# Patient Record
Sex: FEMALE | Race: WHITE | NOT HISPANIC OR LATINO | Employment: OTHER | URBAN - METROPOLITAN AREA
[De-identification: names, ages, dates, MRNs, and addresses within clinical notes are randomized per-mention and may not be internally consistent; named-entity substitution may affect disease eponyms.]

---

## 2021-01-18 ENCOUNTER — CLINICAL SUPPORT (OUTPATIENT)
Dept: URGENT CARE | Facility: CLINIC | Age: 66
End: 2021-01-18
Payer: MEDICARE

## 2021-01-18 DIAGNOSIS — J06.9 UPPER RESPIRATORY TRACT INFECTION, UNSPECIFIED TYPE: Primary | ICD-10-CM

## 2021-01-18 DIAGNOSIS — U07.1 COVID-19 VIRUS DETECTED: ICD-10-CM

## 2021-01-18 LAB
CTP QC/QA: YES
SARS-COV-2 RDRP RESP QL NAA+PROBE: POSITIVE

## 2021-01-18 PROCEDURE — 99211 PR OFFICE/OUTPT VISIT, EST, LEVL I: ICD-10-PCS | Mod: S$GLB,,, | Performed by: NURSE PRACTITIONER

## 2021-01-18 PROCEDURE — 99211 OFF/OP EST MAY X REQ PHY/QHP: CPT | Mod: S$GLB,,, | Performed by: NURSE PRACTITIONER

## 2021-01-18 PROCEDURE — U0002: ICD-10-PCS | Mod: QW,S$GLB,, | Performed by: NURSE PRACTITIONER

## 2021-01-18 PROCEDURE — U0002 COVID-19 LAB TEST NON-CDC: HCPCS | Mod: QW,S$GLB,, | Performed by: NURSE PRACTITIONER

## 2021-01-22 ENCOUNTER — NURSE TRIAGE (OUTPATIENT)
Dept: ADMINISTRATIVE | Facility: CLINIC | Age: 66
End: 2021-01-22

## 2021-02-01 ENCOUNTER — PATIENT OUTREACH (OUTPATIENT)
Dept: ADMINISTRATIVE | Facility: CLINIC | Age: 66
End: 2021-02-01

## 2021-02-01 ENCOUNTER — EXTERNAL HOSPITAL ADMISSION (OUTPATIENT)
Dept: ADMINISTRATIVE | Facility: CLINIC | Age: 66
End: 2021-02-01

## 2021-02-11 ENCOUNTER — PATIENT OUTREACH (OUTPATIENT)
Dept: ADMINISTRATIVE | Facility: CLINIC | Age: 66
End: 2021-02-11

## 2021-02-11 ENCOUNTER — EXTERNAL HOSPITAL ADMISSION (OUTPATIENT)
Dept: ADMINISTRATIVE | Facility: CLINIC | Age: 66
End: 2021-02-11

## 2021-02-13 ENCOUNTER — HOSPITAL ENCOUNTER (INPATIENT)
Facility: HOSPITAL | Age: 66
LOS: 7 days | Discharge: HOME | DRG: 389 | End: 2021-02-22
Attending: EMERGENCY MEDICINE | Admitting: SPECIALIST
Payer: MEDICARE

## 2021-02-13 DIAGNOSIS — K55.069 OCCLUSION OF SUPERIOR MESENTERIC ARTERY: ICD-10-CM

## 2021-02-13 DIAGNOSIS — K55.1 CHRONIC INTESTINAL ISCHEMIA: ICD-10-CM

## 2021-02-13 DIAGNOSIS — R07.89 CHEST DISCOMFORT: ICD-10-CM

## 2021-02-13 DIAGNOSIS — N17.9 AKI (ACUTE KIDNEY INJURY): Primary | ICD-10-CM

## 2021-02-13 DIAGNOSIS — E86.0 DEHYDRATION: ICD-10-CM

## 2021-02-13 DIAGNOSIS — K56.609 SBO (SMALL BOWEL OBSTRUCTION): ICD-10-CM

## 2021-02-13 DIAGNOSIS — F32.A ANXIETY AND DEPRESSION: ICD-10-CM

## 2021-02-13 DIAGNOSIS — R06.02 SOB (SHORTNESS OF BREATH): ICD-10-CM

## 2021-02-13 DIAGNOSIS — D64.9 CHRONIC ANEMIA: ICD-10-CM

## 2021-02-13 DIAGNOSIS — R19.7 DIARRHEA, UNSPECIFIED TYPE: ICD-10-CM

## 2021-02-13 DIAGNOSIS — F41.9 ANXIETY AND DEPRESSION: ICD-10-CM

## 2021-02-13 LAB
ALBUMIN SERPL BCP-MCNC: 2.8 G/DL (ref 3.5–5.2)
ALLENS TEST: ABNORMAL
ALP SERPL-CCNC: 109 U/L (ref 55–135)
ALT SERPL W/O P-5'-P-CCNC: 23 U/L (ref 10–44)
ANION GAP SERPL CALC-SCNC: 13 MMOL/L (ref 8–16)
AST SERPL-CCNC: 20 U/L (ref 10–40)
BACTERIA #/AREA URNS HPF: ABNORMAL /HPF
BASOPHILS # BLD AUTO: 0.02 K/UL (ref 0–0.2)
BASOPHILS NFR BLD: 0.1 % (ref 0–1.9)
BILIRUB SERPL-MCNC: 0.9 MG/DL (ref 0.1–1)
BILIRUB UR QL STRIP: ABNORMAL
BUN SERPL-MCNC: 38 MG/DL (ref 8–23)
C DIFF GDH STL QL: NEGATIVE
C DIFF TOX A+B STL QL IA: NEGATIVE
CALCIUM SERPL-MCNC: 8.2 MG/DL (ref 8.7–10.5)
CHLORIDE SERPL-SCNC: 102 MMOL/L (ref 95–110)
CK SERPL-CCNC: 99 U/L (ref 20–180)
CLARITY UR: ABNORMAL
CO2 SERPL-SCNC: 21 MMOL/L (ref 23–29)
COLOR UR: ABNORMAL
CREAT SERPL-MCNC: 2 MG/DL (ref 0.5–1.4)
DELSYS: ABNORMAL
DIFFERENTIAL METHOD: ABNORMAL
EOSINOPHIL # BLD AUTO: 0 K/UL (ref 0–0.5)
EOSINOPHIL NFR BLD: 0.1 % (ref 0–8)
ERYTHROCYTE [DISTWIDTH] IN BLOOD BY AUTOMATED COUNT: 15.5 % (ref 11.5–14.5)
EST. GFR  (AFRICAN AMERICAN): 30 ML/MIN/1.73 M^2
EST. GFR  (NON AFRICAN AMERICAN): 26 ML/MIN/1.73 M^2
ESTIMATED AVG GLUCOSE: 103 MG/DL (ref 68–131)
GIANT PLATELETS BLD QL SMEAR: PRESENT
GLUCOSE SERPL-MCNC: 159 MG/DL (ref 70–110)
GLUCOSE UR QL STRIP: NEGATIVE
HBA1C MFR BLD: 5.2 % (ref 4–5.6)
HCO3 UR-SCNC: 21.3 MMOL/L (ref 24–28)
HCT VFR BLD AUTO: 31.5 % (ref 37–48.5)
HGB BLD-MCNC: 9.7 G/DL (ref 12–16)
HGB UR QL STRIP: NEGATIVE
HYALINE CASTS #/AREA URNS LPF: 2 /LPF
IMM GRANULOCYTES # BLD AUTO: 0.27 K/UL (ref 0–0.04)
IMM GRANULOCYTES NFR BLD AUTO: 1.4 % (ref 0–0.5)
KETONES UR QL STRIP: NEGATIVE
LACTATE SERPL-SCNC: 1.7 MMOL/L (ref 0.5–2.2)
LEUKOCYTE ESTERASE UR QL STRIP: NEGATIVE
LYMPHOCYTES # BLD AUTO: 1.5 K/UL (ref 1–4.8)
LYMPHOCYTES NFR BLD: 7.6 % (ref 18–48)
MAGNESIUM SERPL-MCNC: 1.9 MG/DL (ref 1.6–2.6)
MCH RBC QN AUTO: 30.5 PG (ref 27–31)
MCHC RBC AUTO-ENTMCNC: 30.8 G/DL (ref 32–36)
MCV RBC AUTO: 99 FL (ref 82–98)
MICROSCOPIC COMMENT: ABNORMAL
MODE: ABNORMAL
MONOCYTES # BLD AUTO: 2.4 K/UL (ref 0.3–1)
MONOCYTES NFR BLD: 12.5 % (ref 4–15)
NEUTROPHILS # BLD AUTO: 15 K/UL (ref 1.8–7.7)
NEUTROPHILS NFR BLD: 78.3 % (ref 38–73)
NITRITE UR QL STRIP: NEGATIVE
NRBC BLD-RTO: 0 /100 WBC
OB PNL STL: POSITIVE
PCO2 BLDA: 42.5 MMHG (ref 35–45)
PH SMN: 7.31 [PH] (ref 7.35–7.45)
PH UR STRIP: 5 [PH] (ref 5–8)
PHOSPHATE SERPL-MCNC: 2.9 MG/DL (ref 2.7–4.5)
PLATELET # BLD AUTO: 269 K/UL (ref 150–350)
PLATELET BLD QL SMEAR: ABNORMAL
PMV BLD AUTO: 9.9 FL (ref 9.2–12.9)
PO2 BLDA: 35 MMHG (ref 40–60)
POC BE: -5 MMOL/L
POC SATURATED O2: 61 % (ref 95–100)
POC TCO2: 23 MMOL/L (ref 24–29)
POCT GLUCOSE: 113 MG/DL (ref 70–110)
POTASSIUM SERPL-SCNC: 3.7 MMOL/L (ref 3.5–5.1)
PROT SERPL-MCNC: 6.9 G/DL (ref 6–8.4)
PROT UR QL STRIP: ABNORMAL
RBC # BLD AUTO: 3.18 M/UL (ref 4–5.4)
RBC #/AREA URNS HPF: 4 /HPF (ref 0–4)
SAMPLE: ABNORMAL
SITE: ABNORMAL
SODIUM SERPL-SCNC: 136 MMOL/L (ref 136–145)
SP GR UR STRIP: 1.03 (ref 1–1.03)
TSH SERPL DL<=0.005 MIU/L-ACNC: 1.55 UIU/ML (ref 0.4–4)
URN SPEC COLLECT METH UR: ABNORMAL
UROBILINOGEN UR STRIP-ACNC: NEGATIVE EU/DL
WBC # BLD AUTO: 19.14 K/UL (ref 3.9–12.7)
WBC #/AREA STL HPF: NORMAL /[HPF]
WBC #/AREA URNS HPF: 2 /HPF (ref 0–5)
WBC CLUMPS URNS QL MICRO: ABNORMAL

## 2021-02-13 PROCEDURE — 84100 ASSAY OF PHOSPHORUS: CPT

## 2021-02-13 PROCEDURE — 87046 STOOL CULTR AEROBIC BACT EA: CPT

## 2021-02-13 PROCEDURE — 83036 HEMOGLOBIN GLYCOSYLATED A1C: CPT

## 2021-02-13 PROCEDURE — 63600175 PHARM REV CODE 636 W HCPCS: Performed by: STUDENT IN AN ORGANIZED HEALTH CARE EDUCATION/TRAINING PROGRAM

## 2021-02-13 PROCEDURE — 99285 EMERGENCY DEPT VISIT HI MDM: CPT | Mod: 25

## 2021-02-13 PROCEDURE — 36569 INSJ PICC 5 YR+ W/O IMAGING: CPT

## 2021-02-13 PROCEDURE — 87449 NOS EACH ORGANISM AG IA: CPT

## 2021-02-13 PROCEDURE — 82803 BLOOD GASES ANY COMBINATION: CPT

## 2021-02-13 PROCEDURE — 96361 HYDRATE IV INFUSION ADD-ON: CPT | Performed by: EMERGENCY MEDICINE

## 2021-02-13 PROCEDURE — 25000003 PHARM REV CODE 250: Performed by: STUDENT IN AN ORGANIZED HEALTH CARE EDUCATION/TRAINING PROGRAM

## 2021-02-13 PROCEDURE — G0378 HOSPITAL OBSERVATION PER HR: HCPCS

## 2021-02-13 PROCEDURE — 82550 ASSAY OF CK (CPK): CPT

## 2021-02-13 PROCEDURE — 83735 ASSAY OF MAGNESIUM: CPT

## 2021-02-13 PROCEDURE — 96361 HYDRATE IV INFUSION ADD-ON: CPT

## 2021-02-13 PROCEDURE — 89055 LEUKOCYTE ASSESSMENT FECAL: CPT

## 2021-02-13 PROCEDURE — 83630 LACTOFERRIN FECAL (QUAL): CPT

## 2021-02-13 PROCEDURE — 25000003 PHARM REV CODE 250: Performed by: EMERGENCY MEDICINE

## 2021-02-13 PROCEDURE — 85025 COMPLETE CBC W/AUTO DIFF WBC: CPT

## 2021-02-13 PROCEDURE — C1751 CATH, INF, PER/CENT/MIDLINE: HCPCS

## 2021-02-13 PROCEDURE — 81000 URINALYSIS NONAUTO W/SCOPE: CPT

## 2021-02-13 PROCEDURE — 96360 HYDRATION IV INFUSION INIT: CPT | Mod: 59

## 2021-02-13 PROCEDURE — 84443 ASSAY THYROID STIM HORMONE: CPT

## 2021-02-13 PROCEDURE — A4216 STERILE WATER/SALINE, 10 ML: HCPCS | Performed by: STUDENT IN AN ORGANIZED HEALTH CARE EDUCATION/TRAINING PROGRAM

## 2021-02-13 PROCEDURE — 87209 SMEAR COMPLEX STAIN: CPT

## 2021-02-13 PROCEDURE — 87324 CLOSTRIDIUM AG IA: CPT

## 2021-02-13 PROCEDURE — 96372 THER/PROPH/DIAG INJ SC/IM: CPT | Mod: 59 | Performed by: EMERGENCY MEDICINE

## 2021-02-13 PROCEDURE — 83605 ASSAY OF LACTIC ACID: CPT

## 2021-02-13 PROCEDURE — 82272 OCCULT BLD FECES 1-3 TESTS: CPT

## 2021-02-13 PROCEDURE — 80053 COMPREHEN METABOLIC PANEL: CPT

## 2021-02-13 PROCEDURE — 96374 THER/PROPH/DIAG INJ IV PUSH: CPT | Performed by: EMERGENCY MEDICINE

## 2021-02-13 PROCEDURE — 87045 FECES CULTURE AEROBIC BACT: CPT

## 2021-02-13 PROCEDURE — 87427 SHIGA-LIKE TOXIN AG IA: CPT | Mod: 59

## 2021-02-13 RX ORDER — ALBUTEROL SULFATE 90 UG/1
2 AEROSOL, METERED RESPIRATORY (INHALATION) EVERY 6 HOURS PRN
Status: DISCONTINUED | OUTPATIENT
Start: 2021-02-13 | End: 2021-02-18

## 2021-02-13 RX ORDER — IBUPROFEN 200 MG
24 TABLET ORAL
Status: DISCONTINUED | OUTPATIENT
Start: 2021-02-13 | End: 2021-02-22 | Stop reason: HOSPADM

## 2021-02-13 RX ORDER — GLUCAGON 1 MG
1 KIT INJECTION
Status: DISCONTINUED | OUTPATIENT
Start: 2021-02-13 | End: 2021-02-22 | Stop reason: HOSPADM

## 2021-02-13 RX ORDER — SODIUM CHLORIDE 0.9 % (FLUSH) 0.9 %
10 SYRINGE (ML) INJECTION
Status: DISCONTINUED | OUTPATIENT
Start: 2021-02-13 | End: 2021-02-22 | Stop reason: HOSPADM

## 2021-02-13 RX ORDER — QUETIAPINE FUMARATE 200 MG/1
200 TABLET, FILM COATED ORAL NIGHTLY
COMMUNITY

## 2021-02-13 RX ORDER — ENOXAPARIN SODIUM 100 MG/ML
40 INJECTION SUBCUTANEOUS EVERY 24 HOURS
Status: DISCONTINUED | OUTPATIENT
Start: 2021-02-13 | End: 2021-02-18

## 2021-02-13 RX ORDER — BUDESONIDE AND FORMOTEROL FUMARATE DIHYDRATE 160; 4.5 UG/1; UG/1
2 AEROSOL RESPIRATORY (INHALATION) EVERY 12 HOURS
COMMUNITY
End: 2021-03-03 | Stop reason: SDUPTHER

## 2021-02-13 RX ORDER — ENOXAPARIN SODIUM 100 MG/ML
40 INJECTION SUBCUTANEOUS EVERY 24 HOURS
Status: DISCONTINUED | OUTPATIENT
Start: 2021-02-13 | End: 2021-02-13 | Stop reason: SDUPTHER

## 2021-02-13 RX ORDER — BENZONATATE 100 MG/1
200 CAPSULE ORAL 3 TIMES DAILY PRN
COMMUNITY
End: 2021-03-03 | Stop reason: SDUPTHER

## 2021-02-13 RX ORDER — ACETAMINOPHEN 325 MG/1
650 TABLET ORAL EVERY 8 HOURS PRN
Status: DISCONTINUED | OUTPATIENT
Start: 2021-02-13 | End: 2021-02-22 | Stop reason: HOSPADM

## 2021-02-13 RX ORDER — RIVAROXABAN 20 MG/1
20 TABLET, FILM COATED ORAL
COMMUNITY
End: 2021-03-03 | Stop reason: SDUPTHER

## 2021-02-13 RX ORDER — ONDANSETRON 2 MG/ML
4 INJECTION INTRAMUSCULAR; INTRAVENOUS ONCE
Status: COMPLETED | OUTPATIENT
Start: 2021-02-13 | End: 2021-02-13

## 2021-02-13 RX ORDER — ACETAMINOPHEN 325 MG/1
650 TABLET ORAL EVERY 4 HOURS PRN
Status: DISCONTINUED | OUTPATIENT
Start: 2021-02-13 | End: 2021-02-22 | Stop reason: HOSPADM

## 2021-02-13 RX ORDER — SODIUM CHLORIDE 0.9 % (FLUSH) 0.9 %
10 SYRINGE (ML) INJECTION EVERY 6 HOURS
Status: DISCONTINUED | OUTPATIENT
Start: 2021-02-13 | End: 2021-02-22 | Stop reason: HOSPADM

## 2021-02-13 RX ORDER — SODIUM CHLORIDE, SODIUM LACTATE, POTASSIUM CHLORIDE, CALCIUM CHLORIDE 600; 310; 30; 20 MG/100ML; MG/100ML; MG/100ML; MG/100ML
INJECTION, SOLUTION INTRAVENOUS CONTINUOUS
Status: ACTIVE | OUTPATIENT
Start: 2021-02-13 | End: 2021-02-14

## 2021-02-13 RX ORDER — INSULIN ASPART 100 [IU]/ML
1-10 INJECTION, SOLUTION INTRAVENOUS; SUBCUTANEOUS
Status: DISCONTINUED | OUTPATIENT
Start: 2021-02-13 | End: 2021-02-22 | Stop reason: HOSPADM

## 2021-02-13 RX ORDER — ALBUTEROL SULFATE 90 UG/1
2 AEROSOL, METERED RESPIRATORY (INHALATION) EVERY 6 HOURS PRN
COMMUNITY
End: 2021-05-24 | Stop reason: SDUPTHER

## 2021-02-13 RX ORDER — IBUPROFEN 200 MG
16 TABLET ORAL
Status: DISCONTINUED | OUTPATIENT
Start: 2021-02-13 | End: 2021-02-22 | Stop reason: HOSPADM

## 2021-02-13 RX ORDER — FLUTICASONE FUROATE AND VILANTEROL TRIFENATATE 200; 25 UG/1; UG/1
1 POWDER RESPIRATORY (INHALATION) DAILY
Status: ON HOLD | COMMUNITY
End: 2021-02-22 | Stop reason: HOSPADM

## 2021-02-13 RX ORDER — SODIUM CHLORIDE 0.9 % (FLUSH) 0.9 %
5 SYRINGE (ML) INJECTION
Status: DISCONTINUED | OUTPATIENT
Start: 2021-02-13 | End: 2021-02-22 | Stop reason: HOSPADM

## 2021-02-13 RX ADMIN — ONDANSETRON 4 MG: 2 INJECTION INTRAMUSCULAR; INTRAVENOUS at 07:02

## 2021-02-13 RX ADMIN — Medication 10 ML: at 06:02

## 2021-02-13 RX ADMIN — ENOXAPARIN SODIUM 40 MG: 40 INJECTION SUBCUTANEOUS at 05:02

## 2021-02-13 RX ADMIN — SODIUM CHLORIDE, SODIUM LACTATE, POTASSIUM CHLORIDE, AND CALCIUM CHLORIDE 1000 ML: .6; .31; .03; .02 INJECTION, SOLUTION INTRAVENOUS at 01:02

## 2021-02-13 RX ADMIN — SODIUM CHLORIDE, SODIUM LACTATE, POTASSIUM CHLORIDE, AND CALCIUM CHLORIDE: .6; .31; .03; .02 INJECTION, SOLUTION INTRAVENOUS at 07:02

## 2021-02-13 RX ADMIN — SODIUM CHLORIDE 1000 ML: 0.9 INJECTION, SOLUTION INTRAVENOUS at 08:02

## 2021-02-13 RX ADMIN — SODIUM CHLORIDE, SODIUM LACTATE, POTASSIUM CHLORIDE, AND CALCIUM CHLORIDE 1000 ML: .6; .31; .03; .02 INJECTION, SOLUTION INTRAVENOUS at 04:02

## 2021-02-14 LAB
ABO + RH BLD: NORMAL
ALBUMIN SERPL BCP-MCNC: 2 G/DL (ref 3.5–5.2)
ALP SERPL-CCNC: 84 U/L (ref 55–135)
ALT SERPL W/O P-5'-P-CCNC: 16 U/L (ref 10–44)
ANION GAP SERPL CALC-SCNC: 6 MMOL/L (ref 8–16)
AST SERPL-CCNC: 13 U/L (ref 10–40)
BASOPHILS # BLD AUTO: 0.01 K/UL (ref 0–0.2)
BASOPHILS # BLD AUTO: 0.02 K/UL (ref 0–0.2)
BASOPHILS NFR BLD: 0.1 % (ref 0–1.9)
BASOPHILS NFR BLD: 0.1 % (ref 0–1.9)
BILIRUB SERPL-MCNC: 0.8 MG/DL (ref 0.1–1)
BLD GP AB SCN CELLS X3 SERPL QL: NORMAL
BLD PROD TYP BPU: NORMAL
BLOOD UNIT EXPIRATION DATE: NORMAL
BLOOD UNIT TYPE CODE: 6200
BLOOD UNIT TYPE: NORMAL
BUN SERPL-MCNC: 24 MG/DL (ref 8–23)
CALCIUM SERPL-MCNC: 7.7 MG/DL (ref 8.7–10.5)
CHLORIDE SERPL-SCNC: 108 MMOL/L (ref 95–110)
CO2 SERPL-SCNC: 24 MMOL/L (ref 23–29)
CODING SYSTEM: NORMAL
CREAT SERPL-MCNC: 0.9 MG/DL (ref 0.5–1.4)
DIFFERENTIAL METHOD: ABNORMAL
DIFFERENTIAL METHOD: ABNORMAL
DISPENSE STATUS: NORMAL
EOSINOPHIL # BLD AUTO: 0.1 K/UL (ref 0–0.5)
EOSINOPHIL # BLD AUTO: 0.1 K/UL (ref 0–0.5)
EOSINOPHIL NFR BLD: 0.6 % (ref 0–8)
EOSINOPHIL NFR BLD: 0.6 % (ref 0–8)
ERYTHROCYTE [DISTWIDTH] IN BLOOD BY AUTOMATED COUNT: 15.6 % (ref 11.5–14.5)
ERYTHROCYTE [DISTWIDTH] IN BLOOD BY AUTOMATED COUNT: 15.7 % (ref 11.5–14.5)
EST. GFR  (AFRICAN AMERICAN): >60 ML/MIN/1.73 M^2
EST. GFR  (NON AFRICAN AMERICAN): >60 ML/MIN/1.73 M^2
GLUCOSE SERPL-MCNC: 91 MG/DL (ref 70–110)
HCT VFR BLD AUTO: 22.6 % (ref 37–48.5)
HCT VFR BLD AUTO: 23.4 % (ref 37–48.5)
HGB BLD-MCNC: 7.1 G/DL (ref 12–16)
HGB BLD-MCNC: 7.3 G/DL (ref 12–16)
IMM GRANULOCYTES # BLD AUTO: 0.11 K/UL (ref 0–0.04)
IMM GRANULOCYTES # BLD AUTO: 0.12 K/UL (ref 0–0.04)
IMM GRANULOCYTES NFR BLD AUTO: 0.8 % (ref 0–0.5)
IMM GRANULOCYTES NFR BLD AUTO: 0.9 % (ref 0–0.5)
LACTATE SERPL-SCNC: 0.7 MMOL/L (ref 0.5–2.2)
LYMPHOCYTES # BLD AUTO: 1.3 K/UL (ref 1–4.8)
LYMPHOCYTES # BLD AUTO: 1.4 K/UL (ref 1–4.8)
LYMPHOCYTES NFR BLD: 9.5 % (ref 18–48)
LYMPHOCYTES NFR BLD: 9.8 % (ref 18–48)
MAGNESIUM SERPL-MCNC: 1.9 MG/DL (ref 1.6–2.6)
MCH RBC QN AUTO: 30.9 PG (ref 27–31)
MCH RBC QN AUTO: 31.4 PG (ref 27–31)
MCHC RBC AUTO-ENTMCNC: 31.2 G/DL (ref 32–36)
MCHC RBC AUTO-ENTMCNC: 31.4 G/DL (ref 32–36)
MCV RBC AUTO: 100 FL (ref 82–98)
MCV RBC AUTO: 99 FL (ref 82–98)
MONOCYTES # BLD AUTO: 1.7 K/UL (ref 0.3–1)
MONOCYTES # BLD AUTO: 1.7 K/UL (ref 0.3–1)
MONOCYTES NFR BLD: 12.1 % (ref 4–15)
MONOCYTES NFR BLD: 12.5 % (ref 4–15)
NEUTROPHILS # BLD AUTO: 10.4 K/UL (ref 1.8–7.7)
NEUTROPHILS # BLD AUTO: 10.5 K/UL (ref 1.8–7.7)
NEUTROPHILS NFR BLD: 76.2 % (ref 38–73)
NEUTROPHILS NFR BLD: 76.8 % (ref 38–73)
NRBC BLD-RTO: 0 /100 WBC
NRBC BLD-RTO: 0 /100 WBC
PHOSPHATE SERPL-MCNC: 2.3 MG/DL (ref 2.7–4.5)
PLATELET # BLD AUTO: 189 K/UL (ref 150–350)
PLATELET # BLD AUTO: 203 K/UL (ref 150–350)
PLATELET BLD QL SMEAR: ABNORMAL
PLATELET BLD QL SMEAR: ABNORMAL
PMV BLD AUTO: 10.1 FL (ref 9.2–12.9)
PMV BLD AUTO: 10.2 FL (ref 9.2–12.9)
POCT GLUCOSE: 73 MG/DL (ref 70–110)
POCT GLUCOSE: 78 MG/DL (ref 70–110)
POCT GLUCOSE: 91 MG/DL (ref 70–110)
POCT GLUCOSE: 94 MG/DL (ref 70–110)
POTASSIUM SERPL-SCNC: 4.1 MMOL/L (ref 3.5–5.1)
PROT SERPL-MCNC: 5.4 G/DL (ref 6–8.4)
RBC # BLD AUTO: 2.26 M/UL (ref 4–5.4)
RBC # BLD AUTO: 2.36 M/UL (ref 4–5.4)
RPR SER QL: NORMAL
SODIUM SERPL-SCNC: 138 MMOL/L (ref 136–145)
TRANS ERYTHROCYTES VOL PATIENT: NORMAL ML
WBC # BLD AUTO: 13.62 K/UL (ref 3.9–12.7)
WBC # BLD AUTO: 13.71 K/UL (ref 3.9–12.7)

## 2021-02-14 PROCEDURE — P9021 RED BLOOD CELLS UNIT: HCPCS

## 2021-02-14 PROCEDURE — 63600175 PHARM REV CODE 636 W HCPCS: Performed by: STUDENT IN AN ORGANIZED HEALTH CARE EDUCATION/TRAINING PROGRAM

## 2021-02-14 PROCEDURE — 85025 COMPLETE CBC W/AUTO DIFF WBC: CPT | Mod: 91

## 2021-02-14 PROCEDURE — 86920 COMPATIBILITY TEST SPIN: CPT

## 2021-02-14 PROCEDURE — 25500020 PHARM REV CODE 255: Performed by: SPECIALIST

## 2021-02-14 PROCEDURE — 83735 ASSAY OF MAGNESIUM: CPT

## 2021-02-14 PROCEDURE — 80053 COMPREHEN METABOLIC PANEL: CPT

## 2021-02-14 PROCEDURE — 97165 OT EVAL LOW COMPLEX 30 MIN: CPT

## 2021-02-14 PROCEDURE — 96372 THER/PROPH/DIAG INJ SC/IM: CPT | Mod: 59 | Performed by: EMERGENCY MEDICINE

## 2021-02-14 PROCEDURE — 96361 HYDRATE IV INFUSION ADD-ON: CPT | Performed by: EMERGENCY MEDICINE

## 2021-02-14 PROCEDURE — 96375 TX/PRO/DX INJ NEW DRUG ADDON: CPT | Performed by: EMERGENCY MEDICINE

## 2021-02-14 PROCEDURE — 36430 TRANSFUSION BLD/BLD COMPNT: CPT

## 2021-02-14 PROCEDURE — 86703 HIV-1/HIV-2 1 RESULT ANTBDY: CPT

## 2021-02-14 PROCEDURE — 83605 ASSAY OF LACTIC ACID: CPT

## 2021-02-14 PROCEDURE — G0378 HOSPITAL OBSERVATION PER HR: HCPCS

## 2021-02-14 PROCEDURE — 25000003 PHARM REV CODE 250: Performed by: STUDENT IN AN ORGANIZED HEALTH CARE EDUCATION/TRAINING PROGRAM

## 2021-02-14 PROCEDURE — 86592 SYPHILIS TEST NON-TREP QUAL: CPT

## 2021-02-14 PROCEDURE — A4216 STERILE WATER/SALINE, 10 ML: HCPCS | Performed by: STUDENT IN AN ORGANIZED HEALTH CARE EDUCATION/TRAINING PROGRAM

## 2021-02-14 PROCEDURE — S0030 INJECTION, METRONIDAZOLE: HCPCS | Performed by: STUDENT IN AN ORGANIZED HEALTH CARE EDUCATION/TRAINING PROGRAM

## 2021-02-14 PROCEDURE — 96376 TX/PRO/DX INJ SAME DRUG ADON: CPT | Performed by: EMERGENCY MEDICINE

## 2021-02-14 PROCEDURE — 25000003 PHARM REV CODE 250: Performed by: NURSE PRACTITIONER

## 2021-02-14 PROCEDURE — 86900 BLOOD TYPING SEROLOGIC ABO: CPT

## 2021-02-14 PROCEDURE — 63600175 PHARM REV CODE 636 W HCPCS: Performed by: NURSE PRACTITIONER

## 2021-02-14 PROCEDURE — 84100 ASSAY OF PHOSPHORUS: CPT

## 2021-02-14 PROCEDURE — 36415 COLL VENOUS BLD VENIPUNCTURE: CPT

## 2021-02-14 PROCEDURE — 97161 PT EVAL LOW COMPLEX 20 MIN: CPT

## 2021-02-14 PROCEDURE — 87040 BLOOD CULTURE FOR BACTERIA: CPT

## 2021-02-14 PROCEDURE — 97116 GAIT TRAINING THERAPY: CPT

## 2021-02-14 RX ORDER — PANTOPRAZOLE SODIUM 40 MG/1
40 TABLET, DELAYED RELEASE ORAL DAILY
Status: COMPLETED | OUTPATIENT
Start: 2021-02-14 | End: 2021-02-14

## 2021-02-14 RX ORDER — METRONIDAZOLE 500 MG/100ML
500 INJECTION, SOLUTION INTRAVENOUS
Status: DISPENSED | OUTPATIENT
Start: 2021-02-14 | End: 2021-02-21

## 2021-02-14 RX ORDER — METOCLOPRAMIDE HYDROCHLORIDE 5 MG/ML
10 INJECTION INTRAMUSCULAR; INTRAVENOUS EVERY 6 HOURS PRN
Status: DISCONTINUED | OUTPATIENT
Start: 2021-02-14 | End: 2021-02-15

## 2021-02-14 RX ORDER — ACETAMINOPHEN 650 MG/1
650 SUPPOSITORY RECTAL EVERY 6 HOURS PRN
Status: DISCONTINUED | OUTPATIENT
Start: 2021-02-14 | End: 2021-02-20

## 2021-02-14 RX ORDER — FAMOTIDINE 10 MG/ML
40 INJECTION INTRAVENOUS ONCE
Status: DISCONTINUED | OUTPATIENT
Start: 2021-02-14 | End: 2021-02-15

## 2021-02-14 RX ORDER — ONDANSETRON 8 MG/1
8 TABLET, ORALLY DISINTEGRATING ORAL EVERY 6 HOURS PRN
Status: DISCONTINUED | OUTPATIENT
Start: 2021-02-14 | End: 2021-02-14

## 2021-02-14 RX ORDER — FAMOTIDINE 20 MG/1
20 TABLET, FILM COATED ORAL NIGHTLY
Status: DISCONTINUED | OUTPATIENT
Start: 2021-02-14 | End: 2021-02-16

## 2021-02-14 RX ORDER — HYDROCODONE BITARTRATE AND ACETAMINOPHEN 500; 5 MG/1; MG/1
TABLET ORAL
Status: DISCONTINUED | OUTPATIENT
Start: 2021-02-14 | End: 2021-02-22 | Stop reason: HOSPADM

## 2021-02-14 RX ORDER — CEFEPIME HYDROCHLORIDE 1 G/50ML
2 INJECTION, SOLUTION INTRAVENOUS
Status: DISCONTINUED | OUTPATIENT
Start: 2021-02-14 | End: 2021-02-15

## 2021-02-14 RX ORDER — LORAZEPAM 1 MG/1
1 TABLET ORAL DAILY PRN
Status: DISCONTINUED | OUTPATIENT
Start: 2021-02-14 | End: 2021-02-14

## 2021-02-14 RX ORDER — SODIUM CHLORIDE, SODIUM LACTATE, POTASSIUM CHLORIDE, CALCIUM CHLORIDE 600; 310; 30; 20 MG/100ML; MG/100ML; MG/100ML; MG/100ML
INJECTION, SOLUTION INTRAVENOUS CONTINUOUS
Status: DISCONTINUED | OUTPATIENT
Start: 2021-02-14 | End: 2021-02-14

## 2021-02-14 RX ORDER — QUETIAPINE FUMARATE 100 MG/1
200 TABLET, FILM COATED ORAL NIGHTLY
Status: DISCONTINUED | OUTPATIENT
Start: 2021-02-14 | End: 2021-02-18

## 2021-02-14 RX ORDER — LORAZEPAM 1 MG/1
1 TABLET ORAL 2 TIMES DAILY PRN
Status: DISCONTINUED | OUTPATIENT
Start: 2021-02-14 | End: 2021-02-18

## 2021-02-14 RX ORDER — ONDANSETRON 2 MG/ML
8 INJECTION INTRAMUSCULAR; INTRAVENOUS ONCE
Status: COMPLETED | OUTPATIENT
Start: 2021-02-14 | End: 2021-02-14

## 2021-02-14 RX ADMIN — ONDANSETRON 8 MG: 2 INJECTION INTRAMUSCULAR; INTRAVENOUS at 10:02

## 2021-02-14 RX ADMIN — ENOXAPARIN SODIUM 40 MG: 40 INJECTION SUBCUTANEOUS at 04:02

## 2021-02-14 RX ADMIN — METOCLOPRAMIDE 10 MG: 5 INJECTION, SOLUTION INTRAMUSCULAR; INTRAVENOUS at 03:02

## 2021-02-14 RX ADMIN — ONDANSETRON 8 MG: 8 TABLET, ORALLY DISINTEGRATING ORAL at 04:02

## 2021-02-14 RX ADMIN — FAMOTIDINE 20 MG: 20 TABLET, FILM COATED ORAL at 06:02

## 2021-02-14 RX ADMIN — QUETIAPINE FUMARATE 200 MG: 100 TABLET ORAL at 06:02

## 2021-02-14 RX ADMIN — CEFEPIME 2 G: 2 INJECTION, POWDER, FOR SOLUTION INTRAVENOUS at 10:02

## 2021-02-14 RX ADMIN — SODIUM CHLORIDE, SODIUM LACTATE, POTASSIUM CHLORIDE, AND CALCIUM CHLORIDE 1000 ML: .6; .31; .03; .02 INJECTION, SOLUTION INTRAVENOUS at 02:02

## 2021-02-14 RX ADMIN — Medication 10 ML: at 05:02

## 2021-02-14 RX ADMIN — Medication 10 ML: at 12:02

## 2021-02-14 RX ADMIN — Medication 10 ML: at 07:02

## 2021-02-14 RX ADMIN — METOCLOPRAMIDE 10 MG: 5 INJECTION, SOLUTION INTRAMUSCULAR; INTRAVENOUS at 05:02

## 2021-02-14 RX ADMIN — Medication 10 ML: at 01:02

## 2021-02-14 RX ADMIN — LORAZEPAM 1 MG: 2 INJECTION INTRAMUSCULAR; INTRAVENOUS at 10:02

## 2021-02-14 RX ADMIN — Medication 10 ML: at 11:02

## 2021-02-14 RX ADMIN — IOHEXOL 100 ML: 350 INJECTION, SOLUTION INTRAVENOUS at 12:02

## 2021-02-14 RX ADMIN — Medication 5 ML: at 11:02

## 2021-02-14 RX ADMIN — ACETAMINOPHEN 650 MG: 650 SUPPOSITORY RECTAL at 10:02

## 2021-02-14 RX ADMIN — SODIUM CHLORIDE, SODIUM LACTATE, POTASSIUM CHLORIDE, AND CALCIUM CHLORIDE: .6; .31; .03; .02 INJECTION, SOLUTION INTRAVENOUS at 03:02

## 2021-02-14 RX ADMIN — LORAZEPAM 1 MG: 1 TABLET ORAL at 04:02

## 2021-02-14 RX ADMIN — LORAZEPAM 1 MG: 1 TABLET ORAL at 10:02

## 2021-02-14 RX ADMIN — PANTOPRAZOLE SODIUM 40 MG: 40 TABLET, DELAYED RELEASE ORAL at 03:02

## 2021-02-15 PROBLEM — N17.9 AKI (ACUTE KIDNEY INJURY): Status: RESOLVED | Noted: 2021-02-13 | Resolved: 2021-02-15

## 2021-02-15 PROBLEM — K56.609 SBO (SMALL BOWEL OBSTRUCTION): Status: ACTIVE | Noted: 2021-02-15

## 2021-02-15 LAB
ALBUMIN SERPL BCP-MCNC: 2.5 G/DL (ref 3.5–5.2)
ALP SERPL-CCNC: 106 U/L (ref 55–135)
ALT SERPL W/O P-5'-P-CCNC: 16 U/L (ref 10–44)
ANION GAP SERPL CALC-SCNC: 11 MMOL/L (ref 8–16)
AST SERPL-CCNC: 12 U/L (ref 10–40)
BASOPHILS # BLD AUTO: ABNORMAL K/UL (ref 0–0.2)
BASOPHILS NFR BLD: 0 % (ref 0–1.9)
BILIRUB SERPL-MCNC: 0.6 MG/DL (ref 0.1–1)
BUN SERPL-MCNC: 15 MG/DL (ref 8–23)
CALCIUM SERPL-MCNC: 8.4 MG/DL (ref 8.7–10.5)
CHLORIDE SERPL-SCNC: 106 MMOL/L (ref 95–110)
CO2 SERPL-SCNC: 24 MMOL/L (ref 23–29)
CREAT SERPL-MCNC: 0.8 MG/DL (ref 0.5–1.4)
DIFFERENTIAL METHOD: ABNORMAL
E COLI SXT1 STL QL IA: NEGATIVE
E COLI SXT2 STL QL IA: NEGATIVE
EOSINOPHIL # BLD AUTO: ABNORMAL K/UL (ref 0–0.5)
EOSINOPHIL NFR BLD: 1 % (ref 0–8)
ERYTHROCYTE [DISTWIDTH] IN BLOOD BY AUTOMATED COUNT: 17.2 % (ref 11.5–14.5)
EST. GFR  (AFRICAN AMERICAN): >60 ML/MIN/1.73 M^2
EST. GFR  (NON AFRICAN AMERICAN): >60 ML/MIN/1.73 M^2
GLUCOSE SERPL-MCNC: 79 MG/DL (ref 70–110)
HCT VFR BLD AUTO: 29 % (ref 37–48.5)
HGB BLD-MCNC: 8.8 G/DL (ref 12–16)
HIV 1+2 AB+HIV1 P24 AG SERPL QL IA: NEGATIVE
IMM GRANULOCYTES # BLD AUTO: ABNORMAL K/UL (ref 0–0.04)
IMM GRANULOCYTES NFR BLD AUTO: ABNORMAL % (ref 0–0.5)
LYMPHOCYTES # BLD AUTO: ABNORMAL K/UL (ref 1–4.8)
LYMPHOCYTES NFR BLD: 10 % (ref 18–48)
MAGNESIUM SERPL-MCNC: 1.9 MG/DL (ref 1.6–2.6)
MCH RBC QN AUTO: 29.7 PG (ref 27–31)
MCHC RBC AUTO-ENTMCNC: 30.3 G/DL (ref 32–36)
MCV RBC AUTO: 98 FL (ref 82–98)
MONOCYTES # BLD AUTO: ABNORMAL K/UL (ref 0.3–1)
MONOCYTES NFR BLD: 3 % (ref 4–15)
NEUTROPHILS NFR BLD: 74 % (ref 38–73)
NEUTS BAND NFR BLD MANUAL: 12 %
NRBC BLD-RTO: 0 /100 WBC
PHOSPHATE SERPL-MCNC: 3 MG/DL (ref 2.7–4.5)
PLATELET # BLD AUTO: 203 K/UL (ref 150–350)
PLATELET BLD QL SMEAR: ABNORMAL
PMV BLD AUTO: 10.2 FL (ref 9.2–12.9)
POCT GLUCOSE: 73 MG/DL (ref 70–110)
POCT GLUCOSE: 77 MG/DL (ref 70–110)
POCT GLUCOSE: 79 MG/DL (ref 70–110)
POCT GLUCOSE: 88 MG/DL (ref 70–110)
POTASSIUM SERPL-SCNC: 3.9 MMOL/L (ref 3.5–5.1)
PROT SERPL-MCNC: 6.3 G/DL (ref 6–8.4)
RBC # BLD AUTO: 2.96 M/UL (ref 4–5.4)
SODIUM SERPL-SCNC: 141 MMOL/L (ref 136–145)
WBC # BLD AUTO: 10.19 K/UL (ref 3.9–12.7)

## 2021-02-15 PROCEDURE — 11000001 HC ACUTE MED/SURG PRIVATE ROOM

## 2021-02-15 PROCEDURE — 84100 ASSAY OF PHOSPHORUS: CPT

## 2021-02-15 PROCEDURE — 25000003 PHARM REV CODE 250: Performed by: STUDENT IN AN ORGANIZED HEALTH CARE EDUCATION/TRAINING PROGRAM

## 2021-02-15 PROCEDURE — 96376 TX/PRO/DX INJ SAME DRUG ADON: CPT | Performed by: EMERGENCY MEDICINE

## 2021-02-15 PROCEDURE — 63600175 PHARM REV CODE 636 W HCPCS: Performed by: STUDENT IN AN ORGANIZED HEALTH CARE EDUCATION/TRAINING PROGRAM

## 2021-02-15 PROCEDURE — 80053 COMPREHEN METABOLIC PANEL: CPT

## 2021-02-15 PROCEDURE — 97110 THERAPEUTIC EXERCISES: CPT

## 2021-02-15 PROCEDURE — 27000221 HC OXYGEN, UP TO 24 HOURS

## 2021-02-15 PROCEDURE — 63600175 PHARM REV CODE 636 W HCPCS: Performed by: SPECIALIST

## 2021-02-15 PROCEDURE — 25000003 PHARM REV CODE 250: Performed by: SPECIALIST

## 2021-02-15 PROCEDURE — 85027 COMPLETE CBC AUTOMATED: CPT

## 2021-02-15 PROCEDURE — 97530 THERAPEUTIC ACTIVITIES: CPT

## 2021-02-15 PROCEDURE — 83735 ASSAY OF MAGNESIUM: CPT

## 2021-02-15 PROCEDURE — 94760 N-INVAS EAR/PLS OXIMETRY 1: CPT

## 2021-02-15 PROCEDURE — 99900035 HC TECH TIME PER 15 MIN (STAT)

## 2021-02-15 PROCEDURE — 36415 COLL VENOUS BLD VENIPUNCTURE: CPT

## 2021-02-15 PROCEDURE — A4216 STERILE WATER/SALINE, 10 ML: HCPCS | Performed by: STUDENT IN AN ORGANIZED HEALTH CARE EDUCATION/TRAINING PROGRAM

## 2021-02-15 PROCEDURE — 85007 BL SMEAR W/DIFF WBC COUNT: CPT

## 2021-02-15 PROCEDURE — S0030 INJECTION, METRONIDAZOLE: HCPCS | Performed by: STUDENT IN AN ORGANIZED HEALTH CARE EDUCATION/TRAINING PROGRAM

## 2021-02-15 RX ORDER — ATORVASTATIN CALCIUM 40 MG/1
40 TABLET, FILM COATED ORAL NIGHTLY
Status: DISCONTINUED | OUTPATIENT
Start: 2021-02-15 | End: 2021-02-22 | Stop reason: HOSPADM

## 2021-02-15 RX ORDER — CEFEPIME HYDROCHLORIDE 1 G/50ML
2 INJECTION, SOLUTION INTRAVENOUS
Status: COMPLETED | OUTPATIENT
Start: 2021-02-15 | End: 2021-02-21

## 2021-02-15 RX ORDER — MUPIROCIN 20 MG/G
OINTMENT TOPICAL 2 TIMES DAILY
Status: DISPENSED | OUTPATIENT
Start: 2021-02-15 | End: 2021-02-20

## 2021-02-15 RX ORDER — CLOPIDOGREL BISULFATE 75 MG/1
75 TABLET ORAL DAILY
Status: DISCONTINUED | OUTPATIENT
Start: 2021-02-15 | End: 2021-02-22 | Stop reason: HOSPADM

## 2021-02-15 RX ORDER — SODIUM CHLORIDE, SODIUM LACTATE, POTASSIUM CHLORIDE, CALCIUM CHLORIDE 600; 310; 30; 20 MG/100ML; MG/100ML; MG/100ML; MG/100ML
INJECTION, SOLUTION INTRAVENOUS CONTINUOUS
Status: ACTIVE | OUTPATIENT
Start: 2021-02-15 | End: 2021-02-16

## 2021-02-15 RX ORDER — ONDANSETRON 4 MG/1
4 TABLET, ORALLY DISINTEGRATING ORAL EVERY 6 HOURS PRN
Status: DISCONTINUED | OUTPATIENT
Start: 2021-02-15 | End: 2021-02-22 | Stop reason: HOSPADM

## 2021-02-15 RX ADMIN — Medication 10 ML: at 05:02

## 2021-02-15 RX ADMIN — CLOPIDOGREL 75 MG: 75 TABLET, FILM COATED ORAL at 12:02

## 2021-02-15 RX ADMIN — Medication 10 ML: at 12:02

## 2021-02-15 RX ADMIN — CEFEPIME 2 G: 2 INJECTION, POWDER, FOR SOLUTION INTRAVENOUS at 05:02

## 2021-02-15 RX ADMIN — MUPIROCIN: 20 OINTMENT TOPICAL at 12:02

## 2021-02-15 RX ADMIN — LORAZEPAM 1 MG: 1 TABLET ORAL at 05:02

## 2021-02-15 RX ADMIN — LORAZEPAM 1 MG: 1 TABLET ORAL at 12:02

## 2021-02-15 RX ADMIN — Medication 10 ML: at 06:02

## 2021-02-15 RX ADMIN — QUETIAPINE FUMARATE 200 MG: 100 TABLET ORAL at 08:02

## 2021-02-15 RX ADMIN — SODIUM CHLORIDE: 0.9 INJECTION, SOLUTION INTRAVENOUS at 04:02

## 2021-02-15 RX ADMIN — LORAZEPAM 1 MG: 1 TABLET ORAL at 09:02

## 2021-02-15 RX ADMIN — METRONIDAZOLE 500 MG: 500 INJECTION, SOLUTION INTRAVENOUS at 02:02

## 2021-02-15 RX ADMIN — METRONIDAZOLE 500 MG: 500 INJECTION, SOLUTION INTRAVENOUS at 09:02

## 2021-02-15 RX ADMIN — METOCLOPRAMIDE 10 MG: 5 INJECTION, SOLUTION INTRAMUSCULAR; INTRAVENOUS at 05:02

## 2021-02-15 RX ADMIN — MUPIROCIN: 20 OINTMENT TOPICAL at 09:02

## 2021-02-15 RX ADMIN — FAMOTIDINE 20 MG: 20 TABLET, FILM COATED ORAL at 08:02

## 2021-02-15 RX ADMIN — METRONIDAZOLE 500 MG: 500 INJECTION, SOLUTION INTRAVENOUS at 05:02

## 2021-02-15 RX ADMIN — PROMETHAZINE HYDROCHLORIDE 12.5 MG: 25 INJECTION INTRAMUSCULAR; INTRAVENOUS at 04:02

## 2021-02-15 RX ADMIN — CEFEPIME 2 G: 2 INJECTION, POWDER, FOR SOLUTION INTRAVENOUS at 09:02

## 2021-02-15 RX ADMIN — ENOXAPARIN SODIUM 40 MG: 40 INJECTION SUBCUTANEOUS at 05:02

## 2021-02-15 RX ADMIN — ONDANSETRON 4 MG: 4 TABLET, ORALLY DISINTEGRATING ORAL at 12:02

## 2021-02-15 RX ADMIN — SODIUM CHLORIDE, SODIUM LACTATE, POTASSIUM CHLORIDE, AND CALCIUM CHLORIDE: .6; .31; .03; .02 INJECTION, SOLUTION INTRAVENOUS at 08:02

## 2021-02-15 RX ADMIN — ATORVASTATIN CALCIUM 40 MG: 40 TABLET, FILM COATED ORAL at 12:02

## 2021-02-15 RX ADMIN — CEFEPIME 2 G: 2 INJECTION, POWDER, FOR SOLUTION INTRAVENOUS at 01:02

## 2021-02-16 LAB
ALBUMIN SERPL BCP-MCNC: 2.4 G/DL (ref 3.5–5.2)
ALP SERPL-CCNC: 91 U/L (ref 55–135)
ALT SERPL W/O P-5'-P-CCNC: 14 U/L (ref 10–44)
ANION GAP SERPL CALC-SCNC: 14 MMOL/L (ref 8–16)
AST SERPL-CCNC: 11 U/L (ref 10–40)
BASOPHILS # BLD AUTO: 0.03 K/UL (ref 0–0.2)
BASOPHILS NFR BLD: 0.4 % (ref 0–1.9)
BILIRUB SERPL-MCNC: 0.4 MG/DL (ref 0.1–1)
BUN SERPL-MCNC: 11 MG/DL (ref 8–23)
CALCIUM SERPL-MCNC: 8.2 MG/DL (ref 8.7–10.5)
CHLORIDE SERPL-SCNC: 105 MMOL/L (ref 95–110)
CO2 SERPL-SCNC: 22 MMOL/L (ref 23–29)
CREAT SERPL-MCNC: 0.7 MG/DL (ref 0.5–1.4)
DIFFERENTIAL METHOD: ABNORMAL
EOSINOPHIL # BLD AUTO: 0.1 K/UL (ref 0–0.5)
EOSINOPHIL NFR BLD: 1.3 % (ref 0–8)
ERYTHROCYTE [DISTWIDTH] IN BLOOD BY AUTOMATED COUNT: 16.8 % (ref 11.5–14.5)
EST. GFR  (AFRICAN AMERICAN): >60 ML/MIN/1.73 M^2
EST. GFR  (NON AFRICAN AMERICAN): >60 ML/MIN/1.73 M^2
GLUCOSE SERPL-MCNC: 78 MG/DL (ref 70–110)
HCT VFR BLD AUTO: 28.6 % (ref 37–48.5)
HGB BLD-MCNC: 8.6 G/DL (ref 12–16)
IMM GRANULOCYTES # BLD AUTO: 0.13 K/UL (ref 0–0.04)
IMM GRANULOCYTES NFR BLD AUTO: 1.7 % (ref 0–0.5)
LYMPHOCYTES # BLD AUTO: 0.8 K/UL (ref 1–4.8)
LYMPHOCYTES NFR BLD: 10.8 % (ref 18–48)
MAGNESIUM SERPL-MCNC: 1.7 MG/DL (ref 1.6–2.6)
MCH RBC QN AUTO: 29.1 PG (ref 27–31)
MCHC RBC AUTO-ENTMCNC: 30.1 G/DL (ref 32–36)
MCV RBC AUTO: 97 FL (ref 82–98)
MONOCYTES # BLD AUTO: 0.9 K/UL (ref 0.3–1)
MONOCYTES NFR BLD: 11.2 % (ref 4–15)
NEUTROPHILS # BLD AUTO: 5.7 K/UL (ref 1.8–7.7)
NEUTROPHILS NFR BLD: 74.6 % (ref 38–73)
NRBC BLD-RTO: 0 /100 WBC
PHOSPHATE SERPL-MCNC: 3.2 MG/DL (ref 2.7–4.5)
PLATELET # BLD AUTO: 193 K/UL (ref 150–350)
PMV BLD AUTO: 10.4 FL (ref 9.2–12.9)
POCT GLUCOSE: 112 MG/DL (ref 70–110)
POCT GLUCOSE: 114 MG/DL (ref 70–110)
POCT GLUCOSE: 129 MG/DL (ref 70–110)
POCT GLUCOSE: 80 MG/DL (ref 70–110)
POTASSIUM SERPL-SCNC: 3.5 MMOL/L (ref 3.5–5.1)
PROT SERPL-MCNC: 6.1 G/DL (ref 6–8.4)
RBC # BLD AUTO: 2.96 M/UL (ref 4–5.4)
SODIUM SERPL-SCNC: 141 MMOL/L (ref 136–145)
WBC # BLD AUTO: 7.61 K/UL (ref 3.9–12.7)

## 2021-02-16 PROCEDURE — 25000003 PHARM REV CODE 250: Performed by: STUDENT IN AN ORGANIZED HEALTH CARE EDUCATION/TRAINING PROGRAM

## 2021-02-16 PROCEDURE — 36415 COLL VENOUS BLD VENIPUNCTURE: CPT

## 2021-02-16 PROCEDURE — 87040 BLOOD CULTURE FOR BACTERIA: CPT | Mod: 59

## 2021-02-16 PROCEDURE — 63600175 PHARM REV CODE 636 W HCPCS: Performed by: STUDENT IN AN ORGANIZED HEALTH CARE EDUCATION/TRAINING PROGRAM

## 2021-02-16 PROCEDURE — 25500020 PHARM REV CODE 255: Performed by: STUDENT IN AN ORGANIZED HEALTH CARE EDUCATION/TRAINING PROGRAM

## 2021-02-16 PROCEDURE — S0030 INJECTION, METRONIDAZOLE: HCPCS | Performed by: STUDENT IN AN ORGANIZED HEALTH CARE EDUCATION/TRAINING PROGRAM

## 2021-02-16 PROCEDURE — 11000001 HC ACUTE MED/SURG PRIVATE ROOM

## 2021-02-16 PROCEDURE — 63600175 PHARM REV CODE 636 W HCPCS: Performed by: SPECIALIST

## 2021-02-16 PROCEDURE — 80053 COMPREHEN METABOLIC PANEL: CPT

## 2021-02-16 PROCEDURE — 83735 ASSAY OF MAGNESIUM: CPT

## 2021-02-16 PROCEDURE — 97110 THERAPEUTIC EXERCISES: CPT | Mod: CQ

## 2021-02-16 PROCEDURE — 85025 COMPLETE CBC W/AUTO DIFF WBC: CPT

## 2021-02-16 PROCEDURE — A4216 STERILE WATER/SALINE, 10 ML: HCPCS | Performed by: STUDENT IN AN ORGANIZED HEALTH CARE EDUCATION/TRAINING PROGRAM

## 2021-02-16 PROCEDURE — 25000003 PHARM REV CODE 250: Performed by: SPECIALIST

## 2021-02-16 PROCEDURE — 84100 ASSAY OF PHOSPHORUS: CPT

## 2021-02-16 PROCEDURE — 97530 THERAPEUTIC ACTIVITIES: CPT | Mod: CQ

## 2021-02-16 PROCEDURE — 99900035 HC TECH TIME PER 15 MIN (STAT)

## 2021-02-16 RX ORDER — METOCLOPRAMIDE HYDROCHLORIDE 5 MG/ML
10 INJECTION INTRAMUSCULAR; INTRAVENOUS EVERY 6 HOURS PRN
Status: DISCONTINUED | OUTPATIENT
Start: 2021-02-16 | End: 2021-02-17

## 2021-02-16 RX ORDER — FAMOTIDINE 20 MG/1
20 TABLET, FILM COATED ORAL 2 TIMES DAILY
Status: DISCONTINUED | OUTPATIENT
Start: 2021-02-16 | End: 2021-02-22 | Stop reason: HOSPADM

## 2021-02-16 RX ORDER — SODIUM CHLORIDE, SODIUM LACTATE, POTASSIUM CHLORIDE, CALCIUM CHLORIDE 600; 310; 30; 20 MG/100ML; MG/100ML; MG/100ML; MG/100ML
INJECTION, SOLUTION INTRAVENOUS CONTINUOUS
Status: ACTIVE | OUTPATIENT
Start: 2021-02-16 | End: 2021-02-17

## 2021-02-16 RX ORDER — LABETALOL HYDROCHLORIDE 5 MG/ML
10 INJECTION, SOLUTION INTRAVENOUS ONCE
Status: COMPLETED | OUTPATIENT
Start: 2021-02-16 | End: 2021-02-16

## 2021-02-16 RX ADMIN — CEFEPIME 2 G: 2 INJECTION, POWDER, FOR SOLUTION INTRAVENOUS at 05:02

## 2021-02-16 RX ADMIN — FAMOTIDINE 20 MG: 20 TABLET, FILM COATED ORAL at 10:02

## 2021-02-16 RX ADMIN — DIATRIZOATE MEGLUMINE AND DIATRIZOATE SODIUM 120 ML: 660; 100 LIQUID ORAL; RECTAL at 01:02

## 2021-02-16 RX ADMIN — FAMOTIDINE 20 MG: 20 TABLET, FILM COATED ORAL at 08:02

## 2021-02-16 RX ADMIN — MUPIROCIN: 20 OINTMENT TOPICAL at 10:02

## 2021-02-16 RX ADMIN — METRONIDAZOLE 500 MG: 500 INJECTION, SOLUTION INTRAVENOUS at 02:02

## 2021-02-16 RX ADMIN — ATORVASTATIN CALCIUM 40 MG: 40 TABLET, FILM COATED ORAL at 08:02

## 2021-02-16 RX ADMIN — METRONIDAZOLE 500 MG: 500 INJECTION, SOLUTION INTRAVENOUS at 09:02

## 2021-02-16 RX ADMIN — ACETAMINOPHEN 650 MG: 325 TABLET ORAL at 08:02

## 2021-02-16 RX ADMIN — Medication 10 ML: at 05:02

## 2021-02-16 RX ADMIN — ENOXAPARIN SODIUM 40 MG: 40 INJECTION SUBCUTANEOUS at 04:02

## 2021-02-16 RX ADMIN — QUETIAPINE FUMARATE 200 MG: 100 TABLET ORAL at 08:02

## 2021-02-16 RX ADMIN — ONDANSETRON 4 MG: 4 TABLET, ORALLY DISINTEGRATING ORAL at 10:02

## 2021-02-16 RX ADMIN — Medication 10 ML: at 11:02

## 2021-02-16 RX ADMIN — SODIUM CHLORIDE, SODIUM LACTATE, POTASSIUM CHLORIDE, AND CALCIUM CHLORIDE: .6; .31; .03; .02 INJECTION, SOLUTION INTRAVENOUS at 09:02

## 2021-02-16 RX ADMIN — CEFEPIME 2 G: 2 INJECTION, POWDER, FOR SOLUTION INTRAVENOUS at 03:02

## 2021-02-16 RX ADMIN — METOCLOPRAMIDE 10 MG: 5 INJECTION, SOLUTION INTRAMUSCULAR; INTRAVENOUS at 04:02

## 2021-02-16 RX ADMIN — LORAZEPAM 1 MG: 1 TABLET ORAL at 08:02

## 2021-02-16 RX ADMIN — ACETAMINOPHEN 650 MG: 325 TABLET ORAL at 03:02

## 2021-02-16 RX ADMIN — CLOPIDOGREL 75 MG: 75 TABLET, FILM COATED ORAL at 09:02

## 2021-02-16 RX ADMIN — CEFEPIME 2 G: 2 INJECTION, POWDER, FOR SOLUTION INTRAVENOUS at 10:02

## 2021-02-16 RX ADMIN — MUPIROCIN: 20 OINTMENT TOPICAL at 08:02

## 2021-02-16 RX ADMIN — LORAZEPAM 1 MG: 1 TABLET ORAL at 09:02

## 2021-02-16 RX ADMIN — LABETALOL HYDROCHLORIDE 10 MG: 5 INJECTION, SOLUTION INTRAVENOUS at 06:02

## 2021-02-16 RX ADMIN — METRONIDAZOLE 500 MG: 500 INJECTION, SOLUTION INTRAVENOUS at 05:02

## 2021-02-17 LAB
ALBUMIN SERPL BCP-MCNC: 2.5 G/DL (ref 3.5–5.2)
ALP SERPL-CCNC: 90 U/L (ref 55–135)
ALT SERPL W/O P-5'-P-CCNC: 12 U/L (ref 10–44)
ANION GAP SERPL CALC-SCNC: 12 MMOL/L (ref 8–16)
AST SERPL-CCNC: 13 U/L (ref 10–40)
BACTERIA STL CULT: NORMAL
BASOPHILS # BLD AUTO: 0.06 K/UL (ref 0–0.2)
BASOPHILS NFR BLD: 0.8 % (ref 0–1.9)
BILIRUB SERPL-MCNC: 0.3 MG/DL (ref 0.1–1)
BUN SERPL-MCNC: 10 MG/DL (ref 8–23)
CALCIUM SERPL-MCNC: 8 MG/DL (ref 8.7–10.5)
CHLORIDE SERPL-SCNC: 109 MMOL/L (ref 95–110)
CO2 SERPL-SCNC: 20 MMOL/L (ref 23–29)
CREAT SERPL-MCNC: 0.7 MG/DL (ref 0.5–1.4)
DIFFERENTIAL METHOD: ABNORMAL
EOSINOPHIL # BLD AUTO: 0.1 K/UL (ref 0–0.5)
EOSINOPHIL NFR BLD: 1.3 % (ref 0–8)
ERYTHROCYTE [DISTWIDTH] IN BLOOD BY AUTOMATED COUNT: 16.9 % (ref 11.5–14.5)
EST. GFR  (AFRICAN AMERICAN): >60 ML/MIN/1.73 M^2
EST. GFR  (NON AFRICAN AMERICAN): >60 ML/MIN/1.73 M^2
GLUCOSE SERPL-MCNC: 118 MG/DL (ref 70–110)
HCT VFR BLD AUTO: 31.9 % (ref 37–48.5)
HGB BLD-MCNC: 9.5 G/DL (ref 12–16)
IMM GRANULOCYTES # BLD AUTO: 0.34 K/UL (ref 0–0.04)
IMM GRANULOCYTES NFR BLD AUTO: 4.3 % (ref 0–0.5)
LACTOFERRIN STL QL IA: POSITIVE
LYMPHOCYTES # BLD AUTO: 0.8 K/UL (ref 1–4.8)
LYMPHOCYTES NFR BLD: 10.5 % (ref 18–48)
MAGNESIUM SERPL-MCNC: 1.6 MG/DL (ref 1.6–2.6)
MCH RBC QN AUTO: 29.4 PG (ref 27–31)
MCHC RBC AUTO-ENTMCNC: 29.8 G/DL (ref 32–36)
MCV RBC AUTO: 99 FL (ref 82–98)
MONOCYTES # BLD AUTO: 1 K/UL (ref 0.3–1)
MONOCYTES NFR BLD: 12.8 % (ref 4–15)
NEUTROPHILS # BLD AUTO: 5.6 K/UL (ref 1.8–7.7)
NEUTROPHILS NFR BLD: 70.3 % (ref 38–73)
NRBC BLD-RTO: 0 /100 WBC
PHOSPHATE SERPL-MCNC: 2.6 MG/DL (ref 2.7–4.5)
PLATELET # BLD AUTO: 179 K/UL (ref 150–350)
PMV BLD AUTO: 10.2 FL (ref 9.2–12.9)
POCT GLUCOSE: 105 MG/DL (ref 70–110)
POCT GLUCOSE: 118 MG/DL (ref 70–110)
POCT GLUCOSE: 131 MG/DL (ref 70–110)
POCT GLUCOSE: 144 MG/DL (ref 70–110)
POTASSIUM SERPL-SCNC: 3.4 MMOL/L (ref 3.5–5.1)
PROT SERPL-MCNC: 6.2 G/DL (ref 6–8.4)
RBC # BLD AUTO: 3.23 M/UL (ref 4–5.4)
SODIUM SERPL-SCNC: 141 MMOL/L (ref 136–145)
WBC # BLD AUTO: 7.99 K/UL (ref 3.9–12.7)

## 2021-02-17 PROCEDURE — 99222 PR INITIAL HOSPITAL CARE,LEVL II: ICD-10-PCS | Mod: ,,, | Performed by: INTERNAL MEDICINE

## 2021-02-17 PROCEDURE — 25000003 PHARM REV CODE 250: Performed by: STUDENT IN AN ORGANIZED HEALTH CARE EDUCATION/TRAINING PROGRAM

## 2021-02-17 PROCEDURE — 93010 EKG 12-LEAD: ICD-10-PCS | Mod: ,,, | Performed by: INTERNAL MEDICINE

## 2021-02-17 PROCEDURE — 99900035 HC TECH TIME PER 15 MIN (STAT)

## 2021-02-17 PROCEDURE — 36415 COLL VENOUS BLD VENIPUNCTURE: CPT

## 2021-02-17 PROCEDURE — 80053 COMPREHEN METABOLIC PANEL: CPT

## 2021-02-17 PROCEDURE — 83735 ASSAY OF MAGNESIUM: CPT

## 2021-02-17 PROCEDURE — 63600175 PHARM REV CODE 636 W HCPCS: Performed by: SPECIALIST

## 2021-02-17 PROCEDURE — 25000003 PHARM REV CODE 250: Performed by: SPECIALIST

## 2021-02-17 PROCEDURE — 63600175 PHARM REV CODE 636 W HCPCS: Performed by: STUDENT IN AN ORGANIZED HEALTH CARE EDUCATION/TRAINING PROGRAM

## 2021-02-17 PROCEDURE — S0030 INJECTION, METRONIDAZOLE: HCPCS | Performed by: STUDENT IN AN ORGANIZED HEALTH CARE EDUCATION/TRAINING PROGRAM

## 2021-02-17 PROCEDURE — 93010 ELECTROCARDIOGRAM REPORT: CPT | Mod: ,,, | Performed by: INTERNAL MEDICINE

## 2021-02-17 PROCEDURE — 85025 COMPLETE CBC W/AUTO DIFF WBC: CPT

## 2021-02-17 PROCEDURE — A4216 STERILE WATER/SALINE, 10 ML: HCPCS | Performed by: STUDENT IN AN ORGANIZED HEALTH CARE EDUCATION/TRAINING PROGRAM

## 2021-02-17 PROCEDURE — 94799 UNLISTED PULMONARY SVC/PX: CPT

## 2021-02-17 PROCEDURE — 11000001 HC ACUTE MED/SURG PRIVATE ROOM

## 2021-02-17 PROCEDURE — 93005 ELECTROCARDIOGRAM TRACING: CPT

## 2021-02-17 PROCEDURE — 99222 1ST HOSP IP/OBS MODERATE 55: CPT | Mod: ,,, | Performed by: INTERNAL MEDICINE

## 2021-02-17 PROCEDURE — 84100 ASSAY OF PHOSPHORUS: CPT

## 2021-02-17 RX ORDER — METOCLOPRAMIDE HYDROCHLORIDE 5 MG/ML
10 INJECTION INTRAMUSCULAR; INTRAVENOUS EVERY 4 HOURS PRN
Status: DISCONTINUED | OUTPATIENT
Start: 2021-02-17 | End: 2021-02-22 | Stop reason: HOSPADM

## 2021-02-17 RX ORDER — AMOXICILLIN 250 MG
2 CAPSULE ORAL 2 TIMES DAILY
Status: DISCONTINUED | OUTPATIENT
Start: 2021-02-17 | End: 2021-02-22 | Stop reason: HOSPADM

## 2021-02-17 RX ORDER — POLYETHYLENE GLYCOL 3350 17 G/17G
17 POWDER, FOR SOLUTION ORAL 2 TIMES DAILY
Status: DISCONTINUED | OUTPATIENT
Start: 2021-02-17 | End: 2021-02-22 | Stop reason: HOSPADM

## 2021-02-17 RX ORDER — METHOCARBAMOL 100 MG/ML
1000 INJECTION, SOLUTION INTRAMUSCULAR; INTRAVENOUS EVERY 8 HOURS
Status: DISCONTINUED | OUTPATIENT
Start: 2021-02-17 | End: 2021-02-17

## 2021-02-17 RX ADMIN — LACTOBACILLUS TAB 4 TABLET: TAB at 04:02

## 2021-02-17 RX ADMIN — QUETIAPINE FUMARATE 200 MG: 100 TABLET ORAL at 08:02

## 2021-02-17 RX ADMIN — CLOPIDOGREL 75 MG: 75 TABLET, FILM COATED ORAL at 08:02

## 2021-02-17 RX ADMIN — MUPIROCIN: 20 OINTMENT TOPICAL at 08:02

## 2021-02-17 RX ADMIN — METHOCARBAMOL: 100 INJECTION INTRAMUSCULAR; INTRAVENOUS at 11:02

## 2021-02-17 RX ADMIN — ATORVASTATIN CALCIUM 40 MG: 40 TABLET, FILM COATED ORAL at 08:02

## 2021-02-17 RX ADMIN — Medication 10 ML: at 05:02

## 2021-02-17 RX ADMIN — METOCLOPRAMIDE 10 MG: 5 INJECTION, SOLUTION INTRAMUSCULAR; INTRAVENOUS at 05:02

## 2021-02-17 RX ADMIN — POLYETHYLENE GLYCOL (3350) 17 G: 17 POWDER, FOR SOLUTION ORAL at 09:02

## 2021-02-17 RX ADMIN — Medication 2 TABLET: at 09:02

## 2021-02-17 RX ADMIN — CEFEPIME 2 G: 2 INJECTION, POWDER, FOR SOLUTION INTRAVENOUS at 10:02

## 2021-02-17 RX ADMIN — FAMOTIDINE 20 MG: 20 TABLET, FILM COATED ORAL at 08:02

## 2021-02-17 RX ADMIN — METRONIDAZOLE 500 MG: 500 INJECTION, SOLUTION INTRAVENOUS at 01:02

## 2021-02-17 RX ADMIN — CEFEPIME 2 G: 2 INJECTION, POWDER, FOR SOLUTION INTRAVENOUS at 05:02

## 2021-02-17 RX ADMIN — METOCLOPRAMIDE 10 MG: 5 INJECTION, SOLUTION INTRAMUSCULAR; INTRAVENOUS at 12:02

## 2021-02-17 RX ADMIN — LACTOBACILLUS TAB 4 TABLET: TAB at 09:02

## 2021-02-17 RX ADMIN — METOCLOPRAMIDE 10 MG: 5 INJECTION, SOLUTION INTRAMUSCULAR; INTRAVENOUS at 08:02

## 2021-02-17 RX ADMIN — PROMETHAZINE HYDROCHLORIDE 12.5 MG: 25 INJECTION INTRAMUSCULAR; INTRAVENOUS at 10:02

## 2021-02-17 RX ADMIN — ACETAMINOPHEN 650 MG: 325 TABLET ORAL at 10:02

## 2021-02-17 RX ADMIN — ENOXAPARIN SODIUM 40 MG: 40 INJECTION SUBCUTANEOUS at 04:02

## 2021-02-17 RX ADMIN — Medication 10 ML: at 11:02

## 2021-02-17 RX ADMIN — METRONIDAZOLE 500 MG: 500 INJECTION, SOLUTION INTRAVENOUS at 09:02

## 2021-02-17 RX ADMIN — METRONIDAZOLE 500 MG: 500 INJECTION, SOLUTION INTRAVENOUS at 05:02

## 2021-02-17 RX ADMIN — LORAZEPAM 1 MG: 1 TABLET ORAL at 08:02

## 2021-02-17 RX ADMIN — CEFEPIME 2 G: 2 INJECTION, POWDER, FOR SOLUTION INTRAVENOUS at 01:02

## 2021-02-17 RX ADMIN — METHOCARBAMOL: 100 INJECTION INTRAMUSCULAR; INTRAVENOUS at 08:02

## 2021-02-18 LAB
ALBUMIN SERPL BCP-MCNC: 2.5 G/DL (ref 3.5–5.2)
ALP SERPL-CCNC: 78 U/L (ref 55–135)
ALT SERPL W/O P-5'-P-CCNC: 12 U/L (ref 10–44)
ANION GAP SERPL CALC-SCNC: 10 MMOL/L (ref 8–16)
ANISOCYTOSIS BLD QL SMEAR: SLIGHT
AST SERPL-CCNC: 11 U/L (ref 10–40)
BASOPHILS # BLD AUTO: ABNORMAL K/UL (ref 0–0.2)
BASOPHILS NFR BLD: 0 % (ref 0–1.9)
BILIRUB SERPL-MCNC: 0.3 MG/DL (ref 0.1–1)
BUN SERPL-MCNC: 8 MG/DL (ref 8–23)
CALCIUM SERPL-MCNC: 8 MG/DL (ref 8.7–10.5)
CHLORIDE SERPL-SCNC: 104 MMOL/L (ref 95–110)
CO2 SERPL-SCNC: 26 MMOL/L (ref 23–29)
CREAT SERPL-MCNC: 0.8 MG/DL (ref 0.5–1.4)
DIFFERENTIAL METHOD: ABNORMAL
EOSINOPHIL # BLD AUTO: ABNORMAL K/UL (ref 0–0.5)
EOSINOPHIL NFR BLD: 1 % (ref 0–8)
ERYTHROCYTE [DISTWIDTH] IN BLOOD BY AUTOMATED COUNT: 16.6 % (ref 11.5–14.5)
EST. GFR  (AFRICAN AMERICAN): >60 ML/MIN/1.73 M^2
EST. GFR  (NON AFRICAN AMERICAN): >60 ML/MIN/1.73 M^2
GLUCOSE SERPL-MCNC: 142 MG/DL (ref 70–110)
HCT VFR BLD AUTO: 30.1 % (ref 37–48.5)
HGB BLD-MCNC: 9.3 G/DL (ref 12–16)
IMM GRANULOCYTES # BLD AUTO: ABNORMAL K/UL (ref 0–0.04)
IMM GRANULOCYTES NFR BLD AUTO: ABNORMAL % (ref 0–0.5)
LYMPHOCYTES # BLD AUTO: ABNORMAL K/UL (ref 1–4.8)
LYMPHOCYTES NFR BLD: 11 % (ref 18–48)
MAGNESIUM SERPL-MCNC: 1.4 MG/DL (ref 1.6–2.6)
MCH RBC QN AUTO: 29.5 PG (ref 27–31)
MCHC RBC AUTO-ENTMCNC: 30.9 G/DL (ref 32–36)
MCV RBC AUTO: 96 FL (ref 82–98)
MONOCYTES # BLD AUTO: ABNORMAL K/UL (ref 0.3–1)
MONOCYTES NFR BLD: 7 % (ref 4–15)
NEUTROPHILS NFR BLD: 77 % (ref 38–73)
NEUTS BAND NFR BLD MANUAL: 4 %
NRBC BLD-RTO: 0 /100 WBC
PHOSPHATE SERPL-MCNC: 2.4 MG/DL (ref 2.7–4.5)
PLATELET # BLD AUTO: 211 K/UL (ref 150–350)
PLATELET BLD QL SMEAR: ABNORMAL
PMV BLD AUTO: 10.3 FL (ref 9.2–12.9)
POCT GLUCOSE: 101 MG/DL (ref 70–110)
POCT GLUCOSE: 107 MG/DL (ref 70–110)
POCT GLUCOSE: 124 MG/DL (ref 70–110)
POCT GLUCOSE: 99 MG/DL (ref 70–110)
POTASSIUM SERPL-SCNC: 3 MMOL/L (ref 3.5–5.1)
PROT SERPL-MCNC: 6 G/DL (ref 6–8.4)
RBC # BLD AUTO: 3.15 M/UL (ref 4–5.4)
SODIUM SERPL-SCNC: 140 MMOL/L (ref 136–145)
WBC # BLD AUTO: 8.66 K/UL (ref 3.9–12.7)

## 2021-02-18 PROCEDURE — 63600175 PHARM REV CODE 636 W HCPCS: Performed by: STUDENT IN AN ORGANIZED HEALTH CARE EDUCATION/TRAINING PROGRAM

## 2021-02-18 PROCEDURE — 94761 N-INVAS EAR/PLS OXIMETRY MLT: CPT

## 2021-02-18 PROCEDURE — 85007 BL SMEAR W/DIFF WBC COUNT: CPT

## 2021-02-18 PROCEDURE — 80053 COMPREHEN METABOLIC PANEL: CPT

## 2021-02-18 PROCEDURE — 87040 BLOOD CULTURE FOR BACTERIA: CPT

## 2021-02-18 PROCEDURE — 11000001 HC ACUTE MED/SURG PRIVATE ROOM

## 2021-02-18 PROCEDURE — A4216 STERILE WATER/SALINE, 10 ML: HCPCS | Performed by: STUDENT IN AN ORGANIZED HEALTH CARE EDUCATION/TRAINING PROGRAM

## 2021-02-18 PROCEDURE — 25000003 PHARM REV CODE 250: Performed by: STUDENT IN AN ORGANIZED HEALTH CARE EDUCATION/TRAINING PROGRAM

## 2021-02-18 PROCEDURE — 63600175 PHARM REV CODE 636 W HCPCS: Performed by: NURSE PRACTITIONER

## 2021-02-18 PROCEDURE — C9113 INJ PANTOPRAZOLE SODIUM, VIA: HCPCS | Performed by: NURSE PRACTITIONER

## 2021-02-18 PROCEDURE — 84100 ASSAY OF PHOSPHORUS: CPT

## 2021-02-18 PROCEDURE — S0030 INJECTION, METRONIDAZOLE: HCPCS | Performed by: STUDENT IN AN ORGANIZED HEALTH CARE EDUCATION/TRAINING PROGRAM

## 2021-02-18 PROCEDURE — 83735 ASSAY OF MAGNESIUM: CPT

## 2021-02-18 PROCEDURE — 99232 PR SUBSEQUENT HOSPITAL CARE,LEVL II: ICD-10-PCS | Mod: ,,, | Performed by: INTERNAL MEDICINE

## 2021-02-18 PROCEDURE — 85027 COMPLETE CBC AUTOMATED: CPT

## 2021-02-18 PROCEDURE — 63600175 PHARM REV CODE 636 W HCPCS: Performed by: SPECIALIST

## 2021-02-18 PROCEDURE — 99232 SBSQ HOSP IP/OBS MODERATE 35: CPT | Mod: ,,, | Performed by: INTERNAL MEDICINE

## 2021-02-18 PROCEDURE — 99900035 HC TECH TIME PER 15 MIN (STAT)

## 2021-02-18 PROCEDURE — 94799 UNLISTED PULMONARY SVC/PX: CPT

## 2021-02-18 PROCEDURE — 25000003 PHARM REV CODE 250: Performed by: SPECIALIST

## 2021-02-18 RX ORDER — MAGNESIUM SULFATE HEPTAHYDRATE 40 MG/ML
2 INJECTION, SOLUTION INTRAVENOUS ONCE
Status: COMPLETED | OUTPATIENT
Start: 2021-02-18 | End: 2021-02-18

## 2021-02-18 RX ORDER — PROMETHAZINE HYDROCHLORIDE 12.5 MG/1
12.5 TABLET ORAL EVERY 6 HOURS PRN
Status: DISCONTINUED | OUTPATIENT
Start: 2021-02-18 | End: 2021-02-18

## 2021-02-18 RX ORDER — PANTOPRAZOLE SODIUM 40 MG/10ML
40 INJECTION, POWDER, LYOPHILIZED, FOR SOLUTION INTRAVENOUS ONCE
Status: COMPLETED | OUTPATIENT
Start: 2021-02-18 | End: 2021-02-18

## 2021-02-18 RX ORDER — POTASSIUM CHLORIDE 20 MEQ/1
20 TABLET, EXTENDED RELEASE ORAL EVERY 4 HOURS
Status: DISCONTINUED | OUTPATIENT
Start: 2021-02-18 | End: 2021-02-18

## 2021-02-18 RX ORDER — IPRATROPIUM BROMIDE AND ALBUTEROL SULFATE 2.5; .5 MG/3ML; MG/3ML
3 SOLUTION RESPIRATORY (INHALATION) EVERY 6 HOURS PRN
Status: DISCONTINUED | OUTPATIENT
Start: 2021-02-18 | End: 2021-02-22 | Stop reason: HOSPADM

## 2021-02-18 RX ORDER — SODIUM,POTASSIUM PHOSPHATES 280-250MG
1 POWDER IN PACKET (EA) ORAL EVERY 4 HOURS
Status: DISCONTINUED | OUTPATIENT
Start: 2021-02-18 | End: 2021-02-18

## 2021-02-18 RX ORDER — POTASSIUM CHLORIDE 7.45 MG/ML
10 INJECTION INTRAVENOUS
Status: COMPLETED | OUTPATIENT
Start: 2021-02-18 | End: 2021-02-18

## 2021-02-18 RX ORDER — SODIUM CHLORIDE, SODIUM LACTATE, POTASSIUM CHLORIDE, CALCIUM CHLORIDE 600; 310; 30; 20 MG/100ML; MG/100ML; MG/100ML; MG/100ML
INJECTION, SOLUTION INTRAVENOUS CONTINUOUS
Status: ACTIVE | OUTPATIENT
Start: 2021-02-18 | End: 2021-02-19

## 2021-02-18 RX ORDER — MECLIZINE HCL 12.5 MG 12.5 MG/1
25 TABLET ORAL 3 TIMES DAILY PRN
Status: ACTIVE | OUTPATIENT
Start: 2021-02-18 | End: 2021-02-21

## 2021-02-18 RX ORDER — FLUTICASONE FUROATE AND VILANTEROL 100; 25 UG/1; UG/1
1 POWDER RESPIRATORY (INHALATION) DAILY
Status: DISCONTINUED | OUTPATIENT
Start: 2021-02-18 | End: 2021-02-22 | Stop reason: HOSPADM

## 2021-02-18 RX ORDER — CARVEDILOL 6.25 MG/1
6.25 TABLET ORAL 2 TIMES DAILY
Status: DISCONTINUED | OUTPATIENT
Start: 2021-02-18 | End: 2021-02-22 | Stop reason: HOSPADM

## 2021-02-18 RX ADMIN — Medication 10 ML: at 12:02

## 2021-02-18 RX ADMIN — METHOCARBAMOL: 100 INJECTION INTRAMUSCULAR; INTRAVENOUS at 11:02

## 2021-02-18 RX ADMIN — POTASSIUM CHLORIDE 10 MEQ: 7.46 INJECTION, SOLUTION INTRAVENOUS at 06:02

## 2021-02-18 RX ADMIN — POTASSIUM CHLORIDE 10 MEQ: 7.46 INJECTION, SOLUTION INTRAVENOUS at 08:02

## 2021-02-18 RX ADMIN — CEFEPIME 2 G: 2 INJECTION, POWDER, FOR SOLUTION INTRAVENOUS at 02:02

## 2021-02-18 RX ADMIN — Medication 10 ML: at 06:02

## 2021-02-18 RX ADMIN — PROMETHAZINE HYDROCHLORIDE 6.25 MG: 25 INJECTION INTRAMUSCULAR; INTRAVENOUS at 11:02

## 2021-02-18 RX ADMIN — PROMETHAZINE HYDROCHLORIDE 12.5 MG: 25 INJECTION INTRAMUSCULAR; INTRAVENOUS at 05:02

## 2021-02-18 RX ADMIN — CEFEPIME 2 G: 2 INJECTION, POWDER, FOR SOLUTION INTRAVENOUS at 05:02

## 2021-02-18 RX ADMIN — LORAZEPAM 1 MG: 1 TABLET ORAL at 04:02

## 2021-02-18 RX ADMIN — METHOCARBAMOL: 100 INJECTION INTRAMUSCULAR; INTRAVENOUS at 08:02

## 2021-02-18 RX ADMIN — LORAZEPAM 1 MG: 1 TABLET ORAL at 05:02

## 2021-02-18 RX ADMIN — METRONIDAZOLE 500 MG: 500 INJECTION, SOLUTION INTRAVENOUS at 02:02

## 2021-02-18 RX ADMIN — ENOXAPARIN SODIUM 40 MG: 40 INJECTION SUBCUTANEOUS at 04:02

## 2021-02-18 RX ADMIN — POTASSIUM CHLORIDE 10 MEQ: 7.46 INJECTION, SOLUTION INTRAVENOUS at 05:02

## 2021-02-18 RX ADMIN — POTASSIUM CHLORIDE 10 MEQ: 7.46 INJECTION, SOLUTION INTRAVENOUS at 02:02

## 2021-02-18 RX ADMIN — POTASSIUM CHLORIDE 10 MEQ: 7.46 INJECTION, SOLUTION INTRAVENOUS at 04:02

## 2021-02-18 RX ADMIN — MAGNESIUM SULFATE 2 G: 2 INJECTION INTRAVENOUS at 09:02

## 2021-02-18 RX ADMIN — LORAZEPAM 0.5 MG: 2 INJECTION INTRAMUSCULAR at 11:02

## 2021-02-18 RX ADMIN — METRONIDAZOLE 500 MG: 500 INJECTION, SOLUTION INTRAVENOUS at 07:02

## 2021-02-18 RX ADMIN — METRONIDAZOLE 500 MG: 500 INJECTION, SOLUTION INTRAVENOUS at 11:02

## 2021-02-18 RX ADMIN — SODIUM CHLORIDE, SODIUM LACTATE, POTASSIUM CHLORIDE, AND CALCIUM CHLORIDE: .6; .31; .03; .02 INJECTION, SOLUTION INTRAVENOUS at 10:02

## 2021-02-18 RX ADMIN — POTASSIUM CHLORIDE 10 MEQ: 7.46 INJECTION, SOLUTION INTRAVENOUS at 03:02

## 2021-02-18 RX ADMIN — PROMETHAZINE HYDROCHLORIDE 6.25 MG: 25 INJECTION INTRAMUSCULAR; INTRAVENOUS at 08:02

## 2021-02-18 RX ADMIN — POTASSIUM PHOSPHATE, MONOBASIC AND POTASSIUM PHOSPHATE, DIBASIC 15 MMOL: 224; 236 INJECTION, SOLUTION, CONCENTRATE INTRAVENOUS at 03:02

## 2021-02-18 RX ADMIN — CEFEPIME 2 G: 2 INJECTION, POWDER, FOR SOLUTION INTRAVENOUS at 08:02

## 2021-02-18 RX ADMIN — LORAZEPAM 0.5 MG: 2 INJECTION INTRAMUSCULAR at 07:02

## 2021-02-18 RX ADMIN — METHOCARBAMOL: 100 INJECTION INTRAMUSCULAR; INTRAVENOUS at 03:02

## 2021-02-18 RX ADMIN — PANTOPRAZOLE SODIUM 40 MG: 40 INJECTION, POWDER, FOR SOLUTION INTRAVENOUS at 10:02

## 2021-02-19 LAB
ALBUMIN SERPL BCP-MCNC: 2.7 G/DL (ref 3.5–5.2)
ALP SERPL-CCNC: 81 U/L (ref 55–135)
ALT SERPL W/O P-5'-P-CCNC: 12 U/L (ref 10–44)
ANION GAP SERPL CALC-SCNC: 10 MMOL/L (ref 8–16)
AST SERPL-CCNC: 12 U/L (ref 10–40)
BASOPHILS # BLD AUTO: ABNORMAL K/UL (ref 0–0.2)
BASOPHILS NFR BLD: 0 % (ref 0–1.9)
BILIRUB SERPL-MCNC: 0.3 MG/DL (ref 0.1–1)
BUN SERPL-MCNC: 8 MG/DL (ref 8–23)
CALCIUM SERPL-MCNC: 7.9 MG/DL (ref 8.7–10.5)
CHLORIDE SERPL-SCNC: 106 MMOL/L (ref 95–110)
CO2 SERPL-SCNC: 21 MMOL/L (ref 23–29)
CREAT SERPL-MCNC: 0.7 MG/DL (ref 0.5–1.4)
DIFFERENTIAL METHOD: ABNORMAL
EOSINOPHIL # BLD AUTO: ABNORMAL K/UL (ref 0–0.5)
EOSINOPHIL NFR BLD: 4 % (ref 0–8)
ERYTHROCYTE [DISTWIDTH] IN BLOOD BY AUTOMATED COUNT: 16.6 % (ref 11.5–14.5)
EST. GFR  (AFRICAN AMERICAN): >60 ML/MIN/1.73 M^2
EST. GFR  (NON AFRICAN AMERICAN): >60 ML/MIN/1.73 M^2
GLUCOSE SERPL-MCNC: 108 MG/DL (ref 70–110)
HCT VFR BLD AUTO: 33.1 % (ref 37–48.5)
HGB BLD-MCNC: 10.4 G/DL (ref 12–16)
IMM GRANULOCYTES # BLD AUTO: ABNORMAL K/UL (ref 0–0.04)
IMM GRANULOCYTES NFR BLD AUTO: ABNORMAL % (ref 0–0.5)
LYMPHOCYTES # BLD AUTO: ABNORMAL K/UL (ref 1–4.8)
LYMPHOCYTES NFR BLD: 6 % (ref 18–48)
MAGNESIUM SERPL-MCNC: 1.9 MG/DL (ref 1.6–2.6)
MCH RBC QN AUTO: 29.5 PG (ref 27–31)
MCHC RBC AUTO-ENTMCNC: 31.4 G/DL (ref 32–36)
MCV RBC AUTO: 94 FL (ref 82–98)
MONOCYTES # BLD AUTO: ABNORMAL K/UL (ref 0.3–1)
MONOCYTES NFR BLD: 4 % (ref 4–15)
MYELOCYTES NFR BLD MANUAL: 1 %
NEUTROPHILS # BLD AUTO: ABNORMAL K/UL (ref 1.8–7.7)
NEUTROPHILS NFR BLD: 81 % (ref 38–73)
NEUTS BAND NFR BLD MANUAL: 4 %
NRBC BLD-RTO: 0 /100 WBC
O+P STL MICRO: NORMAL
PHOSPHATE SERPL-MCNC: 2 MG/DL (ref 2.7–4.5)
PLATELET # BLD AUTO: 283 K/UL (ref 150–350)
PLATELET BLD QL SMEAR: ABNORMAL
PMV BLD AUTO: 10.4 FL (ref 9.2–12.9)
POCT GLUCOSE: 110 MG/DL (ref 70–110)
POCT GLUCOSE: 48 MG/DL (ref 70–110)
POCT GLUCOSE: 78 MG/DL (ref 70–110)
POCT GLUCOSE: 95 MG/DL (ref 70–110)
POTASSIUM SERPL-SCNC: 3.9 MMOL/L (ref 3.5–5.1)
PROT SERPL-MCNC: 6.4 G/DL (ref 6–8.4)
RBC # BLD AUTO: 3.52 M/UL (ref 4–5.4)
SODIUM SERPL-SCNC: 137 MMOL/L (ref 136–145)
WBC # BLD AUTO: 11.69 K/UL (ref 3.9–12.7)

## 2021-02-19 PROCEDURE — A4216 STERILE WATER/SALINE, 10 ML: HCPCS | Performed by: STUDENT IN AN ORGANIZED HEALTH CARE EDUCATION/TRAINING PROGRAM

## 2021-02-19 PROCEDURE — 63600175 PHARM REV CODE 636 W HCPCS: Performed by: STUDENT IN AN ORGANIZED HEALTH CARE EDUCATION/TRAINING PROGRAM

## 2021-02-19 PROCEDURE — 84100 ASSAY OF PHOSPHORUS: CPT

## 2021-02-19 PROCEDURE — 85007 BL SMEAR W/DIFF WBC COUNT: CPT

## 2021-02-19 PROCEDURE — 94640 AIRWAY INHALATION TREATMENT: CPT

## 2021-02-19 PROCEDURE — 99900035 HC TECH TIME PER 15 MIN (STAT)

## 2021-02-19 PROCEDURE — 25000242 PHARM REV CODE 250 ALT 637 W/ HCPCS: Performed by: STUDENT IN AN ORGANIZED HEALTH CARE EDUCATION/TRAINING PROGRAM

## 2021-02-19 PROCEDURE — 97530 THERAPEUTIC ACTIVITIES: CPT | Mod: CO

## 2021-02-19 PROCEDURE — 25000003 PHARM REV CODE 250: Performed by: STUDENT IN AN ORGANIZED HEALTH CARE EDUCATION/TRAINING PROGRAM

## 2021-02-19 PROCEDURE — 94799 UNLISTED PULMONARY SVC/PX: CPT

## 2021-02-19 PROCEDURE — 85027 COMPLETE CBC AUTOMATED: CPT

## 2021-02-19 PROCEDURE — 63600175 PHARM REV CODE 636 W HCPCS: Performed by: SPECIALIST

## 2021-02-19 PROCEDURE — 97530 THERAPEUTIC ACTIVITIES: CPT | Mod: CQ

## 2021-02-19 PROCEDURE — 25000003 PHARM REV CODE 250: Performed by: SPECIALIST

## 2021-02-19 PROCEDURE — 83735 ASSAY OF MAGNESIUM: CPT

## 2021-02-19 PROCEDURE — S0030 INJECTION, METRONIDAZOLE: HCPCS | Performed by: STUDENT IN AN ORGANIZED HEALTH CARE EDUCATION/TRAINING PROGRAM

## 2021-02-19 PROCEDURE — 97110 THERAPEUTIC EXERCISES: CPT | Mod: CQ

## 2021-02-19 PROCEDURE — 80053 COMPREHEN METABOLIC PANEL: CPT

## 2021-02-19 PROCEDURE — 11000001 HC ACUTE MED/SURG PRIVATE ROOM

## 2021-02-19 RX ORDER — HYDRALAZINE HYDROCHLORIDE 20 MG/ML
5 INJECTION INTRAMUSCULAR; INTRAVENOUS EVERY 6 HOURS PRN
Status: DISCONTINUED | OUTPATIENT
Start: 2021-02-19 | End: 2021-02-22 | Stop reason: HOSPADM

## 2021-02-19 RX ADMIN — CEFEPIME 2 G: 2 INJECTION, POWDER, FOR SOLUTION INTRAVENOUS at 02:02

## 2021-02-19 RX ADMIN — Medication 10 ML: at 05:02

## 2021-02-19 RX ADMIN — METRONIDAZOLE 500 MG: 500 INJECTION, SOLUTION INTRAVENOUS at 09:02

## 2021-02-19 RX ADMIN — FAMOTIDINE 20 MG: 20 TABLET, FILM COATED ORAL at 09:02

## 2021-02-19 RX ADMIN — FLUTICASONE FUROATE AND VILANTEROL TRIFENATATE 1 PUFF: 100; 25 POWDER RESPIRATORY (INHALATION) at 11:02

## 2021-02-19 RX ADMIN — LORAZEPAM 0.5 MG: 2 INJECTION INTRAMUSCULAR at 01:02

## 2021-02-19 RX ADMIN — MUPIROCIN: 20 OINTMENT TOPICAL at 09:02

## 2021-02-19 RX ADMIN — METOCLOPRAMIDE 10 MG: 5 INJECTION, SOLUTION INTRAMUSCULAR; INTRAVENOUS at 01:02

## 2021-02-19 RX ADMIN — CLOPIDOGREL 75 MG: 75 TABLET, FILM COATED ORAL at 09:02

## 2021-02-19 RX ADMIN — METHOCARBAMOL: 100 INJECTION INTRAMUSCULAR; INTRAVENOUS at 05:02

## 2021-02-19 RX ADMIN — PROMETHAZINE HYDROCHLORIDE 6.25 MG: 25 INJECTION INTRAMUSCULAR; INTRAVENOUS at 05:02

## 2021-02-19 RX ADMIN — Medication 2 TABLET: at 09:02

## 2021-02-19 RX ADMIN — CEFEPIME 2 G: 2 INJECTION, POWDER, FOR SOLUTION INTRAVENOUS at 10:02

## 2021-02-19 RX ADMIN — METHOCARBAMOL: 100 INJECTION INTRAMUSCULAR; INTRAVENOUS at 08:02

## 2021-02-19 RX ADMIN — METRONIDAZOLE 500 MG: 500 INJECTION, SOLUTION INTRAVENOUS at 05:02

## 2021-02-19 RX ADMIN — PROMETHAZINE HYDROCHLORIDE 6.25 MG: 25 INJECTION INTRAMUSCULAR; INTRAVENOUS at 12:02

## 2021-02-19 RX ADMIN — LORAZEPAM 0.5 MG: 2 INJECTION INTRAMUSCULAR at 07:02

## 2021-02-19 RX ADMIN — CEFEPIME 2 G: 2 INJECTION, POWDER, FOR SOLUTION INTRAVENOUS at 05:02

## 2021-02-19 RX ADMIN — LORAZEPAM 0.5 MG: 2 INJECTION INTRAMUSCULAR at 05:02

## 2021-02-19 RX ADMIN — PROMETHAZINE HYDROCHLORIDE 6.25 MG: 25 INJECTION INTRAMUSCULAR; INTRAVENOUS at 09:02

## 2021-02-19 RX ADMIN — Medication 10 ML: at 02:02

## 2021-02-19 RX ADMIN — CARVEDILOL 6.25 MG: 6.25 TABLET, FILM COATED ORAL at 09:02

## 2021-02-19 RX ADMIN — Medication 10 ML: at 12:02

## 2021-02-20 LAB
ALBUMIN SERPL BCP-MCNC: 2.4 G/DL (ref 3.5–5.2)
ALP SERPL-CCNC: 69 U/L (ref 55–135)
ALT SERPL W/O P-5'-P-CCNC: 12 U/L (ref 10–44)
ANION GAP SERPL CALC-SCNC: 8 MMOL/L (ref 8–16)
ANISOCYTOSIS BLD QL SMEAR: SLIGHT
AST SERPL-CCNC: 12 U/L (ref 10–40)
BACTERIA BLD CULT: NORMAL
BACTERIA BLD CULT: NORMAL
BASOPHILS # BLD AUTO: ABNORMAL K/UL (ref 0–0.2)
BASOPHILS NFR BLD: 0 % (ref 0–1.9)
BILIRUB SERPL-MCNC: 0.3 MG/DL (ref 0.1–1)
BUN SERPL-MCNC: 6 MG/DL (ref 8–23)
CALCIUM SERPL-MCNC: 7.7 MG/DL (ref 8.7–10.5)
CHLORIDE SERPL-SCNC: 104 MMOL/L (ref 95–110)
CO2 SERPL-SCNC: 22 MMOL/L (ref 23–29)
CREAT SERPL-MCNC: 0.7 MG/DL (ref 0.5–1.4)
DIFFERENTIAL METHOD: ABNORMAL
EOSINOPHIL # BLD AUTO: ABNORMAL K/UL (ref 0–0.5)
EOSINOPHIL NFR BLD: 9 % (ref 0–8)
ERYTHROCYTE [DISTWIDTH] IN BLOOD BY AUTOMATED COUNT: 16.8 % (ref 11.5–14.5)
EST. GFR  (AFRICAN AMERICAN): >60 ML/MIN/1.73 M^2
EST. GFR  (NON AFRICAN AMERICAN): >60 ML/MIN/1.73 M^2
GLUCOSE SERPL-MCNC: 116 MG/DL (ref 70–110)
HCT VFR BLD AUTO: 31.9 % (ref 37–48.5)
HGB BLD-MCNC: 9.8 G/DL (ref 12–16)
IMM GRANULOCYTES # BLD AUTO: ABNORMAL K/UL (ref 0–0.04)
IMM GRANULOCYTES NFR BLD AUTO: ABNORMAL % (ref 0–0.5)
LYMPHOCYTES # BLD AUTO: ABNORMAL K/UL (ref 1–4.8)
LYMPHOCYTES NFR BLD: 7 % (ref 18–48)
MAGNESIUM SERPL-MCNC: 1.4 MG/DL (ref 1.6–2.6)
MCH RBC QN AUTO: 29.3 PG (ref 27–31)
MCHC RBC AUTO-ENTMCNC: 30.7 G/DL (ref 32–36)
MCV RBC AUTO: 95 FL (ref 82–98)
METAMYELOCYTES NFR BLD MANUAL: 6 %
MONOCYTES # BLD AUTO: ABNORMAL K/UL (ref 0.3–1)
MONOCYTES NFR BLD: 3 % (ref 4–15)
MYELOCYTES NFR BLD MANUAL: 2 %
NEUTROPHILS # BLD AUTO: ABNORMAL K/UL (ref 1.8–7.7)
NEUTROPHILS NFR BLD: 49 % (ref 38–73)
NEUTS BAND NFR BLD MANUAL: 23 %
NRBC BLD-RTO: 0 /100 WBC
PHOSPHATE SERPL-MCNC: 1.8 MG/DL (ref 2.7–4.5)
PLATELET # BLD AUTO: 299 K/UL (ref 150–350)
PMV BLD AUTO: 10 FL (ref 9.2–12.9)
POCT GLUCOSE: 104 MG/DL (ref 70–110)
POCT GLUCOSE: 124 MG/DL (ref 70–110)
POCT GLUCOSE: 90 MG/DL (ref 70–110)
POCT GLUCOSE: 95 MG/DL (ref 70–110)
POIKILOCYTOSIS BLD QL SMEAR: SLIGHT
POLYCHROMASIA BLD QL SMEAR: ABNORMAL
POTASSIUM SERPL-SCNC: 3.3 MMOL/L (ref 3.5–5.1)
PROMYELOCYTES NFR BLD MANUAL: 1 %
PROT SERPL-MCNC: 5.7 G/DL (ref 6–8.4)
RBC # BLD AUTO: 3.35 M/UL (ref 4–5.4)
SODIUM SERPL-SCNC: 134 MMOL/L (ref 136–145)
WBC # BLD AUTO: 7.68 K/UL (ref 3.9–12.7)

## 2021-02-20 PROCEDURE — 25000003 PHARM REV CODE 250: Performed by: STUDENT IN AN ORGANIZED HEALTH CARE EDUCATION/TRAINING PROGRAM

## 2021-02-20 PROCEDURE — 36415 COLL VENOUS BLD VENIPUNCTURE: CPT

## 2021-02-20 PROCEDURE — 11000001 HC ACUTE MED/SURG PRIVATE ROOM

## 2021-02-20 PROCEDURE — 94640 AIRWAY INHALATION TREATMENT: CPT

## 2021-02-20 PROCEDURE — 63600175 PHARM REV CODE 636 W HCPCS: Performed by: SPECIALIST

## 2021-02-20 PROCEDURE — A4216 STERILE WATER/SALINE, 10 ML: HCPCS | Performed by: STUDENT IN AN ORGANIZED HEALTH CARE EDUCATION/TRAINING PROGRAM

## 2021-02-20 PROCEDURE — 25000003 PHARM REV CODE 250: Performed by: SPECIALIST

## 2021-02-20 PROCEDURE — 84100 ASSAY OF PHOSPHORUS: CPT

## 2021-02-20 PROCEDURE — 85027 COMPLETE CBC AUTOMATED: CPT

## 2021-02-20 PROCEDURE — 85007 BL SMEAR W/DIFF WBC COUNT: CPT

## 2021-02-20 PROCEDURE — 94761 N-INVAS EAR/PLS OXIMETRY MLT: CPT

## 2021-02-20 PROCEDURE — 80053 COMPREHEN METABOLIC PANEL: CPT

## 2021-02-20 PROCEDURE — S0030 INJECTION, METRONIDAZOLE: HCPCS | Performed by: STUDENT IN AN ORGANIZED HEALTH CARE EDUCATION/TRAINING PROGRAM

## 2021-02-20 PROCEDURE — 63600175 PHARM REV CODE 636 W HCPCS: Performed by: STUDENT IN AN ORGANIZED HEALTH CARE EDUCATION/TRAINING PROGRAM

## 2021-02-20 PROCEDURE — 83735 ASSAY OF MAGNESIUM: CPT

## 2021-02-20 PROCEDURE — 87040 BLOOD CULTURE FOR BACTERIA: CPT | Mod: 59

## 2021-02-20 RX ORDER — TALC
6 POWDER (GRAM) TOPICAL NIGHTLY PRN
Status: DISCONTINUED | OUTPATIENT
Start: 2021-02-20 | End: 2021-02-22 | Stop reason: HOSPADM

## 2021-02-20 RX ADMIN — METHOCARBAMOL: 100 INJECTION INTRAMUSCULAR; INTRAVENOUS at 11:02

## 2021-02-20 RX ADMIN — FAMOTIDINE 20 MG: 20 TABLET, FILM COATED ORAL at 10:02

## 2021-02-20 RX ADMIN — LORAZEPAM 0.5 MG: 2 INJECTION INTRAMUSCULAR at 12:02

## 2021-02-20 RX ADMIN — Medication 10 ML: at 11:02

## 2021-02-20 RX ADMIN — LORAZEPAM 0.5 MG: 2 INJECTION INTRAMUSCULAR at 11:02

## 2021-02-20 RX ADMIN — PROMETHAZINE HYDROCHLORIDE 6.25 MG: 25 INJECTION INTRAMUSCULAR; INTRAVENOUS at 04:02

## 2021-02-20 RX ADMIN — LACTOBACILLUS TAB 4 TABLET: TAB at 06:02

## 2021-02-20 RX ADMIN — CLOPIDOGREL 75 MG: 75 TABLET, FILM COATED ORAL at 10:02

## 2021-02-20 RX ADMIN — LORAZEPAM 0.5 MG: 2 INJECTION INTRAMUSCULAR at 04:02

## 2021-02-20 RX ADMIN — RIVAROXABAN 20 MG: 20 TABLET, FILM COATED ORAL at 06:02

## 2021-02-20 RX ADMIN — POLYETHYLENE GLYCOL (3350) 17 G: 17 POWDER, FOR SOLUTION ORAL at 10:02

## 2021-02-20 RX ADMIN — METRONIDAZOLE 500 MG: 500 INJECTION, SOLUTION INTRAVENOUS at 10:02

## 2021-02-20 RX ADMIN — Medication 10 ML: at 04:02

## 2021-02-20 RX ADMIN — CARVEDILOL 6.25 MG: 6.25 TABLET, FILM COATED ORAL at 10:02

## 2021-02-20 RX ADMIN — LORAZEPAM 0.5 MG: 2 INJECTION INTRAMUSCULAR at 07:02

## 2021-02-20 RX ADMIN — METHOCARBAMOL: 100 INJECTION INTRAMUSCULAR; INTRAVENOUS at 06:02

## 2021-02-20 RX ADMIN — METRONIDAZOLE 500 MG: 500 INJECTION, SOLUTION INTRAVENOUS at 08:02

## 2021-02-20 RX ADMIN — ACETAMINOPHEN 650 MG: 325 TABLET ORAL at 12:02

## 2021-02-20 RX ADMIN — LACTOBACILLUS TAB 4 TABLET: TAB at 11:02

## 2021-02-20 RX ADMIN — FAMOTIDINE 20 MG: 20 TABLET, FILM COATED ORAL at 08:02

## 2021-02-20 RX ADMIN — Medication 2 TABLET: at 10:02

## 2021-02-20 RX ADMIN — Medication 10 ML: at 12:02

## 2021-02-20 RX ADMIN — CEFEPIME 2 G: 2 INJECTION, POWDER, FOR SOLUTION INTRAVENOUS at 11:02

## 2021-02-20 RX ADMIN — CEFEPIME 2 G: 2 INJECTION, POWDER, FOR SOLUTION INTRAVENOUS at 07:02

## 2021-02-20 RX ADMIN — Medication 10 ML: at 10:02

## 2021-02-20 RX ADMIN — FLUTICASONE FUROATE AND VILANTEROL TRIFENATATE 1 PUFF: 100; 25 POWDER RESPIRATORY (INHALATION) at 08:02

## 2021-02-20 RX ADMIN — CEFEPIME 2 G: 2 INJECTION, POWDER, FOR SOLUTION INTRAVENOUS at 03:02

## 2021-02-20 RX ADMIN — METRONIDAZOLE 500 MG: 500 INJECTION, SOLUTION INTRAVENOUS at 04:02

## 2021-02-20 RX ADMIN — Medication 6 MG: at 12:02

## 2021-02-20 RX ADMIN — Medication 5 ML: at 07:02

## 2021-02-20 RX ADMIN — ONDANSETRON 4 MG: 4 TABLET, ORALLY DISINTEGRATING ORAL at 03:02

## 2021-02-20 RX ADMIN — METHOCARBAMOL: 100 INJECTION INTRAMUSCULAR; INTRAVENOUS at 08:02

## 2021-02-21 LAB
ALBUMIN SERPL BCP-MCNC: 2.3 G/DL (ref 3.5–5.2)
ALP SERPL-CCNC: 64 U/L (ref 55–135)
ALT SERPL W/O P-5'-P-CCNC: 11 U/L (ref 10–44)
ANION GAP SERPL CALC-SCNC: 10 MMOL/L (ref 8–16)
ANISOCYTOSIS BLD QL SMEAR: SLIGHT
AST SERPL-CCNC: 15 U/L (ref 10–40)
BACTERIA #/AREA URNS HPF: ABNORMAL /HPF
BASOPHILS # BLD AUTO: ABNORMAL K/UL (ref 0–0.2)
BASOPHILS NFR BLD: 0 % (ref 0–1.9)
BILIRUB SERPL-MCNC: 0.2 MG/DL (ref 0.1–1)
BILIRUB UR QL STRIP: NEGATIVE
BUN SERPL-MCNC: 6 MG/DL (ref 8–23)
BURR CELLS BLD QL SMEAR: ABNORMAL
CALCIUM SERPL-MCNC: 7.7 MG/DL (ref 8.7–10.5)
CHLORIDE SERPL-SCNC: 106 MMOL/L (ref 95–110)
CLARITY UR: CLEAR
CO2 SERPL-SCNC: 18 MMOL/L (ref 23–29)
COLOR UR: YELLOW
CREAT SERPL-MCNC: 0.6 MG/DL (ref 0.5–1.4)
DIFFERENTIAL METHOD: ABNORMAL
EOSINOPHIL # BLD AUTO: ABNORMAL K/UL (ref 0–0.5)
EOSINOPHIL NFR BLD: 0 % (ref 0–8)
ERYTHROCYTE [DISTWIDTH] IN BLOOD BY AUTOMATED COUNT: 16.9 % (ref 11.5–14.5)
EST. GFR  (AFRICAN AMERICAN): >60 ML/MIN/1.73 M^2
EST. GFR  (NON AFRICAN AMERICAN): >60 ML/MIN/1.73 M^2
GLUCOSE SERPL-MCNC: 107 MG/DL (ref 70–110)
GLUCOSE UR QL STRIP: NEGATIVE
HCT VFR BLD AUTO: 33.7 % (ref 37–48.5)
HGB BLD-MCNC: 9.9 G/DL (ref 12–16)
HGB UR QL STRIP: ABNORMAL
HYALINE CASTS #/AREA URNS LPF: 1 /LPF
IMM GRANULOCYTES # BLD AUTO: ABNORMAL K/UL (ref 0–0.04)
IMM GRANULOCYTES NFR BLD AUTO: ABNORMAL % (ref 0–0.5)
KETONES UR QL STRIP: NEGATIVE
LEUKOCYTE ESTERASE UR QL STRIP: ABNORMAL
LYMPHOCYTES # BLD AUTO: ABNORMAL K/UL (ref 1–4.8)
LYMPHOCYTES NFR BLD: 13 % (ref 18–48)
MAGNESIUM SERPL-MCNC: 1.5 MG/DL (ref 1.6–2.6)
MCH RBC QN AUTO: 29.6 PG (ref 27–31)
MCHC RBC AUTO-ENTMCNC: 29.4 G/DL (ref 32–36)
MCV RBC AUTO: 101 FL (ref 82–98)
METAMYELOCYTES NFR BLD MANUAL: 1 %
MICROSCOPIC COMMENT: ABNORMAL
MONOCYTES # BLD AUTO: ABNORMAL K/UL (ref 0.3–1)
MONOCYTES NFR BLD: 2 % (ref 4–15)
MYELOCYTES NFR BLD MANUAL: 3 %
NEUTROPHILS NFR BLD: 76 % (ref 38–73)
NEUTS BAND NFR BLD MANUAL: 5 %
NITRITE UR QL STRIP: POSITIVE
NRBC BLD-RTO: 0 /100 WBC
PH UR STRIP: 6 [PH] (ref 5–8)
PHOSPHATE SERPL-MCNC: 2 MG/DL (ref 2.7–4.5)
PLATELET # BLD AUTO: 262 K/UL (ref 150–350)
PLATELET BLD QL SMEAR: ABNORMAL
PMV BLD AUTO: 9.9 FL (ref 9.2–12.9)
POCT GLUCOSE: 116 MG/DL (ref 70–110)
POCT GLUCOSE: 127 MG/DL (ref 70–110)
POCT GLUCOSE: 134 MG/DL (ref 70–110)
POCT GLUCOSE: 155 MG/DL (ref 70–110)
POIKILOCYTOSIS BLD QL SMEAR: SLIGHT
POTASSIUM SERPL-SCNC: 3.5 MMOL/L (ref 3.5–5.1)
PROT SERPL-MCNC: 5.6 G/DL (ref 6–8.4)
PROT UR QL STRIP: ABNORMAL
RBC # BLD AUTO: 3.35 M/UL (ref 4–5.4)
RBC #/AREA URNS HPF: 10 /HPF (ref 0–4)
SODIUM SERPL-SCNC: 134 MMOL/L (ref 136–145)
SP GR UR STRIP: 1.02 (ref 1–1.03)
SQUAMOUS #/AREA URNS HPF: 3 /HPF
URN SPEC COLLECT METH UR: ABNORMAL
UROBILINOGEN UR STRIP-ACNC: NEGATIVE EU/DL
WBC # BLD AUTO: 7.75 K/UL (ref 3.9–12.7)
WBC #/AREA URNS HPF: 5 /HPF (ref 0–5)

## 2021-02-21 PROCEDURE — 25000003 PHARM REV CODE 250: Performed by: STUDENT IN AN ORGANIZED HEALTH CARE EDUCATION/TRAINING PROGRAM

## 2021-02-21 PROCEDURE — S0030 INJECTION, METRONIDAZOLE: HCPCS | Performed by: STUDENT IN AN ORGANIZED HEALTH CARE EDUCATION/TRAINING PROGRAM

## 2021-02-21 PROCEDURE — 85027 COMPLETE CBC AUTOMATED: CPT

## 2021-02-21 PROCEDURE — 80053 COMPREHEN METABOLIC PANEL: CPT

## 2021-02-21 PROCEDURE — 99900035 HC TECH TIME PER 15 MIN (STAT)

## 2021-02-21 PROCEDURE — 25000003 PHARM REV CODE 250: Performed by: SPECIALIST

## 2021-02-21 PROCEDURE — 63600175 PHARM REV CODE 636 W HCPCS: Performed by: SPECIALIST

## 2021-02-21 PROCEDURE — 81000 URINALYSIS NONAUTO W/SCOPE: CPT

## 2021-02-21 PROCEDURE — 94761 N-INVAS EAR/PLS OXIMETRY MLT: CPT

## 2021-02-21 PROCEDURE — 36415 COLL VENOUS BLD VENIPUNCTURE: CPT

## 2021-02-21 PROCEDURE — 11000001 HC ACUTE MED/SURG PRIVATE ROOM

## 2021-02-21 PROCEDURE — 87086 URINE CULTURE/COLONY COUNT: CPT

## 2021-02-21 PROCEDURE — A4216 STERILE WATER/SALINE, 10 ML: HCPCS | Performed by: STUDENT IN AN ORGANIZED HEALTH CARE EDUCATION/TRAINING PROGRAM

## 2021-02-21 PROCEDURE — 85007 BL SMEAR W/DIFF WBC COUNT: CPT

## 2021-02-21 PROCEDURE — 63600175 PHARM REV CODE 636 W HCPCS: Mod: JG | Performed by: STUDENT IN AN ORGANIZED HEALTH CARE EDUCATION/TRAINING PROGRAM

## 2021-02-21 PROCEDURE — 83735 ASSAY OF MAGNESIUM: CPT

## 2021-02-21 PROCEDURE — 94640 AIRWAY INHALATION TREATMENT: CPT

## 2021-02-21 PROCEDURE — 84100 ASSAY OF PHOSPHORUS: CPT

## 2021-02-21 PROCEDURE — 63600175 PHARM REV CODE 636 W HCPCS: Performed by: STUDENT IN AN ORGANIZED HEALTH CARE EDUCATION/TRAINING PROGRAM

## 2021-02-21 RX ORDER — MAGNESIUM SULFATE HEPTAHYDRATE 40 MG/ML
2 INJECTION, SOLUTION INTRAVENOUS ONCE
Status: COMPLETED | OUTPATIENT
Start: 2021-02-21 | End: 2021-02-21

## 2021-02-21 RX ORDER — SODIUM,POTASSIUM PHOSPHATES 280-250MG
1 POWDER IN PACKET (EA) ORAL EVERY 4 HOURS
Status: COMPLETED | OUTPATIENT
Start: 2021-02-21 | End: 2021-02-21

## 2021-02-21 RX ADMIN — Medication 10 ML: at 06:02

## 2021-02-21 RX ADMIN — LACTOBACILLUS TAB 4 TABLET: TAB at 05:02

## 2021-02-21 RX ADMIN — METRONIDAZOLE 500 MG: 500 INJECTION, SOLUTION INTRAVENOUS at 02:02

## 2021-02-21 RX ADMIN — CLOPIDOGREL 75 MG: 75 TABLET, FILM COATED ORAL at 08:02

## 2021-02-21 RX ADMIN — POTASSIUM & SODIUM PHOSPHATES POWDER PACK 280-160-250 MG 1 PACKET: 280-160-250 PACK at 05:02

## 2021-02-21 RX ADMIN — METRONIDAZOLE 500 MG: 500 INJECTION, SOLUTION INTRAVENOUS at 09:02

## 2021-02-21 RX ADMIN — LACTOBACILLUS TAB 4 TABLET: TAB at 08:02

## 2021-02-21 RX ADMIN — METOCLOPRAMIDE 10 MG: 5 INJECTION, SOLUTION INTRAMUSCULAR; INTRAVENOUS at 08:02

## 2021-02-21 RX ADMIN — FAMOTIDINE 20 MG: 20 TABLET, FILM COATED ORAL at 08:02

## 2021-02-21 RX ADMIN — POTASSIUM & SODIUM PHOSPHATES POWDER PACK 280-160-250 MG 1 PACKET: 280-160-250 PACK at 02:02

## 2021-02-21 RX ADMIN — LORAZEPAM 0.5 MG: 2 INJECTION INTRAMUSCULAR at 11:02

## 2021-02-21 RX ADMIN — Medication 10 ML: at 12:02

## 2021-02-21 RX ADMIN — Medication 10 ML: at 05:02

## 2021-02-21 RX ADMIN — ALTEPLASE 2 MG: 2.2 INJECTION, POWDER, LYOPHILIZED, FOR SOLUTION INTRAVENOUS at 05:02

## 2021-02-21 RX ADMIN — LORAZEPAM 0.5 MG: 2 INJECTION INTRAMUSCULAR at 05:02

## 2021-02-21 RX ADMIN — POTASSIUM & SODIUM PHOSPHATES POWDER PACK 280-160-250 MG 1 PACKET: 280-160-250 PACK at 12:02

## 2021-02-21 RX ADMIN — ATORVASTATIN CALCIUM 40 MG: 40 TABLET, FILM COATED ORAL at 07:02

## 2021-02-21 RX ADMIN — MAGNESIUM SULFATE 2 G: 2 INJECTION INTRAVENOUS at 12:02

## 2021-02-21 RX ADMIN — FAMOTIDINE 20 MG: 20 TABLET, FILM COATED ORAL at 07:02

## 2021-02-21 RX ADMIN — METHOCARBAMOL: 100 INJECTION INTRAMUSCULAR; INTRAVENOUS at 12:02

## 2021-02-21 RX ADMIN — LORAZEPAM 0.5 MG: 2 INJECTION INTRAMUSCULAR at 07:02

## 2021-02-21 RX ADMIN — CARVEDILOL 6.25 MG: 6.25 TABLET, FILM COATED ORAL at 08:02

## 2021-02-21 RX ADMIN — LACTOBACILLUS TAB 4 TABLET: TAB at 12:02

## 2021-02-21 RX ADMIN — ACETAMINOPHEN 650 MG: 325 TABLET ORAL at 09:02

## 2021-02-21 RX ADMIN — METHOCARBAMOL: 100 INJECTION INTRAMUSCULAR; INTRAVENOUS at 07:02

## 2021-02-21 RX ADMIN — METHOCARBAMOL: 100 INJECTION INTRAMUSCULAR; INTRAVENOUS at 02:02

## 2021-02-21 RX ADMIN — CARVEDILOL 6.25 MG: 6.25 TABLET, FILM COATED ORAL at 09:02

## 2021-02-21 RX ADMIN — RIVAROXABAN 20 MG: 20 TABLET, FILM COATED ORAL at 05:02

## 2021-02-21 RX ADMIN — FLUTICASONE FUROATE AND VILANTEROL TRIFENATATE 1 PUFF: 100; 25 POWDER RESPIRATORY (INHALATION) at 08:02

## 2021-02-21 RX ADMIN — CEFEPIME 2 G: 2 INJECTION, POWDER, FOR SOLUTION INTRAVENOUS at 07:02

## 2021-02-22 VITALS
TEMPERATURE: 99 F | BODY MASS INDEX: 32.93 KG/M2 | HEIGHT: 68 IN | WEIGHT: 217.31 LBS | OXYGEN SATURATION: 94 % | DIASTOLIC BLOOD PRESSURE: 71 MMHG | SYSTOLIC BLOOD PRESSURE: 136 MMHG | HEART RATE: 82 BPM | RESPIRATION RATE: 18 BRPM

## 2021-02-22 PROBLEM — K56.609 SBO (SMALL BOWEL OBSTRUCTION): Status: RESOLVED | Noted: 2021-02-15 | Resolved: 2021-02-22

## 2021-02-22 LAB
ALBUMIN SERPL BCP-MCNC: 2.3 G/DL (ref 3.5–5.2)
ALP SERPL-CCNC: 63 U/L (ref 55–135)
ALT SERPL W/O P-5'-P-CCNC: 10 U/L (ref 10–44)
ANION GAP SERPL CALC-SCNC: 7 MMOL/L (ref 8–16)
AST SERPL-CCNC: 15 U/L (ref 10–40)
BACTERIA BLD CULT: NORMAL
BACTERIA BLD CULT: NORMAL
BASOPHILS # BLD AUTO: 0.03 K/UL (ref 0–0.2)
BASOPHILS # BLD AUTO: 0.04 K/UL (ref 0–0.2)
BASOPHILS NFR BLD: 0.4 % (ref 0–1.9)
BASOPHILS NFR BLD: 0.6 % (ref 0–1.9)
BILIRUB SERPL-MCNC: 0.2 MG/DL (ref 0.1–1)
BUN SERPL-MCNC: 4 MG/DL (ref 8–23)
CALCIUM SERPL-MCNC: 7.7 MG/DL (ref 8.7–10.5)
CHLORIDE SERPL-SCNC: 106 MMOL/L (ref 95–110)
CO2 SERPL-SCNC: 20 MMOL/L (ref 23–29)
CREAT SERPL-MCNC: 0.7 MG/DL (ref 0.5–1.4)
DIFFERENTIAL METHOD: ABNORMAL
DIFFERENTIAL METHOD: ABNORMAL
EOSINOPHIL # BLD AUTO: 0.2 K/UL (ref 0–0.5)
EOSINOPHIL # BLD AUTO: 0.2 K/UL (ref 0–0.5)
EOSINOPHIL NFR BLD: 2.3 % (ref 0–8)
EOSINOPHIL NFR BLD: 2.4 % (ref 0–8)
ERYTHROCYTE [DISTWIDTH] IN BLOOD BY AUTOMATED COUNT: 16.8 % (ref 11.5–14.5)
ERYTHROCYTE [DISTWIDTH] IN BLOOD BY AUTOMATED COUNT: 16.9 % (ref 11.5–14.5)
EST. GFR  (AFRICAN AMERICAN): >60 ML/MIN/1.73 M^2
EST. GFR  (NON AFRICAN AMERICAN): >60 ML/MIN/1.73 M^2
GLUCOSE SERPL-MCNC: 127 MG/DL (ref 70–110)
HCT VFR BLD AUTO: 29 % (ref 37–48.5)
HCT VFR BLD AUTO: 31 % (ref 37–48.5)
HGB BLD-MCNC: 9 G/DL (ref 12–16)
HGB BLD-MCNC: 9.6 G/DL (ref 12–16)
IMM GRANULOCYTES # BLD AUTO: 0.33 K/UL (ref 0–0.04)
IMM GRANULOCYTES # BLD AUTO: 0.33 K/UL (ref 0–0.04)
IMM GRANULOCYTES NFR BLD AUTO: 4.7 % (ref 0–0.5)
IMM GRANULOCYTES NFR BLD AUTO: 4.9 % (ref 0–0.5)
LYMPHOCYTES # BLD AUTO: 1 K/UL (ref 1–4.8)
LYMPHOCYTES # BLD AUTO: 1.1 K/UL (ref 1–4.8)
LYMPHOCYTES NFR BLD: 14.5 % (ref 18–48)
LYMPHOCYTES NFR BLD: 15.9 % (ref 18–48)
MAGNESIUM SERPL-MCNC: 1.8 MG/DL (ref 1.6–2.6)
MCH RBC QN AUTO: 29.4 PG (ref 27–31)
MCH RBC QN AUTO: 29.5 PG (ref 27–31)
MCHC RBC AUTO-ENTMCNC: 31 G/DL (ref 32–36)
MCHC RBC AUTO-ENTMCNC: 31 G/DL (ref 32–36)
MCV RBC AUTO: 95 FL (ref 82–98)
MCV RBC AUTO: 95 FL (ref 82–98)
MONOCYTES # BLD AUTO: 0.6 K/UL (ref 0.3–1)
MONOCYTES # BLD AUTO: 0.6 K/UL (ref 0.3–1)
MONOCYTES NFR BLD: 9.1 % (ref 4–15)
MONOCYTES NFR BLD: 9.2 % (ref 4–15)
NEUTROPHILS # BLD AUTO: 4.6 K/UL (ref 1.8–7.7)
NEUTROPHILS # BLD AUTO: 4.7 K/UL (ref 1.8–7.7)
NEUTROPHILS NFR BLD: 67.3 % (ref 38–73)
NEUTROPHILS NFR BLD: 68.7 % (ref 38–73)
NRBC BLD-RTO: 0 /100 WBC
NRBC BLD-RTO: 0 /100 WBC
PHOSPHATE SERPL-MCNC: 2.4 MG/DL (ref 2.7–4.5)
PLATELET # BLD AUTO: 335 K/UL (ref 150–350)
PLATELET # BLD AUTO: 351 K/UL (ref 150–350)
PMV BLD AUTO: 10 FL (ref 9.2–12.9)
PMV BLD AUTO: 10 FL (ref 9.2–12.9)
POCT GLUCOSE: 109 MG/DL (ref 70–110)
POCT GLUCOSE: 113 MG/DL (ref 70–110)
POTASSIUM SERPL-SCNC: 3.6 MMOL/L (ref 3.5–5.1)
PROT SERPL-MCNC: 5.7 G/DL (ref 6–8.4)
RBC # BLD AUTO: 3.06 M/UL (ref 4–5.4)
RBC # BLD AUTO: 3.25 M/UL (ref 4–5.4)
SODIUM SERPL-SCNC: 133 MMOL/L (ref 136–145)
WBC # BLD AUTO: 6.7 K/UL (ref 3.9–12.7)
WBC # BLD AUTO: 6.98 K/UL (ref 3.9–12.7)

## 2021-02-22 PROCEDURE — 85025 COMPLETE CBC W/AUTO DIFF WBC: CPT

## 2021-02-22 PROCEDURE — 94640 AIRWAY INHALATION TREATMENT: CPT

## 2021-02-22 PROCEDURE — 94761 N-INVAS EAR/PLS OXIMETRY MLT: CPT

## 2021-02-22 PROCEDURE — 36415 COLL VENOUS BLD VENIPUNCTURE: CPT

## 2021-02-22 PROCEDURE — 25000003 PHARM REV CODE 250: Performed by: STUDENT IN AN ORGANIZED HEALTH CARE EDUCATION/TRAINING PROGRAM

## 2021-02-22 PROCEDURE — A4216 STERILE WATER/SALINE, 10 ML: HCPCS | Performed by: STUDENT IN AN ORGANIZED HEALTH CARE EDUCATION/TRAINING PROGRAM

## 2021-02-22 PROCEDURE — 63600175 PHARM REV CODE 636 W HCPCS: Performed by: SPECIALIST

## 2021-02-22 PROCEDURE — 80053 COMPREHEN METABOLIC PANEL: CPT

## 2021-02-22 PROCEDURE — 63600175 PHARM REV CODE 636 W HCPCS: Performed by: STUDENT IN AN ORGANIZED HEALTH CARE EDUCATION/TRAINING PROGRAM

## 2021-02-22 PROCEDURE — 83735 ASSAY OF MAGNESIUM: CPT

## 2021-02-22 PROCEDURE — 25000003 PHARM REV CODE 250: Performed by: SPECIALIST

## 2021-02-22 PROCEDURE — 84100 ASSAY OF PHOSPHORUS: CPT

## 2021-02-22 RX ORDER — POLYETHYLENE GLYCOL 3350 17 G/17G
17 POWDER, FOR SOLUTION ORAL 2 TIMES DAILY
Qty: 510 G | Refills: 2 | Status: SHIPPED | OUTPATIENT
Start: 2021-02-22 | End: 2021-03-24

## 2021-02-22 RX ORDER — CLOPIDOGREL BISULFATE 75 MG/1
75 TABLET ORAL DAILY
Qty: 30 TABLET | Refills: 11 | Status: SHIPPED | OUTPATIENT
Start: 2021-02-23 | End: 2021-03-22

## 2021-02-22 RX ORDER — FAMOTIDINE 20 MG/1
20 TABLET, FILM COATED ORAL 2 TIMES DAILY
Qty: 60 TABLET | Refills: 11 | Status: SHIPPED | OUTPATIENT
Start: 2021-02-22 | End: 2021-08-23 | Stop reason: ALTCHOICE

## 2021-02-22 RX ORDER — ATORVASTATIN CALCIUM 40 MG/1
40 TABLET, FILM COATED ORAL NIGHTLY
Qty: 90 TABLET | Refills: 3 | Status: SHIPPED | OUTPATIENT
Start: 2021-02-22 | End: 2021-08-23 | Stop reason: SDUPTHER

## 2021-02-22 RX ORDER — CIPROFLOXACIN 250 MG/1
250 TABLET, FILM COATED ORAL 2 TIMES DAILY
Qty: 6 TABLET | Refills: 0 | Status: SHIPPED | OUTPATIENT
Start: 2021-02-22 | End: 2021-02-25

## 2021-02-22 RX ORDER — METOCLOPRAMIDE 5 MG/1
5 TABLET ORAL 4 TIMES DAILY PRN
Qty: 14 TABLET | Refills: 1 | Status: SHIPPED | OUTPATIENT
Start: 2021-02-22 | End: 2021-02-25 | Stop reason: SDUPTHER

## 2021-02-22 RX ORDER — AMOXICILLIN 250 MG
2 CAPSULE ORAL DAILY
Qty: 30 TABLET | Refills: 3 | Status: SHIPPED | OUTPATIENT
Start: 2021-02-22 | End: 2021-09-28

## 2021-02-22 RX ADMIN — Medication 10 ML: at 12:02

## 2021-02-22 RX ADMIN — METHOCARBAMOL: 100 INJECTION INTRAMUSCULAR; INTRAVENOUS at 03:02

## 2021-02-22 RX ADMIN — LACTOBACILLUS TAB 4 TABLET: TAB at 12:02

## 2021-02-22 RX ADMIN — Medication 10 ML: at 06:02

## 2021-02-22 RX ADMIN — FLUTICASONE FUROATE AND VILANTEROL TRIFENATATE 1 PUFF: 100; 25 POWDER RESPIRATORY (INHALATION) at 08:02

## 2021-02-22 RX ADMIN — CLOPIDOGREL 75 MG: 75 TABLET, FILM COATED ORAL at 08:02

## 2021-02-22 RX ADMIN — CARVEDILOL 6.25 MG: 6.25 TABLET, FILM COATED ORAL at 08:02

## 2021-02-22 RX ADMIN — FAMOTIDINE 20 MG: 20 TABLET, FILM COATED ORAL at 08:02

## 2021-02-22 RX ADMIN — METOCLOPRAMIDE 10 MG: 5 INJECTION, SOLUTION INTRAMUSCULAR; INTRAVENOUS at 03:02

## 2021-02-22 RX ADMIN — Medication 10 ML: at 03:02

## 2021-02-22 RX ADMIN — LORAZEPAM 0.5 MG: 2 INJECTION INTRAMUSCULAR at 06:02

## 2021-02-22 RX ADMIN — LACTOBACILLUS TAB 4 TABLET: TAB at 06:02

## 2021-02-22 RX ADMIN — METHOCARBAMOL: 100 INJECTION INTRAMUSCULAR; INTRAVENOUS at 12:02

## 2021-02-23 ENCOUNTER — PATIENT OUTREACH (OUTPATIENT)
Dept: ADMINISTRATIVE | Facility: CLINIC | Age: 66
End: 2021-02-23

## 2021-02-23 LAB
BACTERIA BLD CULT: NORMAL
BACTERIA UR CULT: NO GROWTH

## 2021-02-25 ENCOUNTER — INITIAL CONSULT (OUTPATIENT)
Dept: VASCULAR SURGERY | Facility: CLINIC | Age: 66
End: 2021-02-25
Attending: SURGERY
Payer: MEDICARE

## 2021-02-25 VITALS
DIASTOLIC BLOOD PRESSURE: 68 MMHG | HEIGHT: 68 IN | SYSTOLIC BLOOD PRESSURE: 116 MMHG | TEMPERATURE: 100 F | BODY MASS INDEX: 30.41 KG/M2 | HEART RATE: 98 BPM | WEIGHT: 200.63 LBS

## 2021-02-25 DIAGNOSIS — K55.1 CHRONIC INTESTINAL ISCHEMIA: ICD-10-CM

## 2021-02-25 DIAGNOSIS — K56.609 SBO (SMALL BOWEL OBSTRUCTION): ICD-10-CM

## 2021-02-25 DIAGNOSIS — R11.0 NAUSEA IN ADULT PATIENT: ICD-10-CM

## 2021-02-25 PROCEDURE — 99204 PR OFFICE/OUTPT VISIT, NEW, LEVL IV, 45-59 MIN: ICD-10-PCS | Mod: S$GLB,,, | Performed by: SURGERY

## 2021-02-25 PROCEDURE — 99999 PR PBB SHADOW E&M-EST. PATIENT-LVL IV: CPT | Mod: PBBFAC,,, | Performed by: SURGERY

## 2021-02-25 PROCEDURE — 99204 OFFICE O/P NEW MOD 45 MIN: CPT | Mod: S$GLB,,, | Performed by: SURGERY

## 2021-02-25 PROCEDURE — 1159F PR MEDICATION LIST DOCUMENTED IN MEDICAL RECORD: ICD-10-PCS | Mod: S$GLB,,, | Performed by: SURGERY

## 2021-02-25 PROCEDURE — 1159F MED LIST DOCD IN RCRD: CPT | Mod: S$GLB,,, | Performed by: SURGERY

## 2021-02-25 PROCEDURE — 99999 PR PBB SHADOW E&M-EST. PATIENT-LVL IV: ICD-10-PCS | Mod: PBBFAC,,, | Performed by: SURGERY

## 2021-02-25 RX ORDER — METOCLOPRAMIDE 5 MG/1
5 TABLET ORAL 4 TIMES DAILY PRN
Qty: 14 TABLET | Refills: 1 | Status: SHIPPED | OUTPATIENT
Start: 2021-02-25 | End: 2021-03-03 | Stop reason: SDUPTHER

## 2021-02-26 LAB
BACTERIA BLD CULT: NORMAL
BACTERIA BLD CULT: NORMAL

## 2021-03-03 ENCOUNTER — OFFICE VISIT (OUTPATIENT)
Dept: FAMILY MEDICINE | Facility: HOSPITAL | Age: 66
End: 2021-03-03
Payer: MEDICARE

## 2021-03-03 VITALS — DIASTOLIC BLOOD PRESSURE: 74 MMHG | HEART RATE: 101 BPM | SYSTOLIC BLOOD PRESSURE: 124 MMHG

## 2021-03-03 DIAGNOSIS — J98.8 OTHER SPECIFIED RESPIRATORY DISORDERS: ICD-10-CM

## 2021-03-03 DIAGNOSIS — K55.1 CHRONIC INTESTINAL ISCHEMIA: Primary | ICD-10-CM

## 2021-03-03 DIAGNOSIS — R05.9 COUGH: ICD-10-CM

## 2021-03-03 DIAGNOSIS — I25.10 CORONARY ARTERY DISEASE, ANGINA PRESENCE UNSPECIFIED, UNSPECIFIED VESSEL OR LESION TYPE, UNSPECIFIED WHETHER NATIVE OR TRANSPLANTED HEART: ICD-10-CM

## 2021-03-03 DIAGNOSIS — R11.0 NAUSEA IN ADULT PATIENT: ICD-10-CM

## 2021-03-03 DIAGNOSIS — J42 CHRONIC BRONCHITIS, UNSPECIFIED CHRONIC BRONCHITIS TYPE: ICD-10-CM

## 2021-03-03 PROCEDURE — 99215 OFFICE O/P EST HI 40 MIN: CPT | Performed by: PHYSICIAN ASSISTANT

## 2021-03-03 RX ORDER — TIOTROPIUM BROMIDE 18 UG/1
18 CAPSULE ORAL; RESPIRATORY (INHALATION) DAILY
Qty: 90 CAPSULE | Refills: 3 | Status: SHIPPED | OUTPATIENT
Start: 2021-03-03 | End: 2021-03-29

## 2021-03-03 RX ORDER — PROMETHAZINE HYDROCHLORIDE AND CODEINE PHOSPHATE 6.25; 1 MG/5ML; MG/5ML
SOLUTION ORAL
COMMUNITY
Start: 2021-02-12 | End: 2021-03-03

## 2021-03-03 RX ORDER — METOCLOPRAMIDE 5 MG/1
5 TABLET ORAL 4 TIMES DAILY PRN
Qty: 14 TABLET | Refills: 1 | Status: SHIPPED | OUTPATIENT
Start: 2021-03-03 | End: 2021-03-17

## 2021-03-03 RX ORDER — METOPROLOL SUCCINATE 25 MG/1
25 TABLET, EXTENDED RELEASE ORAL 2 TIMES DAILY
Qty: 90 TABLET | Refills: 3 | Status: SHIPPED | OUTPATIENT
Start: 2021-03-03 | End: 2021-08-23 | Stop reason: SDUPTHER

## 2021-03-03 RX ORDER — BUDESONIDE AND FORMOTEROL FUMARATE DIHYDRATE 160; 4.5 UG/1; UG/1
2 AEROSOL RESPIRATORY (INHALATION) EVERY 12 HOURS
Qty: 1 INHALER | Refills: 11 | Status: SHIPPED | OUTPATIENT
Start: 2021-03-03 | End: 2021-04-26

## 2021-03-03 RX ORDER — BENZONATATE 100 MG/1
200 CAPSULE ORAL 3 TIMES DAILY PRN
Qty: 60 CAPSULE | Refills: 1 | Status: SHIPPED | OUTPATIENT
Start: 2021-03-03 | End: 2021-05-10

## 2021-03-03 RX ORDER — RIVAROXABAN 20 MG/1
20 TABLET, FILM COATED ORAL
Qty: 90 TABLET | Refills: 3 | Status: SHIPPED | OUTPATIENT
Start: 2021-03-03 | End: 2022-02-02

## 2021-03-08 ENCOUNTER — OFFICE VISIT (OUTPATIENT)
Dept: GASTROENTEROLOGY | Facility: CLINIC | Age: 66
End: 2021-03-08
Payer: MEDICARE

## 2021-03-08 ENCOUNTER — TELEPHONE (OUTPATIENT)
Dept: GASTROENTEROLOGY | Facility: CLINIC | Age: 66
End: 2021-03-08

## 2021-03-08 VITALS — WEIGHT: 197.56 LBS | BODY MASS INDEX: 29.94 KG/M2 | HEIGHT: 68 IN

## 2021-03-08 DIAGNOSIS — K55.1 CHRONIC INTESTINAL ISCHEMIA: Primary | ICD-10-CM

## 2021-03-08 DIAGNOSIS — K56.609 SBO (SMALL BOWEL OBSTRUCTION): ICD-10-CM

## 2021-03-08 DIAGNOSIS — Z12.11 SCREEN FOR COLON CANCER: ICD-10-CM

## 2021-03-08 DIAGNOSIS — R14.0 ABDOMINAL DISTENSION: ICD-10-CM

## 2021-03-08 PROCEDURE — 1125F AMNT PAIN NOTED PAIN PRSNT: CPT | Mod: S$GLB,,, | Performed by: INTERNAL MEDICINE

## 2021-03-08 PROCEDURE — 99499 RISK ADDL DX/OHS AUDIT: ICD-10-PCS | Mod: S$GLB,,, | Performed by: INTERNAL MEDICINE

## 2021-03-08 PROCEDURE — 99215 OFFICE O/P EST HI 40 MIN: CPT | Mod: S$GLB,,, | Performed by: INTERNAL MEDICINE

## 2021-03-08 PROCEDURE — 1125F PR PAIN SEVERITY QUANTIFIED, PAIN PRESENT: ICD-10-PCS | Mod: S$GLB,,, | Performed by: INTERNAL MEDICINE

## 2021-03-08 PROCEDURE — 1100F PTFALLS ASSESS-DOCD GE2>/YR: CPT | Mod: CPTII,S$GLB,, | Performed by: INTERNAL MEDICINE

## 2021-03-08 PROCEDURE — 99499 UNLISTED E&M SERVICE: CPT | Mod: S$GLB,,, | Performed by: INTERNAL MEDICINE

## 2021-03-08 PROCEDURE — 99215 PR OFFICE/OUTPT VISIT, EST, LEVL V, 40-54 MIN: ICD-10-PCS | Mod: S$GLB,,, | Performed by: INTERNAL MEDICINE

## 2021-03-08 PROCEDURE — 3288F PR FALLS RISK ASSESSMENT DOCUMENTED: ICD-10-PCS | Mod: CPTII,S$GLB,, | Performed by: INTERNAL MEDICINE

## 2021-03-08 PROCEDURE — 99999 PR PBB SHADOW E&M-EST. PATIENT-LVL III: ICD-10-PCS | Mod: PBBFAC,,, | Performed by: INTERNAL MEDICINE

## 2021-03-08 PROCEDURE — 3008F PR BODY MASS INDEX (BMI) DOCUMENTED: ICD-10-PCS | Mod: CPTII,S$GLB,, | Performed by: INTERNAL MEDICINE

## 2021-03-08 PROCEDURE — 3288F FALL RISK ASSESSMENT DOCD: CPT | Mod: CPTII,S$GLB,, | Performed by: INTERNAL MEDICINE

## 2021-03-08 PROCEDURE — 99999 PR PBB SHADOW E&M-EST. PATIENT-LVL III: CPT | Mod: PBBFAC,,, | Performed by: INTERNAL MEDICINE

## 2021-03-08 PROCEDURE — 1100F PR PT FALLS ASSESS DOC 2+ FALLS/FALL W/INJURY/YR: ICD-10-PCS | Mod: CPTII,S$GLB,, | Performed by: INTERNAL MEDICINE

## 2021-03-08 PROCEDURE — 3008F BODY MASS INDEX DOCD: CPT | Mod: CPTII,S$GLB,, | Performed by: INTERNAL MEDICINE

## 2021-03-08 RX ORDER — SODIUM, POTASSIUM,MAG SULFATES 17.5-3.13G
1 SOLUTION, RECONSTITUTED, ORAL ORAL DAILY
Qty: 1 KIT | Refills: 0 | Status: SHIPPED | OUTPATIENT
Start: 2021-03-08 | End: 2021-03-10

## 2021-03-15 ENCOUNTER — HOSPITAL ENCOUNTER (OUTPATIENT)
Dept: PULMONOLOGY | Facility: HOSPITAL | Age: 66
Discharge: HOME | End: 2021-03-15
Attending: PHYSICIAN ASSISTANT
Payer: MEDICARE

## 2021-03-15 DIAGNOSIS — J98.8 OTHER SPECIFIED RESPIRATORY DISORDERS: ICD-10-CM

## 2021-03-15 DIAGNOSIS — J42 CHRONIC BRONCHITIS, UNSPECIFIED CHRONIC BRONCHITIS TYPE: ICD-10-CM

## 2021-03-15 LAB
BRPFT: ABNORMAL
FEF 25 75 LLN: 1.06
FEF 25 75 PRE REF: 30.4 %
FEF 25 75 REF: 2.2
FEV1 FVC LLN: 65
FEV1 FVC PRE REF: 89.7 %
FEV1 FVC REF: 78
FEV1 LLN: 1.96
FEV1 PRE REF: 44.1 %
FEV1 REF: 2.65
FVC LLN: 2.54
FVC PRE REF: 48.7 %
FVC REF: 3.43
PEF LLN: 4.64
PEF PRE REF: 40.4 %
PEF REF: 6.58
PRE FEF 25 75: 0.67 L/S (ref 1.06–3.35)
PRE FET 100: 6.93 SEC
PRE FEV1 FVC: 70.06 % (ref 65.42–90.76)
PRE FEV1: 1.17 L (ref 1.96–3.34)
PRE FVC: 1.67 L (ref 2.54–4.31)
PRE PEF: 2.66 L/S (ref 4.64–8.52)

## 2021-03-15 PROCEDURE — 94010 BREATHING CAPACITY TEST: CPT

## 2021-03-22 ENCOUNTER — OFFICE VISIT (OUTPATIENT)
Dept: CARDIOLOGY | Facility: CLINIC | Age: 66
End: 2021-03-22
Payer: MEDICARE

## 2021-03-22 VITALS
HEIGHT: 68 IN | WEIGHT: 193.81 LBS | DIASTOLIC BLOOD PRESSURE: 67 MMHG | BODY MASS INDEX: 29.37 KG/M2 | HEART RATE: 78 BPM | OXYGEN SATURATION: 99 % | SYSTOLIC BLOOD PRESSURE: 133 MMHG

## 2021-03-22 DIAGNOSIS — K55.1 MESENTERIC ISCHEMIA DUE TO ARTERIAL INSUFFICIENCY: ICD-10-CM

## 2021-03-22 DIAGNOSIS — I25.10 CORONARY ARTERY DISEASE, ANGINA PRESENCE UNSPECIFIED, UNSPECIFIED VESSEL OR LESION TYPE, UNSPECIFIED WHETHER NATIVE OR TRANSPLANTED HEART: ICD-10-CM

## 2021-03-22 DIAGNOSIS — E46 HYPOALBUMINEMIA DUE TO PROTEIN-CALORIE MALNUTRITION: ICD-10-CM

## 2021-03-22 DIAGNOSIS — Z95.1 S/P CABG (CORONARY ARTERY BYPASS GRAFT): ICD-10-CM

## 2021-03-22 DIAGNOSIS — K55.1 CHRONIC INTESTINAL ISCHEMIA: ICD-10-CM

## 2021-03-22 DIAGNOSIS — Z87.19 H/O: GI BLEED: ICD-10-CM

## 2021-03-22 DIAGNOSIS — I25.2 OLD MI (MYOCARDIAL INFARCTION): ICD-10-CM

## 2021-03-22 DIAGNOSIS — E88.09 HYPOALBUMINEMIA: ICD-10-CM

## 2021-03-22 DIAGNOSIS — I73.9 PAD (PERIPHERAL ARTERY DISEASE): ICD-10-CM

## 2021-03-22 DIAGNOSIS — I48.0 PAROXYSMAL ATRIAL FIBRILLATION: ICD-10-CM

## 2021-03-22 DIAGNOSIS — Z98.61 S/P PTCA (PERCUTANEOUS TRANSLUMINAL CORONARY ANGIOPLASTY): ICD-10-CM

## 2021-03-22 DIAGNOSIS — J42 CHRONIC BRONCHITIS, UNSPECIFIED CHRONIC BRONCHITIS TYPE: Primary | ICD-10-CM

## 2021-03-22 DIAGNOSIS — Z95.810 AICD (AUTOMATIC CARDIOVERTER/DEFIBRILLATOR) PRESENT: ICD-10-CM

## 2021-03-22 DIAGNOSIS — E88.09 HYPOALBUMINEMIA DUE TO PROTEIN-CALORIE MALNUTRITION: ICD-10-CM

## 2021-03-22 PROCEDURE — 93000 EKG 12-LEAD: ICD-10-PCS | Mod: S$GLB,,, | Performed by: INTERNAL MEDICINE

## 2021-03-22 PROCEDURE — 99499 RISK ADDL DX/OHS AUDIT: ICD-10-PCS | Mod: S$GLB,,, | Performed by: INTERNAL MEDICINE

## 2021-03-22 PROCEDURE — 1100F PTFALLS ASSESS-DOCD GE2>/YR: CPT | Mod: CPTII,S$GLB,, | Performed by: INTERNAL MEDICINE

## 2021-03-22 PROCEDURE — 1126F AMNT PAIN NOTED NONE PRSNT: CPT | Mod: S$GLB,,, | Performed by: INTERNAL MEDICINE

## 2021-03-22 PROCEDURE — 1159F MED LIST DOCD IN RCRD: CPT | Mod: S$GLB,,, | Performed by: INTERNAL MEDICINE

## 2021-03-22 PROCEDURE — 1126F PR PAIN SEVERITY QUANTIFIED, NO PAIN PRESENT: ICD-10-PCS | Mod: S$GLB,,, | Performed by: INTERNAL MEDICINE

## 2021-03-22 PROCEDURE — 99205 PR OFFICE/OUTPT VISIT, NEW, LEVL V, 60-74 MIN: ICD-10-PCS | Mod: 25,S$GLB,, | Performed by: INTERNAL MEDICINE

## 2021-03-22 PROCEDURE — 99205 OFFICE O/P NEW HI 60 MIN: CPT | Mod: 25,S$GLB,, | Performed by: INTERNAL MEDICINE

## 2021-03-22 PROCEDURE — 1100F PR PT FALLS ASSESS DOC 2+ FALLS/FALL W/INJURY/YR: ICD-10-PCS | Mod: CPTII,S$GLB,, | Performed by: INTERNAL MEDICINE

## 2021-03-22 PROCEDURE — 3008F BODY MASS INDEX DOCD: CPT | Mod: CPTII,S$GLB,, | Performed by: INTERNAL MEDICINE

## 2021-03-22 PROCEDURE — 99999 PR PBB SHADOW E&M-EST. PATIENT-LVL IV: CPT | Mod: PBBFAC,,, | Performed by: INTERNAL MEDICINE

## 2021-03-22 PROCEDURE — 3288F PR FALLS RISK ASSESSMENT DOCUMENTED: ICD-10-PCS | Mod: CPTII,S$GLB,, | Performed by: INTERNAL MEDICINE

## 2021-03-22 PROCEDURE — 99999 PR PBB SHADOW E&M-EST. PATIENT-LVL IV: ICD-10-PCS | Mod: PBBFAC,,, | Performed by: INTERNAL MEDICINE

## 2021-03-22 PROCEDURE — 3008F PR BODY MASS INDEX (BMI) DOCUMENTED: ICD-10-PCS | Mod: CPTII,S$GLB,, | Performed by: INTERNAL MEDICINE

## 2021-03-22 PROCEDURE — 1159F PR MEDICATION LIST DOCUMENTED IN MEDICAL RECORD: ICD-10-PCS | Mod: S$GLB,,, | Performed by: INTERNAL MEDICINE

## 2021-03-22 PROCEDURE — 99499 UNLISTED E&M SERVICE: CPT | Mod: S$GLB,,, | Performed by: INTERNAL MEDICINE

## 2021-03-22 PROCEDURE — 93000 ELECTROCARDIOGRAM COMPLETE: CPT | Mod: S$GLB,,, | Performed by: INTERNAL MEDICINE

## 2021-03-22 PROCEDURE — 3288F FALL RISK ASSESSMENT DOCD: CPT | Mod: CPTII,S$GLB,, | Performed by: INTERNAL MEDICINE

## 2021-03-22 RX ORDER — HYDROXYZINE PAMOATE 25 MG/1
25 CAPSULE ORAL 4 TIMES DAILY PRN
Qty: 90 CAPSULE | Refills: 3 | Status: SHIPPED | OUTPATIENT
Start: 2021-03-22 | End: 2021-07-13

## 2021-03-22 RX ORDER — METOCLOPRAMIDE 5 MG/1
5 TABLET ORAL
COMMUNITY
End: 2021-03-22

## 2021-03-23 ENCOUNTER — TELEPHONE (OUTPATIENT)
Dept: CARDIOLOGY | Facility: CLINIC | Age: 66
End: 2021-03-23

## 2021-03-23 ENCOUNTER — TELEPHONE (OUTPATIENT)
Dept: GASTROENTEROLOGY | Facility: CLINIC | Age: 66
End: 2021-03-23

## 2021-03-25 ENCOUNTER — TELEPHONE (OUTPATIENT)
Dept: GASTROENTEROLOGY | Facility: CLINIC | Age: 66
End: 2021-03-25

## 2021-03-29 ENCOUNTER — TELEPHONE (OUTPATIENT)
Dept: GASTROENTEROLOGY | Facility: CLINIC | Age: 66
End: 2021-03-29

## 2021-03-29 ENCOUNTER — TELEPHONE (OUTPATIENT)
Dept: ENDOSCOPY | Facility: HOSPITAL | Age: 66
End: 2021-03-29

## 2021-03-29 ENCOUNTER — OFFICE VISIT (OUTPATIENT)
Dept: PRIMARY CARE CLINIC | Facility: CLINIC | Age: 66
End: 2021-03-29
Payer: MEDICARE

## 2021-03-29 VITALS
DIASTOLIC BLOOD PRESSURE: 84 MMHG | HEART RATE: 61 BPM | RESPIRATION RATE: 19 BRPM | TEMPERATURE: 98 F | BODY MASS INDEX: 29.54 KG/M2 | OXYGEN SATURATION: 97 % | HEIGHT: 68 IN | SYSTOLIC BLOOD PRESSURE: 118 MMHG | WEIGHT: 194.88 LBS

## 2021-03-29 DIAGNOSIS — K55.1 MESENTERIC ISCHEMIA DUE TO ARTERIAL INSUFFICIENCY: ICD-10-CM

## 2021-03-29 DIAGNOSIS — F41.9 ANXIETY AND DEPRESSION: ICD-10-CM

## 2021-03-29 DIAGNOSIS — Z76.89 ENCOUNTER TO ESTABLISH CARE: Primary | ICD-10-CM

## 2021-03-29 DIAGNOSIS — I48.0 PAROXYSMAL ATRIAL FIBRILLATION: ICD-10-CM

## 2021-03-29 DIAGNOSIS — Z12.31 SCREENING MAMMOGRAM, ENCOUNTER FOR: ICD-10-CM

## 2021-03-29 DIAGNOSIS — Z11.59 NEED FOR HEPATITIS C SCREENING TEST: ICD-10-CM

## 2021-03-29 DIAGNOSIS — Z12.11 SCREEN FOR COLON CANCER: Primary | ICD-10-CM

## 2021-03-29 DIAGNOSIS — Z78.0 ASYMPTOMATIC MENOPAUSAL STATE: ICD-10-CM

## 2021-03-29 DIAGNOSIS — J44.9 CHRONIC OBSTRUCTIVE PULMONARY DISEASE, UNSPECIFIED COPD TYPE: ICD-10-CM

## 2021-03-29 DIAGNOSIS — F32.A ANXIETY AND DEPRESSION: ICD-10-CM

## 2021-03-29 DIAGNOSIS — J42 CHRONIC BRONCHITIS, UNSPECIFIED CHRONIC BRONCHITIS TYPE: ICD-10-CM

## 2021-03-29 DIAGNOSIS — I25.2 OLD MI (MYOCARDIAL INFARCTION): ICD-10-CM

## 2021-03-29 PROCEDURE — 3008F BODY MASS INDEX DOCD: CPT | Mod: CPTII,S$GLB,, | Performed by: STUDENT IN AN ORGANIZED HEALTH CARE EDUCATION/TRAINING PROGRAM

## 2021-03-29 PROCEDURE — 1159F PR MEDICATION LIST DOCUMENTED IN MEDICAL RECORD: ICD-10-PCS | Mod: S$GLB,,, | Performed by: STUDENT IN AN ORGANIZED HEALTH CARE EDUCATION/TRAINING PROGRAM

## 2021-03-29 PROCEDURE — 1126F AMNT PAIN NOTED NONE PRSNT: CPT | Mod: S$GLB,,, | Performed by: STUDENT IN AN ORGANIZED HEALTH CARE EDUCATION/TRAINING PROGRAM

## 2021-03-29 PROCEDURE — 99999 PR PBB SHADOW E&M-EST. PATIENT-LVL V: ICD-10-PCS | Mod: PBBFAC,,, | Performed by: STUDENT IN AN ORGANIZED HEALTH CARE EDUCATION/TRAINING PROGRAM

## 2021-03-29 PROCEDURE — 1159F MED LIST DOCD IN RCRD: CPT | Mod: S$GLB,,, | Performed by: STUDENT IN AN ORGANIZED HEALTH CARE EDUCATION/TRAINING PROGRAM

## 2021-03-29 PROCEDURE — 3008F PR BODY MASS INDEX (BMI) DOCUMENTED: ICD-10-PCS | Mod: CPTII,S$GLB,, | Performed by: STUDENT IN AN ORGANIZED HEALTH CARE EDUCATION/TRAINING PROGRAM

## 2021-03-29 PROCEDURE — 1126F PR PAIN SEVERITY QUANTIFIED, NO PAIN PRESENT: ICD-10-PCS | Mod: S$GLB,,, | Performed by: STUDENT IN AN ORGANIZED HEALTH CARE EDUCATION/TRAINING PROGRAM

## 2021-03-29 PROCEDURE — 1100F PR PT FALLS ASSESS DOC 2+ FALLS/FALL W/INJURY/YR: ICD-10-PCS | Mod: CPTII,S$GLB,, | Performed by: STUDENT IN AN ORGANIZED HEALTH CARE EDUCATION/TRAINING PROGRAM

## 2021-03-29 PROCEDURE — 99214 OFFICE O/P EST MOD 30 MIN: CPT | Mod: S$GLB,,, | Performed by: STUDENT IN AN ORGANIZED HEALTH CARE EDUCATION/TRAINING PROGRAM

## 2021-03-29 PROCEDURE — 1100F PTFALLS ASSESS-DOCD GE2>/YR: CPT | Mod: CPTII,S$GLB,, | Performed by: STUDENT IN AN ORGANIZED HEALTH CARE EDUCATION/TRAINING PROGRAM

## 2021-03-29 PROCEDURE — 99999 PR PBB SHADOW E&M-EST. PATIENT-LVL V: CPT | Mod: PBBFAC,,, | Performed by: STUDENT IN AN ORGANIZED HEALTH CARE EDUCATION/TRAINING PROGRAM

## 2021-03-29 PROCEDURE — 3288F FALL RISK ASSESSMENT DOCD: CPT | Mod: CPTII,S$GLB,, | Performed by: STUDENT IN AN ORGANIZED HEALTH CARE EDUCATION/TRAINING PROGRAM

## 2021-03-29 PROCEDURE — 3288F PR FALLS RISK ASSESSMENT DOCUMENTED: ICD-10-PCS | Mod: CPTII,S$GLB,, | Performed by: STUDENT IN AN ORGANIZED HEALTH CARE EDUCATION/TRAINING PROGRAM

## 2021-03-29 PROCEDURE — 99214 PR OFFICE/OUTPT VISIT, EST, LEVL IV, 30-39 MIN: ICD-10-PCS | Mod: S$GLB,,, | Performed by: STUDENT IN AN ORGANIZED HEALTH CARE EDUCATION/TRAINING PROGRAM

## 2021-03-29 RX ORDER — TIOTROPIUM BROMIDE INHALATION SPRAY 1.56 UG/1
2 SPRAY, METERED RESPIRATORY (INHALATION) DAILY
Qty: 4 G | Refills: 2 | Status: SHIPPED | OUTPATIENT
Start: 2021-03-29 | End: 2021-05-24 | Stop reason: SDUPTHER

## 2021-03-29 RX ORDER — SODIUM, POTASSIUM,MAG SULFATES 17.5-3.13G
1 SOLUTION, RECONSTITUTED, ORAL ORAL DAILY
Qty: 1 KIT | Refills: 0 | Status: SHIPPED | OUTPATIENT
Start: 2021-03-29 | End: 2021-03-31

## 2021-03-31 ENCOUNTER — ANESTHESIA (OUTPATIENT)
Dept: ENDOSCOPY | Facility: HOSPITAL | Age: 66
End: 2021-03-31
Payer: MEDICARE

## 2021-03-31 ENCOUNTER — HOSPITAL ENCOUNTER (OUTPATIENT)
Facility: HOSPITAL | Age: 66
Discharge: HOME | End: 2021-03-31
Attending: INTERNAL MEDICINE | Admitting: INTERNAL MEDICINE
Payer: MEDICARE

## 2021-03-31 ENCOUNTER — ANESTHESIA EVENT (OUTPATIENT)
Dept: ENDOSCOPY | Facility: HOSPITAL | Age: 66
End: 2021-03-31
Payer: MEDICARE

## 2021-03-31 VITALS
HEART RATE: 70 BPM | DIASTOLIC BLOOD PRESSURE: 72 MMHG | WEIGHT: 200 LBS | TEMPERATURE: 98 F | OXYGEN SATURATION: 99 % | BODY MASS INDEX: 30.31 KG/M2 | RESPIRATION RATE: 18 BRPM | SYSTOLIC BLOOD PRESSURE: 142 MMHG | HEIGHT: 68 IN

## 2021-03-31 DIAGNOSIS — Z12.11 SCREEN FOR COLON CANCER: ICD-10-CM

## 2021-03-31 PROCEDURE — 25000003 PHARM REV CODE 250: Performed by: INTERNAL MEDICINE

## 2021-03-31 PROCEDURE — 43239 PR EGD, FLEX, W/BIOPSY, SGL/MULTI: ICD-10-PCS | Mod: 51,,, | Performed by: INTERNAL MEDICINE

## 2021-03-31 PROCEDURE — 37000009 HC ANESTHESIA EA ADD 15 MINS: Performed by: INTERNAL MEDICINE

## 2021-03-31 PROCEDURE — 45385 COLONOSCOPY W/LESION REMOVAL: CPT | Performed by: INTERNAL MEDICINE

## 2021-03-31 PROCEDURE — 37000008 HC ANESTHESIA 1ST 15 MINUTES: Performed by: INTERNAL MEDICINE

## 2021-03-31 PROCEDURE — 43239 EGD BIOPSY SINGLE/MULTIPLE: CPT | Performed by: INTERNAL MEDICINE

## 2021-03-31 PROCEDURE — 45385 PR COLONOSCOPY,REMV LESN,SNARE: ICD-10-PCS | Mod: PT,,, | Performed by: INTERNAL MEDICINE

## 2021-03-31 PROCEDURE — 88342 IMHCHEM/IMCYTCHM 1ST ANTB: CPT | Performed by: PATHOLOGY

## 2021-03-31 PROCEDURE — 88305 TISSUE EXAM BY PATHOLOGIST: CPT | Mod: 26,,, | Performed by: PATHOLOGY

## 2021-03-31 PROCEDURE — 43239 EGD BIOPSY SINGLE/MULTIPLE: CPT | Mod: 51,,, | Performed by: INTERNAL MEDICINE

## 2021-03-31 PROCEDURE — 27201012 HC FORCEPS, HOT/COLD, DISP: Performed by: INTERNAL MEDICINE

## 2021-03-31 PROCEDURE — 88342 IMHCHEM/IMCYTCHM 1ST ANTB: CPT | Mod: 26,,, | Performed by: PATHOLOGY

## 2021-03-31 PROCEDURE — 88305 TISSUE EXAM BY PATHOLOGIST: ICD-10-PCS | Mod: 26,,, | Performed by: PATHOLOGY

## 2021-03-31 PROCEDURE — 88342 CHG IMMUNOCYTOCHEMISTRY: ICD-10-PCS | Mod: 26,,, | Performed by: PATHOLOGY

## 2021-03-31 PROCEDURE — 45385 COLONOSCOPY W/LESION REMOVAL: CPT | Mod: PT,,, | Performed by: INTERNAL MEDICINE

## 2021-03-31 PROCEDURE — 25000003 PHARM REV CODE 250: Performed by: NURSE ANESTHETIST, CERTIFIED REGISTERED

## 2021-03-31 PROCEDURE — 88305 TISSUE EXAM BY PATHOLOGIST: CPT | Performed by: PATHOLOGY

## 2021-03-31 PROCEDURE — 27201089 HC SNARE, DISP (ANY): Performed by: INTERNAL MEDICINE

## 2021-03-31 PROCEDURE — 63600175 PHARM REV CODE 636 W HCPCS: Performed by: NURSE ANESTHETIST, CERTIFIED REGISTERED

## 2021-03-31 RX ORDER — ESOMEPRAZOLE MAGNESIUM 40 MG/1
40 CAPSULE, DELAYED RELEASE ORAL
COMMUNITY
End: 2021-04-26

## 2021-03-31 RX ORDER — PROPOFOL 10 MG/ML
VIAL (ML) INTRAVENOUS CONTINUOUS PRN
Status: DISCONTINUED | OUTPATIENT
Start: 2021-03-31 | End: 2021-03-31

## 2021-03-31 RX ORDER — SODIUM CHLORIDE 9 MG/ML
INJECTION, SOLUTION INTRAVENOUS CONTINUOUS
Status: DISCONTINUED | OUTPATIENT
Start: 2021-03-31 | End: 2021-03-31 | Stop reason: HOSPADM

## 2021-03-31 RX ORDER — SODIUM CHLORIDE 0.9 % (FLUSH) 0.9 %
10 SYRINGE (ML) INJECTION
Status: DISCONTINUED | OUTPATIENT
Start: 2021-03-31 | End: 2021-03-31 | Stop reason: HOSPADM

## 2021-03-31 RX ORDER — PROPOFOL 10 MG/ML
VIAL (ML) INTRAVENOUS
Status: DISCONTINUED | OUTPATIENT
Start: 2021-03-31 | End: 2021-03-31

## 2021-03-31 RX ORDER — CLOPIDOGREL BISULFATE 75 MG/1
75 TABLET ORAL DAILY
COMMUNITY
End: 2021-04-26

## 2021-03-31 RX ORDER — LIDOCAINE HYDROCHLORIDE 20 MG/ML
INJECTION INTRAVENOUS
Status: DISCONTINUED | OUTPATIENT
Start: 2021-03-31 | End: 2021-03-31

## 2021-03-31 RX ADMIN — PROPOFOL 60 MG: 10 INJECTION, EMULSION INTRAVENOUS at 11:03

## 2021-03-31 RX ADMIN — LIDOCAINE HYDROCHLORIDE 60 MG: 20 INJECTION, SOLUTION INTRAVENOUS at 11:03

## 2021-03-31 RX ADMIN — PROPOFOL 150 MCG/KG/MIN: 10 INJECTION, EMULSION INTRAVENOUS at 11:03

## 2021-03-31 RX ADMIN — SODIUM CHLORIDE: 0.9 INJECTION, SOLUTION INTRAVENOUS at 10:03

## 2021-04-01 ENCOUNTER — TELEPHONE (OUTPATIENT)
Dept: ENDOSCOPY | Facility: HOSPITAL | Age: 66
End: 2021-04-01

## 2021-04-06 LAB
FINAL PATHOLOGIC DIAGNOSIS: NORMAL
GROSS: NORMAL
Lab: NORMAL

## 2021-04-21 ENCOUNTER — PATIENT OUTREACH (OUTPATIENT)
Dept: ADMINISTRATIVE | Facility: OTHER | Age: 66
End: 2021-04-21

## 2021-04-26 ENCOUNTER — TELEPHONE (OUTPATIENT)
Dept: CARDIOLOGY | Facility: CLINIC | Age: 66
End: 2021-04-26

## 2021-04-26 ENCOUNTER — OFFICE VISIT (OUTPATIENT)
Dept: CARDIOLOGY | Facility: CLINIC | Age: 66
End: 2021-04-26
Payer: MEDICARE

## 2021-04-26 VITALS
SYSTOLIC BLOOD PRESSURE: 135 MMHG | HEART RATE: 67 BPM | WEIGHT: 200.38 LBS | BODY MASS INDEX: 30.37 KG/M2 | DIASTOLIC BLOOD PRESSURE: 64 MMHG | HEIGHT: 68 IN | OXYGEN SATURATION: 97 %

## 2021-04-26 DIAGNOSIS — K55.1 MESENTERIC ISCHEMIA DUE TO ARTERIAL INSUFFICIENCY: ICD-10-CM

## 2021-04-26 DIAGNOSIS — I48.0 PAROXYSMAL ATRIAL FIBRILLATION: ICD-10-CM

## 2021-04-26 DIAGNOSIS — I73.9 PAD (PERIPHERAL ARTERY DISEASE): ICD-10-CM

## 2021-04-26 DIAGNOSIS — Z98.61 S/P PTCA (PERCUTANEOUS TRANSLUMINAL CORONARY ANGIOPLASTY): ICD-10-CM

## 2021-04-26 DIAGNOSIS — I25.2 OLD MI (MYOCARDIAL INFARCTION): ICD-10-CM

## 2021-04-26 DIAGNOSIS — I25.118 CORONARY ARTERY DISEASE OF NATIVE ARTERY OF NATIVE HEART WITH STABLE ANGINA PECTORIS: Primary | ICD-10-CM

## 2021-04-26 DIAGNOSIS — Z95.1 S/P CABG (CORONARY ARTERY BYPASS GRAFT): ICD-10-CM

## 2021-04-26 DIAGNOSIS — Z87.19 H/O: GI BLEED: ICD-10-CM

## 2021-04-26 DIAGNOSIS — Z95.810 AICD (AUTOMATIC CARDIOVERTER/DEFIBRILLATOR) PRESENT: ICD-10-CM

## 2021-04-26 DIAGNOSIS — I25.10 CORONARY ARTERY DISEASE, ANGINA PRESENCE UNSPECIFIED, UNSPECIFIED VESSEL OR LESION TYPE, UNSPECIFIED WHETHER NATIVE OR TRANSPLANTED HEART: ICD-10-CM

## 2021-04-26 PROCEDURE — 3008F PR BODY MASS INDEX (BMI) DOCUMENTED: ICD-10-PCS | Mod: CPTII,S$GLB,, | Performed by: INTERNAL MEDICINE

## 2021-04-26 PROCEDURE — 99499 UNLISTED E&M SERVICE: CPT | Mod: S$GLB,,, | Performed by: INTERNAL MEDICINE

## 2021-04-26 PROCEDURE — 99999 PR PBB SHADOW E&M-EST. PATIENT-LVL III: CPT | Mod: PBBFAC,,, | Performed by: INTERNAL MEDICINE

## 2021-04-26 PROCEDURE — 99499 RISK ADDL DX/OHS AUDIT: ICD-10-PCS | Mod: S$GLB,,, | Performed by: INTERNAL MEDICINE

## 2021-04-26 PROCEDURE — 1100F PTFALLS ASSESS-DOCD GE2>/YR: CPT | Mod: CPTII,S$GLB,, | Performed by: INTERNAL MEDICINE

## 2021-04-26 PROCEDURE — 3288F PR FALLS RISK ASSESSMENT DOCUMENTED: ICD-10-PCS | Mod: CPTII,S$GLB,, | Performed by: INTERNAL MEDICINE

## 2021-04-26 PROCEDURE — 1159F MED LIST DOCD IN RCRD: CPT | Mod: S$GLB,,, | Performed by: INTERNAL MEDICINE

## 2021-04-26 PROCEDURE — 3288F FALL RISK ASSESSMENT DOCD: CPT | Mod: CPTII,S$GLB,, | Performed by: INTERNAL MEDICINE

## 2021-04-26 PROCEDURE — 1126F AMNT PAIN NOTED NONE PRSNT: CPT | Mod: S$GLB,,, | Performed by: INTERNAL MEDICINE

## 2021-04-26 PROCEDURE — 1126F PR PAIN SEVERITY QUANTIFIED, NO PAIN PRESENT: ICD-10-PCS | Mod: S$GLB,,, | Performed by: INTERNAL MEDICINE

## 2021-04-26 PROCEDURE — 99214 OFFICE O/P EST MOD 30 MIN: CPT | Mod: S$GLB,,, | Performed by: INTERNAL MEDICINE

## 2021-04-26 PROCEDURE — 99214 PR OFFICE/OUTPT VISIT, EST, LEVL IV, 30-39 MIN: ICD-10-PCS | Mod: S$GLB,,, | Performed by: INTERNAL MEDICINE

## 2021-04-26 PROCEDURE — 3008F BODY MASS INDEX DOCD: CPT | Mod: CPTII,S$GLB,, | Performed by: INTERNAL MEDICINE

## 2021-04-26 PROCEDURE — 1100F PR PT FALLS ASSESS DOC 2+ FALLS/FALL W/INJURY/YR: ICD-10-PCS | Mod: CPTII,S$GLB,, | Performed by: INTERNAL MEDICINE

## 2021-04-26 PROCEDURE — 99999 PR PBB SHADOW E&M-EST. PATIENT-LVL III: ICD-10-PCS | Mod: PBBFAC,,, | Performed by: INTERNAL MEDICINE

## 2021-04-26 PROCEDURE — 1159F PR MEDICATION LIST DOCUMENTED IN MEDICAL RECORD: ICD-10-PCS | Mod: S$GLB,,, | Performed by: INTERNAL MEDICINE

## 2021-04-27 ENCOUNTER — PATIENT OUTREACH (OUTPATIENT)
Dept: ADMINISTRATIVE | Facility: HOSPITAL | Age: 66
End: 2021-04-27

## 2021-05-10 ENCOUNTER — HOSPITAL ENCOUNTER (EMERGENCY)
Facility: HOSPITAL | Age: 66
Discharge: HOME | End: 2021-05-11
Attending: EMERGENCY MEDICINE
Payer: MEDICARE

## 2021-05-10 ENCOUNTER — OFFICE VISIT (OUTPATIENT)
Dept: PRIMARY CARE CLINIC | Facility: CLINIC | Age: 66
End: 2021-05-10
Payer: MEDICARE

## 2021-05-10 VITALS
OXYGEN SATURATION: 96 % | DIASTOLIC BLOOD PRESSURE: 72 MMHG | HEART RATE: 62 BPM | BODY MASS INDEX: 30.07 KG/M2 | RESPIRATION RATE: 18 BRPM | WEIGHT: 198.44 LBS | TEMPERATURE: 98 F | SYSTOLIC BLOOD PRESSURE: 138 MMHG | HEIGHT: 68 IN

## 2021-05-10 DIAGNOSIS — K62.5 BRIGHT RED BLOOD PER RECTUM: ICD-10-CM

## 2021-05-10 DIAGNOSIS — Z95.810 AICD (AUTOMATIC CARDIOVERTER/DEFIBRILLATOR) PRESENT: ICD-10-CM

## 2021-05-10 DIAGNOSIS — F41.9 ANXIETY AND DEPRESSION: ICD-10-CM

## 2021-05-10 DIAGNOSIS — Z87.19 H/O: GI BLEED: ICD-10-CM

## 2021-05-10 DIAGNOSIS — K55.1 CHRONIC MESENTERIC ISCHEMIA: ICD-10-CM

## 2021-05-10 DIAGNOSIS — K55.1 CHRONIC INTESTINAL ISCHEMIA: ICD-10-CM

## 2021-05-10 DIAGNOSIS — R19.7 NAUSEA VOMITING AND DIARRHEA: ICD-10-CM

## 2021-05-10 DIAGNOSIS — I48.0 PAROXYSMAL ATRIAL FIBRILLATION: ICD-10-CM

## 2021-05-10 DIAGNOSIS — K55.1 MESENTERIC ISCHEMIA DUE TO ARTERIAL INSUFFICIENCY: ICD-10-CM

## 2021-05-10 DIAGNOSIS — F32.A ANXIETY AND DEPRESSION: ICD-10-CM

## 2021-05-10 DIAGNOSIS — R11.2 NAUSEA VOMITING AND DIARRHEA: ICD-10-CM

## 2021-05-10 DIAGNOSIS — J42 CHRONIC BRONCHITIS, UNSPECIFIED CHRONIC BRONCHITIS TYPE: ICD-10-CM

## 2021-05-10 DIAGNOSIS — I25.10 CORONARY ARTERY DISEASE, ANGINA PRESENCE UNSPECIFIED, UNSPECIFIED VESSEL OR LESION TYPE, UNSPECIFIED WHETHER NATIVE OR TRANSPLANTED HEART: ICD-10-CM

## 2021-05-10 DIAGNOSIS — I73.9 PAD (PERIPHERAL ARTERY DISEASE): ICD-10-CM

## 2021-05-10 DIAGNOSIS — R10.84 GENERALIZED ABDOMINAL PAIN: Primary | ICD-10-CM

## 2021-05-10 DIAGNOSIS — Z98.61 S/P PTCA (PERCUTANEOUS TRANSLUMINAL CORONARY ANGIOPLASTY): ICD-10-CM

## 2021-05-10 DIAGNOSIS — R10.9 ABDOMINAL PAIN: Primary | ICD-10-CM

## 2021-05-10 DIAGNOSIS — I25.2 OLD MI (MYOCARDIAL INFARCTION): ICD-10-CM

## 2021-05-10 DIAGNOSIS — Z95.1 S/P CABG (CORONARY ARTERY BYPASS GRAFT): ICD-10-CM

## 2021-05-10 LAB
BASOPHILS # BLD AUTO: 0.04 K/UL (ref 0–0.2)
BASOPHILS NFR BLD: 0.6 % (ref 0–1.9)
BILIRUB UR QL STRIP: NEGATIVE
CLARITY UR REFRACT.AUTO: CLEAR
COLOR UR AUTO: YELLOW
DIFFERENTIAL METHOD: ABNORMAL
EOSINOPHIL # BLD AUTO: 0.1 K/UL (ref 0–0.5)
EOSINOPHIL NFR BLD: 1.5 % (ref 0–8)
ERYTHROCYTE [DISTWIDTH] IN BLOOD BY AUTOMATED COUNT: 14.6 % (ref 11.5–14.5)
GLUCOSE UR QL STRIP: NEGATIVE
HCT VFR BLD AUTO: 39.4 % (ref 37–48.5)
HGB BLD-MCNC: 11.9 G/DL (ref 12–16)
HGB UR QL STRIP: NEGATIVE
IMM GRANULOCYTES # BLD AUTO: 0.02 K/UL (ref 0–0.04)
IMM GRANULOCYTES NFR BLD AUTO: 0.3 % (ref 0–0.5)
KETONES UR QL STRIP: NEGATIVE
LEUKOCYTE ESTERASE UR QL STRIP: ABNORMAL
LYMPHOCYTES # BLD AUTO: 2.6 K/UL (ref 1–4.8)
LYMPHOCYTES NFR BLD: 39.4 % (ref 18–48)
MCH RBC QN AUTO: 26.4 PG (ref 27–31)
MCHC RBC AUTO-ENTMCNC: 30.2 G/DL (ref 32–36)
MCV RBC AUTO: 88 FL (ref 82–98)
MICROSCOPIC COMMENT: NORMAL
MONOCYTES # BLD AUTO: 0.6 K/UL (ref 0.3–1)
MONOCYTES NFR BLD: 9.3 % (ref 4–15)
NEUTROPHILS # BLD AUTO: 3.3 K/UL (ref 1.8–7.7)
NEUTROPHILS NFR BLD: 48.9 % (ref 38–73)
NITRITE UR QL STRIP: NEGATIVE
NRBC BLD-RTO: 0 /100 WBC
PH UR STRIP: 5 [PH] (ref 5–8)
PLATELET # BLD AUTO: 250 K/UL (ref 150–450)
PMV BLD AUTO: 10.6 FL (ref 9.2–12.9)
PROT UR QL STRIP: NEGATIVE
RBC # BLD AUTO: 4.5 M/UL (ref 4–5.4)
SP GR UR STRIP: 1.02 (ref 1–1.03)
SQUAMOUS #/AREA URNS AUTO: 2 /HPF
URN SPEC COLLECT METH UR: ABNORMAL
WBC # BLD AUTO: 6.65 K/UL (ref 3.9–12.7)
WBC #/AREA URNS AUTO: 5 /HPF (ref 0–5)

## 2021-05-10 PROCEDURE — 1159F MED LIST DOCD IN RCRD: CPT | Mod: S$GLB,,, | Performed by: FAMILY MEDICINE

## 2021-05-10 PROCEDURE — 99285 PR EMERGENCY DEPT VISIT,LEVEL V: ICD-10-PCS | Mod: ,,, | Performed by: EMERGENCY MEDICINE

## 2021-05-10 PROCEDURE — 93010 EKG 12-LEAD: ICD-10-PCS | Mod: ,,, | Performed by: INTERNAL MEDICINE

## 2021-05-10 PROCEDURE — 99499 RISK ADDL DX/OHS AUDIT: ICD-10-PCS | Mod: S$GLB,,, | Performed by: FAMILY MEDICINE

## 2021-05-10 PROCEDURE — 99214 PR OFFICE/OUTPT VISIT, EST, LEVL IV, 30-39 MIN: ICD-10-PCS | Mod: S$GLB,,, | Performed by: FAMILY MEDICINE

## 2021-05-10 PROCEDURE — 3008F BODY MASS INDEX DOCD: CPT | Mod: CPTII,S$GLB,, | Performed by: FAMILY MEDICINE

## 2021-05-10 PROCEDURE — 1126F AMNT PAIN NOTED NONE PRSNT: CPT | Mod: S$GLB,,, | Performed by: FAMILY MEDICINE

## 2021-05-10 PROCEDURE — 99999 PR PBB SHADOW E&M-EST. PATIENT-LVL IV: ICD-10-PCS | Mod: PBBFAC,,, | Performed by: FAMILY MEDICINE

## 2021-05-10 PROCEDURE — 1159F PR MEDICATION LIST DOCUMENTED IN MEDICAL RECORD: ICD-10-PCS | Mod: S$GLB,,, | Performed by: FAMILY MEDICINE

## 2021-05-10 PROCEDURE — 93010 ELECTROCARDIOGRAM REPORT: CPT | Mod: ,,, | Performed by: INTERNAL MEDICINE

## 2021-05-10 PROCEDURE — 3288F PR FALLS RISK ASSESSMENT DOCUMENTED: ICD-10-PCS | Mod: CPTII,S$GLB,, | Performed by: FAMILY MEDICINE

## 2021-05-10 PROCEDURE — 96374 THER/PROPH/DIAG INJ IV PUSH: CPT

## 2021-05-10 PROCEDURE — 1126F PR PAIN SEVERITY QUANTIFIED, NO PAIN PRESENT: ICD-10-PCS | Mod: S$GLB,,, | Performed by: FAMILY MEDICINE

## 2021-05-10 PROCEDURE — 99999 PR PBB SHADOW E&M-EST. PATIENT-LVL IV: CPT | Mod: PBBFAC,,, | Performed by: FAMILY MEDICINE

## 2021-05-10 PROCEDURE — 3008F PR BODY MASS INDEX (BMI) DOCUMENTED: ICD-10-PCS | Mod: CPTII,S$GLB,, | Performed by: FAMILY MEDICINE

## 2021-05-10 PROCEDURE — 85025 COMPLETE CBC W/AUTO DIFF WBC: CPT | Performed by: EMERGENCY MEDICINE

## 2021-05-10 PROCEDURE — 99285 EMERGENCY DEPT VISIT HI MDM: CPT | Mod: 25

## 2021-05-10 PROCEDURE — 99499 UNLISTED E&M SERVICE: CPT | Mod: S$GLB,,, | Performed by: FAMILY MEDICINE

## 2021-05-10 PROCEDURE — 83605 ASSAY OF LACTIC ACID: CPT | Performed by: EMERGENCY MEDICINE

## 2021-05-10 PROCEDURE — 99285 EMERGENCY DEPT VISIT HI MDM: CPT | Mod: ,,, | Performed by: EMERGENCY MEDICINE

## 2021-05-10 PROCEDURE — 3288F FALL RISK ASSESSMENT DOCD: CPT | Mod: CPTII,S$GLB,, | Performed by: FAMILY MEDICINE

## 2021-05-10 PROCEDURE — 1101F PR PT FALLS ASSESS DOC 0-1 FALLS W/OUT INJ PAST YR: ICD-10-PCS | Mod: CPTII,S$GLB,, | Performed by: FAMILY MEDICINE

## 2021-05-10 PROCEDURE — 80047 BASIC METABLC PNL IONIZED CA: CPT

## 2021-05-10 PROCEDURE — 99214 OFFICE O/P EST MOD 30 MIN: CPT | Mod: S$GLB,,, | Performed by: FAMILY MEDICINE

## 2021-05-10 PROCEDURE — 96375 TX/PRO/DX INJ NEW DRUG ADDON: CPT

## 2021-05-10 PROCEDURE — 1101F PT FALLS ASSESS-DOCD LE1/YR: CPT | Mod: CPTII,S$GLB,, | Performed by: FAMILY MEDICINE

## 2021-05-10 PROCEDURE — 81001 URINALYSIS AUTO W/SCOPE: CPT | Performed by: EMERGENCY MEDICINE

## 2021-05-10 PROCEDURE — 93005 ELECTROCARDIOGRAM TRACING: CPT

## 2021-05-10 PROCEDURE — 63600175 PHARM REV CODE 636 W HCPCS: Performed by: EMERGENCY MEDICINE

## 2021-05-10 RX ORDER — ONDANSETRON 2 MG/ML
4 INJECTION INTRAMUSCULAR; INTRAVENOUS
Status: COMPLETED | OUTPATIENT
Start: 2021-05-10 | End: 2021-05-10

## 2021-05-10 RX ORDER — HYDROMORPHONE HYDROCHLORIDE 1 MG/ML
1 INJECTION, SOLUTION INTRAMUSCULAR; INTRAVENOUS; SUBCUTANEOUS
Status: COMPLETED | OUTPATIENT
Start: 2021-05-10 | End: 2021-05-10

## 2021-05-10 RX ADMIN — ONDANSETRON 4 MG: 2 INJECTION INTRAMUSCULAR; INTRAVENOUS at 11:05

## 2021-05-10 RX ADMIN — HYDROMORPHONE HYDROCHLORIDE 1 MG: 1 INJECTION, SOLUTION INTRAMUSCULAR; INTRAVENOUS; SUBCUTANEOUS at 11:05

## 2021-05-11 VITALS
WEIGHT: 198 LBS | DIASTOLIC BLOOD PRESSURE: 74 MMHG | TEMPERATURE: 98 F | BODY MASS INDEX: 30.01 KG/M2 | HEIGHT: 68 IN | OXYGEN SATURATION: 96 % | HEART RATE: 62 BPM | SYSTOLIC BLOOD PRESSURE: 126 MMHG | RESPIRATION RATE: 16 BRPM

## 2021-05-11 DIAGNOSIS — K55.1 MESENTERIC ISCHEMIA DUE TO ARTERIAL INSUFFICIENCY: Primary | ICD-10-CM

## 2021-05-11 DIAGNOSIS — I25.10 CORONARY ARTERY DISEASE, ANGINA PRESENCE UNSPECIFIED, UNSPECIFIED VESSEL OR LESION TYPE, UNSPECIFIED WHETHER NATIVE OR TRANSPLANTED HEART: ICD-10-CM

## 2021-05-11 LAB
ALBUMIN SERPL BCP-MCNC: 3.4 G/DL (ref 3.5–5.2)
ALP SERPL-CCNC: 127 U/L (ref 55–135)
ALT SERPL W/O P-5'-P-CCNC: 12 U/L (ref 10–44)
ANION GAP SERPL CALC-SCNC: 5 MMOL/L (ref 8–16)
AST SERPL-CCNC: 17 U/L (ref 10–40)
BILIRUB SERPL-MCNC: 0.3 MG/DL (ref 0.1–1)
BUN SERPL-MCNC: 13 MG/DL (ref 8–23)
BUN SERPL-MCNC: 15 MG/DL (ref 6–30)
CALCIUM SERPL-MCNC: 8.8 MG/DL (ref 8.7–10.5)
CHLORIDE SERPL-SCNC: 105 MMOL/L (ref 95–110)
CHLORIDE SERPL-SCNC: 105 MMOL/L (ref 95–110)
CO2 SERPL-SCNC: 28 MMOL/L (ref 23–29)
CREAT SERPL-MCNC: 0.9 MG/DL (ref 0.5–1.4)
CREAT SERPL-MCNC: 0.9 MG/DL (ref 0.5–1.4)
EST. GFR  (AFRICAN AMERICAN): >60 ML/MIN/1.73 M^2
EST. GFR  (NON AFRICAN AMERICAN): >60 ML/MIN/1.73 M^2
GLUCOSE SERPL-MCNC: 90 MG/DL (ref 70–110)
GLUCOSE SERPL-MCNC: 93 MG/DL (ref 70–110)
HCT VFR BLD CALC: 35 %PCV (ref 36–54)
LACTATE SERPL-SCNC: 0.7 MMOL/L (ref 0.5–2.2)
LACTATE SERPL-SCNC: 0.8 MMOL/L (ref 0.5–2.2)
LIPASE SERPL-CCNC: 21 U/L (ref 4–60)
POC IONIZED CALCIUM: 1.19 MMOL/L (ref 1.06–1.42)
POC TCO2 (MEASURED): 29 MMOL/L (ref 23–29)
POTASSIUM BLD-SCNC: 4 MMOL/L (ref 3.5–5.1)
POTASSIUM SERPL-SCNC: 3.7 MMOL/L (ref 3.5–5.1)
PROT SERPL-MCNC: 7.2 G/DL (ref 6–8.4)
SAMPLE: ABNORMAL
SODIUM BLD-SCNC: 143 MMOL/L (ref 136–145)
SODIUM SERPL-SCNC: 138 MMOL/L (ref 136–145)

## 2021-05-11 PROCEDURE — 83690 ASSAY OF LIPASE: CPT | Performed by: EMERGENCY MEDICINE

## 2021-05-11 PROCEDURE — 80053 COMPREHEN METABOLIC PANEL: CPT | Performed by: EMERGENCY MEDICINE

## 2021-05-11 PROCEDURE — 25500020 PHARM REV CODE 255: Performed by: EMERGENCY MEDICINE

## 2021-05-11 PROCEDURE — 83605 ASSAY OF LACTIC ACID: CPT | Performed by: EMERGENCY MEDICINE

## 2021-05-11 RX ADMIN — IOHEXOL 100 ML: 350 INJECTION, SOLUTION INTRAVENOUS at 12:05

## 2021-05-18 ENCOUNTER — TELEPHONE (OUTPATIENT)
Dept: PRIMARY CARE CLINIC | Facility: CLINIC | Age: 66
End: 2021-05-18

## 2021-05-24 ENCOUNTER — OFFICE VISIT (OUTPATIENT)
Dept: PRIMARY CARE CLINIC | Facility: CLINIC | Age: 66
End: 2021-05-24
Payer: MEDICARE

## 2021-05-24 ENCOUNTER — TELEPHONE (OUTPATIENT)
Dept: VASCULAR SURGERY | Facility: CLINIC | Age: 66
End: 2021-05-24

## 2021-05-24 VITALS
HEART RATE: 70 BPM | TEMPERATURE: 99 F | OXYGEN SATURATION: 96 % | SYSTOLIC BLOOD PRESSURE: 134 MMHG | HEIGHT: 68 IN | DIASTOLIC BLOOD PRESSURE: 78 MMHG | WEIGHT: 205.25 LBS | BODY MASS INDEX: 31.11 KG/M2

## 2021-05-24 DIAGNOSIS — J44.9 CHRONIC OBSTRUCTIVE PULMONARY DISEASE, UNSPECIFIED COPD TYPE: ICD-10-CM

## 2021-05-24 DIAGNOSIS — M85.851 OSTEOPENIA OF BOTH HIPS: Primary | ICD-10-CM

## 2021-05-24 DIAGNOSIS — M85.852 OSTEOPENIA OF BOTH HIPS: Primary | ICD-10-CM

## 2021-05-24 DIAGNOSIS — K59.00 CONSTIPATION, UNSPECIFIED CONSTIPATION TYPE: ICD-10-CM

## 2021-05-24 PROCEDURE — 1100F PR PT FALLS ASSESS DOC 2+ FALLS/FALL W/INJURY/YR: ICD-10-PCS | Mod: CPTII,S$GLB,, | Performed by: STUDENT IN AN ORGANIZED HEALTH CARE EDUCATION/TRAINING PROGRAM

## 2021-05-24 PROCEDURE — 3288F PR FALLS RISK ASSESSMENT DOCUMENTED: ICD-10-PCS | Mod: CPTII,S$GLB,, | Performed by: STUDENT IN AN ORGANIZED HEALTH CARE EDUCATION/TRAINING PROGRAM

## 2021-05-24 PROCEDURE — 1100F PTFALLS ASSESS-DOCD GE2>/YR: CPT | Mod: CPTII,S$GLB,, | Performed by: STUDENT IN AN ORGANIZED HEALTH CARE EDUCATION/TRAINING PROGRAM

## 2021-05-24 PROCEDURE — 3288F FALL RISK ASSESSMENT DOCD: CPT | Mod: CPTII,S$GLB,, | Performed by: STUDENT IN AN ORGANIZED HEALTH CARE EDUCATION/TRAINING PROGRAM

## 2021-05-24 PROCEDURE — 1126F AMNT PAIN NOTED NONE PRSNT: CPT | Mod: S$GLB,,, | Performed by: STUDENT IN AN ORGANIZED HEALTH CARE EDUCATION/TRAINING PROGRAM

## 2021-05-24 PROCEDURE — 99999 PR PBB SHADOW E&M-EST. PATIENT-LVL IV: ICD-10-PCS | Mod: PBBFAC,,, | Performed by: STUDENT IN AN ORGANIZED HEALTH CARE EDUCATION/TRAINING PROGRAM

## 2021-05-24 PROCEDURE — 1126F PR PAIN SEVERITY QUANTIFIED, NO PAIN PRESENT: ICD-10-PCS | Mod: S$GLB,,, | Performed by: STUDENT IN AN ORGANIZED HEALTH CARE EDUCATION/TRAINING PROGRAM

## 2021-05-24 PROCEDURE — 3008F BODY MASS INDEX DOCD: CPT | Mod: CPTII,S$GLB,, | Performed by: STUDENT IN AN ORGANIZED HEALTH CARE EDUCATION/TRAINING PROGRAM

## 2021-05-24 PROCEDURE — 1159F PR MEDICATION LIST DOCUMENTED IN MEDICAL RECORD: ICD-10-PCS | Mod: S$GLB,,, | Performed by: STUDENT IN AN ORGANIZED HEALTH CARE EDUCATION/TRAINING PROGRAM

## 2021-05-24 PROCEDURE — 99214 PR OFFICE/OUTPT VISIT, EST, LEVL IV, 30-39 MIN: ICD-10-PCS | Mod: S$GLB,,, | Performed by: STUDENT IN AN ORGANIZED HEALTH CARE EDUCATION/TRAINING PROGRAM

## 2021-05-24 PROCEDURE — 99999 PR PBB SHADOW E&M-EST. PATIENT-LVL IV: CPT | Mod: PBBFAC,,, | Performed by: STUDENT IN AN ORGANIZED HEALTH CARE EDUCATION/TRAINING PROGRAM

## 2021-05-24 PROCEDURE — 3008F PR BODY MASS INDEX (BMI) DOCUMENTED: ICD-10-PCS | Mod: CPTII,S$GLB,, | Performed by: STUDENT IN AN ORGANIZED HEALTH CARE EDUCATION/TRAINING PROGRAM

## 2021-05-24 PROCEDURE — 1159F MED LIST DOCD IN RCRD: CPT | Mod: S$GLB,,, | Performed by: STUDENT IN AN ORGANIZED HEALTH CARE EDUCATION/TRAINING PROGRAM

## 2021-05-24 PROCEDURE — 99214 OFFICE O/P EST MOD 30 MIN: CPT | Mod: S$GLB,,, | Performed by: STUDENT IN AN ORGANIZED HEALTH CARE EDUCATION/TRAINING PROGRAM

## 2021-05-24 RX ORDER — TIOTROPIUM BROMIDE INHALATION SPRAY 1.56 UG/1
2 SPRAY, METERED RESPIRATORY (INHALATION) DAILY
Qty: 4 G | Refills: 2 | Status: SHIPPED | OUTPATIENT
Start: 2021-05-24 | End: 2021-10-06

## 2021-05-24 RX ORDER — DOCUSATE SODIUM 100 MG/1
100 CAPSULE, LIQUID FILLED ORAL 2 TIMES DAILY PRN
Qty: 60 CAPSULE | Refills: 1 | Status: SHIPPED | OUTPATIENT
Start: 2021-05-24 | End: 2021-10-06

## 2021-05-24 RX ORDER — ERGOCALCIFEROL 1.25 MG/1
50000 CAPSULE ORAL
Qty: 12 CAPSULE | Refills: 3 | Status: SHIPPED | OUTPATIENT
Start: 2021-05-24 | End: 2022-07-20

## 2021-05-24 RX ORDER — BUDESONIDE AND FORMOTEROL FUMARATE DIHYDRATE 160; 4.5 UG/1; UG/1
2 AEROSOL RESPIRATORY (INHALATION) EVERY 12 HOURS
Qty: 10.2 G | Refills: 1 | Status: SHIPPED | OUTPATIENT
Start: 2021-05-24 | End: 2021-07-26

## 2021-05-24 RX ORDER — ALBUTEROL SULFATE 90 UG/1
2 AEROSOL, METERED RESPIRATORY (INHALATION) EVERY 6 HOURS PRN
Qty: 8 G | Refills: 2 | Status: SHIPPED | OUTPATIENT
Start: 2021-05-24 | End: 2022-01-28

## 2021-05-25 ENCOUNTER — PATIENT OUTREACH (OUTPATIENT)
Dept: ADMINISTRATIVE | Facility: OTHER | Age: 66
End: 2021-05-25

## 2021-06-09 ENCOUNTER — TELEPHONE (OUTPATIENT)
Dept: PULMONOLOGY | Facility: CLINIC | Age: 66
End: 2021-06-09

## 2021-07-08 ENCOUNTER — PATIENT OUTREACH (OUTPATIENT)
Dept: ADMINISTRATIVE | Facility: OTHER | Age: 66
End: 2021-07-08

## 2021-07-25 DIAGNOSIS — J44.9 CHRONIC OBSTRUCTIVE PULMONARY DISEASE, UNSPECIFIED COPD TYPE: ICD-10-CM

## 2021-07-26 RX ORDER — BUDESONIDE AND FORMOTEROL FUMARATE DIHYDRATE 160; 4.5 UG/1; UG/1
AEROSOL RESPIRATORY (INHALATION)
Qty: 30.6 G | Refills: 3 | Status: SHIPPED | OUTPATIENT
Start: 2021-07-26 | End: 2023-01-04 | Stop reason: SDUPTHER

## 2021-08-16 ENCOUNTER — TELEPHONE (OUTPATIENT)
Dept: CARDIOLOGY | Facility: CLINIC | Age: 66
End: 2021-08-16

## 2021-08-16 ENCOUNTER — HOSPITAL ENCOUNTER (OUTPATIENT)
Facility: HOSPITAL | Age: 66
Discharge: HOME | End: 2021-08-17
Attending: EMERGENCY MEDICINE | Admitting: INTERNAL MEDICINE
Payer: MEDICARE

## 2021-08-16 ENCOUNTER — TELEPHONE (OUTPATIENT)
Dept: PRIMARY CARE CLINIC | Facility: CLINIC | Age: 66
End: 2021-08-16

## 2021-08-16 DIAGNOSIS — R11.10 VOMITING: ICD-10-CM

## 2021-08-16 DIAGNOSIS — R07.9 CHEST PAIN, UNSPECIFIED TYPE: Primary | ICD-10-CM

## 2021-08-16 DIAGNOSIS — R07.9 CHEST PAIN: ICD-10-CM

## 2021-08-16 DIAGNOSIS — R06.02 SHORTNESS OF BREATH: ICD-10-CM

## 2021-08-16 LAB
ALBUMIN SERPL BCP-MCNC: 3.4 G/DL (ref 3.5–5.2)
ALP SERPL-CCNC: 119 U/L (ref 55–135)
ALT SERPL W/O P-5'-P-CCNC: 12 U/L (ref 10–44)
ANION GAP SERPL CALC-SCNC: 11 MMOL/L (ref 8–16)
AST SERPL-CCNC: 13 U/L (ref 10–40)
BACTERIA #/AREA URNS HPF: ABNORMAL /HPF
BASOPHILS # BLD AUTO: 0.02 K/UL (ref 0–0.2)
BASOPHILS NFR BLD: 0.3 % (ref 0–1.9)
BILIRUB SERPL-MCNC: 0.3 MG/DL (ref 0.1–1)
BILIRUB UR QL STRIP: NEGATIVE
BNP SERPL-MCNC: 107 PG/ML (ref 0–99)
BUN SERPL-MCNC: 24 MG/DL (ref 8–23)
CALCIUM SERPL-MCNC: 8.9 MG/DL (ref 8.7–10.5)
CHLORIDE SERPL-SCNC: 112 MMOL/L (ref 95–110)
CLARITY UR: ABNORMAL
CO2 SERPL-SCNC: 19 MMOL/L (ref 23–29)
COLOR UR: YELLOW
CREAT SERPL-MCNC: 1 MG/DL (ref 0.5–1.4)
CTP QC/QA: YES
DIFFERENTIAL METHOD: ABNORMAL
EOSINOPHIL # BLD AUTO: 0.1 K/UL (ref 0–0.5)
EOSINOPHIL NFR BLD: 1 % (ref 0–8)
ERYTHROCYTE [DISTWIDTH] IN BLOOD BY AUTOMATED COUNT: 17.1 % (ref 11.5–14.5)
EST. GFR  (AFRICAN AMERICAN): >60 ML/MIN/1.73 M^2
EST. GFR  (NON AFRICAN AMERICAN): 59 ML/MIN/1.73 M^2
GLUCOSE SERPL-MCNC: 89 MG/DL (ref 70–110)
GLUCOSE UR QL STRIP: NEGATIVE
HCT VFR BLD AUTO: 36 % (ref 37–48.5)
HGB BLD-MCNC: 11.2 G/DL (ref 12–16)
HGB UR QL STRIP: ABNORMAL
HYALINE CASTS #/AREA URNS LPF: 81 /LPF
IMM GRANULOCYTES # BLD AUTO: 0.03 K/UL (ref 0–0.04)
IMM GRANULOCYTES NFR BLD AUTO: 0.5 % (ref 0–0.5)
KETONES UR QL STRIP: NEGATIVE
LACTATE SERPL-SCNC: 1 MMOL/L (ref 0.5–2.2)
LEUKOCYTE ESTERASE UR QL STRIP: ABNORMAL
LIPASE SERPL-CCNC: 40 U/L (ref 4–60)
LYMPHOCYTES # BLD AUTO: 2 K/UL (ref 1–4.8)
LYMPHOCYTES NFR BLD: 32.5 % (ref 18–48)
MCH RBC QN AUTO: 28.7 PG (ref 27–31)
MCHC RBC AUTO-ENTMCNC: 31.1 G/DL (ref 32–36)
MCV RBC AUTO: 92 FL (ref 82–98)
MICROSCOPIC COMMENT: ABNORMAL
MONOCYTES # BLD AUTO: 0.4 K/UL (ref 0.3–1)
MONOCYTES NFR BLD: 6.7 % (ref 4–15)
NEUTROPHILS # BLD AUTO: 3.7 K/UL (ref 1.8–7.7)
NEUTROPHILS NFR BLD: 59 % (ref 38–73)
NITRITE UR QL STRIP: POSITIVE
NON-SQ EPI CELLS #/AREA URNS HPF: 1 /HPF
NRBC BLD-RTO: 0 /100 WBC
PH UR STRIP: 6 [PH] (ref 5–8)
PLATELET # BLD AUTO: 277 K/UL (ref 150–450)
PMV BLD AUTO: 9 FL (ref 9.2–12.9)
POTASSIUM SERPL-SCNC: 4 MMOL/L (ref 3.5–5.1)
PROT SERPL-MCNC: 7.2 G/DL (ref 6–8.4)
PROT UR QL STRIP: ABNORMAL
RBC # BLD AUTO: 3.9 M/UL (ref 4–5.4)
RBC #/AREA URNS HPF: 10 /HPF (ref 0–4)
SARS-COV-2 RDRP RESP QL NAA+PROBE: NEGATIVE
SODIUM SERPL-SCNC: 142 MMOL/L (ref 136–145)
SP GR UR STRIP: 1.03 (ref 1–1.03)
SQUAMOUS #/AREA URNS HPF: 10 /HPF
TROPONIN I SERPL DL<=0.01 NG/ML-MCNC: 0.02 NG/ML (ref 0–0.03)
URN SPEC COLLECT METH UR: ABNORMAL
UROBILINOGEN UR STRIP-ACNC: NEGATIVE EU/DL
WBC # BLD AUTO: 6.28 K/UL (ref 3.9–12.7)
WBC #/AREA URNS HPF: >100 /HPF (ref 0–5)
WBC CLUMPS URNS QL MICRO: ABNORMAL

## 2021-08-16 PROCEDURE — 80053 COMPREHEN METABOLIC PANEL: CPT | Performed by: EMERGENCY MEDICINE

## 2021-08-16 PROCEDURE — 83880 ASSAY OF NATRIURETIC PEPTIDE: CPT | Performed by: EMERGENCY MEDICINE

## 2021-08-16 PROCEDURE — 93010 EKG 12-LEAD: ICD-10-PCS | Mod: 76,,, | Performed by: INTERNAL MEDICINE

## 2021-08-16 PROCEDURE — 99285 EMERGENCY DEPT VISIT HI MDM: CPT | Mod: 25

## 2021-08-16 PROCEDURE — 96374 THER/PROPH/DIAG INJ IV PUSH: CPT

## 2021-08-16 PROCEDURE — 63600175 PHARM REV CODE 636 W HCPCS: Performed by: EMERGENCY MEDICINE

## 2021-08-16 PROCEDURE — 87186 SC STD MICRODIL/AGAR DIL: CPT | Performed by: EMERGENCY MEDICINE

## 2021-08-16 PROCEDURE — 93005 ELECTROCARDIOGRAM TRACING: CPT

## 2021-08-16 PROCEDURE — 93010 ELECTROCARDIOGRAM REPORT: CPT | Mod: 76,,, | Performed by: INTERNAL MEDICINE

## 2021-08-16 PROCEDURE — 85025 COMPLETE CBC W/AUTO DIFF WBC: CPT | Performed by: EMERGENCY MEDICINE

## 2021-08-16 PROCEDURE — U0002 COVID-19 LAB TEST NON-CDC: HCPCS | Performed by: EMERGENCY MEDICINE

## 2021-08-16 PROCEDURE — 87077 CULTURE AEROBIC IDENTIFY: CPT | Performed by: EMERGENCY MEDICINE

## 2021-08-16 PROCEDURE — 96375 TX/PRO/DX INJ NEW DRUG ADDON: CPT

## 2021-08-16 PROCEDURE — 93010 ELECTROCARDIOGRAM REPORT: CPT | Mod: ,,, | Performed by: INTERNAL MEDICINE

## 2021-08-16 PROCEDURE — 83690 ASSAY OF LIPASE: CPT | Performed by: EMERGENCY MEDICINE

## 2021-08-16 PROCEDURE — 83605 ASSAY OF LACTIC ACID: CPT | Performed by: EMERGENCY MEDICINE

## 2021-08-16 PROCEDURE — 87086 URINE CULTURE/COLONY COUNT: CPT | Performed by: EMERGENCY MEDICINE

## 2021-08-16 PROCEDURE — 96376 TX/PRO/DX INJ SAME DRUG ADON: CPT

## 2021-08-16 PROCEDURE — 84484 ASSAY OF TROPONIN QUANT: CPT | Performed by: EMERGENCY MEDICINE

## 2021-08-16 PROCEDURE — 81000 URINALYSIS NONAUTO W/SCOPE: CPT | Performed by: EMERGENCY MEDICINE

## 2021-08-16 PROCEDURE — 87088 URINE BACTERIA CULTURE: CPT | Performed by: EMERGENCY MEDICINE

## 2021-08-16 PROCEDURE — G0378 HOSPITAL OBSERVATION PER HR: HCPCS

## 2021-08-16 RX ORDER — ONDANSETRON 2 MG/ML
4 INJECTION INTRAMUSCULAR; INTRAVENOUS
Status: DISPENSED | OUTPATIENT
Start: 2021-08-16 | End: 2021-08-17

## 2021-08-16 RX ORDER — NITROGLYCERIN 0.4 MG/1
0.4 TABLET SUBLINGUAL EVERY 5 MIN PRN
Status: COMPLETED | OUTPATIENT
Start: 2021-08-16 | End: 2021-08-17

## 2021-08-16 RX ORDER — SODIUM CHLORIDE 0.9 % (FLUSH) 0.9 %
10 SYRINGE (ML) INJECTION
Status: DISCONTINUED | OUTPATIENT
Start: 2021-08-16 | End: 2021-08-17 | Stop reason: HOSPADM

## 2021-08-16 RX ORDER — ASPIRIN 325 MG
325 TABLET, DELAYED RELEASE (ENTERIC COATED) ORAL
Status: ACTIVE | OUTPATIENT
Start: 2021-08-16 | End: 2021-08-17

## 2021-08-16 RX ORDER — POLYETHYLENE GLYCOL 3350 17 G/17G
17 POWDER, FOR SOLUTION ORAL 2 TIMES DAILY PRN
Status: DISCONTINUED | OUTPATIENT
Start: 2021-08-16 | End: 2021-08-17 | Stop reason: HOSPADM

## 2021-08-16 RX ORDER — MORPHINE SULFATE 4 MG/ML
4 INJECTION, SOLUTION INTRAMUSCULAR; INTRAVENOUS
Status: COMPLETED | OUTPATIENT
Start: 2021-08-16 | End: 2021-08-16

## 2021-08-16 RX ORDER — ONDANSETRON 2 MG/ML
4 INJECTION INTRAMUSCULAR; INTRAVENOUS
Status: COMPLETED | OUTPATIENT
Start: 2021-08-16 | End: 2021-08-16

## 2021-08-16 RX ORDER — ONDANSETRON 2 MG/ML
4 INJECTION INTRAMUSCULAR; INTRAVENOUS EVERY 8 HOURS PRN
Status: DISCONTINUED | OUTPATIENT
Start: 2021-08-16 | End: 2021-08-17 | Stop reason: HOSPADM

## 2021-08-16 RX ORDER — ACETAMINOPHEN 325 MG/1
650 TABLET ORAL EVERY 4 HOURS PRN
Status: DISCONTINUED | OUTPATIENT
Start: 2021-08-16 | End: 2021-08-17 | Stop reason: HOSPADM

## 2021-08-16 RX ORDER — MORPHINE SULFATE 2 MG/ML
2 INJECTION, SOLUTION INTRAMUSCULAR; INTRAVENOUS
Status: COMPLETED | OUTPATIENT
Start: 2021-08-16 | End: 2021-08-16

## 2021-08-16 RX ADMIN — ONDANSETRON 4 MG: 2 INJECTION INTRAMUSCULAR; INTRAVENOUS at 09:08

## 2021-08-16 RX ADMIN — MORPHINE SULFATE 4 MG: 4 INJECTION, SOLUTION INTRAMUSCULAR; INTRAVENOUS at 09:08

## 2021-08-16 RX ADMIN — MORPHINE SULFATE 2 MG: 2 INJECTION, SOLUTION INTRAMUSCULAR; INTRAVENOUS at 11:08

## 2021-08-16 RX ADMIN — ONDANSETRON 4 MG: 2 INJECTION INTRAMUSCULAR; INTRAVENOUS at 11:08

## 2021-08-17 VITALS
OXYGEN SATURATION: 96 % | HEIGHT: 66 IN | HEART RATE: 69 BPM | DIASTOLIC BLOOD PRESSURE: 61 MMHG | RESPIRATION RATE: 17 BRPM | BODY MASS INDEX: 31.18 KG/M2 | TEMPERATURE: 98 F | SYSTOLIC BLOOD PRESSURE: 128 MMHG | WEIGHT: 194 LBS

## 2021-08-17 LAB
AV INDEX (PROSTH): 1.04
AV MEAN GRADIENT: 4 MMHG
AV PEAK GRADIENT: 9 MMHG
AV VALVE AREA: 3.64 CM2
AV VELOCITY RATIO: 0.86
BASOPHILS # BLD AUTO: 0.03 K/UL (ref 0–0.2)
BASOPHILS NFR BLD: 0.5 % (ref 0–1.9)
BNP SERPL-MCNC: 62 PG/ML (ref 0–99)
BSA FOR ECHO PROCEDURE: 2.02 M2
CHOLEST SERPL-MCNC: 271 MG/DL (ref 120–199)
CHOLEST/HDLC SERPL: 7.3 {RATIO} (ref 2–5)
CV ECHO LV RWT: 0.3 CM
CV PHARM DOSE: 0 MG
CV STRESS BASE HR: 70 BPM
DIASTOLIC BLOOD PRESSURE: 63 MMHG
DIFFERENTIAL METHOD: ABNORMAL
DOP CALC AO PEAK VEL: 1.49 M/S
DOP CALC AO VTI: 24.01 CM
DOP CALC LVOT AREA: 3.5 CM2
DOP CALC LVOT DIAMETER: 2.11 CM
DOP CALC LVOT PEAK VEL: 1.28 M/S
DOP CALC LVOT STROKE VOLUME: 87.41 CM3
DOP CALCLVOT PEAK VEL VTI: 25.01 CM
E WAVE DECELERATION TIME: 245.19 MSEC
E/A RATIO: 0.88
E/E' RATIO: 7.76 M/S
ECHO LV POSTERIOR WALL: 0.76 CM (ref 0.6–1.1)
EJECTION FRACTION: 60 %
EOSINOPHIL # BLD AUTO: 0.1 K/UL (ref 0–0.5)
EOSINOPHIL NFR BLD: 1.6 % (ref 0–8)
ERYTHROCYTE [DISTWIDTH] IN BLOOD BY AUTOMATED COUNT: 17 % (ref 11.5–14.5)
FRACTIONAL SHORTENING: 42 % (ref 28–44)
HCT VFR BLD AUTO: 36.2 % (ref 37–48.5)
HDLC SERPL-MCNC: 37 MG/DL (ref 40–75)
HDLC SERPL: 13.7 % (ref 20–50)
HGB BLD-MCNC: 11 G/DL (ref 12–16)
IMM GRANULOCYTES # BLD AUTO: 0.02 K/UL (ref 0–0.04)
IMM GRANULOCYTES NFR BLD AUTO: 0.3 % (ref 0–0.5)
INTERVENTRICULAR SEPTUM: 0.67 CM (ref 0.6–1.1)
IVRT: 105.61 MSEC
LA MINOR: 5.42 CM
LDLC SERPL CALC-MCNC: 184 MG/DL (ref 63–159)
LEFT ATRIUM SIZE: 3.46 CM
LEFT ATRIUM VOLUME INDEX MOD: 15.9 ML/M2
LEFT ATRIUM VOLUME MOD: 31.28 CM3
LEFT INTERNAL DIMENSION IN SYSTOLE: 2.93 CM (ref 2.1–4)
LEFT VENTRICLE DIASTOLIC VOLUME INDEX: 62.45 ML/M2
LEFT VENTRICLE DIASTOLIC VOLUME: 123.02 ML
LEFT VENTRICLE MASS INDEX: 62 G/M2
LEFT VENTRICLE SYSTOLIC VOLUME INDEX: 16.7 ML/M2
LEFT VENTRICLE SYSTOLIC VOLUME: 32.98 ML
LEFT VENTRICULAR INTERNAL DIMENSION IN DIASTOLE: 5.09 CM (ref 3.5–6)
LEFT VENTRICULAR MASS: 121.49 G
LV LATERAL E/E' RATIO: 6.6 M/S
LV SEPTAL E/E' RATIO: 9.43 M/S
LYMPHOCYTES # BLD AUTO: 2.5 K/UL (ref 1–4.8)
LYMPHOCYTES NFR BLD: 39.5 % (ref 18–48)
MCH RBC QN AUTO: 27.8 PG (ref 27–31)
MCHC RBC AUTO-ENTMCNC: 30.4 G/DL (ref 32–36)
MCV RBC AUTO: 91 FL (ref 82–98)
MONOCYTES # BLD AUTO: 0.7 K/UL (ref 0.3–1)
MONOCYTES NFR BLD: 10.3 % (ref 4–15)
MV MEAN GRADIENT: 0 MMHG
MV PEAK A VEL: 0.75 M/S
MV PEAK E VEL: 0.66 M/S
MV PEAK GRADIENT: 3 MMHG
MV STENOSIS PRESSURE HALF TIME: 71.1 MS
MV VALVE AREA P 1/2 METHOD: 3.09 CM2
NEUTROPHILS # BLD AUTO: 3.1 K/UL (ref 1.8–7.7)
NEUTROPHILS NFR BLD: 47.8 % (ref 38–73)
NONHDLC SERPL-MCNC: 234 MG/DL
NRBC BLD-RTO: 0 /100 WBC
OHS CV CPX 85 PERCENT MAX PREDICTED HEART RATE MALE: 126
OHS CV CPX MAX PREDICTED HEART RATE: 148
OHS CV CPX PATIENT IS FEMALE: 1
OHS CV CPX PATIENT IS MALE: 0
OHS CV CPX PEAK DIASTOLIC BLOOD PRESSURE: 65 MMHG
OHS CV CPX PEAK HEAR RATE: 76 BPM
OHS CV CPX PEAK RATE PRESSURE PRODUCT: 8360
OHS CV CPX PEAK SYSTOLIC BLOOD PRESSURE: 110 MMHG
OHS CV CPX PERCENT MAX PREDICTED HEART RATE ACHIEVED: 51
OHS CV CPX RATE PRESSURE PRODUCT PRESENTING: 7490
PLATELET # BLD AUTO: 287 K/UL (ref 150–450)
PMV BLD AUTO: 9 FL (ref 9.2–12.9)
PV PEAK VELOCITY: 1.21 CM/S
RA MAJOR: 4.66 CM
RA PRESSURE: 3 MMHG
RA WIDTH: 3.49 CM
RBC # BLD AUTO: 3.96 M/UL (ref 4–5.4)
SYSTOLIC BLOOD PRESSURE: 107 MMHG
TDI LATERAL: 0.1 M/S
TDI SEPTAL: 0.07 M/S
TDI: 0.09 M/S
TRIGL SERPL-MCNC: 250 MG/DL (ref 30–150)
TROPONIN I SERPL DL<=0.01 NG/ML-MCNC: 0.01 NG/ML (ref 0–0.03)
TROPONIN I SERPL DL<=0.01 NG/ML-MCNC: <0.006 NG/ML (ref 0–0.03)
WBC # BLD AUTO: 6.4 K/UL (ref 3.9–12.7)

## 2021-08-17 PROCEDURE — 94640 AIRWAY INHALATION TREATMENT: CPT

## 2021-08-17 PROCEDURE — 84484 ASSAY OF TROPONIN QUANT: CPT | Mod: 91 | Performed by: NURSE PRACTITIONER

## 2021-08-17 PROCEDURE — 99214 PR OFFICE/OUTPT VISIT, EST, LEVL IV, 30-39 MIN: ICD-10-PCS | Mod: ,,, | Performed by: INTERNAL MEDICINE

## 2021-08-17 PROCEDURE — G0378 HOSPITAL OBSERVATION PER HR: HCPCS

## 2021-08-17 PROCEDURE — 93005 ELECTROCARDIOGRAM TRACING: CPT

## 2021-08-17 PROCEDURE — 99214 OFFICE O/P EST MOD 30 MIN: CPT | Mod: ,,, | Performed by: INTERNAL MEDICINE

## 2021-08-17 PROCEDURE — 36415 COLL VENOUS BLD VENIPUNCTURE: CPT | Performed by: NURSE PRACTITIONER

## 2021-08-17 PROCEDURE — 96376 TX/PRO/DX INJ SAME DRUG ADON: CPT | Mod: 59

## 2021-08-17 PROCEDURE — 84484 ASSAY OF TROPONIN QUANT: CPT | Performed by: NURSE PRACTITIONER

## 2021-08-17 PROCEDURE — 25000242 PHARM REV CODE 250 ALT 637 W/ HCPCS: Performed by: NURSE PRACTITIONER

## 2021-08-17 PROCEDURE — 94761 N-INVAS EAR/PLS OXIMETRY MLT: CPT

## 2021-08-17 PROCEDURE — 63600175 PHARM REV CODE 636 W HCPCS: Performed by: HOSPITALIST

## 2021-08-17 PROCEDURE — 25000003 PHARM REV CODE 250: Performed by: NURSE PRACTITIONER

## 2021-08-17 PROCEDURE — 0001A HC IMMUNIZ ADMIN, SARS-COV-2 COVID-19 VACC, 30MCG/0.3ML, 1ST DOSE: CPT | Performed by: EMERGENCY MEDICINE

## 2021-08-17 PROCEDURE — 80061 LIPID PANEL: CPT | Performed by: NURSE PRACTITIONER

## 2021-08-17 PROCEDURE — 25000242 PHARM REV CODE 250 ALT 637 W/ HCPCS: Performed by: HOSPITALIST

## 2021-08-17 PROCEDURE — 63600175 PHARM REV CODE 636 W HCPCS: Performed by: NURSE PRACTITIONER

## 2021-08-17 PROCEDURE — 96375 TX/PRO/DX INJ NEW DRUG ADDON: CPT

## 2021-08-17 PROCEDURE — 83880 ASSAY OF NATRIURETIC PEPTIDE: CPT | Performed by: NURSE PRACTITIONER

## 2021-08-17 PROCEDURE — 93010 ELECTROCARDIOGRAM REPORT: CPT | Mod: 76,,, | Performed by: INTERNAL MEDICINE

## 2021-08-17 PROCEDURE — 25000003 PHARM REV CODE 250: Performed by: HOSPITALIST

## 2021-08-17 PROCEDURE — 85025 COMPLETE CBC W/AUTO DIFF WBC: CPT | Performed by: NURSE PRACTITIONER

## 2021-08-17 PROCEDURE — 91300 PHARM REV CODE 636 W HCPCS: CPT | Performed by: EMERGENCY MEDICINE

## 2021-08-17 PROCEDURE — 93005 ELECTROCARDIOGRAM TRACING: CPT | Mod: 59

## 2021-08-17 PROCEDURE — 93010 EKG 12-LEAD: ICD-10-PCS | Mod: 76,,, | Performed by: INTERNAL MEDICINE

## 2021-08-17 PROCEDURE — 63600175 PHARM REV CODE 636 W HCPCS: Performed by: EMERGENCY MEDICINE

## 2021-08-17 RX ORDER — LORAZEPAM 1 MG/1
1 TABLET ORAL EVERY 6 HOURS PRN
Status: DISCONTINUED | OUTPATIENT
Start: 2021-08-17 | End: 2021-08-17 | Stop reason: HOSPADM

## 2021-08-17 RX ORDER — MORPHINE SULFATE 2 MG/ML
1 INJECTION, SOLUTION INTRAMUSCULAR; INTRAVENOUS ONCE
Status: COMPLETED | OUTPATIENT
Start: 2021-08-17 | End: 2021-08-17

## 2021-08-17 RX ORDER — QUETIAPINE FUMARATE 100 MG/1
200 TABLET, FILM COATED ORAL NIGHTLY
Status: DISCONTINUED | OUTPATIENT
Start: 2021-08-17 | End: 2021-08-17 | Stop reason: HOSPADM

## 2021-08-17 RX ORDER — ONDANSETRON 8 MG/1
8 TABLET, ORALLY DISINTEGRATING ORAL EVERY 6 HOURS PRN
Status: DISCONTINUED | OUTPATIENT
Start: 2021-08-17 | End: 2021-08-17 | Stop reason: HOSPADM

## 2021-08-17 RX ORDER — FAMOTIDINE 20 MG/1
20 TABLET, FILM COATED ORAL 2 TIMES DAILY
Status: DISCONTINUED | OUTPATIENT
Start: 2021-08-17 | End: 2021-08-17 | Stop reason: HOSPADM

## 2021-08-17 RX ORDER — METOPROLOL SUCCINATE 25 MG/1
25 TABLET, EXTENDED RELEASE ORAL 2 TIMES DAILY
Status: DISCONTINUED | OUTPATIENT
Start: 2021-08-17 | End: 2021-08-17 | Stop reason: HOSPADM

## 2021-08-17 RX ORDER — POLYETHYLENE GLYCOL 3350 17 G/17G
17 POWDER, FOR SOLUTION ORAL DAILY
Status: DISCONTINUED | OUTPATIENT
Start: 2021-08-17 | End: 2021-08-17 | Stop reason: HOSPADM

## 2021-08-17 RX ORDER — FLUTICASONE FUROATE AND VILANTEROL 100; 25 UG/1; UG/1
1 POWDER RESPIRATORY (INHALATION) DAILY
Refills: 3 | Status: DISCONTINUED | OUTPATIENT
Start: 2021-08-17 | End: 2021-08-17 | Stop reason: HOSPADM

## 2021-08-17 RX ORDER — REGADENOSON 0.08 MG/ML
0.4 INJECTION, SOLUTION INTRAVENOUS ONCE
Status: COMPLETED | OUTPATIENT
Start: 2021-08-17 | End: 2021-08-17

## 2021-08-17 RX ORDER — ATORVASTATIN CALCIUM 40 MG/1
40 TABLET, FILM COATED ORAL NIGHTLY
Status: DISCONTINUED | OUTPATIENT
Start: 2021-08-17 | End: 2021-08-17 | Stop reason: HOSPADM

## 2021-08-17 RX ORDER — ALBUTEROL SULFATE 90 UG/1
2 AEROSOL, METERED RESPIRATORY (INHALATION) EVERY 6 HOURS PRN
Status: DISCONTINUED | OUTPATIENT
Start: 2021-08-17 | End: 2021-08-17 | Stop reason: HOSPADM

## 2021-08-17 RX ORDER — BUPROPION HYDROCHLORIDE 150 MG/1
450 TABLET ORAL DAILY
Status: DISCONTINUED | OUTPATIENT
Start: 2021-08-17 | End: 2021-08-17 | Stop reason: HOSPADM

## 2021-08-17 RX ADMIN — REGADENOSON 0.4 MG: 0.08 INJECTION, SOLUTION INTRAVENOUS at 03:08

## 2021-08-17 RX ADMIN — CEFTRIAXONE 1 G: 1 INJECTION, SOLUTION INTRAVENOUS at 09:08

## 2021-08-17 RX ADMIN — FAMOTIDINE 20 MG: 20 TABLET, FILM COATED ORAL at 10:08

## 2021-08-17 RX ADMIN — RIVAROXABAN 20 MG: 20 TABLET, FILM COATED ORAL at 05:08

## 2021-08-17 RX ADMIN — ACETAMINOPHEN 650 MG: 325 TABLET ORAL at 05:08

## 2021-08-17 RX ADMIN — ONDANSETRON 4 MG: 2 INJECTION INTRAMUSCULAR; INTRAVENOUS at 05:08

## 2021-08-17 RX ADMIN — FLUTICASONE FUROATE AND VILANTEROL TRIFENATATE 1 PUFF: 100; 25 POWDER RESPIRATORY (INHALATION) at 09:08

## 2021-08-17 RX ADMIN — LORAZEPAM 1 MG: 1 TABLET ORAL at 01:08

## 2021-08-17 RX ADMIN — NITROGLYCERIN 0.4 MG: 0.4 TABLET, ORALLY DISINTEGRATING SUBLINGUAL at 11:08

## 2021-08-17 RX ADMIN — MORPHINE SULFATE 1 MG: 2 INJECTION, SOLUTION INTRAMUSCULAR; INTRAVENOUS at 06:08

## 2021-08-17 RX ADMIN — ONDANSETRON 4 MG: 2 INJECTION INTRAMUSCULAR; INTRAVENOUS at 11:08

## 2021-08-17 RX ADMIN — MORPHINE SULFATE 1 MG: 2 INJECTION, SOLUTION INTRAMUSCULAR; INTRAVENOUS at 01:08

## 2021-08-17 RX ADMIN — NITROGLYCERIN 0.4 MG: 0.4 TABLET, ORALLY DISINTEGRATING SUBLINGUAL at 05:08

## 2021-08-17 RX ADMIN — LORAZEPAM 1 MG: 1 TABLET ORAL at 10:08

## 2021-08-17 RX ADMIN — NITROGLYCERIN 0.4 MG: 0.4 TABLET, ORALLY DISINTEGRATING SUBLINGUAL at 06:08

## 2021-08-17 RX ADMIN — BUPROPION HYDROCHLORIDE 450 MG: 150 TABLET, FILM COATED, EXTENDED RELEASE ORAL at 10:08

## 2021-08-17 RX ADMIN — RNA INGREDIENT BNT-162B2 0.3 ML: 0.23 INJECTION, SUSPENSION INTRAMUSCULAR at 03:08

## 2021-08-17 RX ADMIN — METOPROLOL SUCCINATE 25 MG: 25 TABLET, FILM COATED, EXTENDED RELEASE ORAL at 09:08

## 2021-08-18 ENCOUNTER — PATIENT OUTREACH (OUTPATIENT)
Dept: ADMINISTRATIVE | Facility: OTHER | Age: 66
End: 2021-08-18

## 2021-08-19 LAB — BACTERIA UR CULT: ABNORMAL

## 2021-08-23 ENCOUNTER — OFFICE VISIT (OUTPATIENT)
Dept: CARDIOLOGY | Facility: CLINIC | Age: 66
End: 2021-08-23
Payer: MEDICARE

## 2021-08-23 VITALS — WEIGHT: 197 LBS | HEIGHT: 66 IN | BODY MASS INDEX: 31.66 KG/M2

## 2021-08-23 DIAGNOSIS — I25.10 CORONARY ARTERY DISEASE, ANGINA PRESENCE UNSPECIFIED, UNSPECIFIED VESSEL OR LESION TYPE, UNSPECIFIED WHETHER NATIVE OR TRANSPLANTED HEART: ICD-10-CM

## 2021-08-23 DIAGNOSIS — R07.9 CHEST PAIN, UNSPECIFIED TYPE: ICD-10-CM

## 2021-08-23 DIAGNOSIS — I73.9 PAD (PERIPHERAL ARTERY DISEASE): ICD-10-CM

## 2021-08-23 DIAGNOSIS — Z95.1 S/P CABG (CORONARY ARTERY BYPASS GRAFT): ICD-10-CM

## 2021-08-23 DIAGNOSIS — R25.1 TREMOR: ICD-10-CM

## 2021-08-23 DIAGNOSIS — I25.2 OLD MI (MYOCARDIAL INFARCTION): ICD-10-CM

## 2021-08-23 DIAGNOSIS — Z98.61 S/P PTCA (PERCUTANEOUS TRANSLUMINAL CORONARY ANGIOPLASTY): ICD-10-CM

## 2021-08-23 DIAGNOSIS — J42 CHRONIC BRONCHITIS, UNSPECIFIED CHRONIC BRONCHITIS TYPE: ICD-10-CM

## 2021-08-23 DIAGNOSIS — R06.02 SHORTNESS OF BREATH: ICD-10-CM

## 2021-08-23 DIAGNOSIS — R42 VERTIGO: ICD-10-CM

## 2021-08-23 DIAGNOSIS — K21.9 GASTROESOPHAGEAL REFLUX DISEASE, UNSPECIFIED WHETHER ESOPHAGITIS PRESENT: ICD-10-CM

## 2021-08-23 DIAGNOSIS — Z95.810 AICD (AUTOMATIC CARDIOVERTER/DEFIBRILLATOR) PRESENT: Primary | ICD-10-CM

## 2021-08-23 DIAGNOSIS — I48.0 PAROXYSMAL ATRIAL FIBRILLATION: ICD-10-CM

## 2021-08-23 DIAGNOSIS — K55.1 MESENTERIC ISCHEMIA DUE TO ARTERIAL INSUFFICIENCY: ICD-10-CM

## 2021-08-23 PROCEDURE — 3008F BODY MASS INDEX DOCD: CPT | Mod: CPTII,S$GLB,, | Performed by: INTERNAL MEDICINE

## 2021-08-23 PROCEDURE — 99999 PR PBB SHADOW E&M-EST. PATIENT-LVL III: CPT | Mod: PBBFAC,,, | Performed by: INTERNAL MEDICINE

## 2021-08-23 PROCEDURE — 1160F RVW MEDS BY RX/DR IN RCRD: CPT | Mod: CPTII,S$GLB,, | Performed by: INTERNAL MEDICINE

## 2021-08-23 PROCEDURE — 1126F AMNT PAIN NOTED NONE PRSNT: CPT | Mod: CPTII,S$GLB,, | Performed by: INTERNAL MEDICINE

## 2021-08-23 PROCEDURE — 1100F PTFALLS ASSESS-DOCD GE2>/YR: CPT | Mod: CPTII,S$GLB,, | Performed by: INTERNAL MEDICINE

## 2021-08-23 PROCEDURE — 1159F MED LIST DOCD IN RCRD: CPT | Mod: CPTII,S$GLB,, | Performed by: INTERNAL MEDICINE

## 2021-08-23 PROCEDURE — 99999 PR PBB SHADOW E&M-EST. PATIENT-LVL III: ICD-10-PCS | Mod: PBBFAC,,, | Performed by: INTERNAL MEDICINE

## 2021-08-23 PROCEDURE — 1126F PR PAIN SEVERITY QUANTIFIED, NO PAIN PRESENT: ICD-10-PCS | Mod: CPTII,S$GLB,, | Performed by: INTERNAL MEDICINE

## 2021-08-23 PROCEDURE — 3044F PR MOST RECENT HEMOGLOBIN A1C LEVEL <7.0%: ICD-10-PCS | Mod: CPTII,S$GLB,, | Performed by: INTERNAL MEDICINE

## 2021-08-23 PROCEDURE — 99214 PR OFFICE/OUTPT VISIT, EST, LEVL IV, 30-39 MIN: ICD-10-PCS | Mod: S$GLB,,, | Performed by: INTERNAL MEDICINE

## 2021-08-23 PROCEDURE — 3008F PR BODY MASS INDEX (BMI) DOCUMENTED: ICD-10-PCS | Mod: CPTII,S$GLB,, | Performed by: INTERNAL MEDICINE

## 2021-08-23 PROCEDURE — 3288F PR FALLS RISK ASSESSMENT DOCUMENTED: ICD-10-PCS | Mod: CPTII,S$GLB,, | Performed by: INTERNAL MEDICINE

## 2021-08-23 PROCEDURE — 1159F PR MEDICATION LIST DOCUMENTED IN MEDICAL RECORD: ICD-10-PCS | Mod: CPTII,S$GLB,, | Performed by: INTERNAL MEDICINE

## 2021-08-23 PROCEDURE — 1100F PR PT FALLS ASSESS DOC 2+ FALLS/FALL W/INJURY/YR: ICD-10-PCS | Mod: CPTII,S$GLB,, | Performed by: INTERNAL MEDICINE

## 2021-08-23 PROCEDURE — 1160F PR REVIEW ALL MEDS BY PRESCRIBER/CLIN PHARMACIST DOCUMENTED: ICD-10-PCS | Mod: CPTII,S$GLB,, | Performed by: INTERNAL MEDICINE

## 2021-08-23 PROCEDURE — 3044F HG A1C LEVEL LT 7.0%: CPT | Mod: CPTII,S$GLB,, | Performed by: INTERNAL MEDICINE

## 2021-08-23 PROCEDURE — 3288F FALL RISK ASSESSMENT DOCD: CPT | Mod: CPTII,S$GLB,, | Performed by: INTERNAL MEDICINE

## 2021-08-23 PROCEDURE — 99214 OFFICE O/P EST MOD 30 MIN: CPT | Mod: S$GLB,,, | Performed by: INTERNAL MEDICINE

## 2021-08-23 RX ORDER — METOPROLOL SUCCINATE 25 MG/1
25 TABLET, EXTENDED RELEASE ORAL 2 TIMES DAILY
Qty: 180 TABLET | Refills: 3 | Status: SHIPPED | OUTPATIENT
Start: 2021-08-23 | End: 2022-06-23

## 2021-08-23 RX ORDER — BUPROPION HYDROCHLORIDE 150 MG/1
450 TABLET ORAL DAILY
COMMUNITY

## 2021-08-23 RX ORDER — ATORVASTATIN CALCIUM 40 MG/1
40 TABLET, FILM COATED ORAL DAILY
Qty: 90 TABLET | Refills: 3 | Status: SHIPPED | OUTPATIENT
Start: 2021-08-23 | End: 2022-08-04

## 2021-08-23 RX ORDER — BUPROPION HYDROCHLORIDE 150 MG/1
450 TABLET ORAL DAILY
COMMUNITY
Start: 2021-05-30 | End: 2021-08-23

## 2021-08-23 RX ORDER — ESOMEPRAZOLE MAGNESIUM 40 MG/1
40 CAPSULE, DELAYED RELEASE ORAL
Qty: 90 CAPSULE | Refills: 3 | Status: SHIPPED | OUTPATIENT
Start: 2021-08-23 | End: 2022-07-20

## 2021-08-23 RX ORDER — PREGABALIN 100 MG/1
100 CAPSULE ORAL 3 TIMES DAILY PRN
COMMUNITY
Start: 2021-07-22 | End: 2024-02-20

## 2021-08-24 ENCOUNTER — TELEPHONE (OUTPATIENT)
Dept: NEUROLOGY | Facility: CLINIC | Age: 66
End: 2021-08-24

## 2021-09-27 ENCOUNTER — PATIENT OUTREACH (OUTPATIENT)
Dept: ADMINISTRATIVE | Facility: OTHER | Age: 66
End: 2021-09-27

## 2021-09-28 ENCOUNTER — NURSE TRIAGE (OUTPATIENT)
Dept: ADMINISTRATIVE | Facility: CLINIC | Age: 66
End: 2021-09-28

## 2021-09-28 ENCOUNTER — OFFICE VISIT (OUTPATIENT)
Dept: PRIMARY CARE CLINIC | Facility: CLINIC | Age: 66
End: 2021-09-28
Payer: MEDICARE

## 2021-09-28 VITALS
HEIGHT: 66 IN | SYSTOLIC BLOOD PRESSURE: 128 MMHG | TEMPERATURE: 100 F | DIASTOLIC BLOOD PRESSURE: 64 MMHG | BODY MASS INDEX: 31.79 KG/M2 | HEART RATE: 76 BPM | RESPIRATION RATE: 18 BRPM | OXYGEN SATURATION: 94 %

## 2021-09-28 DIAGNOSIS — M79.672 FOOT PAIN, LEFT: Primary | ICD-10-CM

## 2021-09-28 DIAGNOSIS — R29.6 FREQUENT FALLS: ICD-10-CM

## 2021-09-28 PROCEDURE — 99999 PR PBB SHADOW E&M-EST. PATIENT-LVL V: CPT | Mod: PBBFAC,HCNC,, | Performed by: FAMILY MEDICINE

## 2021-09-28 PROCEDURE — 99999 PR PBB SHADOW E&M-EST. PATIENT-LVL V: ICD-10-PCS | Mod: PBBFAC,HCNC,, | Performed by: FAMILY MEDICINE

## 2021-09-28 PROCEDURE — 99215 OFFICE O/P EST HI 40 MIN: CPT | Mod: PBBFAC,PN | Performed by: FAMILY MEDICINE

## 2021-09-28 PROCEDURE — 99214 OFFICE O/P EST MOD 30 MIN: CPT | Mod: HCNC,S$GLB,, | Performed by: FAMILY MEDICINE

## 2021-09-28 PROCEDURE — 99214 PR OFFICE/OUTPT VISIT, EST, LEVL IV, 30-39 MIN: ICD-10-PCS | Mod: HCNC,S$GLB,, | Performed by: FAMILY MEDICINE

## 2021-09-28 RX ORDER — FAMOTIDINE 20 MG/1
1 TABLET, FILM COATED ORAL DAILY
COMMUNITY
Start: 2021-08-30 | End: 2022-02-28

## 2021-09-28 RX ORDER — IBUPROFEN 600 MG/1
600 TABLET ORAL EVERY 6 HOURS PRN
Qty: 21 TABLET | Refills: 0 | Status: SHIPPED | OUTPATIENT
Start: 2021-09-28 | End: 2021-10-09

## 2021-09-29 ENCOUNTER — TELEPHONE (OUTPATIENT)
Dept: PALLIATIVE MEDICINE | Facility: HOSPITAL | Age: 66
End: 2021-09-29

## 2021-10-05 DIAGNOSIS — K59.00 CONSTIPATION, UNSPECIFIED CONSTIPATION TYPE: ICD-10-CM

## 2021-10-05 DIAGNOSIS — J44.9 CHRONIC OBSTRUCTIVE PULMONARY DISEASE, UNSPECIFIED COPD TYPE: ICD-10-CM

## 2021-10-06 ENCOUNTER — TELEPHONE (OUTPATIENT)
Dept: OTOLARYNGOLOGY | Facility: CLINIC | Age: 66
End: 2021-10-06

## 2021-10-06 RX ORDER — DOCUSATE SODIUM 100 MG/1
100 CAPSULE, LIQUID FILLED ORAL 2 TIMES DAILY PRN
Qty: 60 CAPSULE | Refills: 1 | Status: SHIPPED | OUTPATIENT
Start: 2021-10-06 | End: 2023-01-04 | Stop reason: SDUPTHER

## 2021-10-06 RX ORDER — TIOTROPIUM BROMIDE INHALATION SPRAY 1.56 UG/1
2 SPRAY, METERED RESPIRATORY (INHALATION) DAILY
Qty: 12 G | Refills: 2 | Status: SHIPPED | OUTPATIENT
Start: 2021-10-06 | End: 2023-01-04 | Stop reason: SDUPTHER

## 2021-10-11 ENCOUNTER — OFFICE VISIT (OUTPATIENT)
Dept: OTOLARYNGOLOGY | Facility: CLINIC | Age: 66
End: 2021-10-11
Payer: MEDICARE

## 2021-10-11 VITALS
SYSTOLIC BLOOD PRESSURE: 124 MMHG | BODY MASS INDEX: 31.38 KG/M2 | HEART RATE: 70 BPM | TEMPERATURE: 97 F | DIASTOLIC BLOOD PRESSURE: 60 MMHG | WEIGHT: 194.44 LBS

## 2021-10-11 DIAGNOSIS — R13.10 DYSPHAGIA, UNSPECIFIED TYPE: ICD-10-CM

## 2021-10-11 DIAGNOSIS — R26.89 BALANCE DISORDER: ICD-10-CM

## 2021-10-11 DIAGNOSIS — R42 VERTIGO: Primary | ICD-10-CM

## 2021-10-11 DIAGNOSIS — R09.89 CHOKING IN ADULT: ICD-10-CM

## 2021-10-11 DIAGNOSIS — H93.293 ABNORMAL AUDITORY PERCEPTION OF BOTH EARS: ICD-10-CM

## 2021-10-11 DIAGNOSIS — H93.13 TINNITUS OF BOTH EARS: ICD-10-CM

## 2021-10-11 PROBLEM — H93.299 ABNORMAL AUDITORY PERCEPTION: Status: ACTIVE | Noted: 2021-10-11

## 2021-10-11 PROBLEM — H93.19 TINNITUS: Status: ACTIVE | Noted: 2021-10-11

## 2021-10-11 PROCEDURE — 99214 OFFICE O/P EST MOD 30 MIN: CPT | Mod: PBBFAC,PN | Performed by: SPECIALIST

## 2021-10-11 PROCEDURE — 99999 PR PBB SHADOW E&M-EST. PATIENT-LVL IV: ICD-10-PCS | Mod: PBBFAC,HCNC,, | Performed by: SPECIALIST

## 2021-10-11 PROCEDURE — 99999 PR PBB SHADOW E&M-EST. PATIENT-LVL IV: CPT | Mod: PBBFAC,HCNC,, | Performed by: SPECIALIST

## 2021-10-11 PROCEDURE — 99204 OFFICE O/P NEW MOD 45 MIN: CPT | Mod: HCNC,S$GLB,, | Performed by: SPECIALIST

## 2021-10-11 PROCEDURE — 99204 PR OFFICE/OUTPT VISIT, NEW, LEVL IV, 45-59 MIN: ICD-10-PCS | Mod: HCNC,S$GLB,, | Performed by: SPECIALIST

## 2021-10-12 ENCOUNTER — TELEPHONE (OUTPATIENT)
Dept: AUDIOLOGY | Facility: CLINIC | Age: 66
End: 2021-10-12

## 2021-10-13 ENCOUNTER — TELEPHONE (OUTPATIENT)
Dept: AUDIOLOGY | Facility: CLINIC | Age: 66
End: 2021-10-13

## 2021-10-18 ENCOUNTER — TELEPHONE (OUTPATIENT)
Dept: AUDIOLOGY | Facility: CLINIC | Age: 66
End: 2021-10-18

## 2021-10-18 ENCOUNTER — TELEPHONE (OUTPATIENT)
Dept: OTOLARYNGOLOGY | Facility: CLINIC | Age: 66
End: 2021-10-18

## 2021-10-18 DIAGNOSIS — H90.3 SENSORINEURAL HEARING LOSS, BILATERAL: Primary | ICD-10-CM

## 2021-10-19 ENCOUNTER — NURSE TRIAGE (OUTPATIENT)
Dept: ADMINISTRATIVE | Facility: CLINIC | Age: 66
End: 2021-10-19

## 2021-10-20 ENCOUNTER — TELEPHONE (OUTPATIENT)
Dept: AUDIOLOGY | Facility: CLINIC | Age: 66
End: 2021-10-20

## 2021-10-29 ENCOUNTER — TELEPHONE (OUTPATIENT)
Dept: NEUROLOGY | Facility: CLINIC | Age: 66
End: 2021-10-29
Payer: MEDICARE

## 2021-11-02 ENCOUNTER — CLINICAL SUPPORT (OUTPATIENT)
Dept: AUDIOLOGY | Facility: CLINIC | Age: 66
End: 2021-11-02
Payer: MEDICARE

## 2021-11-02 DIAGNOSIS — H81.8X9 OTHER DISORDERS OF VESTIBULAR FUNCTION, UNSPECIFIED EAR: ICD-10-CM

## 2021-11-02 DIAGNOSIS — H90.3 SENSORINEURAL HEARING LOSS, BILATERAL: Primary | ICD-10-CM

## 2021-11-02 DIAGNOSIS — H93.13 TINNITUS OF BOTH EARS: ICD-10-CM

## 2021-11-02 PROCEDURE — 92537 PR CALORIC VSTBLR TEST W/REC BITHERMAL: ICD-10-PCS | Mod: S$GLB,,, | Performed by: AUDIOLOGIST

## 2021-11-02 PROCEDURE — 99999 PR PBB SHADOW E&M-EST. PATIENT-LVL I: ICD-10-PCS | Mod: PBBFAC,HCNC,, | Performed by: AUDIOLOGIST

## 2021-11-02 PROCEDURE — 92544 OPTOKINETIC NYSTAGMUS TEST: CPT | Mod: PBBFAC | Performed by: AUDIOLOGIST

## 2021-11-02 PROCEDURE — 99999 PR PBB SHADOW E&M-EST. PATIENT-LVL I: CPT | Mod: PBBFAC,HCNC,, | Performed by: AUDIOLOGIST

## 2021-11-02 PROCEDURE — 92545 OSCILLATING TRACKING TEST: CPT | Mod: PBBFAC | Performed by: AUDIOLOGIST

## 2021-11-02 PROCEDURE — 92541 SPONTANEOUS NYSTAGMUS TEST: CPT | Mod: PBBFAC | Performed by: AUDIOLOGIST

## 2021-11-02 PROCEDURE — 92545 PR OSCILLATING TRACKING TEST: ICD-10-PCS | Mod: 59,S$GLB,, | Performed by: AUDIOLOGIST

## 2021-11-02 PROCEDURE — 92567 TYMPANOMETRY: CPT | Mod: PBBFAC,HCNC | Performed by: AUDIOLOGIST

## 2021-11-02 PROCEDURE — 92544 OPTOKINETIC NYSTAGMUS TEST: CPT | Mod: 59,S$GLB,, | Performed by: AUDIOLOGIST

## 2021-11-02 PROCEDURE — 99211 OFF/OP EST MAY X REQ PHY/QHP: CPT | Mod: PBBFAC,25 | Performed by: AUDIOLOGIST

## 2021-11-02 PROCEDURE — 92544 PR OPTOKINETIC NYSTAGMUS TEST, BIDIREC, FOVEAL, PERIPH, W RECORD: ICD-10-PCS | Mod: 59,S$GLB,, | Performed by: AUDIOLOGIST

## 2021-11-02 PROCEDURE — 92557 COMPREHENSIVE HEARING TEST: CPT | Mod: PBBFAC,HCNC | Performed by: AUDIOLOGIST

## 2021-11-02 PROCEDURE — 92545 OSCILLATING TRACKING TEST: CPT | Mod: 59,S$GLB,, | Performed by: AUDIOLOGIST

## 2021-11-02 PROCEDURE — 92541 SPONTANEOUS NYSTAGMUS TEST: CPT | Mod: S$GLB,,, | Performed by: AUDIOLOGIST

## 2021-11-02 PROCEDURE — 92537 CALORIC VSTBLR TEST W/REC: CPT | Mod: S$GLB,,, | Performed by: AUDIOLOGIST

## 2021-11-02 PROCEDURE — 92537 CALORIC VSTBLR TEST W/REC: CPT | Mod: PBBFAC | Performed by: AUDIOLOGIST

## 2021-11-02 PROCEDURE — 92541 PR SPONTANEOUS NYSTAGMUS TEST: ICD-10-PCS | Mod: S$GLB,,, | Performed by: AUDIOLOGIST

## 2021-11-09 ENCOUNTER — HOSPITAL ENCOUNTER (OUTPATIENT)
Dept: RADIOLOGY | Facility: HOSPITAL | Age: 66
Discharge: HOME | End: 2021-11-09
Attending: SPECIALIST
Payer: MEDICARE

## 2021-11-09 DIAGNOSIS — R13.10 DYSPHAGIA, UNSPECIFIED TYPE: ICD-10-CM

## 2021-11-09 DIAGNOSIS — R09.89 CHOKING IN ADULT: ICD-10-CM

## 2021-11-09 PROCEDURE — 74220 X-RAY XM ESOPHAGUS 1CNTRST: CPT | Mod: TC

## 2021-11-09 PROCEDURE — 74220 X-RAY XM ESOPHAGUS 1CNTRST: CPT | Mod: 26,,, | Performed by: RADIOLOGY

## 2021-11-09 PROCEDURE — 74220 FL ESOPHAGRAM COMPLETE: ICD-10-PCS | Mod: 26,,, | Performed by: RADIOLOGY

## 2021-11-09 PROCEDURE — 25500020 PHARM REV CODE 255: Performed by: SPECIALIST

## 2021-11-09 PROCEDURE — A9698 NON-RAD CONTRAST MATERIALNOC: HCPCS | Performed by: SPECIALIST

## 2021-11-09 RX ADMIN — BARIUM SULFATE 270 ML: 0.6 SUSPENSION ORAL at 03:11

## 2021-11-10 ENCOUNTER — TELEPHONE (OUTPATIENT)
Dept: OTOLARYNGOLOGY | Facility: CLINIC | Age: 66
End: 2021-11-10
Payer: MEDICARE

## 2021-11-11 ENCOUNTER — TELEPHONE (OUTPATIENT)
Dept: OTOLARYNGOLOGY | Facility: CLINIC | Age: 66
End: 2021-11-11
Payer: MEDICARE

## 2021-11-16 ENCOUNTER — OFFICE VISIT (OUTPATIENT)
Dept: OTOLARYNGOLOGY | Facility: CLINIC | Age: 66
End: 2021-11-16
Payer: MEDICARE

## 2021-11-16 VITALS
SYSTOLIC BLOOD PRESSURE: 162 MMHG | DIASTOLIC BLOOD PRESSURE: 77 MMHG | WEIGHT: 201.25 LBS | TEMPERATURE: 98 F | BODY MASS INDEX: 32.49 KG/M2 | HEART RATE: 70 BPM

## 2021-11-16 DIAGNOSIS — H81.391 PERIPHERAL VERTIGO INVOLVING RIGHT EAR: ICD-10-CM

## 2021-11-16 DIAGNOSIS — R42 VERTIGO: Primary | ICD-10-CM

## 2021-11-16 DIAGNOSIS — R42 DIZZINESS AND GIDDINESS: ICD-10-CM

## 2021-11-16 DIAGNOSIS — H93.13 TINNITUS OF BOTH EARS: ICD-10-CM

## 2021-11-16 DIAGNOSIS — R41.3 OTHER AMNESIA: ICD-10-CM

## 2021-11-16 DIAGNOSIS — Q39.4 ESOPHAGEAL WEB: ICD-10-CM

## 2021-11-16 DIAGNOSIS — R09.89 CHOKING IN ADULT: ICD-10-CM

## 2021-11-16 DIAGNOSIS — H90.3 SENSORINEURAL HEARING LOSS (SNHL) OF BOTH EARS: ICD-10-CM

## 2021-11-16 DIAGNOSIS — H81.4 VERTIGO OF CENTRAL ORIGIN: ICD-10-CM

## 2021-11-16 DIAGNOSIS — Z13.9 SCREENING FOR CONDITION: ICD-10-CM

## 2021-11-16 DIAGNOSIS — R41.3 MEMORY LOSS: ICD-10-CM

## 2021-11-16 PROCEDURE — 1159F MED LIST DOCD IN RCRD: CPT | Mod: HCNC,CPTII,S$GLB, | Performed by: SPECIALIST

## 2021-11-16 PROCEDURE — 3078F DIAST BP <80 MM HG: CPT | Mod: HCNC,CPTII,S$GLB, | Performed by: SPECIALIST

## 2021-11-16 PROCEDURE — 3008F PR BODY MASS INDEX (BMI) DOCUMENTED: ICD-10-PCS | Mod: HCNC,CPTII,S$GLB, | Performed by: SPECIALIST

## 2021-11-16 PROCEDURE — 3077F PR MOST RECENT SYSTOLIC BLOOD PRESSURE >= 140 MM HG: ICD-10-PCS | Mod: HCNC,CPTII,S$GLB, | Performed by: SPECIALIST

## 2021-11-16 PROCEDURE — 1160F RVW MEDS BY RX/DR IN RCRD: CPT | Mod: HCNC,CPTII,S$GLB, | Performed by: SPECIALIST

## 2021-11-16 PROCEDURE — 3077F SYST BP >= 140 MM HG: CPT | Mod: HCNC,CPTII,S$GLB, | Performed by: SPECIALIST

## 2021-11-16 PROCEDURE — 3008F BODY MASS INDEX DOCD: CPT | Mod: HCNC,CPTII,S$GLB, | Performed by: SPECIALIST

## 2021-11-16 PROCEDURE — 1126F AMNT PAIN NOTED NONE PRSNT: CPT | Mod: HCNC,CPTII,S$GLB, | Performed by: SPECIALIST

## 2021-11-16 PROCEDURE — 99999 PR PBB SHADOW E&M-EST. PATIENT-LVL V: ICD-10-PCS | Mod: PBBFAC,HCNC,, | Performed by: SPECIALIST

## 2021-11-16 PROCEDURE — 3044F HG A1C LEVEL LT 7.0%: CPT | Mod: HCNC,CPTII,S$GLB, | Performed by: SPECIALIST

## 2021-11-16 PROCEDURE — 99999 PR PBB SHADOW E&M-EST. PATIENT-LVL V: CPT | Mod: PBBFAC,HCNC,, | Performed by: SPECIALIST

## 2021-11-16 PROCEDURE — 99214 OFFICE O/P EST MOD 30 MIN: CPT | Mod: HCNC,S$GLB,, | Performed by: SPECIALIST

## 2021-11-16 PROCEDURE — 99214 PR OFFICE/OUTPT VISIT, EST, LEVL IV, 30-39 MIN: ICD-10-PCS | Mod: HCNC,S$GLB,, | Performed by: SPECIALIST

## 2021-11-16 PROCEDURE — 3044F PR MOST RECENT HEMOGLOBIN A1C LEVEL <7.0%: ICD-10-PCS | Mod: HCNC,CPTII,S$GLB, | Performed by: SPECIALIST

## 2021-11-16 PROCEDURE — 1101F PT FALLS ASSESS-DOCD LE1/YR: CPT | Mod: HCNC,CPTII,S$GLB, | Performed by: SPECIALIST

## 2021-11-16 PROCEDURE — 1160F PR REVIEW ALL MEDS BY PRESCRIBER/CLIN PHARMACIST DOCUMENTED: ICD-10-PCS | Mod: HCNC,CPTII,S$GLB, | Performed by: SPECIALIST

## 2021-11-16 PROCEDURE — 1159F PR MEDICATION LIST DOCUMENTED IN MEDICAL RECORD: ICD-10-PCS | Mod: HCNC,CPTII,S$GLB, | Performed by: SPECIALIST

## 2021-11-16 PROCEDURE — 3078F PR MOST RECENT DIASTOLIC BLOOD PRESSURE < 80 MM HG: ICD-10-PCS | Mod: HCNC,CPTII,S$GLB, | Performed by: SPECIALIST

## 2021-11-16 PROCEDURE — 3288F FALL RISK ASSESSMENT DOCD: CPT | Mod: HCNC,CPTII,S$GLB, | Performed by: SPECIALIST

## 2021-11-16 PROCEDURE — 3288F PR FALLS RISK ASSESSMENT DOCUMENTED: ICD-10-PCS | Mod: HCNC,CPTII,S$GLB, | Performed by: SPECIALIST

## 2021-11-16 PROCEDURE — 1126F PR PAIN SEVERITY QUANTIFIED, NO PAIN PRESENT: ICD-10-PCS | Mod: HCNC,CPTII,S$GLB, | Performed by: SPECIALIST

## 2021-11-16 PROCEDURE — 1101F PR PT FALLS ASSESS DOC 0-1 FALLS W/OUT INJ PAST YR: ICD-10-PCS | Mod: HCNC,CPTII,S$GLB, | Performed by: SPECIALIST

## 2021-11-16 RX ORDER — MECLIZINE HCL 12.5 MG 12.5 MG/1
TABLET ORAL
Qty: 50 TABLET | Refills: 5 | Status: SHIPPED | OUTPATIENT
Start: 2021-11-16 | End: 2022-04-26

## 2021-11-17 ENCOUNTER — TELEPHONE (OUTPATIENT)
Dept: OTOLARYNGOLOGY | Facility: CLINIC | Age: 66
End: 2021-11-17
Payer: MEDICARE

## 2021-11-22 ENCOUNTER — PATIENT OUTREACH (OUTPATIENT)
Dept: ADMINISTRATIVE | Facility: OTHER | Age: 66
End: 2021-11-22
Payer: MEDICARE

## 2021-12-01 ENCOUNTER — TELEPHONE (OUTPATIENT)
Dept: CARDIOLOGY | Facility: CLINIC | Age: 66
End: 2021-12-01
Payer: MEDICARE

## 2021-12-01 ENCOUNTER — TELEPHONE (OUTPATIENT)
Dept: OTOLARYNGOLOGY | Facility: CLINIC | Age: 66
End: 2021-12-01
Payer: MEDICARE

## 2022-01-04 ENCOUNTER — TELEPHONE (OUTPATIENT)
Dept: NEUROLOGY | Facility: CLINIC | Age: 67
End: 2022-01-04
Payer: MEDICARE

## 2022-01-05 ENCOUNTER — TELEPHONE (OUTPATIENT)
Dept: NEUROLOGY | Facility: CLINIC | Age: 67
End: 2022-01-05
Payer: MEDICARE

## 2022-01-05 NOTE — TELEPHONE ENCOUNTER
----- Message from Lauren Baron sent at 1/4/2022  2:08 PM CST -----  Regarding: Appointment Access  Contact: Pt [phone]  Patient is calling Neurology to set an appointment for memory and dizziness. Please call. [phone]

## 2022-01-05 NOTE — TELEPHONE ENCOUNTER
Called and scheduled appt with pt. Pt confirmed appt for memory. Offered 7 day release on 02/07/22 at 2pm with Dr. Galeano. Pt accepted. Will schedule once released.

## 2022-01-10 ENCOUNTER — PATIENT OUTREACH (OUTPATIENT)
Dept: ADMINISTRATIVE | Facility: OTHER | Age: 67
End: 2022-01-10
Payer: MEDICARE

## 2022-01-11 ENCOUNTER — CLINICAL SUPPORT (OUTPATIENT)
Dept: OTOLARYNGOLOGY | Facility: CLINIC | Age: 67
End: 2022-01-11
Payer: MEDICARE

## 2022-01-11 DIAGNOSIS — Z71.89 ENCOUNTER FOR HEARING AID CONSULTATION: Primary | ICD-10-CM

## 2022-01-11 PROCEDURE — 99499 NO LOS: ICD-10-PCS | Mod: S$GLB,,, | Performed by: AUDIOLOGIST-HEARING AID FITTER

## 2022-01-11 PROCEDURE — 99499 UNLISTED E&M SERVICE: CPT | Mod: S$GLB,,, | Performed by: AUDIOLOGIST-HEARING AID FITTER

## 2022-01-11 NOTE — PROGRESS NOTES
Care Everywhere: updated  Immunization: updated  Health Maintenance: updated  Media Review: review for outside mammogram report   Legacy Review:   DIS:no mammogram on file   Order placed:   Upcoming appts:  EFAX:  Task Tickets:  Referrals:

## 2022-01-11 NOTE — PROGRESS NOTES
Fahad Alvarado, CCC-A  Audiologist - [facility] [address] [place] [address]  [email or web address]  [phone]    Patient: Stephania Salmeron   MRN: [id number]  [address]  Home Phone [phone]   Work Phone Not on file.   Mobile [phone]   : 1955  LU: 2022      HEARING AID CONSULT    Hearing aid prices and styles were discussed with Stephania Salmeron in the clinic today.  She would like to consider her financial options with her insurance and will call the clinic if she decides to place an order.      _______________________________  Fahad Alvarado, RODRI-A  Audiologist

## 2022-01-24 ENCOUNTER — TELEPHONE (OUTPATIENT)
Dept: CARDIOLOGY | Facility: CLINIC | Age: 67
End: 2022-01-24
Payer: MEDICARE

## 2022-01-24 NOTE — TELEPHONE ENCOUNTER
Patient missed appointment for device interrogation and follow up visit.      Reached out to patient and rescheduled appointment.     Patient is not transmitting remotely.  Monitor ordered 08/23/2021. Patient states she did not receive monitor.  Spoke to [employer] and ordered another monitor.

## 2022-01-25 ENCOUNTER — OFFICE VISIT (OUTPATIENT)
Dept: NEUROLOGY | Facility: CLINIC | Age: 67
End: 2022-01-25
Payer: MEDICARE

## 2022-01-25 VITALS
WEIGHT: 204.13 LBS | HEIGHT: 66 IN | SYSTOLIC BLOOD PRESSURE: 145 MMHG | BODY MASS INDEX: 32.81 KG/M2 | DIASTOLIC BLOOD PRESSURE: 85 MMHG | HEART RATE: 68 BPM

## 2022-01-25 DIAGNOSIS — R26.81 UNSTEADINESS ON FEET: ICD-10-CM

## 2022-01-25 DIAGNOSIS — G25.2 RESTING TREMOR: ICD-10-CM

## 2022-01-25 DIAGNOSIS — R41.89 COGNITIVE IMPAIRMENT: Primary | ICD-10-CM

## 2022-01-25 PROCEDURE — 3077F SYST BP >= 140 MM HG: CPT | Mod: CPTII,S$GLB,, | Performed by: PSYCHIATRY & NEUROLOGY

## 2022-01-25 PROCEDURE — 3288F FALL RISK ASSESSMENT DOCD: CPT | Mod: CPTII,S$GLB,, | Performed by: PSYCHIATRY & NEUROLOGY

## 2022-01-25 PROCEDURE — 1101F PT FALLS ASSESS-DOCD LE1/YR: CPT | Mod: CPTII,S$GLB,, | Performed by: PSYCHIATRY & NEUROLOGY

## 2022-01-25 PROCEDURE — 1101F PR PT FALLS ASSESS DOC 0-1 FALLS W/OUT INJ PAST YR: ICD-10-PCS | Mod: CPTII,S$GLB,, | Performed by: PSYCHIATRY & NEUROLOGY

## 2022-01-25 PROCEDURE — 3008F BODY MASS INDEX DOCD: CPT | Mod: CPTII,S$GLB,, | Performed by: PSYCHIATRY & NEUROLOGY

## 2022-01-25 PROCEDURE — 1126F AMNT PAIN NOTED NONE PRSNT: CPT | Mod: CPTII,S$GLB,, | Performed by: PSYCHIATRY & NEUROLOGY

## 2022-01-25 PROCEDURE — 99205 OFFICE O/P NEW HI 60 MIN: CPT | Mod: S$GLB,,, | Performed by: PSYCHIATRY & NEUROLOGY

## 2022-01-25 PROCEDURE — 99205 PR OFFICE/OUTPT VISIT, NEW, LEVL V, 60-74 MIN: ICD-10-PCS | Mod: S$GLB,,, | Performed by: PSYCHIATRY & NEUROLOGY

## 2022-01-25 PROCEDURE — 1126F PR PAIN SEVERITY QUANTIFIED, NO PAIN PRESENT: ICD-10-PCS | Mod: CPTII,S$GLB,, | Performed by: PSYCHIATRY & NEUROLOGY

## 2022-01-25 PROCEDURE — 3288F PR FALLS RISK ASSESSMENT DOCUMENTED: ICD-10-PCS | Mod: CPTII,S$GLB,, | Performed by: PSYCHIATRY & NEUROLOGY

## 2022-01-25 PROCEDURE — 3079F DIAST BP 80-89 MM HG: CPT | Mod: CPTII,S$GLB,, | Performed by: PSYCHIATRY & NEUROLOGY

## 2022-01-25 PROCEDURE — 99999 PR PBB SHADOW E&M-EST. PATIENT-LVL IV: CPT | Mod: PBBFAC,,, | Performed by: PSYCHIATRY & NEUROLOGY

## 2022-01-25 PROCEDURE — 3079F PR MOST RECENT DIASTOLIC BLOOD PRESSURE 80-89 MM HG: ICD-10-PCS | Mod: CPTII,S$GLB,, | Performed by: PSYCHIATRY & NEUROLOGY

## 2022-01-25 PROCEDURE — 3008F PR BODY MASS INDEX (BMI) DOCUMENTED: ICD-10-PCS | Mod: CPTII,S$GLB,, | Performed by: PSYCHIATRY & NEUROLOGY

## 2022-01-25 PROCEDURE — 99999 PR PBB SHADOW E&M-EST. PATIENT-LVL IV: ICD-10-PCS | Mod: PBBFAC,,, | Performed by: PSYCHIATRY & NEUROLOGY

## 2022-01-25 PROCEDURE — 3077F PR MOST RECENT SYSTOLIC BLOOD PRESSURE >= 140 MM HG: ICD-10-PCS | Mod: CPTII,S$GLB,, | Performed by: PSYCHIATRY & NEUROLOGY

## 2022-01-25 NOTE — PROGRESS NOTES
[facility]    Name: Stephania Salmeron  : 1955  MRN: [id number]    Date: 2022      Assessment:     Ms. Salmeron is a 66 y.o. right handed female with a history of CVA involving left parasagittal parieto-occipital region, PE currently on Xarelto, CAD, MI s/p CABG and multiple stents, AFib, AICD, COPD, and COVID-19 (21) who presents to the [facility] due to memory deficits.  Patient has experienced memory deficits since around 3729-4921.  She predominantly has trouble recalling mundane events and conversations.  She does not have trouble recalling important information.  Saw neurologist at [facility] who thought the trazodone was the cause for the patient's cognitive symptoms, however, cognitive symptoms do not improve when trazodone was stopped.  Of note, patient was hospitalized at [facility] with COVID-19 in 2021. Patient sees Neurology and Psychiatry at [facility].  Patient remains independent in all IADLs.      History, exam, cognitive screening, and head CT are not suggestive of an underlying neurodegenerative disease.  Patient's cognitive symptoms are likely multifactorial.  Vascular disease is likely contributing to Ms. Salmeron's cognitive symptoms.  Stroke stroke involving occipitoparietal region often negatively impacts attention and working memory, which is consistent with her reported symptoms.  Additionally, daily use of anticholinergic medications and possible GINI a potentially contributing to her cognitive symptoms.  There is no compelling evidence of an underlying neurodegenerative disease at this time.  The patient remains independent in all activities of daily living.  That being said, we will request records from [facility] to be sure that we are not missing anything.    Recommendations:     Record request  · Requesting all records from [facility].     Workup   No need to repeat CT at this time from a cognitive standpoint   Unable to get MRI. ICD leads are not MRI  safe. Medtronic Evera XT ICD - Model DWLA7J7 (SN: [id number]), 5568-45 (SN: [id number]), 6932-65 (SN: [id number])   Possible GINI - referred to sleep medicine.    Will get laboratory studies for potentially treatable causes of cognitive impairment - TSH, FT4, B1, benign, B12, MMA, HCYS    Treatment   Patient stopped Venlafaxine.  Recommend reducing benzos if possible.  Recommend psychiatry consider SSRI for anxiety.  Caution with bipolar disorder as SSRIs can cause mary.  Viibryd works by a slightly different mechanism and is effective in cases of refractory anxiety.    Recommend psychiatry consider discontinuing Seroquel due to akathisia. Patient with possible prior episode of mary, but unclear. Sees psychiatry at [facility].   Recommend discontinuing anticholinergic medication. Pt reports taking Vistaril for nausea. If medication is needed for nausea, recommend Zofran. Also takes OTC diphenhydramine (Tylenol PM) for sleep - recommend stopping all anticholinergics.    (8/2021) - too high.  Goal LDL <100, ideally <70.  Recommend PCP consider increasing statin. Also strongly recommend diet and exercise.     Safety-related   We recommend that a medication management system be put in place to avoid errors in taking medication. Helpful services include [employer] ([employer]; free service) and [employer] ([employer]; paid subscription service).    Health maintenance    Continue to follow-up with primary care doctor to monitor and treat vascular risk factors.   Recommend performing moderate-intensity cardiovascular exercise as able, ideally 30 minutes per day, 5 days per week.   Recommend staying socially and cognitively active.   Recommend eating a healthy and balanced diet (Mediterranean or DASH diet).    Follow-up: Follow up in about 6 weeks (around 3/8/2022). I provided Ms. Salmeron and her daughter, Irma, with our contact information should they have any questions or concerns.      Hitesh Barnett,  MD   Behavioral Neurology & Neuropsychiatry  [facility]    Subjective:      Chief complaint:  Memory Deficits    Consultation requested by: Dr. NATHAN Leal    History of present illness:  Ms. Salmeron is a 66 y.o. right handed female with a history of CVA involving left parasagittal parieto-occipital region, PE currently on Xarelto, CAD, MI s/p CABG with multiple stents and ICD, AFib, COPD, and COVID-19 (01/18/2021) requiring hospitalization due to pneumonia and hypoxemia who presents today to the [facility] due to concerns regarding cognitive deficits.    There is difficulty obtaining timeline of events during appointment.  Patient reportedly began experiencing trouble with memory around 7632-1805.  Since onset, the patient has experienced trouble with recalling mundane events and conversations, though she does not have trouble recalling important events or conversations.  Patient saw a neurologist at [facility] for cognitive symptoms who thought that trazodone was the cause of the patient's cognitive symptoms.  Trazodone was stopped but she did not experience any improvement in cognition.  Patient reports that she contracted COVID 19 in January 2021 was hospitalized due to pneumonia and hypoxemia.  There is no significant change in cognition after hospitalization.  Patient reports that she had a stroke in the past which impacted the left parasaggital parieto-occipital region, but is unclear exactly when the stroke occurred.  Lesion was present on CT performed 5/23/19.  She has residual vision loss in the right lower quadrant.  Regarding cognitive symptoms, she sometimes forgets to take medications and also will lose items around her home.  She says that her cognitive processing speed has slowed and it takes her longer to process information.  She reports trouble with attention and concentration, and it takes her longer to solve problems than in the past.  She reports mild  trouble with word finding, but it does not significantly impair her ability to communicate.  She has become turned around and felt lost while driving in familiar areas, though this has only occurred when it is dark.  She says that her anxiety has substantially worsened over the past year and she also notes depressed mood.  She reports that when anxiety worsens, she experiences a worsening of cognitive symptoms consistent with executive dysfunction.  Of note, patient takes OTC sleep aids.  Patient remains independent in all activities of daily living.    Review of systems for cognition, behavior, motor, sensory, and sleep:  Memory   Difficulty recalling recent events: Yes - some trouble - she does not have difficulty recalling important events/conversations or emotionally salient events   Difficulty recalling recent conversations: Yes - see above   Repeats questions and statements: No   Forgets medication: Yes   Loses items: Yes   Rapid forgetting: No   Memory distortions: No    Executive function   Slowed cognitive processing speed: Yes - takes longer to figure things out, some trouble following conversations   Difficulty with attention and concentration: Yes   Difficulty with judgment and/or problem solving: Yes - trouble with prblem solving; often due to severe anxiety which occurs when she is faced with a challenge.    Difficulty with planning and/or organization: No    Language   Difficulty with fluency and omer: No   Word-finding difficulties: Yes - mild   Word substitutions: No    Visuospatial ability   Difficulty navigating: Yes - has been lost on several occasions in familiar areas, though they are typically when it rains or when it gets dark, which leads to significant anxiety.    Becomes lost in familiar places: No   Difficulty recognizing objects or faces: No    Behavior   Disinhibition: No   Irritability: Yes    Apathy: No   Hygiene: No   Appetite: No   Depression:  Yes   Anxiety: Yes   Hallucinations: No   Delusions: No    Motor   Difficulty walking: Yes - due to trouble with balance   Imbalance: Yes - feels off balance when walking   Falls: Yes   Weakness: No   Trouble with fine motor movements: Yes   Tremor: Yes - resting tremor   Involuntary muscle movement: No   Difficulty swallowing: No    Sensory   Paresthesia or pain: Yes - knee pain   Trouble with vision: No   Trouble with hearing: No    Sleep   Insomnia: Yes - will wake around 4 am despite taking seroquel   Snoring: Yes    Apnea: Yes - she has woken with gasping    Dream-enactment: No    Functional status:   iADLs:   Household chores: independent   Meal preparation: independent   Medication management: independent   Shopping: independent   Managing money: independent   Transportation: independent   Telephoning/communication: independent    ADLs:   Transferring: independent   Feeding: independent   Hygiene: independent   Toileting: independent   Bathing: independent   Dressing: independent    Other functional status and safety issues:   She does drive.     Past Medical History:   Diagnosis Date    Acute coronary syndrome     Atrial fibrillation     Clotting disorder     Coronary artery disease     COVID-19 01/18/2021    Required hospitalization d/t pneumonia and hypoxia    Hyperlipidemia     Hypertension     ICD (implantable cardioverter-defibrillator) in place     Medtronic Evera XT ICD - Model RJLJ5I7 (SN: [id number]), 5568-45 (SN: [id number]), 6932-65 (SN: [id number])    Occipital stroke     Left occipital lobe    Quadrantanopia, right     Right lower quadrant     Past Surgical History:   Procedure Laterality Date    APPENDECTOMY      CARDIAC DEFIBRILLATOR PLACEMENT      cardiac stents      CHOLECYSTECTOMY      COLONOSCOPY N/A 3/31/2021    Procedure: COLONOSCOPY Suprep;  Surgeon: Murtaza Curry MD;  Location: [facility];  Service: Endoscopy;  Laterality: N/A;     CORONARY ANGIOPLASTY      CORONARY ARTERY BYPASS GRAFT      ESOPHAGOGASTRODUODENOSCOPY N/A 3/31/2021    Procedure: EGD (ESOPHAGOGASTRODUODENOSCOPY);  Surgeon: Murtaza Curry MD;  Location: [facility];  Service: Endoscopy;  Laterality: N/A;    DESIRAE FILTER PLACEMENT  2017    HYSTERECTOMY       Family History   Problem Relation Age of Onset    Lung cancer Mother     Anuerysm Father     Dementia Sister     Bone cancer Maternal Aunt     Colon cancer Neg Hx     Diabetes Mellitus Neg Hx     Breast cancer Neg Hx      Social History     Socioeconomic History    Marital status:    Tobacco Use    Smoking status: Former Smoker     Packs/day: 4.00     Years: 30.00     Pack years: 120.00     Types: Cigarettes     Quit date: 3/22/1994     Years since quittin.8    Smokeless tobacco: Never Used   Substance and Sexual Activity    Alcohol use: Not Currently     Alcohol/week: 0.0 standard drinks    Drug use: Never    Sexual activity: Not Currently     Current Outpatient Medications:     albuterol (PROAIR HFA) 90 mcg/actuation inhaler, Inhale 2 puffs into the lungs every 6 (six) hours as needed for Wheezing. Rescue, Disp: 8 g, Rfl: 2    atorvastatin (LIPITOR) 40 MG tablet, Take 1 tablet (40 mg total) by mouth once daily., Disp: 90 tablet, Rfl: 3    buPROPion (WELLBUTRIN XL) 150 MG TB24 tablet, Take 450 mg by mouth. Taking 3 tablets daily (450 mg) total., Disp: , Rfl:     docusate sodium (COLACE) 100 MG capsule, TAKE 1 CAPSULE (100 MG TOTAL) BY MOUTH 2 (TWO) TIMES DAILY AS NEEDED FOR CONSTIPATION., Disp: 60 capsule, Rfl: 1    ergocalciferol (ERGOCALCIFEROL) 50,000 unit Cap, Take 1 capsule (50,000 Units total) by mouth every 7 days., Disp: 12 capsule, Rfl: 3    esomeprazole (NEXIUM) 40 MG capsule, Take 1 capsule (40 mg total) by mouth before breakfast., Disp: 90 capsule, Rfl: 3    famotidine (PEPCID) 20 MG tablet, Take 1 tablet by mouth once daily., Disp: , Rfl:     hydrOXYzine pamoate  "(VISTARIL) 25 MG Cap, TAKE 1 CAPSULE (25 MG TOTAL) BY MOUTH 4 (FOUR) TIMES DAILY AS NEEDED (NAUSEA)., Disp: 90 capsule, Rfl: 3    ibuprofen (ADVIL,MOTRIN) 600 MG tablet, TAKE 1 TABLET BY MOUTH EVERY 6 HOURS AS NEEDED FOR PAIN, Disp: 21 tablet, Rfl: 0    lorazepam (ATIVAN) 1 MG tablet, Take 1 mg by mouth every 6 (six) hours as needed for Anxiety., Disp: , Rfl:     meclizine (ANTIVERT) 12.5 mg tablet, 1or 2 tablets every 4-6 hours, as needed to control imbalance or dizziness or vertigo, Disp: 50 tablet, Rfl: 5    metoprolol succinate (TOPROL-XL) 25 MG 24 hr tablet, Take 1 tablet (25 mg total) by mouth 2 (two) times daily., Disp: 180 tablet, Rfl: 3    pregabalin (LYRICA) 100 MG capsule, Take 100 mg by mouth 3 (three) times daily., Disp: , Rfl:     QUEtiapine (SEROQUEL) 200 MG Tab, Take 200 mg by mouth every evening., Disp: , Rfl:     rivaroxaban (XARELTO) 20 mg Tab, Take 1 tablet (20 mg total) by mouth daily with dinner or evening meal., Disp: 90 tablet, Rfl: 3    SPIRIVA RESPIMAT 1.25 mcg/actuation inhaler, INHALE 2 PUFFS INTO THE LUNGS ONCE DAILY. CONTROLLER, Disp: 12 g, Rfl: 2    SYMBICORT 160-4.5 mcg/actuation HFAA, INHALE 2 PUFFS INTO THE LUNGS EVERY 12 (TWELVE) HOURS. CONTROLLER, Disp: 30.6 g, Rfl: 3    venlafaxine HCl (EFFEXOR ORAL), Take by mouth once daily., Disp: , Rfl:     Allergies:  Compazine [prochlorperazine], Demerol (pf) [meperidine (pf)], and Toradol [ketorolac]    Objective:     Vital signs:  Blood pressure (!) 145/85, pulse 68, height 5' 6" (1.676 m), weight 92.6 kg (204 lb 2.3 oz).    NEUROLOGICAL EXAMINATION:     MENTAL STATUS   Oriented to person, place, and time.   Attention: normal. Concentration: normal.   Speech: speech is normal   Level of consciousness: alert  Able to name object (Named 6/6 objects). Able to read (Read 6/6 words and 5/5 sentences). Able to repeat. Able to write. Normal comprehension. Praxis: normal     CRANIAL NERVES     CN II   Visual acuity: normal  Right visual " "field deficit: lower temporal quadrant(s)  Left visual field deficit: lower nasal quadrant(s)    CN III, IV, VI   Pupils are equal, round, and reactive to light.  Extraocular motions are normal.   Nystagmus: none   Diplopia: none  Ophthalmoparesis: none  Upgaze: normal  Downgaze: normal    CN V   Facial sensation intact.     CN VII   Facial expression full, symmetric.     CN VIII   Hearing: intact    CN IX, X   Palate: symmetric    CN XI   Right sternocleidomastoid strength: normal  Left sternocleidomastoid strength: normal  Right trapezius strength: normal  Left trapezius strength: normal    CN XII   Tongue: not atrophic  Fasciculations: absent  Tongue deviation: none    MOTOR EXAM   Muscle bulk: normal  Overall muscle tone: normal    Strength   Strength 5/5 except as noted.        Dystonic tremor during strength testing     REFLEXES     Reflexes   Reflexes 2+ except as noted.   Right Peguero: absent  Left Peguero: absent    SENSORY EXAM   Light touch normal.     GAIT AND COORDINATION      Coordination   Finger to nose coordination: abnormal (Substantial tremor)    Tremor   Resting tremor: present  Intention tremor: present    Mini Mental State Examination (MMSE):  Task Response Score   Orientation to Time        Year Correct       Season Correct       Month Correct       Day Incorrect "Monday"       Date Incorrect "20-something" Total: 3/5   Orientation to Place        Location Correct       State Correct       Parish Correct       City Correct       Floor Correct Total: 5/5   Immediate Recall        Ball Correct       Flag Correct       Tree Correct Total: 3/3   Attention        D Correct       L Correct       R Correct       O Correct       W Correct Total: 5/5   Naming        Watch Correct       Pencil Correct Total: 2/2   Repetition        "No ifs, ands, or buts." Correct Total: 1/1   Delayed Recall        Ball Correct       Flag Correct       Tree Incorrect - (+) with CC Total: 2/3   3-Step Command        " Takes paper in right hand Correct       Folds paper in half Correct       Puts paper on floor Correct Total: 3/3   Reading        Closes eyes Correct Total: 1/1   Writing        Writes sentence Correct Total: 1/1   Copying        Draws polygons Correct Total: 1/1          Total Score: 27/30     Neuroimaging:  Results for orders placed or performed during the hospital encounter of 02/13/21   CT Head Without Contrast    Narrative    EXAMINATION:  CT HEAD WITHOUT CONTRAST    CLINICAL HISTORY:  Altered mental status;    TECHNIQUE:  Low dose axial CT images obtained throughout the head without intravenous contrast. Sagittal and coronal reconstructions were performed.    COMPARISON:  CT head from 05/23/2019.    FINDINGS:  Ventricles and sulci are normal in size for age without evidence of hydrocephalus. No extra-axial blood or fluid collections.  Redemonstrated is a hypodensity within the left medial occipital lobe compatible with encephalomalacia, unchanged in comparison with prior CT from 05/23/2019. No parenchymal mass, hemorrhage, edema or major vascular distribution infarct.    No calvarial fracture.  The scalp is unremarkable.  Bilateral paranasal sinuses and mastoid air cells are clear.      Impression    No acute intracranial abnormality.    Chronic encephalomalacia within the left medial occipital lobe which may represent a remote infarction.      Electronically signed by: Thai Estevez  Date:    02/13/2021  Time:    10:53     Laboratory studies:  No visits with results within 6 Week(s) from this visit.   Latest known visit with results is:   Admission on 08/16/2021, Discharged on 08/17/2021   Component Date Value Ref Range Status    WBC 08/16/2021 6.28  3.90 - 12.70 K/uL Final    RBC 08/16/2021 3.90* 4.00 - 5.40 M/uL Final    Hemoglobin 08/16/2021 11.2* 12.0 - 16.0 g/dL Final    Hematocrit 08/16/2021 36.0* 37.0 - 48.5 % Final    MCV 08/16/2021 92  82 - 98 fL Final    MCH 08/16/2021 28.7  27.0 - 31.0 pg  Final    MCHC 08/16/2021 31.1* 32.0 - 36.0 g/dL Final    RDW 08/16/2021 17.1* 11.5 - 14.5 % Final    Platelets 08/16/2021 277  150 - 450 K/uL Final    MPV 08/16/2021 9.0* 9.2 - 12.9 fL Final    Immature Granulocytes 08/16/2021 0.5  0.0 - 0.5 % Final    Gran # (ANC) 08/16/2021 3.7  1.8 - 7.7 K/uL Final    Immature Grans (Abs) 08/16/2021 0.03  0.00 - 0.04 K/uL Final    Comment: Mild elevation in immature granulocytes is non specific and   can be seen in a variety of conditions including stress response,   acute inflammation, trauma and pregnancy. Correlation with other   laboratory and clinical findings is essential.      Lymph # 08/16/2021 2.0  1.0 - 4.8 K/uL Final    Mono # 08/16/2021 0.4  0.3 - 1.0 K/uL Final    Eos # 08/16/2021 0.1  0.0 - 0.5 K/uL Final    Baso # 08/16/2021 0.02  0.00 - 0.20 K/uL Final    nRBC 08/16/2021 0  0 /100 WBC Final    Gran % 08/16/2021 59.0  38.0 - 73.0 % Final    Lymph % 08/16/2021 32.5  18.0 - 48.0 % Final    Mono % 08/16/2021 6.7  4.0 - 15.0 % Final    Eosinophil % 08/16/2021 1.0  0.0 - 8.0 % Final    Basophil % 08/16/2021 0.3  0.0 - 1.9 % Final    Differential Method 08/16/2021 Automated   Final    Sodium 08/16/2021 142  136 - 145 mmol/L Final    Potassium 08/16/2021 4.0  3.5 - 5.1 mmol/L Final    Chloride 08/16/2021 112* 95 - 110 mmol/L Final    CO2 08/16/2021 19* 23 - 29 mmol/L Final    Glucose 08/16/2021 89  70 - 110 mg/dL Final    BUN 08/16/2021 24* 8 - 23 mg/dL Final    Creatinine 08/16/2021 1.0  0.5 - 1.4 mg/dL Final    Calcium 08/16/2021 8.9  8.7 - 10.5 mg/dL Final    Total Protein 08/16/2021 7.2  6.0 - 8.4 g/dL Final    Albumin 08/16/2021 3.4* 3.5 - 5.2 g/dL Final    Total Bilirubin 08/16/2021 0.3  0.1 - 1.0 mg/dL Final    Comment: For infants and newborns, interpretation of results should be based  on gestational age, weight and in agreement with clinical  observations.    Premature Infant recommended reference ranges:  Up to 24  hours.............<8.0 mg/dL  Up to 48 hours............<12.0 mg/dL  3-5 days..................<15.0 mg/dL  6-29 days.................<15.0 mg/dL      Alkaline Phosphatase 08/16/2021 119  55 - 135 U/L Final    AST 08/16/2021 13  10 - 40 U/L Final    ALT 08/16/2021 12  10 - 44 U/L Final    Anion Gap 08/16/2021 11  8 - 16 mmol/L Final    eGFR if African American 08/16/2021 >60  >60 mL/min/1.73 m^2 Final    eGFR if non  08/16/2021 59* >60 mL/min/1.73 m^2 Final    Comment: Calculation used to obtain the estimated glomerular filtration  rate (eGFR) is the CKD-EPI equation.       Troponin I 08/16/2021 0.024  0.000 - 0.026 ng/mL Final    Comment: The reference interval for Troponin I represents the 99th percentile   cutoff   for our facility and is consistent with 3rd generation assay   performance.      BNP 08/16/2021 107* 0 - 99 pg/mL Final    Values of less than 100 pg/ml are consistent with non-CHF populations.    Lipase 08/16/2021 40  4 - 60 U/L Final    Lactate (Lactic Acid) 08/16/2021 1.0  0.5 - 2.2 mmol/L Final    Comment: Falsely low lactic acid results can be found in samples   containing >=13.0 mg/dL total bilirubin and/or >=3.5 mg/dL   direct bilirubin.      Specimen UA 08/16/2021 Urine, Clean Catch   Final    Color, UA 08/16/2021 Yellow  Yellow, Straw, Kusum Final    Appearance, UA 08/16/2021 Hazy* Clear Final    pH, UA 08/16/2021 6.0  5.0 - 8.0 Final    Specific Gravity, UA 08/16/2021 1.030  1.005 - 1.030 Final    Protein, UA 08/16/2021 1+* Negative Final    Comment: Recommend a 24 hour urine protein or a urine   protein/creatinine ratio if globulin induced proteinuria is  clinically suspected.      Glucose, UA 08/16/2021 Negative  Negative Final    Ketones, UA 08/16/2021 Negative  Negative Final    Bilirubin (UA) 08/16/2021 Negative  Negative Final    Occult Blood UA 08/16/2021 Trace* Negative Final    Nitrite, UA 08/16/2021 Positive* Negative Final    Urobilinogen, UA  08/16/2021 Negative  <2.0 EU/dL Final    Leukocytes, UA 08/16/2021 3+* Negative Final    POC Rapid COVID 08/16/2021 Negative  Negative Final     Acceptable 08/16/2021 Yes   Final    RBC, UA 08/16/2021 10* 0 - 4 /hpf Final    WBC, UA 08/16/2021 >100* 0 - 5 /hpf Final    WBC Clumps, UA 08/16/2021 Few* None-Rare Final    Bacteria 08/16/2021 Many* None-Occ /hpf Final    Squam Epithel, UA 08/16/2021 10  /hpf Final    Non-Squam Epith 08/16/2021 1* <1/hpf /hpf Final    Hyaline Casts, UA 08/16/2021 81* 0-1/lpf /lpf Final    Microscopic Comment 08/16/2021 SEE COMMENT   Final    Comment: Other formed elements not mentioned in the report are not   present in the microscopic examination.       Troponin I 08/17/2021 0.011  0.000 - 0.026 ng/mL Final    Comment: The reference interval for Troponin I represents the 99th percentile   cutoff   for our facility and is consistent with 3rd generation assay   performance.      Urine Culture, Routine 08/16/2021 *  Final                    Value:ESCHERICHIA COLI  >100,000 cfu/ml      Troponin I 08/17/2021 0.009  0.000 - 0.026 ng/mL Final    Comment: The reference interval for Troponin I represents the 99th percentile   cutoff   for our facility and is consistent with 3rd generation assay   performance.      BNP 08/17/2021 62  0 - 99 pg/mL Final    Values of less than 100 pg/ml are consistent with non-CHF populations.    WBC 08/17/2021 6.40  3.90 - 12.70 K/uL Final    RBC 08/17/2021 3.96* 4.00 - 5.40 M/uL Final    Hemoglobin 08/17/2021 11.0* 12.0 - 16.0 g/dL Final    Hematocrit 08/17/2021 36.2* 37.0 - 48.5 % Final    MCV 08/17/2021 91  82 - 98 fL Final    MCH 08/17/2021 27.8  27.0 - 31.0 pg Final    MCHC 08/17/2021 30.4* 32.0 - 36.0 g/dL Final    RDW 08/17/2021 17.0* 11.5 - 14.5 % Final    Platelets 08/17/2021 287  150 - 450 K/uL Final    MPV 08/17/2021 9.0* 9.2 - 12.9 fL Final    Immature Granulocytes 08/17/2021 0.3  0.0 - 0.5 % Final    Gran #  (ANC) 08/17/2021 3.1  1.8 - 7.7 K/uL Final    Immature Grans (Abs) 08/17/2021 0.02  0.00 - 0.04 K/uL Final    Comment: Mild elevation in immature granulocytes is non specific and   can be seen in a variety of conditions including stress response,   acute inflammation, trauma and pregnancy. Correlation with other   laboratory and clinical findings is essential.      Lymph # 08/17/2021 2.5  1.0 - 4.8 K/uL Final    Mono # 08/17/2021 0.7  0.3 - 1.0 K/uL Final    Eos # 08/17/2021 0.1  0.0 - 0.5 K/uL Final    Baso # 08/17/2021 0.03  0.00 - 0.20 K/uL Final    nRBC 08/17/2021 0  0 /100 WBC Final    Gran % 08/17/2021 47.8  38.0 - 73.0 % Final    Lymph % 08/17/2021 39.5  18.0 - 48.0 % Final    Mono % 08/17/2021 10.3  4.0 - 15.0 % Final    Eosinophil % 08/17/2021 1.6  0.0 - 8.0 % Final    Basophil % 08/17/2021 0.5  0.0 - 1.9 % Final    Differential Method 08/17/2021 Automated   Final    BSA 08/17/2021 2.02  m2 Final    TDI SEPTAL 08/17/2021 0.07  m/s Final    LV LATERAL E/E' RATIO 08/17/2021 6.60  m/s Final    LV SEPTAL E/E' RATIO 08/17/2021 9.43  m/s Final    TDI LATERAL 08/17/2021 0.10  m/s Final    PV PEAK VELOCITY 08/17/2021 1.21  cm/s Final    LVIDd 08/17/2021 5.09  3.5 - 6.0 cm Final    IVS 08/17/2021 0.67  0.6 - 1.1 cm Final    Posterior Wall 08/17/2021 0.76  0.6 - 1.1 cm Final    LVIDs 08/17/2021 2.93  2.1 - 4.0 cm Final    FS 08/17/2021 42  28 - 44 % Final    LV mass 08/17/2021 121.49  g Final    LA size 08/17/2021 3.46  cm Final    Left Ventricle Relative Wall Thick* 08/17/2021 0.30  cm Final    AV mean gradient 08/17/2021 4  mmHg Final    AV valve area 08/17/2021 3.64  cm2 Final    AV Velocity Ratio 08/17/2021 0.86   Final    AV index (prosthetic) 08/17/2021 1.04   Final    MV mean gradient 08/17/2021 0  mmHg Final    MV valve area p 1/2 method 08/17/2021 3.09  cm2 Final    E/A ratio 08/17/2021 0.88   Final    Mean e' 08/17/2021 0.09  m/s Final    E wave deceleration time  08/17/2021 245.19  msec Final    IVRT 08/17/2021 105.61  msec Final    LVOT diameter 08/17/2021 2.11  cm Final    LVOT area 08/17/2021 3.5  cm2 Final    LVOT peak juan 08/17/2021 1.28  m/s Final    LVOT peak VTI 08/17/2021 25.01  cm Final    Ao peak juan 08/17/2021 1.49  m/s Final    Ao VTI 08/17/2021 24.01  cm Final    LVOT stroke volume 08/17/2021 87.41  cm3 Final    AV peak gradient 08/17/2021 9  mmHg Final    MV peak gradient 08/17/2021 3  mmHg Final    E/E' ratio 08/17/2021 7.76  m/s Final    MV Peak E Juan 08/17/2021 0.66  m/s Final    MV stenosis pressure 1/2 time 08/17/2021 71.10  ms Final    MV Peak A Juan 08/17/2021 0.75  m/s Final    LV Systolic Volume 08/17/2021 32.98  mL Final    LV Systolic Volume Index 08/17/2021 16.7  mL/m2 Final    LV Diastolic Volume 08/17/2021 123.02  mL Final    LV Diastolic Volume Index 08/17/2021 62.45  mL/m2 Final    LV Mass Index 08/17/2021 62  g/m2 Final    RA Major Axis 08/17/2021 4.66  cm Final    Left Atrium Minor Axis 08/17/2021 5.42  cm Final    LA Volume Index (Mod) 08/17/2021 15.9  mL/m2 Final    LA volume (mod) 08/17/2021 31.28  cm3 Final    RA Width 08/17/2021 3.49  cm Final    Right Atrial Pressure (from IVC) 08/17/2021 3  mmHg Final    EF 08/17/2021 60  % Final    85% Max Predicted HR 08/17/2021 126   Final    Max Predicted HR 08/17/2021 148   Final    OHS CV CPX PATIENT IS MALE 08/17/2021 0.0   Final    OHS CV CPX PATIENT IS FEMALE 08/17/2021 1.0   Final    HR at rest 08/17/2021 70  bpm Final    Systolic blood pressure 08/17/2021 107  mmHg Final    Diastolic blood pressure 08/17/2021 63  mmHg Final    RPP 08/17/2021 7,490   Final    Peak HR 08/17/2021 76  bpm Final    Peak Systolic BP 08/17/2021 110  mmHg Final    Peak Diatolic BP 08/17/2021 65  mmHg Final    Peak RPP 08/17/2021 8,360   Final    % Max HR Achieved 08/17/2021 51   Final    dose 08/17/2021 0.0  mg Final    Troponin I 08/17/2021 0.006  0.000 - 0.026 ng/mL Final     Comment: The reference interval for Troponin I represents the 99th percentile   cutoff   for our facility and is consistent with 3rd generation assay   performance.      Cholesterol 08/17/2021 271* 120 - 199 mg/dL Final    Comment: The National Cholesterol Education Program (NCEP) has set the  following guidelines (reference ranges) for Cholesterol:  Optimal.....................<200 mg/dL  Borderline High.............200-239 mg/dL  High........................> or = 240 mg/dL      Triglycerides 08/17/2021 250* 30 - 150 mg/dL Final    Comment: The National Cholesterol Education Program (NCEP) has set the  following guidelines (reference values) for triglycerides:  Normal......................<150 mg/dL  Borderline High.............150-199 mg/dL  High........................200-499 mg/dL      HDL 08/17/2021 37* 40 - 75 mg/dL Final    Comment: The National Cholesterol Education Program (NCEP) has set the  following guidelines (reference values) for HDL Cholesterol:  Low...............<40 mg/dL  Optimal...........>60 mg/dL      LDL Cholesterol 08/17/2021 184.0* 63.0 - 159.0 mg/dL Final    Comment: The National Cholesterol Education Program (NCEP) has set the  following guidelines (reference values) for LDL Cholesterol:  Optimal.......................<130 mg/dL  Borderline High...............130-159 mg/dL  High..........................160-189 mg/dL  Very High.....................>190 mg/dL      HDL/Cholesterol Ratio 08/17/2021 13.7* 20.0 - 50.0 % Final    Total Cholesterol/HDL Ratio 08/17/2021 7.3* 2.0 - 5.0 Final    Non-HDL Cholesterol 08/17/2021 234  mg/dL Final    Comment: Risk category and Non-HDL cholesterol goals:  Coronary heart disease (CHD)or equivalent (10-year risk of CHD >20%):  Non-HDL cholesterol goal     <130 mg/dL  Two or more CHD risk factors and 10-year risk of CHD <= 20%:  Non-HDL cholesterol goal     <160 mg/dL  0 to 1 CHD risk factor:  Non-HDL cholesterol goal     <190 mg/dL      Troponin I  08/17/2021 <0.006  0.000 - 0.026 ng/mL Final    Comment: The reference interval for Troponin I represents the 99th percentile   cutoff   for our facility and is consistent with 3rd generation assay   performance.       I performed a total of 139 minutes on Ms. Salmeron's care on the day of their visit excluding time spent related to any billed procedures. This time includes time spent with the patient as well as time spent documenting in the medical record, reviewing patient's records and tests, obtaining history, placing orders, communicating with other healthcare professionals, counseling the patient, family, or caregiver, and/or care coordination for the diagnoses above.    This note was dictated using [employer] speech recognition software. Please excuse any spelling or grammatical errors. Word recognition mistakes are occasionally missed on review.      Hitesh Barnett MD   Behavioral Neurology & Neuropsychiatry  [facility]

## 2022-01-28 DIAGNOSIS — J44.9 CHRONIC OBSTRUCTIVE PULMONARY DISEASE, UNSPECIFIED COPD TYPE: ICD-10-CM

## 2022-01-28 DIAGNOSIS — I25.10 CORONARY ARTERY DISEASE: ICD-10-CM

## 2022-01-28 RX ORDER — ALBUTEROL SULFATE 90 UG/1
2 AEROSOL, METERED RESPIRATORY (INHALATION) EVERY 6 HOURS PRN
Qty: 54 G | Refills: 1 | Status: SHIPPED | OUTPATIENT
Start: 2022-01-28 | End: 2022-04-27

## 2022-01-28 NOTE — TELEPHONE ENCOUNTER
Refill Authorization Note   Stephania Salmeron  is requesting a refill authorization.  Brief Assessment and Rationale for Refill:  Approve     Medication Therapy Plan:       Medication Reconciliation Completed: No   Comments:   --->Care Gap information included below if applicable.   Orders Placed This Encounter    albuterol (PROVENTIL/VENTOLIN HFA) 90 mcg/actuation inhaler      Requested Prescriptions   Signed Prescriptions Disp Refills    albuterol (PROVENTIL/VENTOLIN HFA) 90 mcg/actuation inhaler 54 g 1     Sig: INHALE 2 PUFFS INTO THE LUNGS EVERY 6 (SIX) HOURS AS NEEDED FOR WHEEZING. RESCUE       Pulmonology:  Beta Agonists Passed - 1/28/2022  3:15 PM        Passed - Patient is at least 18 years old        Passed - Last Heart Rate in normal range within 360 days     Pulse Readings from Last 1 Encounters:   01/25/22 68              Passed - Valid encounter within last 15 months     Recent Visits  Date Type Provider Dept   05/24/21 Office Visit Sinan Saeed MD [facility]   03/29/21 Office Visit Sinan Saeed MD [facility]   Showing recent visits within past 720 days and meeting all other requirements  Future Appointments  No visits were found meeting these conditions.  Showing future appointments within next 150 days and meeting all other requirements      Future Appointments              In 1 month Eric Robin MD [facility] - [facility] [id number]                    Appointments  past 12m or future 3m with PCP    Date Provider   Last Visit   5/24/2021 Sinan Saeed MD   Next Visit   Visit date not found Sinan Saeed MD   ED visits in past 90 days: 0     Note composed:3:32 PM 01/28/2022

## 2022-01-28 NOTE — TELEPHONE ENCOUNTER
No new care gaps identified.  Powered by [employer]. Reference number: [id number].   1/28/2022 3:16:23 PM CST

## 2022-02-01 ENCOUNTER — TELEPHONE (OUTPATIENT)
Dept: NEUROLOGY | Facility: CLINIC | Age: 67
End: 2022-02-01

## 2022-02-01 NOTE — TELEPHONE ENCOUNTER
Spoke with pt to confirm an appointment that was placed on hold for 2/7/2022. Pt stated she seen Dr. Barnett on 1/25/2022 and would no longer need appointment.

## 2022-02-02 RX ORDER — RIVAROXABAN 20 MG/1
20 TABLET, FILM COATED ORAL
Qty: 90 TABLET | Refills: 0 | Status: SHIPPED | OUTPATIENT
Start: 2022-02-02 | End: 2022-04-14

## 2022-02-23 ENCOUNTER — PATIENT OUTREACH (OUTPATIENT)
Dept: ADMINISTRATIVE | Facility: OTHER | Age: 67
End: 2022-02-23
Payer: MEDICARE

## 2022-02-23 NOTE — PROGRESS NOTES
LINKS immunization registry updated  Care Everywhere updated  Health Maintenance updated  DIS/Chart reviewed for overdue Proactive [facility] ([facility]) health maintenance testing (CRS, Breast Ca, Diabetic Eye Exam)   Orders entered:N/A

## 2022-02-24 ENCOUNTER — TELEPHONE (OUTPATIENT)
Dept: CARDIOLOGY | Facility: CLINIC | Age: 67
End: 2022-02-24
Payer: MEDICARE

## 2022-02-24 NOTE — TELEPHONE ENCOUNTER
Reached out to patient to confirm appointment with the [employer] device rep for Monday, 02/28/22.  No answer.  Left detailed message for patient to call the office to confirm appointment.

## 2022-02-28 ENCOUNTER — OFFICE VISIT (OUTPATIENT)
Dept: CARDIOLOGY | Facility: CLINIC | Age: 67
End: 2022-02-28
Payer: MEDICARE

## 2022-02-28 VITALS
DIASTOLIC BLOOD PRESSURE: 70 MMHG | BODY MASS INDEX: 33.54 KG/M2 | HEART RATE: 72 BPM | HEIGHT: 66 IN | OXYGEN SATURATION: 94 % | SYSTOLIC BLOOD PRESSURE: 134 MMHG | WEIGHT: 208.69 LBS

## 2022-02-28 DIAGNOSIS — Z95.1 S/P CABG (CORONARY ARTERY BYPASS GRAFT): ICD-10-CM

## 2022-02-28 DIAGNOSIS — R06.02 SOB (SHORTNESS OF BREATH): ICD-10-CM

## 2022-02-28 DIAGNOSIS — I25.10 CORONARY ARTERY DISEASE INVOLVING NATIVE CORONARY ARTERY OF NATIVE HEART WITHOUT ANGINA PECTORIS: ICD-10-CM

## 2022-02-28 DIAGNOSIS — Z95.810 AICD (AUTOMATIC CARDIOVERTER/DEFIBRILLATOR) PRESENT: Primary | ICD-10-CM

## 2022-02-28 DIAGNOSIS — I47.29 NONSUSTAINED VENTRICULAR TACHYCARDIA: ICD-10-CM

## 2022-02-28 DIAGNOSIS — K55.1 MESENTERIC ISCHEMIA DUE TO ARTERIAL INSUFFICIENCY: ICD-10-CM

## 2022-02-28 DIAGNOSIS — Z98.61 S/P PTCA (PERCUTANEOUS TRANSLUMINAL CORONARY ANGIOPLASTY): ICD-10-CM

## 2022-02-28 DIAGNOSIS — R06.02 SHORTNESS OF BREATH: ICD-10-CM

## 2022-02-28 DIAGNOSIS — I73.9 PAD (PERIPHERAL ARTERY DISEASE): ICD-10-CM

## 2022-02-28 DIAGNOSIS — I25.2 OLD MI (MYOCARDIAL INFARCTION): ICD-10-CM

## 2022-02-28 DIAGNOSIS — I48.0 PAROXYSMAL ATRIAL FIBRILLATION: ICD-10-CM

## 2022-02-28 PROCEDURE — 1126F PR PAIN SEVERITY QUANTIFIED, NO PAIN PRESENT: ICD-10-PCS | Mod: HCNC,CPTII,S$GLB, | Performed by: INTERNAL MEDICINE

## 2022-02-28 PROCEDURE — 3008F BODY MASS INDEX DOCD: CPT | Mod: HCNC,CPTII,S$GLB, | Performed by: INTERNAL MEDICINE

## 2022-02-28 PROCEDURE — 3078F PR MOST RECENT DIASTOLIC BLOOD PRESSURE < 80 MM HG: ICD-10-PCS | Mod: HCNC,CPTII,S$GLB, | Performed by: INTERNAL MEDICINE

## 2022-02-28 PROCEDURE — 99214 OFFICE O/P EST MOD 30 MIN: CPT | Mod: HCNC,S$GLB,, | Performed by: INTERNAL MEDICINE

## 2022-02-28 PROCEDURE — 1160F PR REVIEW ALL MEDS BY PRESCRIBER/CLIN PHARMACIST DOCUMENTED: ICD-10-PCS | Mod: HCNC,CPTII,S$GLB, | Performed by: INTERNAL MEDICINE

## 2022-02-28 PROCEDURE — 1100F PTFALLS ASSESS-DOCD GE2>/YR: CPT | Mod: HCNC,CPTII,S$GLB, | Performed by: INTERNAL MEDICINE

## 2022-02-28 PROCEDURE — 1100F PR PT FALLS ASSESS DOC 2+ FALLS/FALL W/INJURY/YR: ICD-10-PCS | Mod: HCNC,CPTII,S$GLB, | Performed by: INTERNAL MEDICINE

## 2022-02-28 PROCEDURE — 3075F PR MOST RECENT SYSTOLIC BLOOD PRESS GE 130-139MM HG: ICD-10-PCS | Mod: HCNC,CPTII,S$GLB, | Performed by: INTERNAL MEDICINE

## 2022-02-28 PROCEDURE — 1159F PR MEDICATION LIST DOCUMENTED IN MEDICAL RECORD: ICD-10-PCS | Mod: HCNC,CPTII,S$GLB, | Performed by: INTERNAL MEDICINE

## 2022-02-28 PROCEDURE — 99999 PR PBB SHADOW E&M-EST. PATIENT-LVL III: CPT | Mod: PBBFAC,HCNC,, | Performed by: INTERNAL MEDICINE

## 2022-02-28 PROCEDURE — 99499 RISK ADDL DX/OHS AUDIT: ICD-10-PCS | Mod: HCNC,S$GLB,, | Performed by: INTERNAL MEDICINE

## 2022-02-28 PROCEDURE — 1126F AMNT PAIN NOTED NONE PRSNT: CPT | Mod: HCNC,CPTII,S$GLB, | Performed by: INTERNAL MEDICINE

## 2022-02-28 PROCEDURE — 99214 PR OFFICE/OUTPT VISIT, EST, LEVL IV, 30-39 MIN: ICD-10-PCS | Mod: HCNC,S$GLB,, | Performed by: INTERNAL MEDICINE

## 2022-02-28 PROCEDURE — 1160F RVW MEDS BY RX/DR IN RCRD: CPT | Mod: HCNC,CPTII,S$GLB, | Performed by: INTERNAL MEDICINE

## 2022-02-28 PROCEDURE — 99499 UNLISTED E&M SERVICE: CPT | Mod: HCNC,S$GLB,, | Performed by: INTERNAL MEDICINE

## 2022-02-28 PROCEDURE — 93283 PR PROGRAM EVAL (IN PERSON) IMPLANT DEVICE,CARDVERT/DEFIB,2 LEAD: ICD-10-PCS | Mod: 26,HCNC,, | Performed by: INTERNAL MEDICINE

## 2022-02-28 PROCEDURE — 3078F DIAST BP <80 MM HG: CPT | Mod: HCNC,CPTII,S$GLB, | Performed by: INTERNAL MEDICINE

## 2022-02-28 PROCEDURE — 3075F SYST BP GE 130 - 139MM HG: CPT | Mod: HCNC,CPTII,S$GLB, | Performed by: INTERNAL MEDICINE

## 2022-02-28 PROCEDURE — 3288F FALL RISK ASSESSMENT DOCD: CPT | Mod: HCNC,CPTII,S$GLB, | Performed by: INTERNAL MEDICINE

## 2022-02-28 PROCEDURE — 3008F PR BODY MASS INDEX (BMI) DOCUMENTED: ICD-10-PCS | Mod: HCNC,CPTII,S$GLB, | Performed by: INTERNAL MEDICINE

## 2022-02-28 PROCEDURE — 1159F MED LIST DOCD IN RCRD: CPT | Mod: HCNC,CPTII,S$GLB, | Performed by: INTERNAL MEDICINE

## 2022-02-28 PROCEDURE — 99999 PR PBB SHADOW E&M-EST. PATIENT-LVL III: ICD-10-PCS | Mod: PBBFAC,HCNC,, | Performed by: INTERNAL MEDICINE

## 2022-02-28 PROCEDURE — 93283 PRGRMG EVAL IMPLANTABLE DFB: CPT | Mod: 26,HCNC,, | Performed by: INTERNAL MEDICINE

## 2022-02-28 PROCEDURE — 3288F PR FALLS RISK ASSESSMENT DOCUMENTED: ICD-10-PCS | Mod: HCNC,CPTII,S$GLB, | Performed by: INTERNAL MEDICINE

## 2022-02-28 NOTE — PROGRESS NOTES
"  Subjective:      Patient ID: Stephania Salmeron is a 67 y.o. female.    Chief Complaint: Coronary Artery Disease    HPI:  Shops at the grocery.  Cooks.  Does light housework.      Pt c/o chronic shortness of breath with exertion, for example after climbing 3 steps or walking very short distances.     "The inhalers help."    "I have COPD"    No bleeding on Xarelto    Review of Systems   Cardiovascular: Positive for dyspnea on exertion, leg swelling, near-syncope (Pt feels faint on occasion and sits down) and palpitations. Negative for chest pain, claudication, irregular heartbeat, orthopnea and syncope.        Past Medical History:   Diagnosis Date    Acute coronary syndrome     Atrial fibrillation     Clotting disorder     Coronary artery disease     COVID-19 01/2021    Hyperlipidemia     Hypertension     ICD (implantable cardioverter-defibrillator) in place     Medtronic Evera XT ICD - Model LSPN2F0 (SN: [id number]), 5568-45 (SN: [id number]), 6932-65 (SN: [id number])    Occipital stroke     Left occipital lobe    Quadrantanopia, right     Right lower quadrant        Past Surgical History:   Procedure Laterality Date    APPENDECTOMY      CARDIAC DEFIBRILLATOR PLACEMENT      CHOLECYSTECTOMY      COLONOSCOPY N/A 3/31/2021    Procedure: COLONOSCOPY Suprep;  Surgeon: Murtaza Curry MD;  Location: [facility] ENDO;  Service: Endoscopy;  Laterality: N/A;    CORONARY ANGIOPLASTY      CORONARY ANGIOPLASTY WITH STENT PLACEMENT      CORONARY ARTERY BYPASS GRAFT  1997    ESOPHAGOGASTRODUODENOSCOPY N/A 3/31/2021    Procedure: EGD (ESOPHAGOGASTRODUODENOSCOPY);  Surgeon: Murtaza Curry MD;  Location: [facility] ENDO;  Service: Endoscopy;  Laterality: N/A;    DESIRAE FILTER PLACEMENT  2017    HYSTERECTOMY         Family History   Problem Relation Age of Onset    Lung cancer Mother     Anuerysm Father     Dementia Sister     COPD Sister     Anxiety disorder Sister     Alcohol abuse Sister     Stroke Sister 72    " Bone cancer Maternal Aunt     Bipolar disorder Other         Neice    Depression Brother     Depression Brother     Pulmonary embolism Sister     Colon cancer Neg Hx     Diabetes Mellitus Neg Hx     Breast cancer Neg Hx        Social History     Socioeconomic History    Marital status:    Tobacco Use    Smoking status: Former Smoker     Packs/day: 4.00     Years: 30.00     Pack years: 120.00     Types: Cigarettes     Quit date: 3/22/1994     Years since quittin.9    Smokeless tobacco: Never Used   Substance and Sexual Activity    Alcohol use: Not Currently     Alcohol/week: 0.0 standard drinks    Drug use: Never    Sexual activity: Not Currently       Current Outpatient Medications on File Prior to Visit   Medication Sig Dispense Refill    albuterol (PROVENTIL/VENTOLIN HFA) 90 mcg/actuation inhaler INHALE 2 PUFFS INTO THE LUNGS EVERY 6 (SIX) HOURS AS NEEDED FOR WHEEZING. RESCUE 54 g 1    atorvastatin (LIPITOR) 40 MG tablet Take 1 tablet (40 mg total) by mouth once daily. 90 tablet 3    buPROPion (WELLBUTRIN XL) 150 MG TB24 tablet Take 450 mg by mouth. Taking 3 tablets daily (450 mg) total.      docusate sodium (COLACE) 100 MG capsule TAKE 1 CAPSULE (100 MG TOTAL) BY MOUTH 2 (TWO) TIMES DAILY AS NEEDED FOR CONSTIPATION. 60 capsule 1    ergocalciferol (ERGOCALCIFEROL) 50,000 unit Cap Take 1 capsule (50,000 Units total) by mouth every 7 days. 12 capsule 3    esomeprazole (NEXIUM) 40 MG capsule Take 1 capsule (40 mg total) by mouth before breakfast. 90 capsule 3    hydrOXYzine pamoate (VISTARIL) 25 MG Cap TAKE 1 CAPSULE (25 MG TOTAL) BY MOUTH 4 (FOUR) TIMES DAILY AS NEEDED (NAUSEA). 90 capsule 3    ibuprofen (ADVIL,MOTRIN) 600 MG tablet TAKE 1 TABLET BY MOUTH EVERY 6 HOURS AS NEEDED FOR PAIN (Patient taking differently: 200 mg. PRN) 21 tablet 0    lorazepam (ATIVAN) 1 MG tablet Take 1 mg by mouth every 6 (six) hours as needed for Anxiety.      meclizine (ANTIVERT) 12.5 mg tablet 1or 2  "tablets every 4-6 hours, as needed to control imbalance or dizziness or vertigo 50 tablet 5    metoprolol succinate (TOPROL-XL) 25 MG 24 hr tablet Take 1 tablet (25 mg total) by mouth 2 (two) times daily. 180 tablet 3    pregabalin (LYRICA) 100 MG capsule Take 100 mg by mouth 3 (three) times daily. Pt takes PRN      QUEtiapine (SEROQUEL) 200 MG Tab Take 200 mg by mouth every evening.      SPIRIVA RESPIMAT 1.25 mcg/actuation inhaler INHALE 2 PUFFS INTO THE LUNGS ONCE DAILY. CONTROLLER 12 g 2    SYMBICORT 160-4.5 mcg/actuation HFAA INHALE 2 PUFFS INTO THE LUNGS EVERY 12 (TWELVE) HOURS. CONTROLLER 30.6 g 3    XARELTO 20 mg Tab TAKE 1 TABLET (20 MG TOTAL) BY MOUTH DAILY WITH DINNER OR EVENING MEAL. 90 tablet 0    [DISCONTINUED] famotidine (PEPCID) 20 MG tablet Take 1 tablet by mouth once daily.       No current facility-administered medications on file prior to visit.       Review of patient's allergies indicates:   Allergen Reactions    Compazine [prochlorperazine]      Feels like somethingcrawling underneath the skin    Demerol (pf) [meperidine (pf)]      Feels like somethingcrawling underneath the skin    Toradol [ketorolac]      Feels like somethingcrawling underneath the skin     Objective:     Vitals:    02/28/22 1358   BP: 134/70   BP Location: Right arm   Patient Position: Sitting   BP Method: Large (Automatic)   Pulse: 72   SpO2: (!) 94%   Weight: 94.7 kg (208 lb 10.7 oz)   Height: 5' 6" (1.676 m)        Physical Exam  Vitals reviewed.   Constitutional:       Appearance: She is well-developed.   Eyes:      General: No scleral icterus.  Neck:      Vascular: No carotid bruit or JVD.   Cardiovascular:      Rate and Rhythm: Normal rate and regular rhythm.      Heart sounds: No murmur heard.    No gallop.   Pulmonary:      Breath sounds: Examination of the left-lower field reveals rales. Rales present.   Musculoskeletal:      Right lower leg: No edema.      Left lower leg: No edema.   Skin:     General: " Skin is warm and dry.   Neurological:      Mental Status: She is alert and oriented to person, place, and time.   Psychiatric:         Behavior: Behavior normal.         Thought Content: Thought content normal.         Judgment: Judgment normal.        ECG today reviewed by me: atrial paced rhythm with native AV conduction.      [employer] AICD interrogated in office with rep.   SOFYA 3 years.  Leads OK.  2 minutes of a fib in 2020. No a fib since then.  Several nonsustained runs of VT monitored. Normal device function    No visits with results within 6 Month(s) from this visit.   Latest known visit with results is:   Admission on 08/16/2021, Discharged on 08/17/2021   Component Date Value Ref Range Status    WBC 08/16/2021 6.28  3.90 - 12.70 K/uL Final    RBC 08/16/2021 3.90 (A) 4.00 - 5.40 M/uL Final    Hemoglobin 08/16/2021 11.2 (A) 12.0 - 16.0 g/dL Final    Hematocrit 08/16/2021 36.0 (A) 37.0 - 48.5 % Final    MCV 08/16/2021 92  82 - 98 fL Final    MCH 08/16/2021 28.7  27.0 - 31.0 pg Final    MCHC 08/16/2021 31.1 (A) 32.0 - 36.0 g/dL Final    RDW 08/16/2021 17.1 (A) 11.5 - 14.5 % Final    Platelets 08/16/2021 277  150 - 450 K/uL Final    MPV 08/16/2021 9.0 (A) 9.2 - 12.9 fL Final    Immature Granulocytes 08/16/2021 0.5  0.0 - 0.5 % Final    Gran # (ANC) 08/16/2021 3.7  1.8 - 7.7 K/uL Final    Immature Grans (Abs) 08/16/2021 0.03  0.00 - 0.04 K/uL Final    Lymph # 08/16/2021 2.0  1.0 - 4.8 K/uL Final    Mono # 08/16/2021 0.4  0.3 - 1.0 K/uL Final    Eos # 08/16/2021 0.1  0.0 - 0.5 K/uL Final    Baso # 08/16/2021 0.02  0.00 - 0.20 K/uL Final    nRBC 08/16/2021 0  0 /100 WBC Final    Gran % 08/16/2021 59.0  38.0 - 73.0 % Final    Lymph % 08/16/2021 32.5  18.0 - 48.0 % Final    Mono % 08/16/2021 6.7  4.0 - 15.0 % Final    Eosinophil % 08/16/2021 1.0  0.0 - 8.0 % Final    Basophil % 08/16/2021 0.3  0.0 - 1.9 % Final    Differential Method 08/16/2021 Automated   Final    Sodium 08/16/2021 142   136 - 145 mmol/L Final    Potassium 08/16/2021 4.0  3.5 - 5.1 mmol/L Final    Chloride 08/16/2021 112 (A) 95 - 110 mmol/L Final    CO2 08/16/2021 19 (A) 23 - 29 mmol/L Final    Glucose 08/16/2021 89  70 - 110 mg/dL Final    BUN 08/16/2021 24 (A) 8 - 23 mg/dL Final    Creatinine 08/16/2021 1.0  0.5 - 1.4 mg/dL Final    Calcium 08/16/2021 8.9  8.7 - 10.5 mg/dL Final    Total Protein 08/16/2021 7.2  6.0 - 8.4 g/dL Final    Albumin 08/16/2021 3.4 (A) 3.5 - 5.2 g/dL Final    Total Bilirubin 08/16/2021 0.3  0.1 - 1.0 mg/dL Final    Alkaline Phosphatase 08/16/2021 119  55 - 135 U/L Final    AST 08/16/2021 13  10 - 40 U/L Final    ALT 08/16/2021 12  10 - 44 U/L Final    Anion Gap 08/16/2021 11  8 - 16 mmol/L Final    eGFR if African American 08/16/2021 >60  >60 mL/min/1.73 m^2 Final    eGFR if non  08/16/2021 59 (A) >60 mL/min/1.73 m^2 Final    Troponin I 08/16/2021 0.024  0.000 - 0.026 ng/mL Final    BNP 08/16/2021 107 (A) 0 - 99 pg/mL Final    Lipase 08/16/2021 40  4 - 60 U/L Final    Lactate (Lactic Acid) 08/16/2021 1.0  0.5 - 2.2 mmol/L Final    Specimen UA 08/16/2021 Urine, Clean Catch   Final    Color, UA 08/16/2021 Yellow  Yellow, Straw, Kusum Final    Appearance, UA 08/16/2021 Hazy (A) Clear Final    pH, UA 08/16/2021 6.0  5.0 - 8.0 Final    Specific Gravity, UA 08/16/2021 1.030  1.005 - 1.030 Final    Protein, UA 08/16/2021 1+ (A) Negative Final    Glucose, UA 08/16/2021 Negative  Negative Final    Ketones, UA 08/16/2021 Negative  Negative Final    Bilirubin (UA) 08/16/2021 Negative  Negative Final    Occult Blood UA 08/16/2021 Trace (A) Negative Final    Nitrite, UA 08/16/2021 Positive (A) Negative Final    Urobilinogen, UA 08/16/2021 Negative  <2.0 EU/dL Final    Leukocytes, UA 08/16/2021 3+ (A) Negative Final    POC Rapid COVID 08/16/2021 Negative  Negative Final     Acceptable 08/16/2021 Yes   Final    RBC, UA 08/16/2021 10 (A) 0 - 4 /hpf Final     WBC, UA 08/16/2021 >100 (A) 0 - 5 /hpf Final    WBC Clumps, UA 08/16/2021 Few (A) None-Rare Final    Bacteria 08/16/2021 Many (A) None-Occ /hpf Final    Squam Epithel, UA 08/16/2021 10  /hpf Final    Non-Squam Epith 08/16/2021 1 (A) <1/hpf /hpf Final    Hyaline Casts, UA 08/16/2021 81 (A) 0-1/lpf /lpf Final    Microscopic Comment 08/16/2021 SEE COMMENT   Final    Troponin I 08/17/2021 0.011  0.000 - 0.026 ng/mL Final    Urine Culture, Routine 08/16/2021  (A)  Final                    Value:ESCHERICHIA COLI  >100,000 cfu/ml      Troponin I 08/17/2021 0.009  0.000 - 0.026 ng/mL Final    BNP 08/17/2021 62  0 - 99 pg/mL Final    WBC 08/17/2021 6.40  3.90 - 12.70 K/uL Final    RBC 08/17/2021 3.96 (A) 4.00 - 5.40 M/uL Final    Hemoglobin 08/17/2021 11.0 (A) 12.0 - 16.0 g/dL Final    Hematocrit 08/17/2021 36.2 (A) 37.0 - 48.5 % Final    MCV 08/17/2021 91  82 - 98 fL Final    MCH 08/17/2021 27.8  27.0 - 31.0 pg Final    MCHC 08/17/2021 30.4 (A) 32.0 - 36.0 g/dL Final    RDW 08/17/2021 17.0 (A) 11.5 - 14.5 % Final    Platelets 08/17/2021 287  150 - 450 K/uL Final    MPV 08/17/2021 9.0 (A) 9.2 - 12.9 fL Final    Immature Granulocytes 08/17/2021 0.3  0.0 - 0.5 % Final    Gran # (ANC) 08/17/2021 3.1  1.8 - 7.7 K/uL Final    Immature Grans (Abs) 08/17/2021 0.02  0.00 - 0.04 K/uL Final    Lymph # 08/17/2021 2.5  1.0 - 4.8 K/uL Final    Mono # 08/17/2021 0.7  0.3 - 1.0 K/uL Final    Eos # 08/17/2021 0.1  0.0 - 0.5 K/uL Final    Baso # 08/17/2021 0.03  0.00 - 0.20 K/uL Final    nRBC 08/17/2021 0  0 /100 WBC Final    Gran % 08/17/2021 47.8  38.0 - 73.0 % Final    Lymph % 08/17/2021 39.5  18.0 - 48.0 % Final    Mono % 08/17/2021 10.3  4.0 - 15.0 % Final    Eosinophil % 08/17/2021 1.6  0.0 - 8.0 % Final    Basophil % 08/17/2021 0.5  0.0 - 1.9 % Final    Differential Method 08/17/2021 Automated   Final    BSA 08/17/2021 2.02  m2 Final    TDI SEPTAL 08/17/2021 0.07  m/s Final    LV LATERAL E/E'  RATIO 08/17/2021 6.60  m/s Final    LV SEPTAL E/E' RATIO 08/17/2021 9.43  m/s Final    TDI LATERAL 08/17/2021 0.10  m/s Final    PV PEAK VELOCITY 08/17/2021 1.21  cm/s Final    LVIDd 08/17/2021 5.09  3.5 - 6.0 cm Final    IVS 08/17/2021 0.67  0.6 - 1.1 cm Final    Posterior Wall 08/17/2021 0.76  0.6 - 1.1 cm Final    LVIDs 08/17/2021 2.93  2.1 - 4.0 cm Final    FS 08/17/2021 42  28 - 44 % Final    LV mass 08/17/2021 121.49  g Final    LA size 08/17/2021 3.46  cm Final    Left Ventricle Relative Wall Thick* 08/17/2021 0.30  cm Final    AV mean gradient 08/17/2021 4  mmHg Final    AV valve area 08/17/2021 3.64  cm2 Final    AV Velocity Ratio 08/17/2021 0.86   Final    AV index (prosthetic) 08/17/2021 1.04   Final    MV mean gradient 08/17/2021 0  mmHg Final    MV valve area p 1/2 method 08/17/2021 3.09  cm2 Final    E/A ratio 08/17/2021 0.88   Final    Mean e' 08/17/2021 0.09  m/s Final    E wave deceleration time 08/17/2021 245.19  msec Final    IVRT 08/17/2021 105.61  msec Final    LVOT diameter 08/17/2021 2.11  cm Final    LVOT area 08/17/2021 3.5  cm2 Final    LVOT peak juan 08/17/2021 1.28  m/s Final    LVOT peak VTI 08/17/2021 25.01  cm Final    Ao peak juan 08/17/2021 1.49  m/s Final    Ao VTI 08/17/2021 24.01  cm Final    LVOT stroke volume 08/17/2021 87.41  cm3 Final    AV peak gradient 08/17/2021 9  mmHg Final    MV peak gradient 08/17/2021 3  mmHg Final    E/E' ratio 08/17/2021 7.76  m/s Final    MV Peak E Juan 08/17/2021 0.66  m/s Final    MV stenosis pressure 1/2 time 08/17/2021 71.10  ms Final    MV Peak A Juan 08/17/2021 0.75  m/s Final    LV Systolic Volume 08/17/2021 32.98  mL Final    LV Systolic Volume Index 08/17/2021 16.7  mL/m2 Final    LV Diastolic Volume 08/17/2021 123.02  mL Final    LV Diastolic Volume Index 08/17/2021 62.45  mL/m2 Final    LV Mass Index 08/17/2021 62  g/m2 Final    RA Major Axis 08/17/2021 4.66  cm Final    Left Atrium Minor Axis  08/17/2021 5.42  cm Final    LA Volume Index (Mod) 08/17/2021 15.9  mL/m2 Final    LA volume (mod) 08/17/2021 31.28  cm3 Final    RA Width 08/17/2021 3.49  cm Final    Right Atrial Pressure (from IVC) 08/17/2021 3  mmHg Final    EF 08/17/2021 60  % Final    85% Max Predicted HR 08/17/2021 126   Final    Max Predicted HR 08/17/2021 148   Final    OHS CV CPX PATIENT IS MALE 08/17/2021 0.0   Final    OHS CV CPX PATIENT IS FEMALE 08/17/2021 1.0   Final    HR at rest 08/17/2021 70  bpm Final    Systolic blood pressure 08/17/2021 107  mmHg Final    Diastolic blood pressure 08/17/2021 63  mmHg Final    RPP 08/17/2021 7,490   Final    Peak HR 08/17/2021 76  bpm Final    Peak Systolic BP 08/17/2021 110  mmHg Final    Peak Diatolic BP 08/17/2021 65  mmHg Final    Peak RPP 08/17/2021 8,360   Final    % Max HR Achieved 08/17/2021 51   Final    dose 08/17/2021 0.0  mg Final    Troponin I 08/17/2021 0.006  0.000 - 0.026 ng/mL Final    Cholesterol 08/17/2021 271 (A) 120 - 199 mg/dL Final    Triglycerides 08/17/2021 250 (A) 30 - 150 mg/dL Final    HDL 08/17/2021 37 (A) 40 - 75 mg/dL Final    LDL Cholesterol 08/17/2021 184.0 (A) 63.0 - 159.0 mg/dL Final    HDL/Cholesterol Ratio 08/17/2021 13.7 (A) 20.0 - 50.0 % Final    Total Cholesterol/HDL Ratio 08/17/2021 7.3 (A) 2.0 - 5.0 Final    Non-HDL Cholesterol 08/17/2021 234  mg/dL Final    Troponin I 08/17/2021 <0.006  0.000 - 0.026 ng/mL Final   (          [phone] 8/17/2021 Routine     Narrative & Impression  EXAMINATION:  NM MYOCARDIAL PERFUSION SPECT MULTI STUDY     CLINICAL HISTORY:  Chest pain, acute, nonspecific;     TECHNIQUE:  SPECT-CT images through the heart were acquired following the intravenous administration of 12.1 mCi Tc-99m tetrofosmin at rest. SPECT-CT images were then acquired following the intravenous administration of 28 mCi Tc-99m tetrofosmin during a Lexiscan cardiac stress. The clinical stress and ECG portion of the  "study will be interpreted separately.     COMPARISON:  None available     FINDINGS:  No focal wall motion abnormalities. No defects on stress images to suggest reversible ischemia. No fixed defects on stress and rest images to suggest infarct.     Stress and rest end diastolic volumes 27 and 25 mL, respectively. Stress and rest end systolic volumes 10 and 7 mL, respectively. Estimated left ventricular ejection fraction 63% during stress and 73% during rest.     Impression:     1. No convincing scintigraphic evidence of ischemia or infarct.  2. No focal wall motion abnormalities.  3. Left ventricular ejection fraction 63% during stress and 73% during rest.        Electronically signed by: Mukund Somers  Date:                                            08/17/2021  Time:                                           16:17             Exam Ended: 08/17/21 15:50 Last Resulted: 08/17/21 16:17              Accession #: [id number]    Transthoracic echo (TTE) complete  Order# [id number]  Reading physician: Marcelo Mcdaniel MD Ordering physician: Nicolas Murray MD Study date: 8/17/21       Reason for Exam  Priority: Routine  Not on file     Result Image Hyperlink     Show images for Echo    Summary    · The left ventricle is normal in size with normal systolic function.  · The estimated ejection fraction is 60%.  · Indeterminate left ventricular diastolic function.  · Normal right ventricular size with normal right ventricular systolic function.  · Normal central venous pressure (3 mmHg).         Vitals    Height Weight BMI (Calculated) BSA (Calculated - sq m) BP Pulse   5' 6" (1.676          Stress test only, Regadenoson  Order# [id number]  Reading physician: Marcelo Mcdaniel MD Ordering physician: Nicolas Murray MD Study date: 8/17/21       Reason for Exam  Priority: Routine  Not on file         Conclusion         The EKG portion of this study is negative for ischemia. Sensitivity is reduced secondary to the target heart rate " not being achieved. Lexiscan.    The patient reported no chest pain during the stress test.    The nuclear portion of this study will be reported separately.         Performing Clinician           Reading Physician Reading Date Result Priority   Stu Tamez MD  [phone] 8/16/2021 STAT     Narrative & Impression  EXAMINATION:  XR CHEST AP PORTABLE     CLINICAL HISTORY:  CHF;     TECHNIQUE:  Single frontal view of the chest was performed.     COMPARISON:  02/20/2021     FINDINGS:  Cardiac defibrillator device on the left.     The lungs are clear, with normal appearance of pulmonary vasculature and no pleural effusion or pneumothorax.     The cardiac silhouette is normal in size. The hilar and mediastinal contours are unremarkable.     Bones are intact.     Impression:     No acute abnormality.        Electronically signed by: Stu Tamez  Date:                                            08/16/2021  Time:                                           21:35             Exam Ended: 08/16/21 21:30 Last Resulte               Assessment:     1. AICD (automatic cardioverter/defibrillator) present    2. Old MI (myocardial infarction)    3. PAD (peripheral artery disease)    4. Paroxysmal atrial fibrillation    5. S/P CABG (coronary artery bypass graft)    6. S/P PTCA (percutaneous transluminal coronary angioplasty)    7. Coronary artery disease involving native coronary artery of native heart without angina pectoris    8. Mesenteric ischemia due to arterial insufficiency    9. Shortness of breath    10. Nonsustained ventricular tachycardia    11. SOB (shortness of breath)      Plan:   Stephania was seen today for coronary artery disease.    Diagnoses and all orders for this visit:    AICD (automatic cardioverter/defibrillator) present  -     CBC Auto Differential; Future  -     Comprehensive Metabolic Panel; Future  -     BNP; Future  -     Lipid Panel; Future    Old MI (myocardial infarction)  -     CBC  Auto Differential; Future  -     Comprehensive Metabolic Panel; Future  -     BNP; Future  -     Lipid Panel; Future    PAD (peripheral artery disease)  -     CBC Auto Differential; Future  -     Comprehensive Metabolic Panel; Future  -     BNP; Future  -     Lipid Panel; Future    Paroxysmal atrial fibrillation  -     CBC Auto Differential; Future  -     Comprehensive Metabolic Panel; Future  -     BNP; Future  -     Lipid Panel; Future    S/P CABG (coronary artery bypass graft)  -     CBC Auto Differential; Future  -     Comprehensive Metabolic Panel; Future  -     BNP; Future  -     Lipid Panel; Future    S/P PTCA (percutaneous transluminal coronary angioplasty)  -     CBC Auto Differential; Future  -     Comprehensive Metabolic Panel; Future  -     BNP; Future  -     Lipid Panel; Future    Coronary artery disease involving native coronary artery of native heart without angina pectoris  -     CBC Auto Differential; Future  -     Comprehensive Metabolic Panel; Future  -     BNP; Future  -     Lipid Panel; Future    Mesenteric ischemia due to arterial insufficiency  -     CBC Auto Differential; Future  -     Comprehensive Metabolic Panel; Future  -     BNP; Future  -     Lipid Panel; Future    Shortness of breath  -     CBC Auto Differential; Future  -     Comprehensive Metabolic Panel; Future  -     BNP; Future  -     Lipid Panel; Future    Nonsustained ventricular tachycardia    SOB (shortness of breath)       Same meds    Hold Xarelto for any bleeding and report to MD [employer] remote AICD interrogations    Repeat lab    Get lab ordered by DR Ericka SANTILLAN 6 months    F/u with Dr Saeed    Follow up in about 6 months (around 8/28/2022).

## 2022-03-03 ENCOUNTER — TELEPHONE (OUTPATIENT)
Dept: CARDIOLOGY | Facility: CLINIC | Age: 67
End: 2022-03-03
Payer: MEDICARE

## 2022-03-03 NOTE — TELEPHONE ENCOUNTER
Message left on vopiecemail:  Lab results look good.        Lab Visit on 02/28/2022   Component Date Value Ref Range Status    Cholesterol 02/28/2022 181  120 - 199 mg/dL Final    Triglycerides 02/28/2022 139  30 - 150 mg/dL Final    HDL 02/28/2022 47  40 - 75 mg/dL Final    LDL Cholesterol 02/28/2022 106.2  63.0 - 159.0 mg/dL Final    HDL/Cholesterol Ratio 02/28/2022 26.0  20.0 - 50.0 % Final    Total Cholesterol/HDL Ratio 02/28/2022 3.9  2.0 - 5.0 Final    Non-HDL Cholesterol 02/28/2022 134  mg/dL Final    Hepatitis C Ab 02/28/2022 Negative  Negative Final    Folate 02/28/2022 4.9  4.0 - 24.0 ng/mL Final    Vitamin B-12 02/28/2022 205 (A) 210 - 950 pg/mL Final    TSH 02/28/2022 2.603  0.400 - 4.000 uIU/mL Final    Free T4 02/28/2022 0.81  0.71 - 1.51 ng/dL Final    Homocysteine 02/28/2022 10.9  4.0 - 15.5 umol/L Final    WBC 02/28/2022 5.73  3.90 - 12.70 K/uL Final    RBC 02/28/2022 4.48  4.00 - 5.40 M/uL Final    Hemoglobin 02/28/2022 11.4 (A) 12.0 - 16.0 g/dL Final    Hematocrit 02/28/2022 38.6  37.0 - 48.5 % Final    MCV 02/28/2022 86  82 - 98 fL Final    MCH 02/28/2022 25.4 (A) 27.0 - 31.0 pg Final    MCHC 02/28/2022 29.5 (A) 32.0 - 36.0 g/dL Final    RDW 02/28/2022 15.0 (A) 11.5 - 14.5 % Final    Platelets 02/28/2022 303  150 - 450 K/uL Final    MPV 02/28/2022 9.7  9.2 - 12.9 fL Final    Immature Granulocytes 02/28/2022 0.3  0.0 - 0.5 % Final    Gran # (ANC) 02/28/2022 3.2  1.8 - 7.7 K/uL Final    Immature Grans (Abs) 02/28/2022 0.02  0.00 - 0.04 K/uL Final    Lymph # 02/28/2022 2.1  1.0 - 4.8 K/uL Final    Mono # 02/28/2022 0.4  0.3 - 1.0 K/uL Final    Eos # 02/28/2022 0.1  0.0 - 0.5 K/uL Final    Baso # 02/28/2022 0.02  0.00 - 0.20 K/uL Final    nRBC 02/28/2022 0  0 /100 WBC Final    Gran % 02/28/2022 55.4  38.0 - 73.0 % Final    Lymph % 02/28/2022 36.0  18.0 - 48.0 % Final    Mono % 02/28/2022 6.8  4.0 - 15.0 % Final    Eosinophil % 02/28/2022 1.2  0.0 - 8.0 % Final     Basophil % 02/28/2022 0.3  0.0 - 1.9 % Final    Differential Method 02/28/2022 Automated   Final    Sodium 02/28/2022 141  136 - 145 mmol/L Final    Potassium 02/28/2022 4.3  3.5 - 5.1 mmol/L Final    Chloride 02/28/2022 109  95 - 110 mmol/L Final    CO2 02/28/2022 20 (A) 23 - 29 mmol/L Final    Glucose 02/28/2022 101  70 - 110 mg/dL Final    BUN 02/28/2022 18  8 - 23 mg/dL Final    Creatinine 02/28/2022 1.0  0.5 - 1.4 mg/dL Final    Calcium 02/28/2022 9.4  8.7 - 10.5 mg/dL Final    Total Protein 02/28/2022 7.6  6.0 - 8.4 g/dL Final    Albumin 02/28/2022 3.4 (A) 3.5 - 5.2 g/dL Final    Total Bilirubin 02/28/2022 0.4  0.1 - 1.0 mg/dL Final    Alkaline Phosphatase 02/28/2022 152 (A) 55 - 135 U/L Final    AST 02/28/2022 14  10 - 40 U/L Final    ALT 02/28/2022 16  10 - 44 U/L Final    Anion Gap 02/28/2022 12  8 - 16 mmol/L Final    eGFR if African American 02/28/2022 >60.0  >60 mL/min/1.73 m^2 Final    eGFR if non African American 02/28/2022 58.4 (A) >60 mL/min/1.73 m^2 Final    BNP 02/28/2022 69  0 - 99 pg/mL Final   (

## 2022-03-29 ENCOUNTER — OFFICE VISIT (OUTPATIENT)
Dept: PRIMARY CARE CLINIC | Facility: CLINIC | Age: 67
End: 2022-03-29
Payer: MEDICARE

## 2022-03-29 VITALS
HEART RATE: 81 BPM | DIASTOLIC BLOOD PRESSURE: 76 MMHG | OXYGEN SATURATION: 97 % | RESPIRATION RATE: 18 BRPM | WEIGHT: 215.25 LBS | BODY MASS INDEX: 34.59 KG/M2 | SYSTOLIC BLOOD PRESSURE: 120 MMHG | HEIGHT: 66 IN

## 2022-03-29 DIAGNOSIS — J42 CHRONIC BRONCHITIS, UNSPECIFIED CHRONIC BRONCHITIS TYPE: ICD-10-CM

## 2022-03-29 DIAGNOSIS — N18.31 CHRONIC KIDNEY DISEASE, STAGE 3A: ICD-10-CM

## 2022-03-29 DIAGNOSIS — J01.00 ACUTE MAXILLARY SINUSITIS, RECURRENCE NOT SPECIFIED: Primary | ICD-10-CM

## 2022-03-29 PROCEDURE — 3074F PR MOST RECENT SYSTOLIC BLOOD PRESSURE < 130 MM HG: ICD-10-PCS | Mod: CPTII,S$GLB,, | Performed by: FAMILY MEDICINE

## 2022-03-29 PROCEDURE — 96372 THER/PROPH/DIAG INJ SC/IM: CPT | Mod: S$GLB,,, | Performed by: FAMILY MEDICINE

## 2022-03-29 PROCEDURE — 1159F PR MEDICATION LIST DOCUMENTED IN MEDICAL RECORD: ICD-10-PCS | Mod: CPTII,S$GLB,, | Performed by: FAMILY MEDICINE

## 2022-03-29 PROCEDURE — 99499 UNLISTED E&M SERVICE: CPT | Mod: HCNC,S$GLB,, | Performed by: FAMILY MEDICINE

## 2022-03-29 PROCEDURE — 1100F PR PT FALLS ASSESS DOC 2+ FALLS/FALL W/INJURY/YR: ICD-10-PCS | Mod: CPTII,S$GLB,, | Performed by: FAMILY MEDICINE

## 2022-03-29 PROCEDURE — 3074F SYST BP LT 130 MM HG: CPT | Mod: CPTII,S$GLB,, | Performed by: FAMILY MEDICINE

## 2022-03-29 PROCEDURE — 99214 OFFICE O/P EST MOD 30 MIN: CPT | Mod: 25,S$GLB,, | Performed by: FAMILY MEDICINE

## 2022-03-29 PROCEDURE — 1125F PR PAIN SEVERITY QUANTIFIED, PAIN PRESENT: ICD-10-PCS | Mod: CPTII,S$GLB,, | Performed by: FAMILY MEDICINE

## 2022-03-29 PROCEDURE — 3078F PR MOST RECENT DIASTOLIC BLOOD PRESSURE < 80 MM HG: ICD-10-PCS | Mod: CPTII,S$GLB,, | Performed by: FAMILY MEDICINE

## 2022-03-29 PROCEDURE — 3288F FALL RISK ASSESSMENT DOCD: CPT | Mod: CPTII,S$GLB,, | Performed by: FAMILY MEDICINE

## 2022-03-29 PROCEDURE — 3008F PR BODY MASS INDEX (BMI) DOCUMENTED: ICD-10-PCS | Mod: CPTII,S$GLB,, | Performed by: FAMILY MEDICINE

## 2022-03-29 PROCEDURE — 1159F MED LIST DOCD IN RCRD: CPT | Mod: CPTII,S$GLB,, | Performed by: FAMILY MEDICINE

## 2022-03-29 PROCEDURE — 96372 PR INJECTION,THERAP/PROPH/DIAG2ST, IM OR SUBCUT: ICD-10-PCS | Mod: S$GLB,,, | Performed by: FAMILY MEDICINE

## 2022-03-29 PROCEDURE — 3288F PR FALLS RISK ASSESSMENT DOCUMENTED: ICD-10-PCS | Mod: CPTII,S$GLB,, | Performed by: FAMILY MEDICINE

## 2022-03-29 PROCEDURE — 3078F DIAST BP <80 MM HG: CPT | Mod: CPTII,S$GLB,, | Performed by: FAMILY MEDICINE

## 2022-03-29 PROCEDURE — 1160F PR REVIEW ALL MEDS BY PRESCRIBER/CLIN PHARMACIST DOCUMENTED: ICD-10-PCS | Mod: CPTII,S$GLB,, | Performed by: FAMILY MEDICINE

## 2022-03-29 PROCEDURE — 99214 PR OFFICE/OUTPT VISIT, EST, LEVL IV, 30-39 MIN: ICD-10-PCS | Mod: 25,S$GLB,, | Performed by: FAMILY MEDICINE

## 2022-03-29 PROCEDURE — 99999 PR PBB SHADOW E&M-EST. PATIENT-LVL IV: CPT | Mod: PBBFAC,,, | Performed by: FAMILY MEDICINE

## 2022-03-29 PROCEDURE — 99499 RISK ADDL DX/OHS AUDIT: ICD-10-PCS | Mod: HCNC,S$GLB,, | Performed by: FAMILY MEDICINE

## 2022-03-29 PROCEDURE — 1125F AMNT PAIN NOTED PAIN PRSNT: CPT | Mod: CPTII,S$GLB,, | Performed by: FAMILY MEDICINE

## 2022-03-29 PROCEDURE — 1100F PTFALLS ASSESS-DOCD GE2>/YR: CPT | Mod: CPTII,S$GLB,, | Performed by: FAMILY MEDICINE

## 2022-03-29 PROCEDURE — 99999 PR PBB SHADOW E&M-EST. PATIENT-LVL IV: ICD-10-PCS | Mod: PBBFAC,,, | Performed by: FAMILY MEDICINE

## 2022-03-29 PROCEDURE — 1160F RVW MEDS BY RX/DR IN RCRD: CPT | Mod: CPTII,S$GLB,, | Performed by: FAMILY MEDICINE

## 2022-03-29 PROCEDURE — 3008F BODY MASS INDEX DOCD: CPT | Mod: CPTII,S$GLB,, | Performed by: FAMILY MEDICINE

## 2022-03-29 RX ORDER — AMOXICILLIN AND CLAVULANATE POTASSIUM 875; 125 MG/1; MG/1
1 TABLET, FILM COATED ORAL 2 TIMES DAILY
Qty: 20 TABLET | Refills: 0 | Status: SHIPPED | OUTPATIENT
Start: 2022-03-29 | End: 2022-04-08

## 2022-03-29 RX ORDER — BETAMETHASONE SODIUM PHOSPHATE AND BETAMETHASONE ACETATE 3; 3 MG/ML; MG/ML
6 INJECTION, SUSPENSION INTRA-ARTICULAR; INTRALESIONAL; INTRAMUSCULAR; SOFT TISSUE
Status: COMPLETED | OUTPATIENT
Start: 2022-03-29 | End: 2022-03-29

## 2022-03-29 RX ADMIN — BETAMETHASONE SODIUM PHOSPHATE AND BETAMETHASONE ACETATE 6 MG: 3; 3 INJECTION, SUSPENSION INTRA-ARTICULAR; INTRALESIONAL; INTRAMUSCULAR; SOFT TISSUE at 04:03

## 2022-03-29 NOTE — PROGRESS NOTES
"Subjective:       Patient ID: Stephania Salmeron is a 67 y.o. female.    Chief Complaint: Chest Pain, Sinus Problem (Sinuses and pressure for last 5 days), Shortness of Breath, and Mammogram (Patient isn't sure if she can do one with a pacemaker )    Sinusitis  This is a new problem. The current episode started in the past 7 days. The problem has been waxing and waning since onset. There has been no fever. Associated symptoms include chills, congestion, ear pain, headaches, shortness of breath, sinus pressure and a sore throat. Pertinent negatives include no coughing. Past treatments include oral decongestants and acetaminophen. The treatment provided no relief.     Review of Systems   Constitutional: Positive for chills.   HENT: Positive for congestion, ear pain, sinus pressure and sore throat.    Respiratory: Positive for shortness of breath. Negative for cough.    Neurological: Positive for headaches.       Objective:      Vitals:    03/29/22 1616   BP: 120/76   BP Location: Right arm   Patient Position: Sitting   BP Method: Medium (Manual)   Pulse: 81   Resp: 18   SpO2: 97%   Weight: 97.7 kg (215 lb 4.5 oz)   Height: 5' 6" (1.676 m)     Physical Exam  Vitals and nursing note reviewed.   Constitutional:       Appearance: She is well-developed.   HENT:      Head: Normocephalic and atraumatic.      Right Ear: Tympanic membrane normal.      Left Ear: A middle ear effusion is present.      Nose:      Right Sinus: Maxillary sinus tenderness present.      Left Sinus: Maxillary sinus tenderness present.   Eyes:      Pupils: Pupils are equal, round, and reactive to light.   Neck:      Vascular: No JVD.   Cardiovascular:      Rate and Rhythm: Normal rate and regular rhythm.      Heart sounds: Normal heart sounds.   Pulmonary:      Effort: Pulmonary effort is normal.      Breath sounds: Normal breath sounds.   Musculoskeletal:      Cervical back: Neck supple.   Skin:     General: Skin is warm and dry.   Neurological:      " Mental Status: She is alert and oriented to person, place, and time.         Lab Results   Component Value Date    WBC 5.73 02/28/2022    HGB 11.4 (L) 02/28/2022    HCT 38.6 02/28/2022     02/28/2022    CHOL 181 02/28/2022    TRIG 139 02/28/2022    HDL 47 02/28/2022    ALT 16 02/28/2022    AST 14 02/28/2022     02/28/2022    K 4.3 02/28/2022     02/28/2022    CREATININE 1.0 02/28/2022    BUN 18 02/28/2022    CO2 20 (L) 02/28/2022    TSH 2.603 02/28/2022    HGBA1C 5.2 02/13/2021      Assessment:       1. Acute maxillary sinusitis, recurrence not specified    2. Chronic kidney disease, stage 3a    3. Chronic bronchitis, unspecified chronic bronchitis type        Plan:       Acute maxillary sinusitis, recurrence not specified  -     betamethasone acetate-betamethasone sodium phosphate injection 6 mg  -     amoxicillin-clavulanate 875-125mg (AUGMENTIN) 875-125 mg per tablet; Take 1 tablet by mouth 2 (two) times daily. for 10 days  Dispense: 20 tablet; Refill: 0    Chronic kidney disease, stage 3a  Maintain control of BP, avoid nephrotoxins  Chronic bronchitis, unspecified chronic bronchitis type  Continue Symbicort and albuterol    Medication List with Changes/Refills   New Medications    AMOXICILLIN-CLAVULANATE 875-125MG (AUGMENTIN) 875-125 MG PER TABLET    Take 1 tablet by mouth 2 (two) times daily. for 10 days   Current Medications    ALBUTEROL (PROVENTIL/VENTOLIN HFA) 90 MCG/ACTUATION INHALER    INHALE 2 PUFFS INTO THE LUNGS EVERY 6 (SIX) HOURS AS NEEDED FOR WHEEZING. RESCUE    ATORVASTATIN (LIPITOR) 40 MG TABLET    Take 1 tablet (40 mg total) by mouth once daily.    BUPROPION (WELLBUTRIN XL) 150 MG TB24 TABLET    Take 450 mg by mouth. Taking 3 tablets daily (450 mg) total.    DOCUSATE SODIUM (COLACE) 100 MG CAPSULE    TAKE 1 CAPSULE (100 MG TOTAL) BY MOUTH 2 (TWO) TIMES DAILY AS NEEDED FOR CONSTIPATION.    ERGOCALCIFEROL (ERGOCALCIFEROL) 50,000 UNIT CAP    Take 1 capsule (50,000 Units total) by  mouth every 7 days.    ESOMEPRAZOLE (NEXIUM) 40 MG CAPSULE    Take 1 capsule (40 mg total) by mouth before breakfast.    HYDROXYZINE PAMOATE (VISTARIL) 25 MG CAP    TAKE 1 CAPSULE (25 MG TOTAL) BY MOUTH 4 (FOUR) TIMES DAILY AS NEEDED (NAUSEA).    LORAZEPAM (ATIVAN) 1 MG TABLET    Take 1 mg by mouth every 6 (six) hours as needed for Anxiety.    MECLIZINE (ANTIVERT) 12.5 MG TABLET    1or 2 tablets every 4-6 hours, as needed to control imbalance or dizziness or vertigo    METOPROLOL SUCCINATE (TOPROL-XL) 25 MG 24 HR TABLET    Take 1 tablet (25 mg total) by mouth 2 (two) times daily.    PREGABALIN (LYRICA) 100 MG CAPSULE    Take 100 mg by mouth 3 (three) times daily. Pt takes PRN    QUETIAPINE (SEROQUEL) 200 MG TAB    Take 200 mg by mouth every evening.    SPIRIVA RESPIMAT 1.25 MCG/ACTUATION INHALER    INHALE 2 PUFFS INTO THE LUNGS ONCE DAILY. CONTROLLER    SYMBICORT 160-4.5 MCG/ACTUATION HFAA    INHALE 2 PUFFS INTO THE LUNGS EVERY 12 (TWELVE) HOURS. CONTROLLER    XARELTO 20 MG TAB    TAKE 1 TABLET (20 MG TOTAL) BY MOUTH DAILY WITH DINNER OR EVENING MEAL.   Discontinued Medications    IBUPROFEN (ADVIL,MOTRIN) 600 MG TABLET    TAKE 1 TABLET BY MOUTH EVERY 6 HOURS AS NEEDED FOR PAIN

## 2022-03-30 ENCOUNTER — NURSE TRIAGE (OUTPATIENT)
Dept: ADMINISTRATIVE | Facility: CLINIC | Age: 67
End: 2022-03-30
Payer: MEDICARE

## 2022-03-30 NOTE — TELEPHONE ENCOUNTER
Pt calling s/p being diagnosed with sinusitis yesterday. States she is experiencing pain behind her eyes, pressure and worsening SOB. Denies relief with clearing nose. Per protocol advised to ED NOW. Pt declined disposition. Reinforced dispo. Pt continues to decline. Will route message.     Reason for Disposition   [1] Difficulty breathing AND [2] not from stuffy nose (e.g., not relieved by cleaning out the nose)    Additional Information   Negative: SEVERE difficulty breathing (e.g., struggling for each breath, speaks in single words)   Negative: Sounds like a life-threatening emergency to the triager    Protocols used: SINUS INFECTION ON ANTIBIOTIC FOLLOW-UP CALL-A-

## 2022-04-14 DIAGNOSIS — I25.10 CORONARY ARTERY DISEASE: ICD-10-CM

## 2022-04-14 RX ORDER — RIVAROXABAN 20 MG/1
20 TABLET, FILM COATED ORAL
Qty: 30 TABLET | Refills: 1 | Status: SHIPPED | OUTPATIENT
Start: 2022-04-14 | End: 2022-07-14

## 2022-04-26 DIAGNOSIS — J44.9 CHRONIC OBSTRUCTIVE PULMONARY DISEASE, UNSPECIFIED COPD TYPE: ICD-10-CM

## 2022-04-26 RX ORDER — MECLIZINE HCL 12.5 MG 12.5 MG/1
TABLET ORAL
Qty: 50 TABLET | Refills: 5 | Status: SHIPPED | OUTPATIENT
Start: 2022-04-26 | End: 2022-11-07

## 2022-04-26 NOTE — TELEPHONE ENCOUNTER
No new care gaps identified.  Powered by [employer]. Reference number: [id number].   4/26/2022 12:08:38 PM CDT

## 2022-04-26 NOTE — TELEPHONE ENCOUNTER
----- Message from Yumiko Churchill sent at 4/26/2022 12:40 PM CDT -----  Contact: Self   Patient is returning a phone call.  Who left a message for the patient: office  Does patient know what this is regarding:    Would you like a call back, or a response through your [facility] portal?:   call back  Comments:

## 2022-04-27 RX ORDER — ALBUTEROL SULFATE 90 UG/1
2 AEROSOL, METERED RESPIRATORY (INHALATION) EVERY 6 HOURS PRN
Qty: 54 G | Refills: 1 | Status: SHIPPED | OUTPATIENT
Start: 2022-04-27

## 2022-04-27 NOTE — TELEPHONE ENCOUNTER
Refill Routing Note   Medication(s) are not appropriate for processing by [facility] for the following reason(s):      - Patient has been seen in the ED/Hospital since the last PCP visit    [facility] action(s):  Route          Medication reconciliation completed: No     Appointments  past 12m or future 3m with PCP    Date Provider   Last Visit   5/24/2021 Sinan Saeed MD   Next Visit   Visit date not found Sinan Saeed MD   ED visits in past 90 days: 0        Note composed:10:11 AM 04/27/2022

## 2022-06-02 ENCOUNTER — HOSPITAL ENCOUNTER (OUTPATIENT)
Facility: HOSPITAL | Age: 67
Discharge: HOME HEALTH | End: 2022-06-05
Attending: EMERGENCY MEDICINE | Admitting: STUDENT IN AN ORGANIZED HEALTH CARE EDUCATION/TRAINING PROGRAM
Payer: MEDICARE

## 2022-06-02 ENCOUNTER — TELEPHONE (OUTPATIENT)
Dept: PRIMARY CARE CLINIC | Facility: CLINIC | Age: 67
End: 2022-06-02
Payer: MEDICARE

## 2022-06-02 ENCOUNTER — NURSE TRIAGE (OUTPATIENT)
Dept: ADMINISTRATIVE | Facility: CLINIC | Age: 67
End: 2022-06-02
Payer: MEDICARE

## 2022-06-02 DIAGNOSIS — R53.1 WEAKNESS: ICD-10-CM

## 2022-06-02 DIAGNOSIS — R51.9 ACUTE NONINTRACTABLE HEADACHE, UNSPECIFIED HEADACHE TYPE: ICD-10-CM

## 2022-06-02 DIAGNOSIS — R42 VERTIGO: ICD-10-CM

## 2022-06-02 DIAGNOSIS — I47.29 NONSUSTAINED VENTRICULAR TACHYCARDIA: ICD-10-CM

## 2022-06-02 DIAGNOSIS — H53.9 VISUAL CHANGES: ICD-10-CM

## 2022-06-02 DIAGNOSIS — H81.4 VERTIGO OF CENTRAL ORIGIN: ICD-10-CM

## 2022-06-02 DIAGNOSIS — R07.9 CHEST PAIN: Primary | ICD-10-CM

## 2022-06-02 PROBLEM — R20.2 NUMBNESS AND TINGLING OF LEFT ARM AND LEG: Status: ACTIVE | Noted: 2022-06-02

## 2022-06-02 PROBLEM — R20.0 NUMBNESS AND TINGLING OF LEFT ARM AND LEG: Status: ACTIVE | Noted: 2022-06-02

## 2022-06-02 LAB
ALBUMIN SERPL BCP-MCNC: 3.2 G/DL (ref 3.5–5.2)
ALP SERPL-CCNC: 158 U/L (ref 55–135)
ALT SERPL W/O P-5'-P-CCNC: 16 U/L (ref 10–44)
ANION GAP SERPL CALC-SCNC: 8 MMOL/L (ref 8–16)
AST SERPL-CCNC: 17 U/L (ref 10–40)
BASOPHILS # BLD AUTO: 0.02 K/UL (ref 0–0.2)
BASOPHILS NFR BLD: 0.4 % (ref 0–1.9)
BILIRUB SERPL-MCNC: 0.3 MG/DL (ref 0.1–1)
BNP SERPL-MCNC: 44 PG/ML (ref 0–99)
BUN SERPL-MCNC: 20 MG/DL (ref 8–23)
CALCIUM SERPL-MCNC: 9.1 MG/DL (ref 8.7–10.5)
CHLORIDE SERPL-SCNC: 107 MMOL/L (ref 95–110)
CK SERPL-CCNC: 217 U/L (ref 20–180)
CO2 SERPL-SCNC: 24 MMOL/L (ref 23–29)
CREAT SERPL-MCNC: 1 MG/DL (ref 0.5–1.4)
CTP QC/QA: YES
DIFFERENTIAL METHOD: ABNORMAL
EOSINOPHIL # BLD AUTO: 0.1 K/UL (ref 0–0.5)
EOSINOPHIL NFR BLD: 2.5 % (ref 0–8)
ERYTHROCYTE [DISTWIDTH] IN BLOOD BY AUTOMATED COUNT: 15 % (ref 11.5–14.5)
EST. GFR  (AFRICAN AMERICAN): >60 ML/MIN/1.73 M^2
EST. GFR  (NON AFRICAN AMERICAN): 58.4 ML/MIN/1.73 M^2
GLUCOSE SERPL-MCNC: 91 MG/DL (ref 70–110)
HCT VFR BLD AUTO: 37.4 % (ref 37–48.5)
HGB BLD-MCNC: 11.1 G/DL (ref 12–16)
IMM GRANULOCYTES # BLD AUTO: 0.01 K/UL (ref 0–0.04)
IMM GRANULOCYTES NFR BLD AUTO: 0.2 % (ref 0–0.5)
LYMPHOCYTES # BLD AUTO: 1.9 K/UL (ref 1–4.8)
LYMPHOCYTES NFR BLD: 35.2 % (ref 18–48)
MCH RBC QN AUTO: 26.2 PG (ref 27–31)
MCHC RBC AUTO-ENTMCNC: 29.7 G/DL (ref 32–36)
MCV RBC AUTO: 88 FL (ref 82–98)
MONOCYTES # BLD AUTO: 0.5 K/UL (ref 0.3–1)
MONOCYTES NFR BLD: 8.5 % (ref 4–15)
NEUTROPHILS # BLD AUTO: 2.8 K/UL (ref 1.8–7.7)
NEUTROPHILS NFR BLD: 53.2 % (ref 38–73)
NRBC BLD-RTO: 0 /100 WBC
PLATELET # BLD AUTO: 241 K/UL (ref 150–450)
PMV BLD AUTO: 10.5 FL (ref 9.2–12.9)
POTASSIUM SERPL-SCNC: 4 MMOL/L (ref 3.5–5.1)
PROT SERPL-MCNC: 7.4 G/DL (ref 6–8.4)
RBC # BLD AUTO: 4.24 M/UL (ref 4–5.4)
SARS-COV-2 RDRP RESP QL NAA+PROBE: NEGATIVE
SODIUM SERPL-SCNC: 139 MMOL/L (ref 136–145)
TROPONIN I SERPL DL<=0.01 NG/ML-MCNC: <0.006 NG/ML (ref 0–0.03)
WBC # BLD AUTO: 5.28 K/UL (ref 3.9–12.7)

## 2022-06-02 PROCEDURE — 96375 TX/PRO/DX INJ NEW DRUG ADDON: CPT

## 2022-06-02 PROCEDURE — 84484 ASSAY OF TROPONIN QUANT: CPT | Mod: 91 | Performed by: PHYSICIAN ASSISTANT

## 2022-06-02 PROCEDURE — 82550 ASSAY OF CK (CPK): CPT | Performed by: PHYSICIAN ASSISTANT

## 2022-06-02 PROCEDURE — 99285 PR EMERGENCY DEPT VISIT,LEVEL V: ICD-10-PCS | Mod: CS,,, | Performed by: PHYSICIAN ASSISTANT

## 2022-06-02 PROCEDURE — 25000003 PHARM REV CODE 250: Performed by: PHYSICIAN ASSISTANT

## 2022-06-02 PROCEDURE — 99204 OFFICE O/P NEW MOD 45 MIN: CPT | Mod: ,,, | Performed by: PSYCHIATRY & NEUROLOGY

## 2022-06-02 PROCEDURE — 85025 COMPLETE CBC W/AUTO DIFF WBC: CPT | Performed by: PHYSICIAN ASSISTANT

## 2022-06-02 PROCEDURE — 80053 COMPREHEN METABOLIC PANEL: CPT | Performed by: PHYSICIAN ASSISTANT

## 2022-06-02 PROCEDURE — G0378 HOSPITAL OBSERVATION PER HR: HCPCS

## 2022-06-02 PROCEDURE — 99204 PR OFFICE/OUTPT VISIT, NEW, LEVL IV, 45-59 MIN: ICD-10-PCS | Mod: ,,, | Performed by: PSYCHIATRY & NEUROLOGY

## 2022-06-02 PROCEDURE — 93005 ELECTROCARDIOGRAM TRACING: CPT

## 2022-06-02 PROCEDURE — 93010 EKG 12-LEAD: ICD-10-PCS | Mod: ,,, | Performed by: INTERNAL MEDICINE

## 2022-06-02 PROCEDURE — 99285 EMERGENCY DEPT VISIT HI MDM: CPT | Mod: 25

## 2022-06-02 PROCEDURE — 99285 EMERGENCY DEPT VISIT HI MDM: CPT | Mod: CS,,, | Performed by: PHYSICIAN ASSISTANT

## 2022-06-02 PROCEDURE — U0002 COVID-19 LAB TEST NON-CDC: HCPCS | Performed by: EMERGENCY MEDICINE

## 2022-06-02 PROCEDURE — 93010 ELECTROCARDIOGRAM REPORT: CPT | Mod: ,,, | Performed by: INTERNAL MEDICINE

## 2022-06-02 PROCEDURE — 96376 TX/PRO/DX INJ SAME DRUG ADON: CPT

## 2022-06-02 PROCEDURE — 25000242 PHARM REV CODE 250 ALT 637 W/ HCPCS: Performed by: PHYSICIAN ASSISTANT

## 2022-06-02 PROCEDURE — 80047 BASIC METABLC PNL IONIZED CA: CPT

## 2022-06-02 PROCEDURE — 63600175 PHARM REV CODE 636 W HCPCS: Performed by: PHYSICIAN ASSISTANT

## 2022-06-02 PROCEDURE — 83880 ASSAY OF NATRIURETIC PEPTIDE: CPT | Performed by: PHYSICIAN ASSISTANT

## 2022-06-02 RX ORDER — PROPARACAINE HYDROCHLORIDE 5 MG/ML
2 SOLUTION/ DROPS OPHTHALMIC
Status: COMPLETED | OUTPATIENT
Start: 2022-06-02 | End: 2022-06-02

## 2022-06-02 RX ORDER — ONDANSETRON 2 MG/ML
4 INJECTION INTRAMUSCULAR; INTRAVENOUS EVERY 8 HOURS PRN
Status: DISCONTINUED | OUTPATIENT
Start: 2022-06-03 | End: 2022-06-02

## 2022-06-02 RX ORDER — ACETAMINOPHEN 500 MG
1000 TABLET ORAL
Status: COMPLETED | OUTPATIENT
Start: 2022-06-02 | End: 2022-06-02

## 2022-06-02 RX ORDER — SODIUM CHLORIDE 0.9 % (FLUSH) 0.9 %
10 SYRINGE (ML) INJECTION
Status: DISCONTINUED | OUTPATIENT
Start: 2022-06-02 | End: 2022-06-05 | Stop reason: HOSPADM

## 2022-06-02 RX ORDER — GLUCAGON 1 MG
1 KIT INJECTION
Status: DISCONTINUED | OUTPATIENT
Start: 2022-06-03 | End: 2022-06-05 | Stop reason: HOSPADM

## 2022-06-02 RX ORDER — PREGABALIN 50 MG/1
100 CAPSULE ORAL 3 TIMES DAILY
Status: DISCONTINUED | OUTPATIENT
Start: 2022-06-03 | End: 2022-06-05 | Stop reason: HOSPADM

## 2022-06-02 RX ORDER — ATORVASTATIN CALCIUM 20 MG/1
40 TABLET, FILM COATED ORAL DAILY
Status: DISCONTINUED | OUTPATIENT
Start: 2022-06-03 | End: 2022-06-05 | Stop reason: HOSPADM

## 2022-06-02 RX ORDER — ASPIRIN 325 MG
325 TABLET ORAL
Status: COMPLETED | OUTPATIENT
Start: 2022-06-02 | End: 2022-06-02

## 2022-06-02 RX ORDER — LORAZEPAM 1 MG/1
1 TABLET ORAL
Status: COMPLETED | OUTPATIENT
Start: 2022-06-02 | End: 2022-06-03

## 2022-06-02 RX ORDER — PREGABALIN 50 MG/1
100 CAPSULE ORAL
Status: COMPLETED | OUTPATIENT
Start: 2022-06-02 | End: 2022-06-03

## 2022-06-02 RX ORDER — NALOXONE HCL 0.4 MG/ML
0.02 VIAL (ML) INJECTION
Status: DISCONTINUED | OUTPATIENT
Start: 2022-06-03 | End: 2022-06-05 | Stop reason: HOSPADM

## 2022-06-02 RX ORDER — IBUPROFEN 200 MG
24 TABLET ORAL
Status: DISCONTINUED | OUTPATIENT
Start: 2022-06-03 | End: 2022-06-05 | Stop reason: HOSPADM

## 2022-06-02 RX ORDER — ERGOCALCIFEROL 1.25 MG/1
50000 CAPSULE ORAL
Status: DISCONTINUED | OUTPATIENT
Start: 2022-06-03 | End: 2022-06-05 | Stop reason: HOSPADM

## 2022-06-02 RX ORDER — MORPHINE SULFATE 2 MG/ML
6 INJECTION, SOLUTION INTRAMUSCULAR; INTRAVENOUS
Status: COMPLETED | OUTPATIENT
Start: 2022-06-02 | End: 2022-06-02

## 2022-06-02 RX ORDER — DOCUSATE SODIUM 100 MG/1
100 CAPSULE, LIQUID FILLED ORAL 2 TIMES DAILY PRN
Status: DISCONTINUED | OUTPATIENT
Start: 2022-06-02 | End: 2022-06-05 | Stop reason: HOSPADM

## 2022-06-02 RX ORDER — MORPHINE SULFATE 2 MG/ML
2 INJECTION, SOLUTION INTRAMUSCULAR; INTRAVENOUS
Status: COMPLETED | OUTPATIENT
Start: 2022-06-02 | End: 2022-06-03

## 2022-06-02 RX ORDER — QUETIAPINE FUMARATE 200 MG/1
200 TABLET, FILM COATED ORAL NIGHTLY
Status: DISCONTINUED | OUTPATIENT
Start: 2022-06-02 | End: 2022-06-05 | Stop reason: HOSPADM

## 2022-06-02 RX ORDER — HYDROXYZINE PAMOATE 25 MG/1
25 CAPSULE ORAL 4 TIMES DAILY PRN
Status: DISCONTINUED | OUTPATIENT
Start: 2022-06-02 | End: 2022-06-05 | Stop reason: HOSPADM

## 2022-06-02 RX ORDER — BUPROPION HYDROCHLORIDE 150 MG/1
450 TABLET ORAL DAILY
Status: DISCONTINUED | OUTPATIENT
Start: 2022-06-03 | End: 2022-06-05 | Stop reason: HOSPADM

## 2022-06-02 RX ORDER — FLUTICASONE FUROATE AND VILANTEROL 100; 25 UG/1; UG/1
1 POWDER RESPIRATORY (INHALATION) DAILY
Refills: 3 | Status: DISCONTINUED | OUTPATIENT
Start: 2022-06-03 | End: 2022-06-05 | Stop reason: HOSPADM

## 2022-06-02 RX ORDER — MECLIZINE HYDROCHLORIDE 25 MG/1
25 TABLET ORAL 3 TIMES DAILY PRN
Status: DISCONTINUED | OUTPATIENT
Start: 2022-06-02 | End: 2022-06-05 | Stop reason: HOSPADM

## 2022-06-02 RX ORDER — NITROGLYCERIN 0.4 MG/1
0.4 TABLET SUBLINGUAL EVERY 5 MIN PRN
Status: DISCONTINUED | OUTPATIENT
Start: 2022-06-02 | End: 2022-06-02

## 2022-06-02 RX ORDER — TALC
6 POWDER (GRAM) TOPICAL NIGHTLY PRN
Status: DISCONTINUED | OUTPATIENT
Start: 2022-06-02 | End: 2022-06-05 | Stop reason: HOSPADM

## 2022-06-02 RX ORDER — MORPHINE SULFATE 4 MG/ML
4 INJECTION, SOLUTION INTRAMUSCULAR; INTRAVENOUS
Status: COMPLETED | OUTPATIENT
Start: 2022-06-02 | End: 2022-06-02

## 2022-06-02 RX ORDER — ALBUTEROL SULFATE 90 UG/1
2 AEROSOL, METERED RESPIRATORY (INHALATION) EVERY 6 HOURS PRN
Status: DISCONTINUED | OUTPATIENT
Start: 2022-06-02 | End: 2022-06-05 | Stop reason: HOSPADM

## 2022-06-02 RX ORDER — SODIUM CHLORIDE 0.9 % (FLUSH) 0.9 %
10 SYRINGE (ML) INJECTION EVERY 12 HOURS PRN
Status: DISCONTINUED | OUTPATIENT
Start: 2022-06-03 | End: 2022-06-05 | Stop reason: HOSPADM

## 2022-06-02 RX ORDER — LORAZEPAM 0.5 MG/1
1 TABLET ORAL EVERY 6 HOURS PRN
Status: DISCONTINUED | OUTPATIENT
Start: 2022-06-02 | End: 2022-06-05 | Stop reason: HOSPADM

## 2022-06-02 RX ORDER — ONDANSETRON 2 MG/ML
4 INJECTION INTRAMUSCULAR; INTRAVENOUS
Status: COMPLETED | OUTPATIENT
Start: 2022-06-02 | End: 2022-06-02

## 2022-06-02 RX ORDER — PANTOPRAZOLE SODIUM 40 MG/1
40 TABLET, DELAYED RELEASE ORAL DAILY
Refills: 3 | Status: DISCONTINUED | OUTPATIENT
Start: 2022-06-02 | End: 2022-06-05 | Stop reason: HOSPADM

## 2022-06-02 RX ORDER — IBUPROFEN 200 MG
16 TABLET ORAL
Status: DISCONTINUED | OUTPATIENT
Start: 2022-06-03 | End: 2022-06-05 | Stop reason: HOSPADM

## 2022-06-02 RX ADMIN — ONDANSETRON 4 MG: 2 INJECTION INTRAMUSCULAR; INTRAVENOUS at 05:06

## 2022-06-02 RX ADMIN — MORPHINE SULFATE 6 MG: 2 INJECTION, SOLUTION INTRAMUSCULAR; INTRAVENOUS at 05:06

## 2022-06-02 RX ADMIN — PROPARACAINE HYDROCHLORIDE 2 DROP: 5 SOLUTION/ DROPS OPHTHALMIC at 07:06

## 2022-06-02 RX ADMIN — ASPIRIN 325 MG ORAL TABLET 325 MG: 325 PILL ORAL at 04:06

## 2022-06-02 RX ADMIN — ACETAMINOPHEN 1000 MG: 500 TABLET ORAL at 07:06

## 2022-06-02 RX ADMIN — MORPHINE SULFATE 4 MG: 4 INJECTION INTRAVENOUS at 06:06

## 2022-06-02 RX ADMIN — FLUORESCEIN SODIUM 1 EACH: 1 STRIP OPHTHALMIC at 07:06

## 2022-06-02 RX ADMIN — NITROGLYCERIN 0.4 MG: 0.4 TABLET, ORALLY DISINTEGRATING SUBLINGUAL at 04:06

## 2022-06-02 NOTE — TELEPHONE ENCOUNTER
----- Message from Sarah Alicia sent at 6/2/2022  1:03 PM CDT -----  Type:  Sooner Apoointment Request    Caller is requesting a sooner appointment.  Caller declined first available appointment listed below.  Caller will not accept being placed on the waitlist and is requesting a message be sent to doctor.  Name of Caller: pt   When is the first available appointment?  Symptoms: chest pain/ headaches   Would the patient rather a call back or a response via [facility]? Call back   Best Call Back Number:[phone]  Additional Information:pt wants appt asap preferably today if possible

## 2022-06-02 NOTE — ED NOTES
I-STAT Chem-8+ Results:   Value Reference Range   Sodium 143 136-145 mmol/L   Potassium  4.0 3.5-5.1 mmol/L   Chloride 106  mmol/L   Ionized Calcium 1.20 1.06-1.42 mmol/L   CO2 (measured) 27 23-29 mmol/L   Glucose 93  mg/dL   BUN 23 6-30 mg/dL   Creatinine 1.0 0.5-1.4 mg/dL   Hematocrit 37 36-54%

## 2022-06-02 NOTE — TELEPHONE ENCOUNTER
Pt reports she has been having a headache that is constant for four days with intermittent left arm pain, chest pain and blurry vision. Reports blurred vision is becoming worse. Also reports a weakness to her left leg that has been intermittent, worse over the past 4 days. Advised, per protocol. Verbalizes understanding.    Reason for Disposition   Weakness of the face, arm or leg on one side of the body and new-onset    Protocols used: HEADACHE-A-[place]

## 2022-06-02 NOTE — HPI
"Stephania Salmeron is a 67 y.o. female with PMHx of CAD (s/p CABG and stent), occipital infarct (baseline R hemianopsia), afib (Xarelto), COVID, HLD, HTN, intestinal ischemia, anxiety/depression, migraine, and ICD who presented to ED with HA, L arm numbness, L leg pain, chest pain, blurry vision, and eye pain. Patient reports she was most concerned about her HA, vision changes, eye pain, and chest pain. She has had an HA for the past 4 days and at times the pain has been 10/10 and affecting her daily activity. She reports eye pain with BL blurry vision (L>R). This HA is different from her typical headaches which she reports she has about twice a week, her typical HA pain is a 3-4/10 and are worse with bending over.     Last week patient woke up in her chair and after getting up she felt like her BL knees were "giving out" and her L knee hurt. She states felt unsteady for the rest of the day and had to hold onto objects around the house for balance. Last night and today she had intermittent L arm paresthesias lasting minutes and resolving, paresthesias mostly located in the L forearm.        "

## 2022-06-03 PROBLEM — R51.9 HEADACHE: Status: ACTIVE | Noted: 2022-06-03

## 2022-06-03 LAB
ALBUMIN SERPL BCP-MCNC: 3 G/DL (ref 3.5–5.2)
ALP SERPL-CCNC: 196 U/L (ref 55–135)
ALT SERPL W/O P-5'-P-CCNC: 40 U/L (ref 10–44)
ANION GAP SERPL CALC-SCNC: 6 MMOL/L (ref 8–16)
AST SERPL-CCNC: 61 U/L (ref 10–40)
BACTERIA #/AREA URNS AUTO: ABNORMAL /HPF
BASOPHILS # BLD AUTO: 0.02 K/UL (ref 0–0.2)
BASOPHILS NFR BLD: 0.4 % (ref 0–1.9)
BILIRUB SERPL-MCNC: 0.3 MG/DL (ref 0.1–1)
BILIRUB UR QL STRIP: NEGATIVE
BUN SERPL-MCNC: 18 MG/DL (ref 8–23)
BUN SERPL-MCNC: 23 MG/DL (ref 6–30)
CALCIUM SERPL-MCNC: 9.2 MG/DL (ref 8.7–10.5)
CHLORIDE SERPL-SCNC: 105 MMOL/L (ref 95–110)
CHLORIDE SERPL-SCNC: 106 MMOL/L (ref 95–110)
CLARITY UR REFRACT.AUTO: ABNORMAL
CO2 SERPL-SCNC: 26 MMOL/L (ref 23–29)
COLOR UR AUTO: YELLOW
CREAT SERPL-MCNC: 0.9 MG/DL (ref 0.5–1.4)
CREAT SERPL-MCNC: 1 MG/DL (ref 0.5–1.4)
CRP SERPL-MCNC: 8.2 MG/L (ref 0–8.2)
DIFFERENTIAL METHOD: ABNORMAL
EOSINOPHIL # BLD AUTO: 0.2 K/UL (ref 0–0.5)
EOSINOPHIL NFR BLD: 3.3 % (ref 0–8)
ERYTHROCYTE [DISTWIDTH] IN BLOOD BY AUTOMATED COUNT: 15.1 % (ref 11.5–14.5)
ERYTHROCYTE [SEDIMENTATION RATE] IN BLOOD BY WESTERGREN METHOD: 79 MM/HR (ref 0–36)
EST. GFR  (AFRICAN AMERICAN): >60 ML/MIN/1.73 M^2
EST. GFR  (NON AFRICAN AMERICAN): >60 ML/MIN/1.73 M^2
GLUCOSE SERPL-MCNC: 80 MG/DL (ref 70–110)
GLUCOSE SERPL-MCNC: 93 MG/DL (ref 70–110)
GLUCOSE UR QL STRIP: NEGATIVE
HCT VFR BLD AUTO: 35.8 % (ref 37–48.5)
HCT VFR BLD CALC: 37 %PCV (ref 36–54)
HGB BLD-MCNC: 10.7 G/DL (ref 12–16)
HGB UR QL STRIP: NEGATIVE
IMM GRANULOCYTES # BLD AUTO: 0.01 K/UL (ref 0–0.04)
IMM GRANULOCYTES NFR BLD AUTO: 0.2 % (ref 0–0.5)
KETONES UR QL STRIP: NEGATIVE
LEUKOCYTE ESTERASE UR QL STRIP: ABNORMAL
LYMPHOCYTES # BLD AUTO: 1.7 K/UL (ref 1–4.8)
LYMPHOCYTES NFR BLD: 35.5 % (ref 18–48)
MAGNESIUM SERPL-MCNC: 1.8 MG/DL (ref 1.6–2.6)
MCH RBC QN AUTO: 25.1 PG (ref 27–31)
MCHC RBC AUTO-ENTMCNC: 29.9 G/DL (ref 32–36)
MCV RBC AUTO: 84 FL (ref 82–98)
MICROSCOPIC COMMENT: ABNORMAL
MONOCYTES # BLD AUTO: 0.6 K/UL (ref 0.3–1)
MONOCYTES NFR BLD: 11.6 % (ref 4–15)
NEUTROPHILS # BLD AUTO: 2.4 K/UL (ref 1.8–7.7)
NEUTROPHILS NFR BLD: 49 % (ref 38–73)
NITRITE UR QL STRIP: POSITIVE
NRBC BLD-RTO: 0 /100 WBC
PH UR STRIP: 5 [PH] (ref 5–8)
PHOSPHATE SERPL-MCNC: 4.3 MG/DL (ref 2.7–4.5)
PLATELET # BLD AUTO: 222 K/UL (ref 150–450)
PMV BLD AUTO: 10.2 FL (ref 9.2–12.9)
POC IONIZED CALCIUM: 1.2 MMOL/L (ref 1.06–1.42)
POC TCO2 (MEASURED): 27 MMOL/L (ref 23–29)
POTASSIUM BLD-SCNC: 4 MMOL/L (ref 3.5–5.1)
POTASSIUM SERPL-SCNC: 3.7 MMOL/L (ref 3.5–5.1)
PROT SERPL-MCNC: 6.8 G/DL (ref 6–8.4)
PROT UR QL STRIP: NEGATIVE
RBC # BLD AUTO: 4.27 M/UL (ref 4–5.4)
RBC #/AREA URNS AUTO: 3 /HPF (ref 0–4)
SAMPLE: NORMAL
SODIUM BLD-SCNC: 143 MMOL/L (ref 136–145)
SODIUM SERPL-SCNC: 137 MMOL/L (ref 136–145)
SP GR UR STRIP: 1.02 (ref 1–1.03)
URN SPEC COLLECT METH UR: ABNORMAL
WBC # BLD AUTO: 4.84 K/UL (ref 3.9–12.7)
WBC #/AREA URNS AUTO: 16 /HPF (ref 0–5)

## 2022-06-03 PROCEDURE — G0378 HOSPITAL OBSERVATION PER HR: HCPCS

## 2022-06-03 PROCEDURE — 87186 SC STD MICRODIL/AGAR DIL: CPT | Performed by: STUDENT IN AN ORGANIZED HEALTH CARE EDUCATION/TRAINING PROGRAM

## 2022-06-03 PROCEDURE — 81001 URINALYSIS AUTO W/SCOPE: CPT | Performed by: STUDENT IN AN ORGANIZED HEALTH CARE EDUCATION/TRAINING PROGRAM

## 2022-06-03 PROCEDURE — 25000003 PHARM REV CODE 250: Performed by: STUDENT IN AN ORGANIZED HEALTH CARE EDUCATION/TRAINING PROGRAM

## 2022-06-03 PROCEDURE — 84100 ASSAY OF PHOSPHORUS: CPT | Performed by: STUDENT IN AN ORGANIZED HEALTH CARE EDUCATION/TRAINING PROGRAM

## 2022-06-03 PROCEDURE — 80053 COMPREHEN METABOLIC PANEL: CPT | Performed by: STUDENT IN AN ORGANIZED HEALTH CARE EDUCATION/TRAINING PROGRAM

## 2022-06-03 PROCEDURE — 96361 HYDRATE IV INFUSION ADD-ON: CPT

## 2022-06-03 PROCEDURE — 25000242 PHARM REV CODE 250 ALT 637 W/ HCPCS: Performed by: STUDENT IN AN ORGANIZED HEALTH CARE EDUCATION/TRAINING PROGRAM

## 2022-06-03 PROCEDURE — 25000003 PHARM REV CODE 250: Performed by: PHYSICIAN ASSISTANT

## 2022-06-03 PROCEDURE — 96376 TX/PRO/DX INJ SAME DRUG ADON: CPT

## 2022-06-03 PROCEDURE — 87086 URINE CULTURE/COLONY COUNT: CPT | Performed by: STUDENT IN AN ORGANIZED HEALTH CARE EDUCATION/TRAINING PROGRAM

## 2022-06-03 PROCEDURE — 86140 C-REACTIVE PROTEIN: CPT

## 2022-06-03 PROCEDURE — 25000003 PHARM REV CODE 250

## 2022-06-03 PROCEDURE — 85652 RBC SED RATE AUTOMATED: CPT

## 2022-06-03 PROCEDURE — 87088 URINE BACTERIA CULTURE: CPT | Performed by: STUDENT IN AN ORGANIZED HEALTH CARE EDUCATION/TRAINING PROGRAM

## 2022-06-03 PROCEDURE — 63600175 PHARM REV CODE 636 W HCPCS: Performed by: STUDENT IN AN ORGANIZED HEALTH CARE EDUCATION/TRAINING PROGRAM

## 2022-06-03 PROCEDURE — 83735 ASSAY OF MAGNESIUM: CPT | Performed by: STUDENT IN AN ORGANIZED HEALTH CARE EDUCATION/TRAINING PROGRAM

## 2022-06-03 PROCEDURE — 99219 PR INITIAL OBSERVATION CARE,LEVL II: CPT | Mod: GC,,, | Performed by: STUDENT IN AN ORGANIZED HEALTH CARE EDUCATION/TRAINING PROGRAM

## 2022-06-03 PROCEDURE — 85025 COMPLETE CBC W/AUTO DIFF WBC: CPT | Performed by: STUDENT IN AN ORGANIZED HEALTH CARE EDUCATION/TRAINING PROGRAM

## 2022-06-03 PROCEDURE — 99219 PR INITIAL OBSERVATION CARE,LEVL II: ICD-10-PCS | Mod: GC,,, | Performed by: STUDENT IN AN ORGANIZED HEALTH CARE EDUCATION/TRAINING PROGRAM

## 2022-06-03 PROCEDURE — 87077 CULTURE AEROBIC IDENTIFY: CPT | Performed by: STUDENT IN AN ORGANIZED HEALTH CARE EDUCATION/TRAINING PROGRAM

## 2022-06-03 RX ORDER — ASPIRIN 81 MG/1
81 TABLET ORAL DAILY
COMMUNITY

## 2022-06-03 RX ORDER — ACETAMINOPHEN 500 MG
1000 TABLET ORAL EVERY 8 HOURS PRN
Status: DISCONTINUED | OUTPATIENT
Start: 2022-06-03 | End: 2022-06-05 | Stop reason: HOSPADM

## 2022-06-03 RX ORDER — BUTALBITAL, ACETAMINOPHEN AND CAFFEINE 50; 325; 40 MG/1; MG/1; MG/1
1 TABLET ORAL ONCE
Status: COMPLETED | OUTPATIENT
Start: 2022-06-03 | End: 2022-06-03

## 2022-06-03 RX ORDER — OLANZAPINE 5 MG/1
5 TABLET, ORALLY DISINTEGRATING ORAL EVERY 8 HOURS PRN
Status: DISCONTINUED | OUTPATIENT
Start: 2022-06-03 | End: 2022-06-05 | Stop reason: HOSPADM

## 2022-06-03 RX ADMIN — SODIUM CHLORIDE, SODIUM LACTATE, POTASSIUM CHLORIDE, AND CALCIUM CHLORIDE 500 ML: .6; .31; .03; .02 INJECTION, SOLUTION INTRAVENOUS at 12:06

## 2022-06-03 RX ADMIN — METOPROLOL SUCCINATE 25 MG: 25 TABLET, EXTENDED RELEASE ORAL at 09:06

## 2022-06-03 RX ADMIN — METOPROLOL SUCCINATE 25 MG: 25 TABLET, EXTENDED RELEASE ORAL at 12:06

## 2022-06-03 RX ADMIN — PANTOPRAZOLE SODIUM 40 MG: 40 TABLET, DELAYED RELEASE ORAL at 09:06

## 2022-06-03 RX ADMIN — PREGABALIN 100 MG: 50 CAPSULE ORAL at 03:06

## 2022-06-03 RX ADMIN — PANTOPRAZOLE SODIUM 40 MG: 40 TABLET, DELAYED RELEASE ORAL at 12:06

## 2022-06-03 RX ADMIN — FLUTICASONE FUROATE AND VILANTEROL TRIFENATATE 1 PUFF: 100; 25 POWDER RESPIRATORY (INHALATION) at 09:06

## 2022-06-03 RX ADMIN — ATORVASTATIN CALCIUM 40 MG: 20 TABLET, FILM COATED ORAL at 09:06

## 2022-06-03 RX ADMIN — BUPROPION HYDROCHLORIDE 450 MG: 150 TABLET, FILM COATED, EXTENDED RELEASE ORAL at 09:06

## 2022-06-03 RX ADMIN — RIVAROXABAN 20 MG: 10 TABLET, FILM COATED ORAL at 07:06

## 2022-06-03 RX ADMIN — LORAZEPAM 1 MG: 1 TABLET ORAL at 09:06

## 2022-06-03 RX ADMIN — MORPHINE SULFATE 2 MG: 2 INJECTION, SOLUTION INTRAMUSCULAR; INTRAVENOUS at 12:06

## 2022-06-03 RX ADMIN — HYDROXYZINE PAMOATE 25 MG: 25 CAPSULE ORAL at 09:06

## 2022-06-03 RX ADMIN — PREGABALIN 100 MG: 50 CAPSULE ORAL at 12:06

## 2022-06-03 RX ADMIN — PREGABALIN 100 MG: 50 CAPSULE ORAL at 09:06

## 2022-06-03 RX ADMIN — ACETAMINOPHEN 1000 MG: 500 TABLET ORAL at 10:06

## 2022-06-03 RX ADMIN — OLANZAPINE 5 MG: 5 TABLET, ORALLY DISINTEGRATING ORAL at 04:06

## 2022-06-03 RX ADMIN — ERGOCALCIFEROL 50000 UNITS: 1.25 CAPSULE ORAL at 09:06

## 2022-06-03 RX ADMIN — Medication 6 MG: at 10:06

## 2022-06-03 RX ADMIN — BUTALBITAL, ACETAMINOPHEN AND CAFFEINE 1 TABLET: 50; 325; 40 TABLET ORAL at 12:06

## 2022-06-03 RX ADMIN — LORAZEPAM 1 MG: 1 TABLET ORAL at 12:06

## 2022-06-03 RX ADMIN — QUETIAPINE FUMARATE 200 MG: 200 TABLET ORAL at 09:06

## 2022-06-03 NOTE — HPI
Patient is a 66 yo woman with significant medical history HTN, COPD, AFib, CAD s/p CABG with multiple stents and ICD, and mild cognitive dysfunction who presents with 5 days of headache and chest pain radiating to left shoulder. She provides history. She states she presented to the ED because of a headache that she has had for 5 days. It has kept her in the bed and she has not been eating or drinking much. She states the HA is to the left and right of her head and some in the back towards her neck, radiating to her shoulders. Massaging her shoulders makes the pain better. She has had several doses of morphine in the ED and this has also made the pain better. NTG makes the HA worse. She has photosensitivity. She has had some nausea. She also c/o chest pain, radiating to the left shoulder which was not relieved by the NTG. Morphine made chest pain better. She has also noticed increased urinary frequency. She is feeling very anxious because she is on Ativan four times a day and has not taken it today. She lives with her daughter who helps her with medications. She denies tobacco and drinking. She owns a dog, Zahida, who is a Shiatzu.

## 2022-06-03 NOTE — PT/OT/SLP PROGRESS
Occupational Therapy      Patient Name:  Stephania Salmeron   MRN:  [id number]    Patient not seen today secondary to Pain, Patient fatigue. Will follow-up tomorrow 6/4/22.    6/3/2022

## 2022-06-03 NOTE — NURSING
REPORT REC @ 1510, ARRIVED TO UNIT @ 2246, AOX4, DANIEL WITH GEN WEAKNESS, REPORTS SENSITIVITY TO LIGHT, GLASSES INTACT, DENIES CP, FULL ASSESSMENT COMPLETED, WILL MONITOR.

## 2022-06-03 NOTE — ED NOTES
"When taking PO Tylenol, pt started to choke. Pt reports that she has difficulty eating and taking PO medications d/t "difficulty swallowing after having throat surgery."  "

## 2022-06-03 NOTE — H&P
[facility] - Emergency Dept  [facility]  History & Physical    Patient Name: Stephania Salmeron  MRN: [id number]  Patient Class: OP- Observation  Admission Date: 6/2/2022  Attending Physician: Glynn Santos MD   Primary Care Provider: Sinan Saeed MD         Patient information was obtained from patient and ER records.     Subjective:     Principal Problem:Headache    Chief Complaint:   Chief Complaint   Patient presents with    Chest Pain     Arrives via EMS with c/o chest pain and headache with blurring vision for 4 days         HPI: Patient is a 66 yo woman with significant medical history HTN, COPD, AFib, CAD s/p CABG with multiple stents and ICD, and mild cognitive dysfunction who presents with 5 days of headache and chest pain radiating to left shoulder. She provides history. She states she presented to the ED because of a headache that she has had for 5 days. It has kept her in the bed and she has not been eating or drinking much. She states the HA is to the left and right of her head and some in the back towards her neck, radiating to her shoulders. Massaging her shoulders makes the pain better. She has had several doses of morphine in the ED and this has also made the pain better. NTG makes the HA worse. She has photosensitivity. She has had some nausea. She also c/o chest pain, radiating to the left shoulder which was not relieved by the NTG. Morphine made chest pain better. She has also noticed increased urinary frequency. She is feeling very anxious because she is on Ativan four times a day and has not taken it today. She lives with her daughter who helps her with medications. She denies tobacco and drinking. She owns a dog, Zahida, who is a Shiatzu.       Past Medical History:   Diagnosis Date    Acute coronary syndrome     Atrial fibrillation     Clotting disorder     Coronary artery disease     COVID-19 01/18/2021    Required hospitalization d/t pneumonia and hypoxia    Hyperlipidemia      Hypertension     ICD (implantable cardioverter-defibrillator) in place     Medtronic Evera XT ICD - Model LPZN8T6 (SN: [id number]), 5568-45 (SN: [id number]), 6932-65 (SN: [id number])    Occipital stroke     Left occipital lobe    Quadrantanopia, right     Right lower quadrant       Past Surgical History:   Procedure Laterality Date    APPENDECTOMY      CARDIAC DEFIBRILLATOR PLACEMENT      CHOLECYSTECTOMY      COLONOSCOPY N/A 3/31/2021    Procedure: COLONOSCOPY Suprep;  Surgeon: Murtaza Curry MD;  Location: [facility] ENDO;  Service: Endoscopy;  Laterality: N/A;    CORONARY ANGIOPLASTY      CORONARY ANGIOPLASTY WITH STENT PLACEMENT      CORONARY ARTERY BYPASS GRAFT  1997    ESOPHAGOGASTRODUODENOSCOPY N/A 3/31/2021    Procedure: EGD (ESOPHAGOGASTRODUODENOSCOPY);  Surgeon: Murtaza Curry MD;  Location: [facility] ENDO;  Service: Endoscopy;  Laterality: N/A;    DESIRAE FILTER PLACEMENT  2017    HYSTERECTOMY         Review of patient's allergies indicates:   Allergen Reactions    Compazine [prochlorperazine]      Feels like somethingcrawling underneath the skin    Demerol (pf) [meperidine (pf)]      Feels like somethingcrawling underneath the skin    Toradol [ketorolac]      Feels like somethingcrawling underneath the skin       No current facility-administered medications on file prior to encounter.     Current Outpatient Medications on File Prior to Encounter   Medication Sig    albuterol (PROVENTIL/VENTOLIN HFA) 90 mcg/actuation inhaler INHALE 2 PUFFS INTO THE LUNGS EVERY 6 (SIX) HOURS AS NEEDED FOR WHEEZING. RESCUE    atorvastatin (LIPITOR) 40 MG tablet Take 1 tablet (40 mg total) by mouth once daily.    buPROPion (WELLBUTRIN XL) 150 MG TB24 tablet Take 450 mg by mouth. Taking 3 tablets daily (450 mg) total.    docusate sodium (COLACE) 100 MG capsule TAKE 1 CAPSULE (100 MG TOTAL) BY MOUTH 2 (TWO) TIMES DAILY AS NEEDED FOR CONSTIPATION.    ergocalciferol (ERGOCALCIFEROL) 50,000 unit Cap Take 1  capsule (50,000 Units total) by mouth every 7 days.    esomeprazole (NEXIUM) 40 MG capsule Take 1 capsule (40 mg total) by mouth before breakfast.    hydrOXYzine pamoate (VISTARIL) 25 MG Cap TAKE 1 CAPSULE (25 MG TOTAL) BY MOUTH 4 (FOUR) TIMES DAILY AS NEEDED (NAUSEA).    lorazepam (ATIVAN) 1 MG tablet Take 1 mg by mouth every 6 (six) hours as needed for Anxiety.    meclizine (ANTIVERT) 12.5 mg tablet TAKE 1 OR 2 TABLETS EVERY 4-6 HOURS, AS NEEDED TO CONTROL IMBALANCE OR DIZZINESS OR VERTIGO    metoprolol succinate (TOPROL-XL) 25 MG 24 hr tablet Take 1 tablet (25 mg total) by mouth 2 (two) times daily.    pregabalin (LYRICA) 100 MG capsule Take 100 mg by mouth 3 (three) times daily. Pt takes PRN    QUEtiapine (SEROQUEL) 200 MG Tab Take 200 mg by mouth every evening.    SPIRIVA RESPIMAT 1.25 mcg/actuation inhaler INHALE 2 PUFFS INTO THE LUNGS ONCE DAILY. CONTROLLER    SYMBICORT 160-4.5 mcg/actuation HFAA INHALE 2 PUFFS INTO THE LUNGS EVERY 12 (TWELVE) HOURS. CONTROLLER (Patient taking differently: daily as needed.)    XARELTO 20 mg Tab TAKE 1 TABLET (20 MG TOTAL) BY MOUTH DAILY WITH DINNER OR EVENING MEAL.     Family History       Problem Relation (Age of Onset)    Alcohol abuse Sister    Anuerysm Father    Anxiety disorder Sister    Bipolar disorder Other    Bone cancer Maternal Aunt    COPD Sister    Dementia Sister    Depression Brother, Brother    Lung cancer Mother    Pulmonary embolism Sister    Stroke Sister (72)          Tobacco Use    Smoking status: Former Smoker     Packs/day: 4.00     Years: 30.00     Pack years: 120.00     Types: Cigarettes     Quit date: 3/22/1994     Years since quittin.2    Smokeless tobacco: Never Used   Substance and Sexual Activity    Alcohol use: Not Currently     Alcohol/week: 0.0 standard drinks    Drug use: Never    Sexual activity: Not Currently     Review of Systems   Constitutional:  Negative for appetite change, chills, fever and unexpected weight  change.   HENT:  Negative for congestion, nosebleeds and sore throat.    Eyes:  Negative for photophobia and pain.   Respiratory:  Negative for cough, chest tightness and shortness of breath.    Cardiovascular:  Positive for chest pain. Negative for palpitations.   Gastrointestinal:  Negative for abdominal pain, blood in stool, diarrhea, nausea and vomiting.   Genitourinary:  Positive for frequency. Negative for dysuria, flank pain, hematuria and urgency.   Musculoskeletal:  Positive for gait problem and neck pain. Negative for back pain.   Skin:  Negative for color change.   Neurological:  Positive for weakness and headaches. Negative for seizures and numbness.   Hematological:  Negative for adenopathy.   Psychiatric/Behavioral:  Negative for agitation and confusion.    Objective:     Vital Signs (Most Recent):  Temp: 98.4 °F (36.9 °C) (06/03/22 0400)  Pulse: 70 (06/03/22 0500)  Resp: 14 (06/03/22 0500)  BP: 132/78 (06/03/22 0500)  SpO2: (!) 92 % (06/03/22 0523)   Vital Signs (24h Range):  Temp:  [98.2 °F (36.8 °C)-98.4 °F (36.9 °C)] 98.4 °F (36.9 °C)  Pulse:  [69-88] 70  Resp:  [14-20] 14  SpO2:  [91 %-100 %] 92 %  BP: (130-176)/(64-88) 132/78     Weight: 97.5 kg (215 lb)  Body mass index is 34.7 kg/m².    Physical Exam  Vitals and nursing note reviewed.   Constitutional:       General: She is not in acute distress.     Appearance: Normal appearance. She is not ill-appearing or diaphoretic.   HENT:      Head: Normocephalic and atraumatic.      Right Ear: External ear normal.      Left Ear: External ear normal.      Nose: Nose normal.      Mouth/Throat:      Mouth: Mucous membranes are moist.   Eyes:      General: No scleral icterus.        Right eye: No discharge.         Left eye: No discharge.   Cardiovascular:      Rate and Rhythm: Normal rate and regular rhythm.      Pulses: Normal pulses.      Heart sounds: No murmur heard.    No gallop.   Pulmonary:      Effort: Pulmonary effort is normal. No respiratory  distress.      Breath sounds: Normal breath sounds. No stridor. No wheezing, rhonchi or rales.   Chest:      Chest wall: No tenderness.   Abdominal:      General: Abdomen is flat. Bowel sounds are normal. There is no distension.      Tenderness: There is no abdominal tenderness. There is no right CVA tenderness, left CVA tenderness or guarding.   Musculoskeletal:         General: No swelling or deformity. Normal range of motion.      Cervical back: Normal range of motion and neck supple. No rigidity or tenderness.      Right lower leg: No edema.      Comments: Tender to bilateral trapezoids   Lymphadenopathy:      Cervical: No cervical adenopathy.   Skin:     General: Skin is warm.      Capillary Refill: Capillary refill takes less than 2 seconds.      Coloration: Skin is not jaundiced.      Findings: No bruising, lesion or rash.   Neurological:      General: No focal deficit present.      Mental Status: She is alert and oriented to person, place, and time.      Gait: Gait abnormal.      Comments: Symmetrical plantar and dorsal flexion 5/5  Symmetrical hand squeeze and arm flexion 5/5   Psychiatric:         Mood and Affect: Mood normal.           Significant Labs: All pertinent labs within the past 24 hours have been reviewed.    Significant Imaging: I have reviewed all pertinent imaging results/findings within the past 24 hours.    Assessment/Plan:     * Headache  States this is her major complaint to me  Severe compared to what she normally has  Morphine in the ED has made it better  Lights and agitation make it worse  CT head without concerning findings  No recent trauma  Dry MM    PLAN  - DDx: migraine, secondary to dehydration, anxiety related  - LR 500ml overnight given dry MM, decreased PO intake (cautious with HF)  - Given PM meds (ativan, seroquel, pregabalin) and assess; do not want to oversedate if this is due to missed meds / anxiety.       Chest pain  Trop 0.006 x 2  EKG without ST changes  Previous  presentations were negative for ischemia evaluations    PLAN  - DDx: HTN or anxiety related  - Continue home meds for anxiety -- Ativan 1mg (on wellbutrin which may worsening anxiety)     Coronary artery disease  Has heavy smoking history,  CAD s/p CABG w/ multiple stents and ICD placement  Nuclear stress test 8/2021 without ischemia  [place] and [place] [facility] [place]    PLAN  - EKG and trop if cardiac-like chest pain; no trending at the moment  - Atorvastatin QHS  - Control BP    AICD (automatic cardioverter/defibrillator) present  Placed in 2002  Unable to get MRI      Frequent falls  PT/OT  F/u UA      Vertigo  Patient seen by neurology outpatient; was supposed to f/u but has not  Also seen by vascular neurology for numbness complaints (not voiced to me); no concern from CT head of acute pathology    PLAN  - Outpt neurology referral for f/u: gait, vertigo, numbness  - Fall precautions  - PRN meclizine (home med)  - PT/OT         VTE Risk Mitigation (From admission, onward)         Ordered     rivaroxaban tablet 20 mg  With dinner         06/02/22 2236     IP VTE HIGH RISK PATIENT  Once         06/02/22 2236     Place sequential compression device  Until discontinued         06/02/22 2236                   Mary Leroy MD  Department of Hospital Medicine   [facility] - Emergency Dept

## 2022-06-03 NOTE — PHARMACY MED REC
"    Admission Medication History     The home medication history was taken by Chari Mendez.    You may go to "Admission" then "Reconcile Home Medications" tabs to review and/or act upon these items.      The home medication list has been updated by the Pharmacy department.    Please read ALL comments highlighted in yellow.    Please address this information as you see fit.     Feel free to contact us if you have any questions or require assistance.      The medications listed below were removed from the home medication list. Please reorder if appropriate:  Patient reports no longer taking the following medication(s):   HYDROXYZINE PAMOATE     Medications listed below were obtained from: Patient/family    Medication Sig    albuterol (PROVENTIL/VENTOLIN HFA) 90 mcg/actuation inhaler   INHALE 2 PUFFS INTO THE LUNGS EVERY 6 (SIX) HOURS AS NEEDED FOR WHEEZING. RESCUE    aspirin (ECOTRIN) 81 MG EC tablet   Take 81 mg by mouth once daily.    aspirin/caffeine (BC PAIN RELIEF ORAL)   Take 1 packet by mouth 3 (three) times daily as needed (headache).    atorvastatin (LIPITOR) 40 MG tablet   Take 1 tablet (40 mg total) by mouth once daily.    buPROPion (WELLBUTRIN XL) 150 MG TB24 tablet   Take 450 mg by mouth once daily.    docusate sodium (COLACE) 100 MG capsule     TAKE 1 CAPSULE (100 MG TOTAL) BY MOUTH 2 (TWO) TIMES DAILY AS NEEDED FOR CONSTIPATION.    ergocalciferol (ERGOCALCIFEROL) 50,000 unit Cap   Take 1 capsule (50,000 Units total) by mouth every 7 days.    esomeprazole (NEXIUM) 40 MG capsule   Take 1 capsule (40 mg total) by mouth before breakfast.    lorazepam (ATIVAN) 1 MG tablet   Take 1 mg by mouth 3 (three) times daily.    meclizine (ANTIVERT) 12.5 mg tablet     TAKE 1 OR 2 TABLETS EVERY 4-6 HOURS, AS NEEDED TO CONTROL IMBALANCE OR DIZZINESS OR VERTIGO    metoprolol succinate (TOPROL-XL) 25 MG 24 hr tablet   Take 1 tablet (25 mg total) by mouth 2 (two) times daily.    pregabalin (LYRICA) 100 MG " capsule   Take 100 mg by mouth 3 (three) times daily as needed.    QUEtiapine (SEROQUEL) 200 MG Tab   Take 200 mg by mouth every evening.    SPIRIVA RESPIMAT 1.25 mcg/actuation inhaler   INHALE 2 PUFFS INTO THE LUNGS ONCE DAILY. CONTROLLER    SYMBICORT 160-4.5 mcg/actuation HFAA   Inhale 1-2 puffs into the lungs 1-2 times per day as needed    XARELTO 20 mg Tab   TAKE 1 TABLET (20 MG TOTAL) BY MOUTH DAILY WITH DINNER OR EVENING MEAL.             Chari Mendez  EXT [id number]                .

## 2022-06-03 NOTE — CONSULTS
"[facility]  Vascular Neurology  [facility]  Consult Note    Inpatient consult to Vascular Neurology  Consult performed by: Nathaly Fontenot PA-C  Consult ordered by: Denia Farrell PA-C        Assessment/Plan:     Patient is a 67 y.o. year old female with:    Numbness and tingling of left arm and leg  Stephania Salmeron is a 67 y.o. female with PMHx of CAD (s/p CABG and stent), occipital infarct (baseline R hemianopsia), afib (Xarelto), COVID, HLD, HTN, intestinal ischemia, anxiety/depression, migraine, and ICD who presented to ED with HA, L arm numbness, L leg pain, chest pain, blurry vision, and eye pain. She has had an HA for the past 4 days and at times the pain has been 10/10 and affecting her daily activity. She reports eye pain with BL blurry vision (L>R). This HA is different from her typical headaches which she reports she has about twice a week, her typical HA pain is a 3-4/10 and are worse with bending over.     Last week patient patient had an episode where she felt like her BL knees were "giving out" and her L knee hurt requiring her to hold onto objects around the house for balance. Last night and today she had intermittent L arm paresthesias lasting minutes and resolving, paresthesias mostly located in the L forearm.    On exam, patient with baseline R hemianopsia. LUE drift noted on exam, intermittent 5/5 strength in both L arm and leg suggestive of embellishment. LLE subjective numbness. CT without acute changes. Patient unable to have MRI/MRA due to pacemaker. CTA deferred due to contrast shortage and exam not consistent with LVO.     History, exam, and CT findings do not correlate with cerebrovascular distribution. Therefore, there is low suspicion for cerebrovascular origin for patient's symptoms. Recommend exploring other etiologies for patient's symptoms and treating HA. Continue home xarelto and atorvastatin for max secondary stroke prevention. Given HA " history and multiple complaints, may benefit from outpatient general neurology follow up.        STROKE DOCUMENTATION          NIH Scale:  Interval: baseline  1a. Level of Consciousness: 0-->Alert, keenly responsive  1b. LOC Questions: 0-->Answers both questions correctly  1c. LOC Commands: 0-->Performs both tasks correctly  2. Best Gaze: 0-->Normal  3. Visual: 2-->Complete hemianopia (baseline)  4. Facial Palsy: 0-->Normal symmetrical movements  5a. Motor Arm, Left: 1-->Drift, limb holds 90 (or 45) degrees, but drifts down before full 10 seconds, does not hit bed or other support  5b. Motor Arm, Right: 0-->No drift, limb holds 90 (or 45) degrees for full 10 secs  6a. Motor Leg, Left: 0-->No drift, leg holds 30 degree position for full 5 secs  6b. Motor Leg, Right: 0-->No drift, leg holds 30 degree position for full 5 secs  7. Limb Ataxia: 0-->Absent  8. Sensory: 1-->Mild-to-moderate sensory loss, patient feels pinprick is less sharp or is dull on the affected side, or there is a loss of superficial pain with pinprick, but patient is aware of being touched  9. Best Language: 0-->No aphasia, normal  10. Dysarthria: 0-->Normal  11. Extinction and Inattention (formerly Neglect): 0-->No abnormality  Total (NIH Stroke Scale): 4    Modified Galion Score: 0  Margaret Coma Scale:    ABCD2 Score:    [id number] Score:   HAS -BLED Score:   ICH Score:   Hunt & Donaldson Classification:       Thrombolysis Candidate? No, Out of window , Non-disabling symptoms - Low NIHSS     Delays to Thrombolysis?  Not Applicable    Interventional Revascularization Candidate?   Is the patient eligible for mechanical endovascular reperfusion (ALETHA)?  No; no significant neurologic deficit (NIHSS <6)  and No; at this time symptoms not suggestive of large vessel occlusion    Delays to Thrombectomy? Not Applicable    Hemorrhagic change of an Ischemic Stroke: Does this patient have an ischemic stroke with hemorrhagic changes? No     Subjective:     History  "of Present Illness:  Stephania Salmeron is a 67 y.o. female with PMHx of CAD (s/p CABG and stent), occipital infarct (baseline R hemianopsia), afib (Xarelto), COVID, HLD, HTN, intestinal ischemia, anxiety/depression, migraine, and ICD who presented to ED with HA, L arm numbness, L leg pain, chest pain, blurry vision, and eye pain. Patient reports she was most concerned about her HA, vision changes, eye pain, and chest pain. She has had an HA for the past 4 days and at times the pain has been 10/10 and affecting her daily activity. She reports eye pain with BL blurry vision (L>R). This HA is different from her typical headaches which she reports she has about twice a week, her typical HA pain is a 3-4/10 and are worse with bending over.     Last week patient woke up in her chair and after getting up she felt like her BL knees were "giving out" and her L knee hurt. She states felt unsteady for the rest of the day and had to hold onto objects around the house for balance. Last night and today she had intermittent L arm paresthesias lasting minutes and resolving, paresthesias mostly located in the L forearm.                Past Medical History:   Diagnosis Date    Acute coronary syndrome     Atrial fibrillation     Clotting disorder     Coronary artery disease     COVID-19 01/18/2021    Required hospitalization d/t pneumonia and hypoxia    Hyperlipidemia     Hypertension     ICD (implantable cardioverter-defibrillator) in place     Medtronic Evera XT ICD - Model RIVL1B7 (SN: [id number]), 5568-45 (SN: [id number]), 6932-65 (SN: [id number])    Occipital stroke     Left occipital lobe    Quadrantanopia, right     Right lower quadrant     Past Surgical History:   Procedure Laterality Date    APPENDECTOMY      CARDIAC DEFIBRILLATOR PLACEMENT      CHOLECYSTECTOMY      COLONOSCOPY N/A 3/31/2021    Procedure: COLONOSCOPY Suprep;  Surgeon: Murtaza Curry MD;  Location: [facility];  Service: Endoscopy;  Laterality: N/A; "    CORONARY ANGIOPLASTY      CORONARY ANGIOPLASTY WITH STENT PLACEMENT      CORONARY ARTERY BYPASS GRAFT      ESOPHAGOGASTRODUODENOSCOPY N/A 3/31/2021    Procedure: EGD (ESOPHAGOGASTRODUODENOSCOPY);  Surgeon: Murtaza Curry MD;  Location: [facility];  Service: Endoscopy;  Laterality: N/A;    DESIRAE FILTER PLACEMENT  2017    HYSTERECTOMY       Family History   Problem Relation Age of Onset    Lung cancer Mother     Anuerysm Father     Dementia Sister     COPD Sister     Anxiety disorder Sister     Alcohol abuse Sister     Stroke Sister 72    Bone cancer Maternal Aunt     Bipolar disorder Other         Neice    Depression Brother     Depression Brother     Pulmonary embolism Sister     Colon cancer Neg Hx     Diabetes Mellitus Neg Hx     Breast cancer Neg Hx      Social History     Tobacco Use    Smoking status: Former Smoker     Packs/day: 4.00     Years: 30.00     Pack years: 120.00     Types: Cigarettes     Quit date: 3/22/1994     Years since quittin.2    Smokeless tobacco: Never Used   Substance Use Topics    Alcohol use: Not Currently     Alcohol/week: 0.0 standard drinks    Drug use: Never     Review of patient's allergies indicates:   Allergen Reactions    Compazine [prochlorperazine]      Feels like somethingcrawling underneath the skin    Demerol (pf) [meperidine (pf)]      Feels like somethingcrawling underneath the skin    Toradol [ketorolac]      Feels like somethingcrawling underneath the skin       Medications: I have reviewed the current medication administration record.    (Not in a hospital admission)      Review of Systems   Constitutional:  Negative for fever.   HENT:  Negative for drooling.    Eyes:  Positive for pain and visual disturbance.   Respiratory:  Negative for cough.    Cardiovascular:  Positive for chest pain.   Gastrointestinal:  Negative for nausea and vomiting.   Genitourinary:  Negative for dysuria.   Musculoskeletal:  Positive for  arthralgias. Negative for myalgias.   Skin:  Negative for rash.   Neurological:  Positive for weakness, numbness and headaches. Negative for facial asymmetry and speech difficulty.   Psychiatric/Behavioral:  Negative for agitation.    Objective:     Vital Signs (Most Recent):  Temp: 98.4 °F (36.9 °C) (06/02/22 1508)  Pulse: 70 (06/02/22 1749)  Resp: 18 (06/02/22 1828)  BP: (!) 152/64 (06/02/22 1749)  SpO2: 100 % (06/02/22 1749)    Vital Signs Range (Last 24H):  Temp:  [98.4 °F (36.9 °C)]   Pulse:  [69-88]   Resp:  [17-18]   BP: (152-160)/(64-88)   SpO2:  [96 %-100 %]     Physical Exam  Vitals reviewed.   Constitutional:       General: She is not in acute distress.     Appearance: She is well-developed.   HENT:      Head: Normocephalic and atraumatic.      Right Ear: External ear normal.      Left Ear: External ear normal.      Nose: Nose normal.   Eyes:      General: No scleral icterus.  Cardiovascular:      Rate and Rhythm: Normal rate.   Pulmonary:      Effort: Pulmonary effort is normal. No respiratory distress.   Abdominal:      General: There is no distension.   Musculoskeletal:      Cervical back: Normal range of motion.      Right lower leg: No edema.      Left lower leg: No edema.   Skin:     General: Skin is warm and dry.   Neurological:      Mental Status: She is alert.   Psychiatric:         Mood and Affect: Mood normal.         Behavior: Behavior normal.       Neurological Exam:   LOC: alert  Attention Span: Good   Language: No aphasia  Articulation: No dysarthria  Orientation: Person, Place, Time   Visual Fields: Hemianopsia right  EOM (CN III, IV, VI): Full/intact  Facial Movement (CN VII): Symmetric facial expression    Motor: Arm left  Normal 5/5  Leg left  Normal 5/5  Arm right  Normal 5/5  Leg right Normal 5/5  Intermittent giveway weakness in L arm and leg throughout exam  Sensation: Sohan-anesthesia left lower extremity  Tone: Normal tone throughout      Laboratory:  CMP:   Recent Labs   Lab  06/02/22  1730   CALCIUM 9.1   ALBUMIN 3.2*   PROT 7.4      K 4.0   CO2 24      BUN 20   CREATININE 1.0   ALKPHOS 158*   ALT 16   AST 17   BILITOT 0.3     CBC:   Recent Labs   Lab 06/02/22  1730   WBC 5.28   RBC 4.24   HGB 11.1*   HCT 37.4      MCV 88   MCH 26.2*   MCHC 29.7*     Lipid Panel: No results for input(s): CHOL, LDLCALC, HDL, TRIG in the last 168 hours.  Coagulation: No results for input(s): PT, INR, APTT in the last 168 hours.  Hgb A1C: No results for input(s): HGBA1C in the last 168 hours.  TSH: No results for input(s): TSH in the last 168 hours.    Diagnostic Results:    CT Head. Date: 06/02/22  No acute intracranial findings specifically without evidence for acute intracranial hemorrhage or sulcal effacement to suggest large territory recent infarction allowing for artifact from motion.     Mild moderate left parasagittal parietal/occipital encephalomalacia stable most compatible with prior infarction with stable probable remote right caudate lacunar type infarct.     Clinical correlation and further evaluation with MRI as warranted if patient compatible.           Nathaly Fontenot PA-C  [facility]  Department of Vascular Neurology   [facility]

## 2022-06-03 NOTE — ASSESSMENT & PLAN NOTE
States this is her major complaint to me  Severe compared to what she normally has  Morphine in the ED has made it better  Lights and agitation make it worse  CT head without concerning findings  No recent trauma  Dry MM    PLAN  - DDx: migraine, secondary to dehydration, anxiety related  - LR 500ml overnight given dry MM, decreased PO intake (cautious with HF)  - Given PM meds (ativan, seroquel, pregabalin) and assess; do not want to oversedate if this is due to missed meds / anxiety.

## 2022-06-03 NOTE — ASSESSMENT & PLAN NOTE
Trop 0.006 x 2  EKG without ST changes  Previous presentations were negative for ischemia evaluations    PLAN  - DDx: HTN or anxiety related  - Continue home meds for anxiety -- Ativan 1mg (on wellbutrin which may worsening anxiety)

## 2022-06-03 NOTE — SUBJECTIVE & OBJECTIVE
Past Medical History:   Diagnosis Date    Acute coronary syndrome     Atrial fibrillation     Clotting disorder     Coronary artery disease     COVID-19 2021    Required hospitalization d/t pneumonia and hypoxia    Hyperlipidemia     Hypertension     ICD (implantable cardioverter-defibrillator) in place     Medtronic Evera XT ICD - Model RJWF5H7 (SN: [id number]), 5568-45 (SN: [id number]), 6932-65 (SN: [id number])    Occipital stroke     Left occipital lobe    Quadrantanopia, right     Right lower quadrant     Past Surgical History:   Procedure Laterality Date    APPENDECTOMY      CARDIAC DEFIBRILLATOR PLACEMENT      CHOLECYSTECTOMY      COLONOSCOPY N/A 3/31/2021    Procedure: COLONOSCOPY Suprep;  Surgeon: Murtaza Curry MD;  Location: [facility] ENDO;  Service: Endoscopy;  Laterality: N/A;    CORONARY ANGIOPLASTY      CORONARY ANGIOPLASTY WITH STENT PLACEMENT      CORONARY ARTERY BYPASS GRAFT      ESOPHAGOGASTRODUODENOSCOPY N/A 3/31/2021    Procedure: EGD (ESOPHAGOGASTRODUODENOSCOPY);  Surgeon: Murtaza Curry MD;  Location: [facility] ENDO;  Service: Endoscopy;  Laterality: N/A;    DESIRAE FILTER PLACEMENT  2017    HYSTERECTOMY       Family History   Problem Relation Age of Onset    Lung cancer Mother     Anuerysm Father     Dementia Sister     COPD Sister     Anxiety disorder Sister     Alcohol abuse Sister     Stroke Sister 72    Bone cancer Maternal Aunt     Bipolar disorder Other         Neice    Depression Brother     Depression Brother     Pulmonary embolism Sister     Colon cancer Neg Hx     Diabetes Mellitus Neg Hx     Breast cancer Neg Hx      Social History     Tobacco Use    Smoking status: Former Smoker     Packs/day: 4.00     Years: 30.00     Pack years: 120.00     Types: Cigarettes     Quit date: 3/22/1994     Years since quittin.2    Smokeless tobacco: Never Used   Substance Use Topics    Alcohol use: Not Currently     Alcohol/week: 0.0 standard drinks    Drug use: Never     Review of  patient's allergies indicates:   Allergen Reactions    Compazine [prochlorperazine]      Feels like somethingcrawling underneath the skin    Demerol (pf) [meperidine (pf)]      Feels like somethingcrawling underneath the skin    Toradol [ketorolac]      Feels like somethingcrawling underneath the skin       Medications: I have reviewed the current medication administration record.    (Not in a hospital admission)      Review of Systems   Constitutional:  Negative for fever.   HENT:  Negative for drooling.    Eyes:  Positive for pain and visual disturbance.   Respiratory:  Negative for cough.    Cardiovascular:  Positive for chest pain.   Gastrointestinal:  Negative for nausea and vomiting.   Genitourinary:  Negative for dysuria.   Musculoskeletal:  Positive for arthralgias. Negative for myalgias.   Skin:  Negative for rash.   Neurological:  Positive for weakness, numbness and headaches. Negative for facial asymmetry and speech difficulty.   Psychiatric/Behavioral:  Negative for agitation.    Objective:     Vital Signs (Most Recent):  Temp: 98.4 °F (36.9 °C) (06/02/22 1508)  Pulse: 70 (06/02/22 1749)  Resp: 18 (06/02/22 1828)  BP: (!) 152/64 (06/02/22 1749)  SpO2: 100 % (06/02/22 1749)    Vital Signs Range (Last 24H):  Temp:  [98.4 °F (36.9 °C)]   Pulse:  [69-88]   Resp:  [17-18]   BP: (152-160)/(64-88)   SpO2:  [96 %-100 %]     Physical Exam  Vitals reviewed.   Constitutional:       General: She is not in acute distress.     Appearance: She is well-developed.   HENT:      Head: Normocephalic and atraumatic.      Right Ear: External ear normal.      Left Ear: External ear normal.      Nose: Nose normal.   Eyes:      General: No scleral icterus.  Cardiovascular:      Rate and Rhythm: Normal rate.   Pulmonary:      Effort: Pulmonary effort is normal. No respiratory distress.   Abdominal:      General: There is no distension.   Musculoskeletal:      Cervical back: Normal range of motion.      Right lower leg: No edema.       Left lower leg: No edema.   Skin:     General: Skin is warm and dry.   Neurological:      Mental Status: She is alert.   Psychiatric:         Mood and Affect: Mood normal.         Behavior: Behavior normal.       Neurological Exam:   LOC: alert  Attention Span: Good   Language: No aphasia  Articulation: No dysarthria  Orientation: Person, Place, Time   Visual Fields: Hemianopsia right  EOM (CN III, IV, VI): Full/intact  Facial Movement (CN VII): Symmetric facial expression    Motor: Arm left  Normal 5/5  Leg left  Normal 5/5  Arm right  Normal 5/5  Leg right Normal 5/5  Intermittent giveway weakness in L arm and leg throughout exam  Sensation: Sohan-anesthesia left lower extremity  Tone: Normal tone throughout      Laboratory:  CMP:   Recent Labs   Lab 06/02/22  1730   CALCIUM 9.1   ALBUMIN 3.2*   PROT 7.4      K 4.0   CO2 24      BUN 20   CREATININE 1.0   ALKPHOS 158*   ALT 16   AST 17   BILITOT 0.3     CBC:   Recent Labs   Lab 06/02/22  1730   WBC 5.28   RBC 4.24   HGB 11.1*   HCT 37.4      MCV 88   MCH 26.2*   MCHC 29.7*     Lipid Panel: No results for input(s): CHOL, LDLCALC, HDL, TRIG in the last 168 hours.  Coagulation: No results for input(s): PT, INR, APTT in the last 168 hours.  Hgb A1C: No results for input(s): HGBA1C in the last 168 hours.  TSH: No results for input(s): TSH in the last 168 hours.    Diagnostic Results:    CT Head. Date: 06/02/22  No acute intracranial findings specifically without evidence for acute intracranial hemorrhage or sulcal effacement to suggest large territory recent infarction allowing for artifact from motion.     Mild moderate left parasagittal parietal/occipital encephalomalacia stable most compatible with prior infarction with stable probable remote right caudate lacunar type infarct.     Clinical correlation and further evaluation with MRI as warranted if patient compatible.

## 2022-06-03 NOTE — ED PROVIDER NOTES
"Encounter Date: 6/2/2022       History     Chief Complaint   Patient presents with    Chest Pain     Arrives via EMS with c/o chest pain and headache with blurring vision for 4 days      67-year-old female with multiple comorbidities including CAD, AFib on Xarelto, PAD, hypertension, hyperlipidemia, history of left occipital stroke presents for multiple complaints.  Her initial complaint is for a right-sided headache for over 4 day which feels tight and pounding.  She denies any palliating factors, headache feels worse with movement.  She reports associated decreased vision in her left eye which she states feels like "filminess" over her visual field.  She reports associated left substernal chest pressure which feels like "someone sitting on me" as well as pain in her left arm, bilateral leg weakness, muscle cramps, nausea and occasional cough.  She took some nitro for her chest pain which helped yesterday but did not help when she took a dose this morning.  She states that she has been lying in bed for 4 days due to diffuse muscle pain and is endorsing pain and decreased sensation in her left arm as well as bilateral leg weakness.  She denies shortness of breath, leg swelling, fever or changes in speech.        Review of patient's allergies indicates:   Allergen Reactions    Compazine [prochlorperazine]      Feels like somethingcrawling underneath the skin    Demerol (pf) [meperidine (pf)]      Feels like somethingcrawling underneath the skin    Toradol [ketorolac]      Feels like somethingcrawling underneath the skin     Past Medical History:   Diagnosis Date    Acute coronary syndrome     Atrial fibrillation     Clotting disorder     Coronary artery disease     COVID-19 01/18/2021    Required hospitalization d/t pneumonia and hypoxia    Hyperlipidemia     Hypertension     ICD (implantable cardioverter-defibrillator) in place     [employer] Evera XT ICD - Model IWXP2C0 (SN: [id number]), 5568-45 (SN: " [id number]), 6932-65 (SN: [id number])    Occipital stroke     Left occipital lobe    Quadrantanopia, right     Right lower quadrant     Past Surgical History:   Procedure Laterality Date    APPENDECTOMY      CARDIAC DEFIBRILLATOR PLACEMENT      CHOLECYSTECTOMY      COLONOSCOPY N/A 3/31/2021    Procedure: COLONOSCOPY Suprep;  Surgeon: Murtaza Curry MD;  Location: [facility] ENDO;  Service: Endoscopy;  Laterality: N/A;    CORONARY ANGIOPLASTY      CORONARY ANGIOPLASTY WITH STENT PLACEMENT      CORONARY ARTERY BYPASS GRAFT      ESOPHAGOGASTRODUODENOSCOPY N/A 3/31/2021    Procedure: EGD (ESOPHAGOGASTRODUODENOSCOPY);  Surgeon: Murtaza Curry MD;  Location: [facility] ENDO;  Service: Endoscopy;  Laterality: N/A;    DESIRAE FILTER PLACEMENT  2017    HYSTERECTOMY       Family History   Problem Relation Age of Onset    Lung cancer Mother     Anuerysm Father     Dementia Sister     COPD Sister     Anxiety disorder Sister     Alcohol abuse Sister     Stroke Sister 72    Bone cancer Maternal Aunt     Bipolar disorder Other         Neice    Depression Brother     Depression Brother     Pulmonary embolism Sister     Colon cancer Neg Hx     Diabetes Mellitus Neg Hx     Breast cancer Neg Hx      Social History     Tobacco Use    Smoking status: Former Smoker     Packs/day: 4.00     Years: 30.00     Pack years: 120.00     Types: Cigarettes     Quit date: 3/22/1994     Years since quittin.2    Smokeless tobacco: Never Used   Substance Use Topics    Alcohol use: Not Currently     Alcohol/week: 0.0 standard drinks    Drug use: Never     Review of Systems   Constitutional: Negative for fever.   HENT: Negative for sore throat.    Eyes: Positive for photophobia, pain and visual disturbance.   Respiratory: Positive for chest tightness. Negative for shortness of breath.    Cardiovascular: Positive for chest pain. Negative for palpitations and leg swelling.   Gastrointestinal: Positive for nausea. Negative  for abdominal pain, rectal pain and vomiting.   Genitourinary: Negative for dysuria.   Musculoskeletal: Positive for myalgias. Negative for back pain and neck pain.   Skin: Negative for rash.   Neurological: Positive for weakness, numbness and headaches. Negative for dizziness, tremors, seizures, syncope, facial asymmetry, speech difficulty and light-headedness.   Hematological: Does not bruise/bleed easily.       Physical Exam     Initial Vitals [06/02/22 1508]   BP Pulse Resp Temp SpO2   (!) 155/88 88 17 98.4 °F (36.9 °C) 96 %      MAP       --         Physical Exam    Nursing note and vitals reviewed.  Constitutional: She appears well-developed and well-nourished. She is not diaphoretic. No distress.   HENT:   Head: Normocephalic and atraumatic.   No temporal tenderness   Eyes: Conjunctivae and EOM are normal. Pupils are equal, round, and reactive to light.   IOP: OS: 10, OD: 9    No uptake of fluorescein bilaterally   Neck:   Normal range of motion.  Cardiovascular: Normal rate, regular rhythm, normal heart sounds and intact distal pulses. Exam reveals no gallop and no friction rub.    No murmur heard.  No lower extremity edema, erythema, tenderness or warmth   Pulmonary/Chest: Breath sounds normal. No respiratory distress. She has no wheezes. She has no rhonchi. She has no rales. She exhibits no tenderness.   Abdominal: Abdomen is soft. Bowel sounds are normal. She exhibits no distension and no mass. There is no abdominal tenderness. There is no rebound and no guarding.   Musculoskeletal:         General: Normal range of motion.      Cervical back: Normal range of motion.     Neurological: She is alert and oriented to person, place, and time. No cranial nerve deficit or sensory deficit. GCS score is 15. GCS eye subscore is 4. GCS verbal subscore is 5. GCS motor subscore is 6.   No facial droop, speech is normal.  There is a pronator drift of the left arm, with other strength testing (hand , abduction)  strength is normal on that side.  No sensory deficits.   Skin: Skin is warm and dry.   Psychiatric: She has a normal mood and affect.         ED Course   Procedures  Labs Reviewed   CBC W/ AUTO DIFFERENTIAL - Abnormal; Notable for the following components:       Result Value    Hemoglobin 11.1 (*)     MCH 26.2 (*)     MCHC 29.7 (*)     RDW 15.0 (*)     All other components within normal limits   COMPREHENSIVE METABOLIC PANEL - Abnormal; Notable for the following components:    Albumin 3.2 (*)     Alkaline Phosphatase 158 (*)     eGFR if non  58.4 (*)     All other components within normal limits   CK - Abnormal; Notable for the following components:     (*)     All other components within normal limits   TROPONIN I   TROPONIN I   B-TYPE NATRIURETIC PEPTIDE   TROPONIN I   URINALYSIS, REFLEX TO URINE CULTURE   SARS-COV-2 RDRP GENE     EKG Readings: (Independently Interpreted)   Initial Reading: No STEMI. Previous EKG: Compared with most recent EKG Previous EKG Date: 2/28/2022. Heart Rate: 70. Ectopy: No Ectopy. ST Segments: Normal ST Segments. Clinical Impression: Paced Rhythm     ECG Results          EKG 12-lead (Final result)  Result time 06/02/22 16:44:19    Final result by Interface, Lab In [facility] (06/02/22 16:44:19)                 Narrative:    Test Reason : R07.9,    Vent. Rate : 070 BPM     Atrial Rate : 070 BPM     P-R Int : 198 ms          QRS Dur : 094 ms      QT Int : 418 ms       P-R-T Axes : 090 060 072 degrees     QTc Int : 451 ms    Atrial-paced rhythm  Abnormal ECG  When compared with ECG of 28-FEB-2022 14:13,  No significant change was found  Confirmed by Enrico TOLBERT, John BAIRD ([id number]) on 6/2/2022 4:44:08 PM    Referred By:             Confirmed By:John Weston MD                            Imaging Results          X-Ray Chest AP Portable (Final result)  Result time 06/02/22 17:18:08   Notes recorded by Sinan Saeed MD on 6/2/2022 at 9:59 PM CDT  Chest xray is normal. Was  completed in ED and should get response from them pending workup.      Final result by Stu Tamez MD (06/02/22 17:18:08)                 Impression:      No acute radiographic abnormality.      Electronically signed by: Stu Tamez  Date:    06/02/2022  Time:    17:18             Narrative:    EXAMINATION:  XR CHEST AP PORTABLE    CLINICAL HISTORY:  Chest Pain;    TECHNIQUE:  Single frontal view of the chest was performed.    COMPARISON:  08/16/2021    FINDINGS:  Cardiac defibrillator device on the left.The lungs are clear, with normal appearance of pulmonary vasculature and no pleural effusion or pneumothorax.    The cardiac silhouette is normal in size. The hilar and mediastinal contours are unremarkable.    Bones are intact.    No significant change.                               CT Head Without Contrast (Final result)  Result time 06/02/22 17:07:59   Notes recorded by Sinan Saeed MD on 6/2/2022 at 10:16 PM CDT  Patient admitted for observation, should initially be addressed by inpatient team.  Can address at f/u as needed.  No sure in patient images are getting routed outpatient.      Final result by Tyler Muhammad DO (06/02/22 17:07:59)                 Impression:      No acute intracranial findings specifically without evidence for acute intracranial hemorrhage or sulcal effacement to suggest large territory recent infarction allowing for artifact from motion.    Mild moderate left parasagittal parietal/occipital encephalomalacia stable most compatible with prior infarction with stable probable remote right caudate lacunar type infarct.    Clinical correlation and further evaluation with MRI as warranted if patient compatible..    Electronically signed by resident: Emory Stewart MD  Date:    06/02/2022  Time:    16:40    Electronically signed by: Tyler Muhammad DO  Date:    06/02/2022  Time:    17:07             Narrative:    EXAMINATION:  CT HEAD WITHOUT CONTRAST    CLINICAL HISTORY:  Neuro  deficit, acute, stroke suspected;Headache, chronic, new features or increased frequency;Headache + left visual loss x3-4 days, subacute stroke suspected;    TECHNIQUE:  Low dose axial CT images obtained throughout the head without intravenous contrast. Sagittal and coronal reconstructions were performed.    COMPARISON:  CT head 02/13/2021, 05/23/2019.    FINDINGS:  Ventricles stable without hydrocephalus.    Stable encephalomalacia in the left parasagittal parietal/occipital lobe, most compatible with remote infarct..  Small remote infarct within the right caudate with punctate hypodensity left basal ganglia which may represent prominent perivascular space versus additional remote lacunar type infarct..  Study is somewhat distorted by motion within limits of the study no acute intracranial hemorrhage or sulcal effacement to suggest large territory recent infarction..    Partially empty sella configuration.    Visualized paranasal sinuses and mastoid air cells are clear.                                 Medications   sodium chloride 0.9% flush 10 mL (has no administration in time range)   melatonin tablet 6 mg (has no administration in time range)   LORazepam tablet 1 mg (has no administration in time range)   rivaroxaban tablet 20 mg (has no administration in time range)   fluticasone furoate-vilanteroL 100-25 mcg/dose diskus inhaler 1 puff (has no administration in time range)   tiotropium bromide 1.25 mcg/actuation inhaler 2 puff (has no administration in time range)   QUEtiapine tablet 200 mg (has no administration in time range)   metoprolol succinate (TOPROL-XL) 24 hr tablet 25 mg (has no administration in time range)   hydrOXYzine pamoate capsule 25 mg (has no administration in time range)   pantoprazole EC tablet 40 mg (has no administration in time range)   ergocalciferol capsule 50,000 Units (has no administration in time range)   atorvastatin tablet 40 mg (has no administration in time range)   buPROPion  TB24 tablet 450 mg (has no administration in time range)   albuterol inhaler 2 puff (has no administration in time range)   pregabalin capsule 100 mg (has no administration in time range)   docusate sodium capsule 100 mg (has no administration in time range)   meclizine tablet 25 mg (has no administration in time range)   LORazepam tablet 1 mg (has no administration in time range)   pregabalin capsule 100 mg (has no administration in time range)   morphine injection 2 mg (has no administration in time range)   sodium chloride 0.9% flush 10 mL (has no administration in time range)   naloxone 0.4 mg/mL injection 0.02 mg (has no administration in time range)   glucose chewable tablet 16 g (has no administration in time range)   glucose chewable tablet 24 g (has no administration in time range)   glucagon (human recombinant) injection 1 mg (has no administration in time range)   dextrose 10% bolus 125 mL (has no administration in time range)   dextrose 10% bolus 250 mL (has no administration in time range)   promethazine (PHENERGAN) 12.5 mg in dextrose 5 % 50 mL IVPB (has no administration in time range)   lactated ringers bolus 500 mL (has no administration in time range)   aspirin tablet 325 mg (325 mg Oral Given 6/2/22 1641)   ondansetron injection 4 mg (4 mg Intravenous Given 6/2/22 1731)   morphine injection 6 mg (6 mg Intravenous Given 6/2/22 1733)   morphine injection 4 mg (4 mg Intravenous Given 6/2/22 1828)   acetaminophen tablet 1,000 mg (1,000 mg Oral Given 6/2/22 1925)   proparacaine 0.5 % ophthalmic solution 2 drop (2 drops Left Eye Given by Other 6/2/22 1915)   fluorescein ophthalmic strip 1 each (1 each Left Eye Given by Other 6/2/22 1915)     Medical Decision Making:   History:   Old Medical Records: I decided to obtain old medical records.  Old Records Summarized: records from previous admission(s).       <> Summary of Records: Patient was admitted for chest pain in August of 2021 and had a stress test  which was negative for ischemia but was suboptimal study as patient did not meet target heart rate  Initial Assessment:   67-year-old female presenting for multiple complaints including right-sided headache, left-sided decreased vision, pain and diffuse myalgias.  She is mildly hypertensive 155/88 with otherwise normal vitals.  Differential Diagnosis:   Differential is wide including migraine, ICH, subacute stroke.  Her symptoms been present for greater than 4 days so stroke code was not called.  Additional concerns include ACS, rhabdomyolysis, anemia, electrolyte derangement  Independently Interpreted Test(s):   I have ordered and independently interpreted X-rays - see summary below.       <> Summary of X-Ray Reading(s): No consolidation or pulmonary edema  I have ordered and independently interpreted EKG Reading(s) - see prior notes  Clinical Tests:   Lab Tests: Ordered and Reviewed  Radiological Study: Ordered and Reviewed  Medical Tests: Ordered and Reviewed  ED Management:  Will check labs, do head CT, give aspirin, nitro and discuss with vascular Neurology.    No improvement of chest pain with nitro but improved with morphine.  Head CT without any acute findings.  Lab workup is reassuring with negative troponin.  Although her chest pain does not seem to be overly cardiac in nature, she has significant risk factors and history of CAD.  Neurological exam seems inconsistent and I think that acute stroke is not likely.  She was not evaluated by our are vascular Neurology team who agree that presentation is certainly not consistent with a large vessel occlusion and she is already optimally managed on medications.  She was not able to have an MRI performed in the emergency department as she has an ICD which we were not able to confirm MRI compatibility with.  Her symptoms have improved with control of her headache.  She will be admitted to Hospital Medicine for further management.  Patient comfortable with  admission.  I discussed this patient with my supervising physician.  Other:   I have discussed this case with another health care provider.       <> Summary of the Discussion: See ED course             ED Course as of 06/03/22 0002   Thu Jun 02, 2022   1654 No relief with nitro.  Headache is worse.  Will give morphine.  Difficulty obtaining IV access, 2nd RN is evaluating currently. [CC]   1802 BNP: 44 [CC]   1802 Chest pain improved with morphine but still present.  Will give additional dose.  EKG repeated. [CC]   1803 Troponin I: <0.006  Initial troponin negative [CC]   1901 I discussed this patient with vascular Neurology, presentation is inconsistent and less likely due to acute stroke.  She is unlikely to be able to have an MRI performed due to pacemaker.  She will discuss with fellow. [CC]   1940 I discussed this patient with cardiology who recommend admission to Medicine, may need PET stress [CC]   2051 I discussed this patient with MRI tech, the patient does not have a card for her ICD verify if it is compatible.  They will contact the manufacture but stated that he emergent, placed in MRI mode tonight by cardiology.  I think that is not necessary given her clinical presentation.  I discussed this with vascular Neurology LIZ Po, primary team concerned they can pursue MRI imaging.  Will admit to medicine. [CC]   2057 SARS-CoV-2 RNA, Amplification, Qual: Negative [CC]      ED Course User Index  [CC] Denia Farrell PA-C             Clinical Impression:   Final diagnoses:  [R07.9] Chest pain (Primary)  [R51.9] Acute nonintractable headache, unspecified headache type  [H53.9] Visual changes  [R53.1] Weakness          ED Disposition Condition    Observation               Denia Farrell PA-C  06/03/22 0002

## 2022-06-03 NOTE — ASSESSMENT & PLAN NOTE
"Stephania Salmeron is a 67 y.o. female with PMHx of CAD (s/p CABG and stent), occipital infarct (baseline R hemianopsia), afib (Xarelto), COVID, HLD, HTN, intestinal ischemia, anxiety/depression, migraine, and ICD who presented to ED with HA, L arm numbness, L leg pain, chest pain, blurry vision, and eye pain. She has had an HA for the past 4 days and at times the pain has been 10/10 and affecting her daily activity. She reports eye pain with BL blurry vision (L>R). This HA is different from her typical headaches which she reports she has about twice a week, her typical HA pain is a 3-4/10 and are worse with bending over.     Last week patient patient had an episode where she felt like her BL knees were "giving out" and her L knee hurt requiring her to hold onto objects around the house for balance. Last night and today she had intermittent L arm paresthesias lasting minutes and resolving, paresthesias mostly located in the L forearm.    On exam, patient with baseline R hemianopsia. LUE drift noted on exam, intermittent 5/5 strength in both L arm and leg suggestive of embellishment. LLE subjective numbness. CT without acute changes. Patient unable to have MRI/MRA due to pacemaker. CTA deferred due to contrast shortage and exam not consistent with LVO.     History, exam, and CT findings do not correlate with cerebrovascular distribution. Therefore, there is low suspicion for cerebrovascular origin for patient's symptoms. Recommend exploring other etiologies for patient's symptoms and treating HA. Continue home xarelto and atorvastatin for max secondary stroke prevention. Given HA history and multiple complaints, may benefit from outpatient general neurology follow up.  "

## 2022-06-03 NOTE — ED NOTES
Pt awake and alert; resting quietly on stretcher.  Pt remains on continuous cardiac and pulse ox monitoring with non-invasive blood pressure to cycle every 30 minutes.  Pt denies pain at this time; no acute distress or discomfort reported or observed.  Pt denies restroom needs at this time; is able to reposition self on stretcher. Bed locked in lowest position; side rails up and locked x 2; call light, bedside table, and personal belongings within reach.  Plan of care discussed.  Pt instructed to alert nurse for assistance and before attempting to get out of bed; verbalizes understanding. Pt denies needs or complaints at this time; will continue to monitor.

## 2022-06-03 NOTE — ASSESSMENT & PLAN NOTE
Patient seen by neurology outpatient; was supposed to f/u but has not  Also seen by vascular neurology for numbness complaints (not voiced to me); no concern from CT head of acute pathology    PLAN  - Outpt neurology referral for f/u: gait, vertigo, numbness  - Fall precautions  - PRN meclizine (home med)  - PT/OT

## 2022-06-03 NOTE — SUBJECTIVE & OBJECTIVE
Past Medical History:   Diagnosis Date    Acute coronary syndrome     Atrial fibrillation     Clotting disorder     Coronary artery disease     COVID-19 01/18/2021    Required hospitalization d/t pneumonia and hypoxia    Hyperlipidemia     Hypertension     ICD (implantable cardioverter-defibrillator) in place     Medtronic Evera XT ICD - Model TOIK5X7 (SN: [id number]), 5568-45 (SN: [id number]), 6932-65 (SN: [id number])    Occipital stroke     Left occipital lobe    Quadrantanopia, right     Right lower quadrant       Past Surgical History:   Procedure Laterality Date    APPENDECTOMY      CARDIAC DEFIBRILLATOR PLACEMENT      CHOLECYSTECTOMY      COLONOSCOPY N/A 3/31/2021    Procedure: COLONOSCOPY Suprep;  Surgeon: Murtaza Curry MD;  Location: [facility] ENDO;  Service: Endoscopy;  Laterality: N/A;    CORONARY ANGIOPLASTY      CORONARY ANGIOPLASTY WITH STENT PLACEMENT      CORONARY ARTERY BYPASS GRAFT  1997    ESOPHAGOGASTRODUODENOSCOPY N/A 3/31/2021    Procedure: EGD (ESOPHAGOGASTRODUODENOSCOPY);  Surgeon: Murtaza Curry MD;  Location: [facility] ENDO;  Service: Endoscopy;  Laterality: N/A;    DESIRAE FILTER PLACEMENT  2017    HYSTERECTOMY         Review of patient's allergies indicates:   Allergen Reactions    Compazine [prochlorperazine]      Feels like somethingcrawling underneath the skin    Demerol (pf) [meperidine (pf)]      Feels like somethingcrawling underneath the skin    Toradol [ketorolac]      Feels like somethingcrawling underneath the skin       No current facility-administered medications on file prior to encounter.     Current Outpatient Medications on File Prior to Encounter   Medication Sig    albuterol (PROVENTIL/VENTOLIN HFA) 90 mcg/actuation inhaler INHALE 2 PUFFS INTO THE LUNGS EVERY 6 (SIX) HOURS AS NEEDED FOR WHEEZING. RESCUE    atorvastatin (LIPITOR) 40 MG tablet Take 1 tablet (40 mg total) by mouth once daily.    buPROPion (WELLBUTRIN XL) 150 MG TB24 tablet Take 450 mg by mouth. Taking 3  tablets daily (450 mg) total.    docusate sodium (COLACE) 100 MG capsule TAKE 1 CAPSULE (100 MG TOTAL) BY MOUTH 2 (TWO) TIMES DAILY AS NEEDED FOR CONSTIPATION.    ergocalciferol (ERGOCALCIFEROL) 50,000 unit Cap Take 1 capsule (50,000 Units total) by mouth every 7 days.    esomeprazole (NEXIUM) 40 MG capsule Take 1 capsule (40 mg total) by mouth before breakfast.    hydrOXYzine pamoate (VISTARIL) 25 MG Cap TAKE 1 CAPSULE (25 MG TOTAL) BY MOUTH 4 (FOUR) TIMES DAILY AS NEEDED (NAUSEA).    lorazepam (ATIVAN) 1 MG tablet Take 1 mg by mouth every 6 (six) hours as needed for Anxiety.    meclizine (ANTIVERT) 12.5 mg tablet TAKE 1 OR 2 TABLETS EVERY 4-6 HOURS, AS NEEDED TO CONTROL IMBALANCE OR DIZZINESS OR VERTIGO    metoprolol succinate (TOPROL-XL) 25 MG 24 hr tablet Take 1 tablet (25 mg total) by mouth 2 (two) times daily.    pregabalin (LYRICA) 100 MG capsule Take 100 mg by mouth 3 (three) times daily. Pt takes PRN    QUEtiapine (SEROQUEL) 200 MG Tab Take 200 mg by mouth every evening.    SPIRIVA RESPIMAT 1.25 mcg/actuation inhaler INHALE 2 PUFFS INTO THE LUNGS ONCE DAILY. CONTROLLER    SYMBICORT 160-4.5 mcg/actuation HFAA INHALE 2 PUFFS INTO THE LUNGS EVERY 12 (TWELVE) HOURS. CONTROLLER (Patient taking differently: daily as needed.)    XARELTO 20 mg Tab TAKE 1 TABLET (20 MG TOTAL) BY MOUTH DAILY WITH DINNER OR EVENING MEAL.     Family History       Problem Relation (Age of Onset)    Alcohol abuse Sister    Anuerysm Father    Anxiety disorder Sister    Bipolar disorder Other    Bone cancer Maternal Aunt    COPD Sister    Dementia Sister    Depression Brother, Brother    Lung cancer Mother    Pulmonary embolism Sister    Stroke Sister (72)          Tobacco Use    Smoking status: Former Smoker     Packs/day: 4.00     Years: 30.00     Pack years: 120.00     Types: Cigarettes     Quit date: 3/22/1994     Years since quittin.2    Smokeless tobacco: Never Used   Substance and Sexual Activity    Alcohol use: Not Currently      Alcohol/week: 0.0 standard drinks    Drug use: Never    Sexual activity: Not Currently     Review of Systems   Constitutional:  Negative for appetite change, chills, fever and unexpected weight change.   HENT:  Negative for congestion, nosebleeds and sore throat.    Eyes:  Negative for photophobia and pain.   Respiratory:  Negative for cough, chest tightness and shortness of breath.    Cardiovascular:  Positive for chest pain. Negative for palpitations.   Gastrointestinal:  Negative for abdominal pain, blood in stool, diarrhea, nausea and vomiting.   Genitourinary:  Positive for frequency. Negative for dysuria, flank pain, hematuria and urgency.   Musculoskeletal:  Positive for gait problem and neck pain. Negative for back pain.   Skin:  Negative for color change.   Neurological:  Positive for weakness and headaches. Negative for seizures and numbness.   Hematological:  Negative for adenopathy.   Psychiatric/Behavioral:  Negative for agitation and confusion.    Objective:     Vital Signs (Most Recent):  Temp: 98.4 °F (36.9 °C) (06/03/22 0400)  Pulse: 70 (06/03/22 0500)  Resp: 14 (06/03/22 0500)  BP: 132/78 (06/03/22 0500)  SpO2: (!) 92 % (06/03/22 0523)   Vital Signs (24h Range):  Temp:  [98.2 °F (36.8 °C)-98.4 °F (36.9 °C)] 98.4 °F (36.9 °C)  Pulse:  [69-88] 70  Resp:  [14-20] 14  SpO2:  [91 %-100 %] 92 %  BP: (130-176)/(64-88) 132/78     Weight: 97.5 kg (215 lb)  Body mass index is 34.7 kg/m².    Physical Exam  Vitals and nursing note reviewed.   Constitutional:       General: She is not in acute distress.     Appearance: Normal appearance. She is not ill-appearing or diaphoretic.   HENT:      Head: Normocephalic and atraumatic.      Right Ear: External ear normal.      Left Ear: External ear normal.      Nose: Nose normal.      Mouth/Throat:      Mouth: Mucous membranes are moist.   Eyes:      General: No scleral icterus.        Right eye: No discharge.         Left eye: No discharge.   Cardiovascular:       Rate and Rhythm: Normal rate and regular rhythm.      Pulses: Normal pulses.      Heart sounds: No murmur heard.    No gallop.   Pulmonary:      Effort: Pulmonary effort is normal. No respiratory distress.      Breath sounds: Normal breath sounds. No stridor. No wheezing, rhonchi or rales.   Chest:      Chest wall: No tenderness.   Abdominal:      General: Abdomen is flat. Bowel sounds are normal. There is no distension.      Tenderness: There is no abdominal tenderness. There is no right CVA tenderness, left CVA tenderness or guarding.   Musculoskeletal:         General: No swelling or deformity. Normal range of motion.      Cervical back: Normal range of motion and neck supple. No rigidity or tenderness.      Right lower leg: No edema.      Comments: Tender to bilateral trapezoids   Lymphadenopathy:      Cervical: No cervical adenopathy.   Skin:     General: Skin is warm.      Capillary Refill: Capillary refill takes less than 2 seconds.      Coloration: Skin is not jaundiced.      Findings: No bruising, lesion or rash.   Neurological:      General: No focal deficit present.      Mental Status: She is alert and oriented to person, place, and time.      Gait: Gait abnormal.      Comments: Symmetrical plantar and dorsal flexion 5/5  Symmetrical hand squeeze and arm flexion 5/5   Psychiatric:         Mood and Affect: Mood normal.           Significant Labs: All pertinent labs within the past 24 hours have been reviewed.    Significant Imaging: I have reviewed all pertinent imaging results/findings within the past 24 hours.

## 2022-06-03 NOTE — ASSESSMENT & PLAN NOTE
Has heavy smoking history,  CAD s/p CABG w/ multiple stents and ICD placement  Nuclear stress test 8/2021 without ischemia  Trop and EKG reassuring at [facility] this admission    PLAN  - EKG and trop if cardiac-like chest pain; no trending at the moment  - Atorvastatin QHS  - Control BP

## 2022-06-03 NOTE — PT/OT/SLP PROGRESS
Physical Therapy      Patient Name:  Stephania Salmeron   MRN:  [id number]    Patient not seen today secondary to Pain, Patient fatigue. Patient found in corner of ED corridor, complaining of HA and photophobia- RN alerted patient requesting pain meds. Patient tearful about her symptoms, location in ED hallway, being asked to mobilize with therapy. Therapeutic listening, defer PT eval today.  Will follow-up as appropriate.

## 2022-06-04 LAB
ALBUMIN SERPL BCP-MCNC: 2.7 G/DL (ref 3.5–5.2)
ALP SERPL-CCNC: 161 U/L (ref 55–135)
ALT SERPL W/O P-5'-P-CCNC: 30 U/L (ref 10–44)
ANION GAP SERPL CALC-SCNC: 7 MMOL/L (ref 8–16)
AST SERPL-CCNC: 26 U/L (ref 10–40)
BASOPHILS # BLD AUTO: 0.03 K/UL (ref 0–0.2)
BASOPHILS NFR BLD: 0.7 % (ref 0–1.9)
BILIRUB SERPL-MCNC: 0.3 MG/DL (ref 0.1–1)
BUN SERPL-MCNC: 21 MG/DL (ref 8–23)
CALCIUM SERPL-MCNC: 8.7 MG/DL (ref 8.7–10.5)
CHLORIDE SERPL-SCNC: 106 MMOL/L (ref 95–110)
CO2 SERPL-SCNC: 26 MMOL/L (ref 23–29)
CREAT SERPL-MCNC: 0.9 MG/DL (ref 0.5–1.4)
DIFFERENTIAL METHOD: ABNORMAL
EOSINOPHIL # BLD AUTO: 0.1 K/UL (ref 0–0.5)
EOSINOPHIL NFR BLD: 3.2 % (ref 0–8)
ERYTHROCYTE [DISTWIDTH] IN BLOOD BY AUTOMATED COUNT: 14.7 % (ref 11.5–14.5)
EST. GFR  (AFRICAN AMERICAN): >60 ML/MIN/1.73 M^2
EST. GFR  (NON AFRICAN AMERICAN): >60 ML/MIN/1.73 M^2
GLUCOSE SERPL-MCNC: 102 MG/DL (ref 70–110)
HCT VFR BLD AUTO: 34.5 % (ref 37–48.5)
HGB BLD-MCNC: 10.1 G/DL (ref 12–16)
IMM GRANULOCYTES # BLD AUTO: 0.02 K/UL (ref 0–0.04)
IMM GRANULOCYTES NFR BLD AUTO: 0.5 % (ref 0–0.5)
LYMPHOCYTES # BLD AUTO: 1.7 K/UL (ref 1–4.8)
LYMPHOCYTES NFR BLD: 38.3 % (ref 18–48)
MAGNESIUM SERPL-MCNC: 1.7 MG/DL (ref 1.6–2.6)
MCH RBC QN AUTO: 24.8 PG (ref 27–31)
MCHC RBC AUTO-ENTMCNC: 29.3 G/DL (ref 32–36)
MCV RBC AUTO: 85 FL (ref 82–98)
MONOCYTES # BLD AUTO: 0.7 K/UL (ref 0.3–1)
MONOCYTES NFR BLD: 16.7 % (ref 4–15)
NEUTROPHILS # BLD AUTO: 1.8 K/UL (ref 1.8–7.7)
NEUTROPHILS NFR BLD: 40.6 % (ref 38–73)
NRBC BLD-RTO: 0 /100 WBC
PHOSPHATE SERPL-MCNC: 4 MG/DL (ref 2.7–4.5)
PLATELET # BLD AUTO: 198 K/UL (ref 150–450)
PMV BLD AUTO: 10 FL (ref 9.2–12.9)
POTASSIUM SERPL-SCNC: 4.1 MMOL/L (ref 3.5–5.1)
PROT SERPL-MCNC: 6.2 G/DL (ref 6–8.4)
RBC # BLD AUTO: 4.07 M/UL (ref 4–5.4)
SODIUM SERPL-SCNC: 139 MMOL/L (ref 136–145)
WBC # BLD AUTO: 4.36 K/UL (ref 3.9–12.7)

## 2022-06-04 PROCEDURE — 97116 GAIT TRAINING THERAPY: CPT

## 2022-06-04 PROCEDURE — 83735 ASSAY OF MAGNESIUM: CPT | Performed by: STUDENT IN AN ORGANIZED HEALTH CARE EDUCATION/TRAINING PROGRAM

## 2022-06-04 PROCEDURE — G0378 HOSPITAL OBSERVATION PER HR: HCPCS

## 2022-06-04 PROCEDURE — 25000003 PHARM REV CODE 250

## 2022-06-04 PROCEDURE — 99226 PR SUBSEQUENT OBSERVATION CARE,LEVEL III: CPT | Mod: ,,, | Performed by: STUDENT IN AN ORGANIZED HEALTH CARE EDUCATION/TRAINING PROGRAM

## 2022-06-04 PROCEDURE — 80053 COMPREHEN METABOLIC PANEL: CPT | Performed by: STUDENT IN AN ORGANIZED HEALTH CARE EDUCATION/TRAINING PROGRAM

## 2022-06-04 PROCEDURE — 25000242 PHARM REV CODE 250 ALT 637 W/ HCPCS: Performed by: STUDENT IN AN ORGANIZED HEALTH CARE EDUCATION/TRAINING PROGRAM

## 2022-06-04 PROCEDURE — 36415 COLL VENOUS BLD VENIPUNCTURE: CPT | Performed by: STUDENT IN AN ORGANIZED HEALTH CARE EDUCATION/TRAINING PROGRAM

## 2022-06-04 PROCEDURE — 99226 PR SUBSEQUENT OBSERVATION CARE,LEVEL III: ICD-10-PCS | Mod: ,,, | Performed by: STUDENT IN AN ORGANIZED HEALTH CARE EDUCATION/TRAINING PROGRAM

## 2022-06-04 PROCEDURE — 99900035 HC TECH TIME PER 15 MIN (STAT)

## 2022-06-04 PROCEDURE — 84100 ASSAY OF PHOSPHORUS: CPT | Performed by: STUDENT IN AN ORGANIZED HEALTH CARE EDUCATION/TRAINING PROGRAM

## 2022-06-04 PROCEDURE — 94640 AIRWAY INHALATION TREATMENT: CPT

## 2022-06-04 PROCEDURE — 25000003 PHARM REV CODE 250: Performed by: STUDENT IN AN ORGANIZED HEALTH CARE EDUCATION/TRAINING PROGRAM

## 2022-06-04 PROCEDURE — 85025 COMPLETE CBC W/AUTO DIFF WBC: CPT | Performed by: STUDENT IN AN ORGANIZED HEALTH CARE EDUCATION/TRAINING PROGRAM

## 2022-06-04 PROCEDURE — 97162 PT EVAL MOD COMPLEX 30 MIN: CPT

## 2022-06-04 PROCEDURE — 94761 N-INVAS EAR/PLS OXIMETRY MLT: CPT

## 2022-06-04 RX ORDER — BUTALBITAL, ACETAMINOPHEN AND CAFFEINE 50; 325; 40 MG/1; MG/1; MG/1
1 TABLET ORAL ONCE AS NEEDED
Status: COMPLETED | OUTPATIENT
Start: 2022-06-04 | End: 2022-06-04

## 2022-06-04 RX ADMIN — LORAZEPAM 1 MG: 1 TABLET ORAL at 09:06

## 2022-06-04 RX ADMIN — BUTALBITAL, ACETAMINOPHEN, AND CAFFEINE 1 TABLET: 50; 325; 40 TABLET ORAL at 01:06

## 2022-06-04 RX ADMIN — METOPROLOL SUCCINATE 25 MG: 25 TABLET, EXTENDED RELEASE ORAL at 09:06

## 2022-06-04 RX ADMIN — QUETIAPINE FUMARATE 200 MG: 200 TABLET ORAL at 09:06

## 2022-06-04 RX ADMIN — PANTOPRAZOLE SODIUM 40 MG: 40 TABLET, DELAYED RELEASE ORAL at 09:06

## 2022-06-04 RX ADMIN — RIVAROXABAN 20 MG: 10 TABLET, FILM COATED ORAL at 04:06

## 2022-06-04 RX ADMIN — ACETAMINOPHEN 1000 MG: 500 TABLET ORAL at 11:06

## 2022-06-04 RX ADMIN — BUPROPION HYDROCHLORIDE 450 MG: 150 TABLET, FILM COATED, EXTENDED RELEASE ORAL at 09:06

## 2022-06-04 RX ADMIN — PREGABALIN 100 MG: 50 CAPSULE ORAL at 09:06

## 2022-06-04 RX ADMIN — PREGABALIN 100 MG: 50 CAPSULE ORAL at 02:06

## 2022-06-04 RX ADMIN — ATORVASTATIN CALCIUM 40 MG: 20 TABLET, FILM COATED ORAL at 09:06

## 2022-06-04 RX ADMIN — FLUTICASONE FUROATE AND VILANTEROL TRIFENATATE 1 PUFF: 100; 25 POWDER RESPIRATORY (INHALATION) at 08:06

## 2022-06-04 NOTE — PROGRESS NOTES
[facility] - Intensive Care ([place] [address])  [facility] Medicine  Progress Note    Patient Name: Stephania Salmeron  MRN: [id number]  Patient Class: OP- Observation   Admission Date: 6/2/2022  Length of Stay: 0 days  Attending Physician: Glynn Santos MD  Primary Care Provider: Sinan Saeed MD        Subjective:     Principal Problem:Headache        HPI:  Patient is a 66 yo woman with significant medical history HTN, COPD, AFib, CAD s/p CABG with multiple stents and ICD, and mild cognitive dysfunction who presents with 5 days of headache and chest pain radiating to left shoulder. She provides history. She states she presented to the ED because of a headache that she has had for 5 days. It has kept her in the bed and she has not been eating or drinking much. She states the HA is to the left and right of her head and some in the back towards her neck, radiating to her shoulders. Massaging her shoulders makes the pain better. She has had several doses of morphine in the ED and this has also made the pain better. NTG makes the HA worse. She has photosensitivity. She has had some nausea. She also c/o chest pain, radiating to the left shoulder which was not relieved by the NTG. Morphine made chest pain better. She has also noticed increased urinary frequency. She is feeling very anxious because she is on Ativan four times a day and has not taken it today. She lives with her daughter who helps her with medications. She denies tobacco and drinking. She owns a dog, Zahida, who is a Shiatzu.       Overview/Hospital Course:  No notes on file    Interval History: NAEON. Improvement in headache with Fioricet, but still mild headache and photophobia. Worked with PT today, will need HH at discharge. Will continue to watch another day.         Past Medical History:   Diagnosis Date    Acute coronary syndrome     Atrial fibrillation     Clotting disorder     Coronary artery disease     COVID-19 01/18/2021    Required  hospitalization d/t pneumonia and hypoxia    Hyperlipidemia     Hypertension     ICD (implantable cardioverter-defibrillator) in place     Medtronic Evera XT ICD - Model DNPN6F8 (SN: [id number]), 5568-45 (SN: [id number]), 6932-65 (SN: [id number])    Occipital stroke     Left occipital lobe    Quadrantanopia, right     Right lower quadrant       Past Surgical History:   Procedure Laterality Date    APPENDECTOMY      CARDIAC DEFIBRILLATOR PLACEMENT      CHOLECYSTECTOMY      COLONOSCOPY N/A 3/31/2021    Procedure: COLONOSCOPY Suprep;  Surgeon: Murtaza Curry MD;  Location: [facility] ENDO;  Service: Endoscopy;  Laterality: N/A;    CORONARY ANGIOPLASTY      CORONARY ANGIOPLASTY WITH STENT PLACEMENT      CORONARY ARTERY BYPASS GRAFT  1997    ESOPHAGOGASTRODUODENOSCOPY N/A 3/31/2021    Procedure: EGD (ESOPHAGOGASTRODUODENOSCOPY);  Surgeon: Murtaza Curry MD;  Location: [facility] ENDO;  Service: Endoscopy;  Laterality: N/A;    DESIRAE FILTER PLACEMENT  2017    HYSTERECTOMY         Review of patient's allergies indicates:   Allergen Reactions    Compazine [prochlorperazine]      Feels like somethingcrawling underneath the skin    Demerol (pf) [meperidine (pf)]      Feels like somethingcrawling underneath the skin    Toradol [ketorolac]      Feels like somethingcrawling underneath the skin       No current facility-administered medications on file prior to encounter.     Current Outpatient Medications on File Prior to Encounter   Medication Sig    albuterol (PROVENTIL/VENTOLIN HFA) 90 mcg/actuation inhaler INHALE 2 PUFFS INTO THE LUNGS EVERY 6 (SIX) HOURS AS NEEDED FOR WHEEZING. RESCUE    aspirin (ECOTRIN) 81 MG EC tablet Take 81 mg by mouth once daily.    aspirin/caffeine (BC PAIN RELIEF ORAL) Take 1 packet by mouth 3 (three) times daily as needed (headache).    atorvastatin (LIPITOR) 40 MG tablet Take 1 tablet (40 mg total) by mouth once daily.    buPROPion (WELLBUTRIN XL) 150 MG TB24 tablet Take 450 mg  by mouth once daily.    docusate sodium (COLACE) 100 MG capsule TAKE 1 CAPSULE (100 MG TOTAL) BY MOUTH 2 (TWO) TIMES DAILY AS NEEDED FOR CONSTIPATION.    ergocalciferol (ERGOCALCIFEROL) 50,000 unit Cap Take 1 capsule (50,000 Units total) by mouth every 7 days.    esomeprazole (NEXIUM) 40 MG capsule Take 1 capsule (40 mg total) by mouth before breakfast.    lorazepam (ATIVAN) 1 MG tablet Take 1 mg by mouth 3 (three) times daily.    meclizine (ANTIVERT) 12.5 mg tablet TAKE 1 OR 2 TABLETS EVERY 4-6 HOURS, AS NEEDED TO CONTROL IMBALANCE OR DIZZINESS OR VERTIGO    metoprolol succinate (TOPROL-XL) 25 MG 24 hr tablet Take 1 tablet (25 mg total) by mouth 2 (two) times daily.    pregabalin (LYRICA) 100 MG capsule Take 100 mg by mouth 3 (three) times daily as needed.    QUEtiapine (SEROQUEL) 200 MG Tab Take 200 mg by mouth every evening.    SPIRIVA RESPIMAT 1.25 mcg/actuation inhaler INHALE 2 PUFFS INTO THE LUNGS ONCE DAILY. CONTROLLER    SYMBICORT 160-4.5 mcg/actuation HFAA INHALE 2 PUFFS INTO THE LUNGS EVERY 12 (TWELVE) HOURS. CONTROLLER (Patient taking differently: Inhale 1-2 puffs into the lungs 1-2 times per day as needed)    XARELTO 20 mg Tab TAKE 1 TABLET (20 MG TOTAL) BY MOUTH DAILY WITH DINNER OR EVENING MEAL.     Family History       Problem Relation (Age of Onset)    Alcohol abuse Sister    Anuerysm Father    Anxiety disorder Sister    Bipolar disorder Other    Bone cancer Maternal Aunt    COPD Sister    Dementia Sister    Depression Brother, Brother    Lung cancer Mother    Pulmonary embolism Sister    Stroke Sister (72)          Tobacco Use    Smoking status: Former Smoker     Packs/day: 4.00     Years: 30.00     Pack years: 120.00     Types: Cigarettes     Quit date: 3/22/1994     Years since quittin.2    Smokeless tobacco: Never Used   Substance and Sexual Activity    Alcohol use: Not Currently     Alcohol/week: 0.0 standard drinks    Drug use: Never    Sexual activity: Not Currently      Review of Systems   Constitutional:  Negative for appetite change, chills, fever and unexpected weight change.   HENT:  Negative for congestion, nosebleeds and sore throat.    Eyes:  Negative for photophobia and pain.   Respiratory:  Negative for cough, chest tightness and shortness of breath.    Cardiovascular:  Negative for chest pain and palpitations.   Gastrointestinal:  Negative for abdominal pain, blood in stool, diarrhea, nausea and vomiting.   Genitourinary:  Negative for dysuria, flank pain, frequency, hematuria and urgency.   Musculoskeletal:  Positive for gait problem and neck pain. Negative for back pain.   Skin:  Negative for color change.   Neurological:  Positive for weakness and headaches. Negative for seizures and numbness.   Hematological:  Negative for adenopathy.   Psychiatric/Behavioral:  Negative for agitation and confusion.    Objective:     Vital Signs (Most Recent):  Temp: 98.1 °F (36.7 °C) (06/04/22 1200)  Pulse: 70 (06/04/22 1200)  Resp: 18 (06/04/22 1200)  BP: 125/68 (06/04/22 1200)  SpO2: 97 % (06/04/22 1200)   Vital Signs (24h Range):  Temp:  [97.7 °F (36.5 °C)-98.3 °F (36.8 °C)] 98.1 °F (36.7 °C)  Pulse:  [69-78] 70  Resp:  [17-21] 18  SpO2:  [91 %-98 %] 97 %  BP: ()/(51-93) 125/68     Weight: 100.8 kg (222 lb 3.6 oz)  Body mass index is 35.87 kg/m².    Physical Exam  Vitals and nursing note reviewed.   Constitutional:       General: She is not in acute distress.     Appearance: Normal appearance. She is not ill-appearing or diaphoretic.   HENT:      Head: Normocephalic and atraumatic.      Right Ear: External ear normal.      Left Ear: External ear normal.      Nose: Nose normal.      Mouth/Throat:      Mouth: Mucous membranes are moist.   Eyes:      General: No scleral icterus.        Right eye: No discharge.         Left eye: No discharge.   Cardiovascular:      Rate and Rhythm: Normal rate and regular rhythm.      Pulses: Normal pulses.      Heart sounds: No murmur  heard.    No gallop.   Pulmonary:      Effort: Pulmonary effort is normal. No respiratory distress.      Breath sounds: Normal breath sounds. No stridor. No wheezing, rhonchi or rales.   Chest:      Chest wall: No tenderness.   Abdominal:      General: Abdomen is flat. Bowel sounds are normal. There is no distension.      Tenderness: There is no abdominal tenderness. There is no right CVA tenderness, left CVA tenderness or guarding.   Musculoskeletal:         General: No swelling or deformity. Normal range of motion.      Cervical back: Normal range of motion and neck supple. No rigidity or tenderness.      Right lower leg: No edema.      Comments: Tender to bilateral trapezoids   Lymphadenopathy:      Cervical: No cervical adenopathy.   Skin:     General: Skin is warm.      Capillary Refill: Capillary refill takes less than 2 seconds.      Coloration: Skin is not jaundiced.      Findings: No bruising, lesion or rash.   Neurological:      General: No focal deficit present.      Mental Status: She is alert and oriented to person, place, and time.      Gait: Gait abnormal.      Comments: Symmetrical plantar and dorsal flexion 5/5  Symmetrical hand squeeze and arm flexion 5/5   Psychiatric:         Mood and Affect: Mood normal.           Significant Labs: All pertinent labs within the past 24 hours have been reviewed.    Significant Imaging: I have reviewed all pertinent imaging results/findings within the past 24 hours.      Assessment/Plan:      * Headache  States this is her major complaint to me  Severe compared to what she normally has  Morphine in the ED has made it better  Lights and agitation make it worse  CT head without concerning findings  No recent trauma  Dry MM    PLAN  - DDx: migraine, secondary to dehydration, anxiety related  - Given PM meds (ativan, seroquel, pregabalin) and assess; do not want to oversedate if this is due to missed meds / anxiety.       Frequent falls  PT/OT  F/u UA      Home PT at  discharge    Vertigo  Patient seen by neurology outpatient; was supposed to f/u but has not  Also seen by vascular neurology for numbness complaints (not voiced to me); no concern from CT head of acute pathology    PLAN  - Outpt neurology referral for f/u: gait, vertigo, numbness  - Fall precautions  - PRN meclizine (home med)  - PT/OT       Chest pain  Trop 0.006 x 2  EKG without ST changes  Previous presentations were negative for ischemia evaluations    PLAN  - DDx: HTN or anxiety related  - Continue home meds for anxiety -- Ativan 1mg (on wellbutrin which may worsening anxiety)     Coronary artery disease  Has heavy smoking history,  CAD s/p CABG w/ multiple stents and ICD placement  Nuclear stress test 8/2021 without ischemia  Trop and EKG reassuring at [facility] this admission    PLAN  - EKG and trop if cardiac-like chest pain; no trending at the moment  - Atorvastatin QHS  - Control BP    AICD (automatic cardioverter/defibrillator) present  Placed in 2002  Unable to get MRI        VTE Risk Mitigation (From admission, onward)         Ordered     rivaroxaban tablet 20 mg  With dinner         06/02/22 2236     IP VTE HIGH RISK PATIENT  Once         06/02/22 2236     Place sequential compression device  Until discontinued         06/02/22 2236                Discharge Planning   WILDER:      Code Status: Full Code   Is the patient medically ready for discharge?:     Reason for patient still in hospital (select all that apply): Patient trending condition                     Deandre Bailey MD  Department of Hospital Medicine   [facility] - Intensive Care ([place]-[facility])

## 2022-06-04 NOTE — PLAN OF CARE
Problem: Adult Inpatient Plan of Care  Goal: Plan of Care Review  Outcome: Ongoing, Progressing  Goal: Patient-Specific Goal (Individualized)  Outcome: Ongoing, Progressing  Goal: Absence of Hospital-Acquired Illness or Injury  Outcome: Ongoing, Progressing  Goal: Optimal Comfort and Wellbeing  Outcome: Ongoing, Progressing     POC reviewed with patient. All questions and concerns addressed. Fall/safety precautions implemented and maintained. Pain management provided d/t HA. No acute events noted this shift. Please see flowsheet for full assessment and vitals. Bed locked in lowest position. Call bell within reach. Will continue to monitor.

## 2022-06-04 NOTE — PT/OT/SLP EVAL
Physical Therapy Evaluation    Patient Name:  Stephania Salmeron   MRN:  [id number]    Recommendations:     Discharge Recommendations:  home with home health   Discharge Equipment Recommendations: bath bench, walker, rolling   Barriers to discharge: Inaccessible home (pt has 3 steps to enter her house)    Assessment:     Stephania Salmeron is a 67 y.o. female admitted with a medical diagnosis of Headache.  She presents with the following impairments/functional limitations:  weakness, impaired endurance, impaired self care skills, impaired functional mobilty, gait instability, impaired balance (light sensitivity) .     Pt jose alfredo session well w/ good participation. PTA she was IND w/ mobility/ADLs but she is currently limited by the above listed deficits requiring CGA for safety w/ transfers and short distance gait. She was also limited by vertigo w/ head turns- vestibular testing was completed and she was found to be negative for vestibular etiology of vertigo. Pt is appropriate for acute PT services and will begin PT POC.    Rehab Prognosis: Good; patient would benefit from acute skilled PT services to address these deficits and reach maximum level of function.    Recent Surgery: * No surgery found *      Plan:     During this hospitalization, patient to be seen 3 x/week to address the identified rehab impairments via gait training, therapeutic activities, therapeutic exercises and progress toward the following goals:    · Plan of Care Expires:  07/02/22    Subjective     Chief Complaint: headache and light sensitivity  Patient/Family Comments/goals: return to PLOF  Pain/Comfort:  · Pain Rating 1: 5/10  · Location - Side 1: Bilateral  · Location - Orientation 1: generalized  · Location 1: head  · Pain Addressed 1: Pre-medicate for activity, Nurse notified  · Pain Rating Post-Intervention 1: 5/10    Patients cultural, spiritual, Mormonism conflicts given the current situation: no    Living Environment:  Pt lives w/ her daughter  and son-in-law in a 1SH w/ 3STE and BHR. She has a tub/shower combo.  Prior to admission, patients level of function was IND w/ mobility/ADLs w/o AD.  Equipment used at home: none.  DME owned (not currently used): none.  Upon discharge, patient will have assistance from her daughter and son-in-law but they both work.    Objective:     Communicated with nursing prior to session.  Patient found supine with  (no active lines)  upon PT entry to room. Pt's lights were off in her room due to her new light sensitivity.    General Precautions: Standard, fall   Orthopedic Precautions:N/A   Braces: N/A  Respiratory Status: Room air    Exams:  · Cognitive Exam:  Patient is oriented to Person, Place, Time and Situation  · Fine Motor Coordination:    · -       Intact  Left hand thumb/finger opposition skills and Right hand thumb/finger opposition skills  · Gross Motor Coordination:  WFL  · Postural Exam:  Patient presented with the following abnormalities:    · -       Rounded shoulders  · -       Forward head  · Sensation:    · -       Intact  light/touch hands/feet  · Skin Integrity/Edema:      · -       Skin integrity: Visible skin intact  · RUE ROM: WFL  · RUE Strength: WFL  · LUE ROM: WFL  · LUE Strength: WFL  · RLE ROM: WFL  · RLE Strength: grossly 4/5  · LLE ROM: WFL  · LLE Strength: grossly 4/5    Vestibular Assessment:  Subjective Report:  Pt reported dizziness when turning her head that lasts for a few seconds. She reports it has gotten better recently.     Vestibular Testing:  Oculomotor Exam:  1. Spontaneous Nystagmus- none  2. Gaze Holding Nystagmus- none  3. Ocular ROM- normal  4. Smooth Pursuit- normal  5. Saccades- normal  6. Head Thrust Test- negative bilaterally    Positional Testin. Alex Hallpike- negative bilaterally  2. Roll Test- negative bilaterally    Functional Mobility:  · Bed Mobility:   · Roll left/right on bed w/ SBA  · Supine<>sit on bed w/ SBA  · Transfers:   · Sit<>stand to/from EOB w/ RW and  CGA  Cues for hand placement  · Gait:   · 40ft in room w/ RW and CGA  Mild instability noted t/o gait trial but no overt LOB  · Pt declined to walk in watts due to light sensitivity  · Balance:   · Static stand w/ RW and CGA    Therapeutic Activities and Exercises:  Pt was educated on PT role and POC. She verb understanding.     AM-PAC 6 CLICK MOBILITY  Total Score:17     Patient left supine with call button in reach.    GOALS:   Multidisciplinary Problems     Physical Therapy Goals        Problem: Physical Therapy    Goal Priority Disciplines Outcome Goal Variances Interventions   Physical Therapy Goal     PT, PT/OT Ongoing, Progressing     Description: Goals to be met by: 22     Patient will increase functional independence with mobility by performin. Supine to sit with Set-up Lee  2. Sit to supine with Set-up Lee  3. Sit to stand transfer with Supervision  4. Bed to chair transfer with Supervision using Rolling Walker  5. Gait  x 150 feet with Supervision using Rolling Walker.   6. Ascend/descend 3 stair with right Handrails Contact Guard Assistance                      History:     Past Medical History:   Diagnosis Date    Acute coronary syndrome     Atrial fibrillation     Clotting disorder     Coronary artery disease     COVID-19 2021    Required hospitalization d/t pneumonia and hypoxia    Hyperlipidemia     Hypertension     ICD (implantable cardioverter-defibrillator) in place     Medtronic Evera XT ICD - Model SXDP4T9 (SN: [id number]), 5568-45 (SN: [id number]), 6932-65 (SN: [id number])    Occipital stroke     Left occipital lobe    Quadrantanopia, right     Right lower quadrant       Past Surgical History:   Procedure Laterality Date    APPENDECTOMY      CARDIAC DEFIBRILLATOR PLACEMENT      CHOLECYSTECTOMY      COLONOSCOPY N/A 3/31/2021    Procedure: COLONOSCOPY Suprep;  Surgeon: Murtaza Curry MD;  [facility];  Service: Endoscopy;  Laterality:  N/A;    CORONARY ANGIOPLASTY      CORONARY ANGIOPLASTY WITH STENT PLACEMENT      CORONARY ARTERY BYPASS GRAFT  1997    ESOPHAGOGASTRODUODENOSCOPY N/A 3/31/2021    Procedure: EGD (ESOPHAGOGASTRODUODENOSCOPY);  Surgeon: Murtaza Curry MD;  Location: [facility];  Service: Endoscopy;  Laterality: N/A;    DESIRAE FILTER PLACEMENT  2017    HYSTERECTOMY         Time Tracking:     PT Received On: 06/04/22  PT Start Time: 1108     PT Stop Time: 1130  PT Total Time (min): 22 min     Billable Minutes: Evaluation 12, Gait Training 10 and Total Time 22      06/04/2022

## 2022-06-04 NOTE — ASSESSMENT & PLAN NOTE
States this is her major complaint to me  Severe compared to what she normally has  Morphine in the ED has made it better  Lights and agitation make it worse  CT head without concerning findings  No recent trauma  Dry MM    PLAN  - DDx: migraine, secondary to dehydration, anxiety related  - Given PM meds (ativan, seroquel, pregabalin) and assess; do not want to oversedate if this is due to missed meds / anxiety.

## 2022-06-04 NOTE — PLAN OF CARE
PT suleimanal completed. Pt will begin PT POC.  Melina Delgado, PT  2022    Problem: Physical Therapy  Goal: Physical Therapy Goal  Description: Goals to be met by: 22     Patient will increase functional independence with mobility by performin. Supine to sit with Set-up Florence  2. Sit to supine with Set-up Florence  3. Sit to stand transfer with Supervision  4. Bed to chair transfer with Supervision using Rolling Walker  5. Gait  x 150 feet with Supervision using Rolling Walker.   6. Ascend/descend 3 stair with right Handrails Contact Guard Assistance     Outcome: Ongoing, Progressing

## 2022-06-04 NOTE — SUBJECTIVE & OBJECTIVE
Interval History: NAEON. Improvement in headache with Fioricet, but still mild headache and photophobia. Worked with PT today, will need HH at discharge. Will continue to watch another day.         Past Medical History:   Diagnosis Date    Acute coronary syndrome     Atrial fibrillation     Clotting disorder     Coronary artery disease     COVID-19 01/18/2021    Required hospitalization d/t pneumonia and hypoxia    Hyperlipidemia     Hypertension     ICD (implantable cardioverter-defibrillator) in place     Medtronic Evera XT ICD - Model UDTQ8K5 (SN: [id number]), 5568-45 (SN: [id number]), 6932-65 (SN: [id number])    Occipital stroke     Left occipital lobe    Quadrantanopia, right     Right lower quadrant       Past Surgical History:   Procedure Laterality Date    APPENDECTOMY      CARDIAC DEFIBRILLATOR PLACEMENT      CHOLECYSTECTOMY      COLONOSCOPY N/A 3/31/2021    Procedure: COLONOSCOPY Suprep;  Surgeon: Murtaza Curry MD;  Location: [facility] ENDO;  Service: Endoscopy;  Laterality: N/A;    CORONARY ANGIOPLASTY      CORONARY ANGIOPLASTY WITH STENT PLACEMENT      CORONARY ARTERY BYPASS GRAFT  1997    ESOPHAGOGASTRODUODENOSCOPY N/A 3/31/2021    Procedure: EGD (ESOPHAGOGASTRODUODENOSCOPY);  Surgeon: Murtaza Curry MD;  Location: [facility] ENDO;  Service: Endoscopy;  Laterality: N/A;    DESIRAE FILTER PLACEMENT  2017    HYSTERECTOMY         Review of patient's allergies indicates:   Allergen Reactions    Compazine [prochlorperazine]      Feels like somethingcrawling underneath the skin    Demerol (pf) [meperidine (pf)]      Feels like somethingcrawling underneath the skin    Toradol [ketorolac]      Feels like somethingcrawling underneath the skin       No current facility-administered medications on file prior to encounter.     Current Outpatient Medications on File Prior to Encounter   Medication Sig    albuterol (PROVENTIL/VENTOLIN HFA) 90 mcg/actuation inhaler INHALE 2 PUFFS INTO THE LUNGS EVERY 6 (SIX) HOURS AS  NEEDED FOR WHEEZING. RESCUE    aspirin (ECOTRIN) 81 MG EC tablet Take 81 mg by mouth once daily.    aspirin/caffeine (BC PAIN RELIEF ORAL) Take 1 packet by mouth 3 (three) times daily as needed (headache).    atorvastatin (LIPITOR) 40 MG tablet Take 1 tablet (40 mg total) by mouth once daily.    buPROPion (WELLBUTRIN XL) 150 MG TB24 tablet Take 450 mg by mouth once daily.    docusate sodium (COLACE) 100 MG capsule TAKE 1 CAPSULE (100 MG TOTAL) BY MOUTH 2 (TWO) TIMES DAILY AS NEEDED FOR CONSTIPATION.    ergocalciferol (ERGOCALCIFEROL) 50,000 unit Cap Take 1 capsule (50,000 Units total) by mouth every 7 days.    esomeprazole (NEXIUM) 40 MG capsule Take 1 capsule (40 mg total) by mouth before breakfast.    lorazepam (ATIVAN) 1 MG tablet Take 1 mg by mouth 3 (three) times daily.    meclizine (ANTIVERT) 12.5 mg tablet TAKE 1 OR 2 TABLETS EVERY 4-6 HOURS, AS NEEDED TO CONTROL IMBALANCE OR DIZZINESS OR VERTIGO    metoprolol succinate (TOPROL-XL) 25 MG 24 hr tablet Take 1 tablet (25 mg total) by mouth 2 (two) times daily.    pregabalin (LYRICA) 100 MG capsule Take 100 mg by mouth 3 (three) times daily as needed.    QUEtiapine (SEROQUEL) 200 MG Tab Take 200 mg by mouth every evening.    SPIRIVA RESPIMAT 1.25 mcg/actuation inhaler INHALE 2 PUFFS INTO THE LUNGS ONCE DAILY. CONTROLLER    SYMBICORT 160-4.5 mcg/actuation HFAA INHALE 2 PUFFS INTO THE LUNGS EVERY 12 (TWELVE) HOURS. CONTROLLER (Patient taking differently: Inhale 1-2 puffs into the lungs 1-2 times per day as needed)    XARELTO 20 mg Tab TAKE 1 TABLET (20 MG TOTAL) BY MOUTH DAILY WITH DINNER OR EVENING MEAL.     Family History       Problem Relation (Age of Onset)    Alcohol abuse Sister    Anuerysm Father    Anxiety disorder Sister    Bipolar disorder Other    Bone cancer Maternal Aunt    COPD Sister    Dementia Sister    Depression Brother, Brother    Lung cancer Mother    Pulmonary embolism Sister    Stroke Sister (72)          Tobacco Use    Smoking status: Former  Smoker     Packs/day: 4.00     Years: 30.00     Pack years: 120.00     Types: Cigarettes     Quit date: 3/22/1994     Years since quittin.2    Smokeless tobacco: Never Used   Substance and Sexual Activity    Alcohol use: Not Currently     Alcohol/week: 0.0 standard drinks    Drug use: Never    Sexual activity: Not Currently     Review of Systems   Constitutional:  Negative for appetite change, chills, fever and unexpected weight change.   HENT:  Negative for congestion, nosebleeds and sore throat.    Eyes:  Negative for photophobia and pain.   Respiratory:  Negative for cough, chest tightness and shortness of breath.    Cardiovascular:  Negative for chest pain and palpitations.   Gastrointestinal:  Negative for abdominal pain, blood in stool, diarrhea, nausea and vomiting.   Genitourinary:  Negative for dysuria, flank pain, frequency, hematuria and urgency.   Musculoskeletal:  Positive for gait problem and neck pain. Negative for back pain.   Skin:  Negative for color change.   Neurological:  Positive for weakness and headaches. Negative for seizures and numbness.   Hematological:  Negative for adenopathy.   Psychiatric/Behavioral:  Negative for agitation and confusion.    Objective:     Vital Signs (Most Recent):  Temp: 98.1 °F (36.7 °C) (22 1200)  Pulse: 70 (22 1200)  Resp: 18 (22 1200)  BP: 125/68 (22 1200)  SpO2: 97 % (22 1200)   Vital Signs (24h Range):  Temp:  [97.7 °F (36.5 °C)-98.3 °F (36.8 °C)] 98.1 °F (36.7 °C)  Pulse:  [69-78] 70  Resp:  [17-21] 18  SpO2:  [91 %-98 %] 97 %  BP: ()/(51-93) 125/68     Weight: 100.8 kg (222 lb 3.6 oz)  Body mass index is 35.87 kg/m².    Physical Exam  Vitals and nursing note reviewed.   Constitutional:       General: She is not in acute distress.     Appearance: Normal appearance. She is not ill-appearing or diaphoretic.   HENT:      Head: Normocephalic and atraumatic.      Right Ear: External ear normal.      Left Ear: External ear  normal.      Nose: Nose normal.      Mouth/Throat:      Mouth: Mucous membranes are moist.   Eyes:      General: No scleral icterus.        Right eye: No discharge.         Left eye: No discharge.   Cardiovascular:      Rate and Rhythm: Normal rate and regular rhythm.      Pulses: Normal pulses.      Heart sounds: No murmur heard.    No gallop.   Pulmonary:      Effort: Pulmonary effort is normal. No respiratory distress.      Breath sounds: Normal breath sounds. No stridor. No wheezing, rhonchi or rales.   Chest:      Chest wall: No tenderness.   Abdominal:      General: Abdomen is flat. Bowel sounds are normal. There is no distension.      Tenderness: There is no abdominal tenderness. There is no right CVA tenderness, left CVA tenderness or guarding.   Musculoskeletal:         General: No swelling or deformity. Normal range of motion.      Cervical back: Normal range of motion and neck supple. No rigidity or tenderness.      Right lower leg: No edema.      Comments: Tender to bilateral trapezoids   Lymphadenopathy:      Cervical: No cervical adenopathy.   Skin:     General: Skin is warm.      Capillary Refill: Capillary refill takes less than 2 seconds.      Coloration: Skin is not jaundiced.      Findings: No bruising, lesion or rash.   Neurological:      General: No focal deficit present.      Mental Status: She is alert and oriented to person, place, and time.      Gait: Gait abnormal.      Comments: Symmetrical plantar and dorsal flexion 5/5  Symmetrical hand squeeze and arm flexion 5/5   Psychiatric:         Mood and Affect: Mood normal.           Significant Labs: All pertinent labs within the past 24 hours have been reviewed.    Significant Imaging: I have reviewed all pertinent imaging results/findings within the past 24 hours.

## 2022-06-05 VITALS
WEIGHT: 222.25 LBS | OXYGEN SATURATION: 93 % | HEART RATE: 70 BPM | RESPIRATION RATE: 18 BRPM | DIASTOLIC BLOOD PRESSURE: 67 MMHG | HEIGHT: 66 IN | SYSTOLIC BLOOD PRESSURE: 116 MMHG | BODY MASS INDEX: 35.72 KG/M2 | TEMPERATURE: 98 F

## 2022-06-05 LAB
ALBUMIN SERPL BCP-MCNC: 2.7 G/DL (ref 3.5–5.2)
ALP SERPL-CCNC: 158 U/L (ref 55–135)
ALT SERPL W/O P-5'-P-CCNC: 24 U/L (ref 10–44)
ANION GAP SERPL CALC-SCNC: 6 MMOL/L (ref 8–16)
AST SERPL-CCNC: 19 U/L (ref 10–40)
BASOPHILS # BLD AUTO: 0.02 K/UL (ref 0–0.2)
BASOPHILS NFR BLD: 0.4 % (ref 0–1.9)
BILIRUB SERPL-MCNC: 0.2 MG/DL (ref 0.1–1)
BUN SERPL-MCNC: 23 MG/DL (ref 8–23)
CALCIUM SERPL-MCNC: 8.8 MG/DL (ref 8.7–10.5)
CHLORIDE SERPL-SCNC: 106 MMOL/L (ref 95–110)
CO2 SERPL-SCNC: 27 MMOL/L (ref 23–29)
CREAT SERPL-MCNC: 0.9 MG/DL (ref 0.5–1.4)
DIFFERENTIAL METHOD: ABNORMAL
EOSINOPHIL # BLD AUTO: 0.2 K/UL (ref 0–0.5)
EOSINOPHIL NFR BLD: 4.3 % (ref 0–8)
ERYTHROCYTE [DISTWIDTH] IN BLOOD BY AUTOMATED COUNT: 14.8 % (ref 11.5–14.5)
EST. GFR  (AFRICAN AMERICAN): >60 ML/MIN/1.73 M^2
EST. GFR  (NON AFRICAN AMERICAN): >60 ML/MIN/1.73 M^2
GLUCOSE SERPL-MCNC: 90 MG/DL (ref 70–110)
HCT VFR BLD AUTO: 33.2 % (ref 37–48.5)
HGB BLD-MCNC: 10.1 G/DL (ref 12–16)
IMM GRANULOCYTES # BLD AUTO: 0.02 K/UL (ref 0–0.04)
IMM GRANULOCYTES NFR BLD AUTO: 0.4 % (ref 0–0.5)
LYMPHOCYTES # BLD AUTO: 1.8 K/UL (ref 1–4.8)
LYMPHOCYTES NFR BLD: 39.5 % (ref 18–48)
MAGNESIUM SERPL-MCNC: 1.8 MG/DL (ref 1.6–2.6)
MCH RBC QN AUTO: 25.5 PG (ref 27–31)
MCHC RBC AUTO-ENTMCNC: 30.4 G/DL (ref 32–36)
MCV RBC AUTO: 84 FL (ref 82–98)
MONOCYTES # BLD AUTO: 0.6 K/UL (ref 0.3–1)
MONOCYTES NFR BLD: 13.2 % (ref 4–15)
NEUTROPHILS # BLD AUTO: 1.9 K/UL (ref 1.8–7.7)
NEUTROPHILS NFR BLD: 42.2 % (ref 38–73)
NRBC BLD-RTO: 0 /100 WBC
PHOSPHATE SERPL-MCNC: 3.6 MG/DL (ref 2.7–4.5)
PLATELET # BLD AUTO: 206 K/UL (ref 150–450)
PMV BLD AUTO: 10.6 FL (ref 9.2–12.9)
POTASSIUM SERPL-SCNC: 4.1 MMOL/L (ref 3.5–5.1)
PROT SERPL-MCNC: 6.3 G/DL (ref 6–8.4)
RBC # BLD AUTO: 3.96 M/UL (ref 4–5.4)
SODIUM SERPL-SCNC: 139 MMOL/L (ref 136–145)
WBC # BLD AUTO: 4.61 K/UL (ref 3.9–12.7)

## 2022-06-05 PROCEDURE — 99217 PR OBSERVATION CARE DISCHARGE: CPT | Mod: GC,,, | Performed by: STUDENT IN AN ORGANIZED HEALTH CARE EDUCATION/TRAINING PROGRAM

## 2022-06-05 PROCEDURE — 25000242 PHARM REV CODE 250 ALT 637 W/ HCPCS: Performed by: STUDENT IN AN ORGANIZED HEALTH CARE EDUCATION/TRAINING PROGRAM

## 2022-06-05 PROCEDURE — 96365 THER/PROPH/DIAG IV INF INIT: CPT

## 2022-06-05 PROCEDURE — G0378 HOSPITAL OBSERVATION PER HR: HCPCS

## 2022-06-05 PROCEDURE — 25000003 PHARM REV CODE 250

## 2022-06-05 PROCEDURE — 83735 ASSAY OF MAGNESIUM: CPT | Performed by: STUDENT IN AN ORGANIZED HEALTH CARE EDUCATION/TRAINING PROGRAM

## 2022-06-05 PROCEDURE — 99900035 HC TECH TIME PER 15 MIN (STAT)

## 2022-06-05 PROCEDURE — 36415 COLL VENOUS BLD VENIPUNCTURE: CPT | Performed by: STUDENT IN AN ORGANIZED HEALTH CARE EDUCATION/TRAINING PROGRAM

## 2022-06-05 PROCEDURE — 25000003 PHARM REV CODE 250: Performed by: STUDENT IN AN ORGANIZED HEALTH CARE EDUCATION/TRAINING PROGRAM

## 2022-06-05 PROCEDURE — 94640 AIRWAY INHALATION TREATMENT: CPT | Mod: XB

## 2022-06-05 PROCEDURE — 94761 N-INVAS EAR/PLS OXIMETRY MLT: CPT

## 2022-06-05 PROCEDURE — 85025 COMPLETE CBC W/AUTO DIFF WBC: CPT | Performed by: STUDENT IN AN ORGANIZED HEALTH CARE EDUCATION/TRAINING PROGRAM

## 2022-06-05 PROCEDURE — 84100 ASSAY OF PHOSPHORUS: CPT | Performed by: STUDENT IN AN ORGANIZED HEALTH CARE EDUCATION/TRAINING PROGRAM

## 2022-06-05 PROCEDURE — 99217 PR OBSERVATION CARE DISCHARGE: ICD-10-PCS | Mod: GC,,, | Performed by: STUDENT IN AN ORGANIZED HEALTH CARE EDUCATION/TRAINING PROGRAM

## 2022-06-05 PROCEDURE — 63600175 PHARM REV CODE 636 W HCPCS: Performed by: STUDENT IN AN ORGANIZED HEALTH CARE EDUCATION/TRAINING PROGRAM

## 2022-06-05 PROCEDURE — 99900031 HC PATIENT EDUCATION (STAT)

## 2022-06-05 PROCEDURE — 80053 COMPREHEN METABOLIC PANEL: CPT | Performed by: STUDENT IN AN ORGANIZED HEALTH CARE EDUCATION/TRAINING PROGRAM

## 2022-06-05 RX ORDER — BUTALBITAL, ACETAMINOPHEN AND CAFFEINE 50; 325; 40 MG/1; MG/1; MG/1
1 TABLET ORAL EVERY 4 HOURS PRN
Qty: 10 TABLET | Refills: 0 | Status: SHIPPED | OUTPATIENT
Start: 2022-06-05 | End: 2022-07-05

## 2022-06-05 RX ORDER — NITROFURANTOIN 25; 75 MG/1; MG/1
100 CAPSULE ORAL EVERY 12 HOURS
Qty: 13 CAPSULE | Refills: 0 | Status: ON HOLD | OUTPATIENT
Start: 2022-06-05 | End: 2022-12-19 | Stop reason: HOSPADM

## 2022-06-05 RX ORDER — NITROFURANTOIN 25; 75 MG/1; MG/1
100 CAPSULE ORAL EVERY 12 HOURS
Status: DISCONTINUED | OUTPATIENT
Start: 2022-06-05 | End: 2022-06-05

## 2022-06-05 RX ADMIN — LORAZEPAM 1 MG: 1 TABLET ORAL at 08:06

## 2022-06-05 RX ADMIN — BUPROPION HYDROCHLORIDE 450 MG: 150 TABLET, FILM COATED, EXTENDED RELEASE ORAL at 08:06

## 2022-06-05 RX ADMIN — FLUTICASONE FUROATE AND VILANTEROL TRIFENATATE 1 PUFF: 100; 25 POWDER RESPIRATORY (INHALATION) at 09:06

## 2022-06-05 RX ADMIN — TIOTROPIUM BROMIDE INHALATION SPRAY 2 PUFF: 1.56 SPRAY, METERED RESPIRATORY (INHALATION) at 09:06

## 2022-06-05 RX ADMIN — METOPROLOL SUCCINATE 25 MG: 25 TABLET, EXTENDED RELEASE ORAL at 08:06

## 2022-06-05 RX ADMIN — PANTOPRAZOLE SODIUM 40 MG: 40 TABLET, DELAYED RELEASE ORAL at 08:06

## 2022-06-05 RX ADMIN — LORAZEPAM 1 MG: 1 TABLET ORAL at 02:06

## 2022-06-05 RX ADMIN — PREGABALIN 100 MG: 50 CAPSULE ORAL at 08:06

## 2022-06-05 RX ADMIN — ACETAMINOPHEN 1000 MG: 500 TABLET ORAL at 11:06

## 2022-06-05 RX ADMIN — CEFTRIAXONE 1 G: 1 INJECTION, SOLUTION INTRAVENOUS at 09:06

## 2022-06-05 RX ADMIN — RIVAROXABAN 20 MG: 10 TABLET, FILM COATED ORAL at 05:06

## 2022-06-05 RX ADMIN — ATORVASTATIN CALCIUM 40 MG: 20 TABLET, FILM COATED ORAL at 08:06

## 2022-06-05 RX ADMIN — PREGABALIN 100 MG: 50 CAPSULE ORAL at 02:06

## 2022-06-05 NOTE — DISCHARGE SUMMARY
[facility] - Intensive Care ([place] [address])  [facility] Medicine  Discharge Summary      Patient Name: Stephania Salmeron  MRN: [id number]  Patient Class: OP- Observation  Admission Date: 6/2/2022  Hospital Length of Stay: 0 days  Discharge Date and Time:  06/05/2022 7:26 AM  Attending Physician: Glynn Santos MD   Discharging Provider: Deandre Bailey MD  Primary Care Provider: Sinan Saeed MD  [facility] Medicine Team: [facility] MED 2 Deandre Bailey MD    HPI:   Patient is a 68 yo woman with significant medical history HTN, COPD, AFib, CAD s/p CABG with multiple stents and ICD, and mild cognitive dysfunction who presents with 5 days of headache and chest pain radiating to left shoulder. She provides history. She states she presented to the ED because of a headache that she has had for 5 days. It has kept her in the bed and she has not been eating or drinking much. She states the HA is to the left and right of her head and some in the back towards her neck, radiating to her shoulders. Massaging her shoulders makes the pain better. She has had several doses of morphine in the ED and this has also made the pain better. NTG makes the HA worse. She has photosensitivity. She has had some nausea. She also c/o chest pain, radiating to the left shoulder which was not relieved by the NTG. Morphine made chest pain better. She has also noticed increased urinary frequency. She is feeling very anxious because she is on Ativan four times a day and has not taken it today. She lives with her daughter who helps her with medications. She denies tobacco and drinking. She owns a dog, Zahida, who is a Shiatzu.       * No surgery found *      Hospital Course:   Pt admitted and work up unrevealing. Improvement in headache/ muscle soreness with fioricet/tylenol. Worked with PT/OT who recommend home health. She will dc with HH and follow up with PCP.      Temp:  [97.8 °F (36.6 °C)-98.8 °F (37.1 °C)]   Pulse:  [69-78]   Resp:  [16-18]   BP:  (120-131)/(68-93)   SpO2:  [92 %-98 %]   Physical Exam  Vitals and nursing note reviewed.   Constitutional:       General: She is not in acute distress.     Appearance: Normal appearance. She is not ill-appearing or diaphoretic.   HENT:      Head: Normocephalic and atraumatic.      Right Ear: External ear normal.      Left Ear: External ear normal.      Nose: Nose normal.      Mouth/Throat:      Mouth: Mucous membranes are moist.   Eyes:      General: No scleral icterus.        Right eye: No discharge.         Left eye: No discharge.   Cardiovascular:      Rate and Rhythm: Normal rate and regular rhythm.      Pulses: Normal pulses.      Heart sounds: No murmur heard.    No gallop.   Pulmonary:      Effort: Pulmonary effort is normal. No respiratory distress.      Breath sounds: Normal breath sounds. No stridor. No wheezing, rhonchi or rales.   Chest:      Chest wall: No tenderness.   Abdominal:      General: Abdomen is flat. Bowel sounds are normal. There is no distension.      Tenderness: There is no abdominal tenderness. There is no right CVA tenderness, left CVA tenderness or guarding.   Musculoskeletal:         General: No swelling or deformity. Normal range of motion.      Cervical back: Normal range of motion and neck supple. No rigidity or tenderness.      Right lower leg: No edema.      Comments: Tender to bilateral trapezoids   Lymphadenopathy:      Cervical: No cervical adenopathy.   Skin:     General: Skin is warm.      Capillary Refill: Capillary refill takes less than 2 seconds.      Coloration: Skin is not jaundiced.      Findings: No bruising, lesion or rash.   Neurological:      General: No focal deficit present.      Mental Status: She is alert and oriented to person, place, and time.      Gait: Gait abnormal.      Comments: Symmetrical plantar and dorsal flexion 5/5  Symmetrical hand squeeze and arm flexion 5/5   Psychiatric:         Mood and Affect: Mood normal.      Goals of Care Treatment  "Preferences:  Code Status: Full Code      Consults:   Consults (From admission, onward)        Status Ordering Provider     Inpatient consult to Vascular Neurology  Once        Provider:  (Not yet assigned)    NIC Isaacs          No new Assessment & Plan notes have been filed under this hospital service since the last note was generated.  Service: Hospital Medicine    Final Active Diagnoses:    Diagnosis Date Noted POA    PRINCIPAL PROBLEM:  Headache [R51.9] 06/03/2022 Yes    Frequent falls [R29.6] 09/28/2021 Not Applicable    Vertigo [R42] 08/23/2021 Yes    Chest pain [R07.9] 08/16/2021 Yes    AICD (automatic cardioverter/defibrillator) present [Z95.810] 03/22/2021 Yes    Coronary artery disease [I25.10] 03/22/2021 Yes      Problems Resolved During this Admission:       Discharged Condition: stable    Disposition:     Follow Up:   Follow-up Information     Sinan Saeed MD. Call in 2 day(s).    Specialty: Family Medicine  Contact information:  [address]  [address] [facility]  [place] [id number] [phone]                       Patient Instructions:      BATH/SHOWER CHAIR FOR HOME USE     Order Specific Question Answer Comments   Height: 5' 6" (1.676 m)    Weight: 100.8 kg (222 lb 3.6 oz)    Does patient have medical equipment at home? none    Length of need (1-99 months): 99    Type: With back        Significant Diagnostic Studies: Labs:   CMP   Recent Labs   Lab 06/04/22  0444 06/05/22  0620    139   K 4.1 4.1    106   CO2 26 27    90   BUN 21 23   CREATININE 0.9 0.9   CALCIUM 8.7 8.8   PROT 6.2 6.3   ALBUMIN 2.7* 2.7*   BILITOT 0.3 0.2   ALKPHOS 161* 158*   AST 26 19   ALT 30 24   ANIONGAP 7* 6*   ESTGFRAFRICA >60.0 >60.0   EGFRNONAA >60.0 >60.0       Pending Diagnostic Studies:     Procedure Component Value Units Date/Time    CBC with Automated Differential [[id number]] Collected: 06/05/22 0620    Order Status: Sent Lab Status: In process Updated: " 06/05/22 0645    Specimen: Blood          Medications:  Reconciled Home Medications:      Medication List      START taking these medications    butalbital-acetaminophen-caffeine -40 mg -40 mg per tablet  Commonly known as: FIORICET, ESGIC  Take 1 tablet by mouth every 4 (four) hours as needed for Pain.        CHANGE how you take these medications    SYMBICORT 160-4.5 mcg/actuation Hfaa  Generic drug: budesonide-formoterol 160-4.5 mcg  INHALE 2 PUFFS INTO THE LUNGS EVERY 12 (TWELVE) HOURS. CONTROLLER  What changed:   · how much to take  · how to take this  · when to take this  · additional instructions        CONTINUE taking these medications    albuterol 90 mcg/actuation inhaler  Commonly known as: PROVENTIL/VENTOLIN HFA  INHALE 2 PUFFS INTO THE LUNGS EVERY 6 (SIX) HOURS AS NEEDED FOR WHEEZING. RESCUE     aspirin 81 MG EC tablet  Commonly known as: ECOTRIN  Take 81 mg by mouth once daily.     atorvastatin 40 MG tablet  Commonly known as: LIPITOR  Take 1 tablet (40 mg total) by mouth once daily.     buPROPion 150 MG TB24 tablet  Commonly known as: WELLBUTRIN XL  Take 450 mg by mouth once daily.     docusate sodium 100 MG capsule  Commonly known as: COLACE  TAKE 1 CAPSULE (100 MG TOTAL) BY MOUTH 2 (TWO) TIMES DAILY AS NEEDED FOR CONSTIPATION.     ergocalciferol 50,000 unit Cap  Commonly known as: ERGOCALCIFEROL  Take 1 capsule (50,000 Units total) by mouth every 7 days.     esomeprazole 40 MG capsule  Commonly known as: NEXIUM  Take 1 capsule (40 mg total) by mouth before breakfast.     meclizine 12.5 mg tablet  Commonly known as: ANTIVERT  TAKE 1 OR 2 TABLETS EVERY 4-6 HOURS, AS NEEDED TO CONTROL IMBALANCE OR DIZZINESS OR VERTIGO     metoprolol succinate 25 MG 24 hr tablet  Commonly known as: TOPROL-XL  Take 1 tablet (25 mg total) by mouth 2 (two) times daily.     pregabalin 100 MG capsule  Commonly known as: LYRICA  Take 100 mg by mouth 3 (three) times daily as needed.     QUEtiapine 200 MG  Tab  Commonly known as: SEROQUEL  Take 200 mg by mouth every evening.     SPIRIVA RESPIMAT 1.25 mcg/actuation inhaler  Generic drug: tiotropium bromide  INHALE 2 PUFFS INTO THE LUNGS ONCE DAILY. CONTROLLER     XARELTO 20 mg Tab  Generic drug: rivaroxaban  TAKE 1 TABLET (20 MG TOTAL) BY MOUTH DAILY WITH DINNER OR EVENING MEAL.        STOP taking these medications    BC PAIN RELIEF ORAL     LORazepam 1 MG tablet  Commonly known as: ATIVAN            Indwelling Lines/Drains at time of discharge:   Lines/Drains/Airways     None                 Time spent on the discharge of patient: 35 minutes         Deandre Bailey MD  Department of Hospital Medicine  [facility] - Intensive Care ([place]-16)

## 2022-06-05 NOTE — HOSPITAL COURSE
Pt admitted and work up unrevealing. Improvement in headache/ muscle soreness with fioricet/tylenol. Urine cxr growing GNRs started treatment with macrobid. Worked with PT/OT who recommend home health. She will dc with a week course of macrobid for UTI, HH and follow up with PCP. She is requesting new referrals for cardiology and neurology as well.

## 2022-06-05 NOTE — PLAN OF CARE
[facility] - [facility] ([place] [address])      HOME HEALTH ORDERS  FACE TO FACE ENCOUNTER    Patient Name: Stephania Salmeron  YOB: 1955    PCP: Sinan Saeed MD   PCP Address: [address] [facility] ([place], [place]) SUITE [facility] / [id number]  PCP Phone Number: [phone]  PCP Fax: [phone]    Encounter Date: 6/2/22    Admit to Home Health    Diagnoses:  Active Hospital Problems    Diagnosis  POA    *Headache [R51.9]  Yes    Frequent falls [R29.6]  Not Applicable    Vertigo [R42]  Yes    Chest pain [R07.9]  Yes    AICD (automatic cardioverter/defibrillator) present [Z95.810]  Yes    Coronary artery disease [I25.10]  Yes      Resolved Hospital Problems   No resolved problems to display.       Follow Up Appointments:  No future appointments.    Allergies:  Review of patient's allergies indicates:   Allergen Reactions    Compazine [prochlorperazine]      Feels like somethingcrawling underneath the skin    Demerol (pf) [meperidine (pf)]      Feels like somethingcrawling underneath the skin    Toradol [ketorolac]      Feels like somethingcrawling underneath the skin       Medications: Review discharge medications with patient and family and provide education.    Current Facility-Administered Medications   Medication Dose Route Frequency Provider Last Rate Last Admin    acetaminophen tablet 1,000 mg  1,000 mg Oral Q8H PRN Hitesh Shin DO   1,000 mg at 06/04/22 1103    albuterol inhaler 2 puff  2 puff Inhalation Q6H PRN Mary Leroy MD        atorvastatin tablet 40 mg  40 mg Oral Daily Mary Leroy MD   40 mg at 06/04/22 0901    buPROPion TB24 tablet 450 mg  450 mg Oral Daily Mary Leroy MD   450 mg at 06/04/22 0901    dextrose 10% bolus 125 mL  12.5 g Intravenous PRN Mary Leroy MD        dextrose 10% bolus 250 mL  25 g Intravenous PRN Mary Leroy MD        docusate sodium capsule 100 mg  100 mg Oral BID PRN Mary Leroy MD        ergocalciferol  capsule 50,000 Units  50,000 Units Oral Q7 Days Mary Leroy MD   50,000 Units at 06/03/22 0917    fluticasone furoate-vilanteroL 100-25 mcg/dose diskus inhaler 1 puff  1 puff Inhalation Daily Mary Leroy MD   1 puff at 06/04/22 0807    glucagon (human recombinant) injection 1 mg  1 mg Intramuscular PRN Mary Leroy MD        glucose chewable tablet 16 g  16 g Oral PRN Mary Leroy MD        glucose chewable tablet 24 g  24 g Oral PRN Mary Leroy MD        hydrOXYzine pamoate capsule 25 mg  25 mg Oral QID PRN Mary Leroy MD   25 mg at 06/03/22 0924    LORazepam tablet 1 mg  1 mg Oral Q6H PRN Mary Lreoy MD   1 mg at 06/04/22 2153    meclizine tablet 25 mg  25 mg Oral TID PRN Mary Leroy MD        melatonin tablet 6 mg  6 mg Oral Nightly PRN Denia Farrell PA-C   6 mg at 06/03/22 2224    metoprolol succinate (TOPROL-XL) 24 hr split tablet 25 mg  25 mg Oral BID Mary Leroy MD   25 mg at 06/04/22 2151    naloxone 0.4 mg/mL injection 0.02 mg  0.02 mg Intravenous PRN Mary Leroy MD        olanzapine zydis disintegrating tablet 5 mg  5 mg Oral Q8H PRN Mary Leroy MD   5 mg at 06/03/22 0405    pantoprazole EC tablet 40 mg  40 mg Oral Daily Mary Leroy MD   40 mg at 06/04/22 0901    pregabalin capsule 100 mg  100 mg Oral TID Mary Leroy MD   100 mg at 06/04/22 2151    promethazine (PHENERGAN) 12.5 mg in dextrose 5 % 50 mL IVPB  12.5 mg Intravenous Q6H PRN Mary Leroy MD        QUEtiapine tablet 200 mg  200 mg Oral QHS Mary Leroy MD   200 mg at 06/04/22 2151    rivaroxaban tablet 20 mg  20 mg Oral Daily with dinner Mary Leroy MD   20 mg at 06/04/22 1656    sodium chloride 0.9% flush 10 mL  10 mL Intravenous PRN Denia Farrell PA-C        sodium chloride 0.9% flush 10 mL  10 mL Intravenous Q12H PRN Mary Leroy MD        tiotropium bromide 1.25 mcg/actuation inhaler 2 puff  2 puff  Inhalation Daily Mary Leroy MD         Current Discharge Medication List      CONTINUE these medications which have NOT CHANGED    Details   albuterol (PROVENTIL/VENTOLIN HFA) 90 mcg/actuation inhaler INHALE 2 PUFFS INTO THE LUNGS EVERY 6 (SIX) HOURS AS NEEDED FOR WHEEZING. RESCUE  Qty: 54 g, Refills: 1    Associated Diagnoses: Chronic obstructive pulmonary disease, unspecified COPD type      aspirin (ECOTRIN) 81 MG EC tablet Take 81 mg by mouth once daily.      atorvastatin (LIPITOR) 40 MG tablet Take 1 tablet (40 mg total) by mouth once daily.  Qty: 90 tablet, Refills: 3      buPROPion (WELLBUTRIN XL) 150 MG TB24 tablet Take 450 mg by mouth once daily.      docusate sodium (COLACE) 100 MG capsule TAKE 1 CAPSULE (100 MG TOTAL) BY MOUTH 2 (TWO) TIMES DAILY AS NEEDED FOR CONSTIPATION.  Qty: 60 capsule, Refills: 1    Associated Diagnoses: Constipation, unspecified constipation type      ergocalciferol (ERGOCALCIFEROL) 50,000 unit Cap Take 1 capsule (50,000 Units total) by mouth every 7 days.  Qty: 12 capsule, Refills: 3    Associated Diagnoses: Osteopenia of both hips      esomeprazole (NEXIUM) 40 MG capsule Take 1 capsule (40 mg total) by mouth before breakfast.  Qty: 90 capsule, Refills: 3      meclizine (ANTIVERT) 12.5 mg tablet TAKE 1 OR 2 TABLETS EVERY 4-6 HOURS, AS NEEDED TO CONTROL IMBALANCE OR DIZZINESS OR VERTIGO  Qty: 50 tablet, Refills: 5      metoprolol succinate (TOPROL-XL) 25 MG 24 hr tablet Take 1 tablet (25 mg total) by mouth 2 (two) times daily.  Qty: 180 tablet, Refills: 3    Comments: .  Associated Diagnoses: Coronary artery disease, angina presence unspecified, unspecified vessel or lesion type, unspecified whether native or transplanted heart      pregabalin (LYRICA) 100 MG capsule Take 100 mg by mouth 3 (three) times daily as needed.      QUEtiapine (SEROQUEL) 200 MG Tab Take 200 mg by mouth every evening.      SPIRIVA RESPIMAT 1.25 mcg/actuation inhaler INHALE 2 PUFFS INTO THE LUNGS ONCE  DAILY. CONTROLLER  Qty: 12 g, Refills: 2    Comments: Please inactivate all prior scripts with same name and strength including on holds.  Associated Diagnoses: Chronic obstructive pulmonary disease, unspecified COPD type      SYMBICORT 160-4.5 mcg/actuation HFAA INHALE 2 PUFFS INTO THE LUNGS EVERY 12 (TWELVE) HOURS. CONTROLLER  Qty: 30.6 g, Refills: 3    Comments: Please inactivate all prior scripts with same name and strength including on holds.  Associated Diagnoses: Chronic obstructive pulmonary disease, unspecified COPD type      XARELTO 20 mg Tab TAKE 1 TABLET (20 MG TOTAL) BY MOUTH DAILY WITH DINNER OR EVENING MEAL.  Qty: 30 tablet, Refills: 1    Associated Diagnoses: Coronary artery disease         STOP taking these medications       aspirin/caffeine (BC PAIN RELIEF ORAL) Comments:   Reason for Stopping:         lorazepam (ATIVAN) 1 MG tablet Comments:   Reason for Stopping:         hydrOXYzine pamoate (VISTARIL) 25 MG Cap Comments:   Reason for Stopping:                 I have seen and examined this patient within the last 30 days. My clinical findings that support the need for the home health skilled services and home bound status are the following:no   Weakness/numbness causing balance and gait disturbance due to Weakness/Debility making it taxing to leave home.     Diet:   regular diet    Labs:  Report Lab results to PCP.    Referrals/ Consults  Physical Therapy to evaluate and treat. Evaluate for home safety and equipment needs; Establish/upgrade home exercise program. Perform / instruct on therapeutic exercises, gait training, transfer training, and Range of Motion.  Occupational Therapy to evaluate and treat. Evaluate home environment for safety and equipment needs. Perform/Instruct on transfers, ADL training, ROM, and therapeutic exercises.    Activities:   activity as tolerated    Nursing:   Agency to admit patient within 24 hours of hospital discharge unless specified on physician order or at  patient request    SN to complete comprehensive assessment including routine vital signs. Instruct on disease process and s/s of complications to report to MD. Review/verify medication list sent home with the patient at time of discharge  and instruct patient/caregiver as needed. Frequency may be adjusted depending on start of care date.     Skilled nurse to perform up to 3 visits PRN for symptoms related to diagnosis    Notify MD if SBP > 160 or < 90; DBP > 90 or < 50; HR > 120 or < 50; Temp > 101; O2 < 88%;     Ok to schedule additional visits based on staff availability and patient request on consecutive days within the home health episode.    When multiple disciplines ordered:    Start of Care occurs on Sunday - Wednesday schedule remaining discipline evaluations as ordered on separate consecutive days following the start of care.    Thursday SOC -schedule subsequent evaluations Friday and Monday the following week.     Friday - Saturday SOC - schedule subsequent discipline evaluations on consecutive days starting Monday of the following week.    For all post-discharge communication and subsequent orders please contact patient's primary care physician.         Home Health Aide:  Physical Therapy Three times weekly and Occupational Therapy Three times weekly    Wound Care Orders  no    I certify that this patient is confined to her home and needs physical therapy and occupational therapy.

## 2022-06-05 NOTE — PT/OT/SLP PROGRESS
Occupational Therapy      Patient Name:  Stephania Salmeron   MRN:  [id number]    Patient not seen today secondary to Patient discharged prior to initiation of evaluation.    6/5/2022

## 2022-06-05 NOTE — DISCHARGE SUMMARY
[facility] - [facility] ([place] [address])  [facility] Medicine  Discharge Summary      Patient Name: Stephania Salmeron  MRN: [id number]  Patient Class: OP- Observation  Admission Date: 6/2/2022  Hospital Length of Stay: 0 days  Discharge Date and Time:  06/05/2022 10:07 AM  Attending Physician: Glynn Santos MD   Discharging Provider: Deandre Bailey MD  Primary Care Provider: Sinan Saeed MD  [facility] Medicine Team: [facility] [place] [place] HOSP MED 2 Deandre Bailey MD    HPI:   Patient is a 66 yo woman with significant medical history HTN, COPD, AFib, CAD s/p CABG with multiple stents and ICD, and mild cognitive dysfunction who presents with 5 days of headache and chest pain radiating to left shoulder. She provides history. She states she presented to the ED because of a headache that she has had for 5 days. It has kept her in the bed and she has not been eating or drinking much. She states the HA is to the left and right of her head and some in the back towards her neck, radiating to her shoulders. Massaging her shoulders makes the pain better. She has had several doses of morphine in the ED and this has also made the pain better. NTG makes the HA worse. She has photosensitivity. She has had some nausea. She also c/o chest pain, radiating to the left shoulder which was not relieved by the NTG. Morphine made chest pain better. She has also noticed increased urinary frequency. She is feeling very anxious because she is on Ativan four times a day and has not taken it today. She lives with her daughter who helps her with medications. She denies tobacco and drinking. She owns a dog, Zahida, who is a Shiatzu.       * No surgery found *      Hospital Course:   Pt admitted and work up unrevealing. Improvement in headache/ muscle soreness with fioricet/tylenol. Urine cxr growing GNRs started treatment with macrobid. Worked with PT/OT who recommend home health. She will dc with a week course of macrobid for UTI, HH and follow up with  "PCP. She is requesting new referrals for cardiology and neurology as well.       Goals of Care Treatment Preferences:  Code Status: Full Code      Consults:   Consults (From admission, onward)        Status Ordering Provider     Inpatient consult to Vascular Neurology  Once        Provider:  (Not yet assigned)    NIC Isaacs          No new Assessment & Plan notes have been filed under this hospital service since the last note was generated.  Service: Hospital Medicine    Final Active Diagnoses:    Diagnosis Date Noted POA    PRINCIPAL PROBLEM:  Headache [R51.9] 06/03/2022 Yes    Frequent falls [R29.6] 09/28/2021 Not Applicable    Vertigo [R42] 08/23/2021 Yes    Chest pain [R07.9] 08/16/2021 Yes    AICD (automatic cardioverter/defibrillator) present [Z95.810] 03/22/2021 Yes    Coronary artery disease [I25.10] 03/22/2021 Yes      Problems Resolved During this Admission:       Discharged Condition: stable    Disposition: Home-Health Care [facility]    Follow Up:   Follow-up Information     Sinan Saeed MD. Call in 2 day(s).    Specialty: Family Medicine  Contact information:  [address]  [address] [place] [address]  [phone]                       Patient Instructions:      BATH/SHOWER CHAIR FOR HOME USE     Order Specific Question Answer Comments   Height: 5' 6" (1.676 m)    Weight: 100.8 kg (222 lb 3.6 oz)    Does patient have medical equipment at home? none    Length of need (1-99 months): 99    Type: With back      Ambulatory referral/consult to Neurology   Standing Status: Future   Referral Priority: Routine Referral Type: Consultation   Referral Reason: Specialty Services Required   Requested Specialty: Neurology   Number of Visits Requested: 1     Ambulatory referral/consult to Cardiology   Standing Status: Future   Referral Priority: Routine Referral Type: Consultation   Referral Reason: Specialty Services Required   Requested Specialty: Cardiology   Number of " Visits Requested: 1       Significant Diagnostic Studies: Microbiology:   Urine Culture    Lab Results   Component Value Date    LABURIN (A) 06/03/2022     GRAM NEGATIVE CHANTELLE  >100,000 cfu/ml  Identification and susceptibility pending         Pending Diagnostic Studies:     None         Medications:  Reconciled Home Medications:      Medication List      START taking these medications    butalbital-acetaminophen-caffeine -40 mg -40 mg per tablet  Commonly known as: FIORICET, ESGIC  Take 1 tablet by mouth every 4 (four) hours as needed for Pain.     nitrofurantoin (macrocrystal-monohydrate) 100 MG capsule  Commonly known as: MACROBID  Take 1 capsule (100 mg total) by mouth every 12 (twelve) hours.        CHANGE how you take these medications    SYMBICORT 160-4.5 mcg/actuation Hfaa  Generic drug: budesonide-formoterol 160-4.5 mcg  INHALE 2 PUFFS INTO THE LUNGS EVERY 12 (TWELVE) HOURS. CONTROLLER  What changed:   · how much to take  · how to take this  · when to take this  · additional instructions        CONTINUE taking these medications    albuterol 90 mcg/actuation inhaler  Commonly known as: PROVENTIL/VENTOLIN HFA  INHALE 2 PUFFS INTO THE LUNGS EVERY 6 (SIX) HOURS AS NEEDED FOR WHEEZING. RESCUE     aspirin 81 MG EC tablet  Commonly known as: ECOTRIN  Take 81 mg by mouth once daily.     atorvastatin 40 MG tablet  Commonly known as: LIPITOR  Take 1 tablet (40 mg total) by mouth once daily.     buPROPion 150 MG TB24 tablet  Commonly known as: WELLBUTRIN XL  Take 450 mg by mouth once daily.     docusate sodium 100 MG capsule  Commonly known as: COLACE  TAKE 1 CAPSULE (100 MG TOTAL) BY MOUTH 2 (TWO) TIMES DAILY AS NEEDED FOR CONSTIPATION.     ergocalciferol 50,000 unit Cap  Commonly known as: ERGOCALCIFEROL  Take 1 capsule (50,000 Units total) by mouth every 7 days.     esomeprazole 40 MG capsule  Commonly known as: NEXIUM  Take 1 capsule (40 mg total) by mouth before breakfast.     meclizine 12.5 mg  tablet  Commonly known as: ANTIVERT  TAKE 1 OR 2 TABLETS EVERY 4-6 HOURS, AS NEEDED TO CONTROL IMBALANCE OR DIZZINESS OR VERTIGO     metoprolol succinate 25 MG 24 hr tablet  Commonly known as: TOPROL-XL  Take 1 tablet (25 mg total) by mouth 2 (two) times daily.     pregabalin 100 MG capsule  Commonly known as: LYRICA  Take 100 mg by mouth 3 (three) times daily as needed.     QUEtiapine 200 MG Tab  Commonly known as: SEROQUEL  Take 200 mg by mouth every evening.     SPIRIVA RESPIMAT 1.25 mcg/actuation inhaler  Generic drug: tiotropium bromide  INHALE 2 PUFFS INTO THE LUNGS ONCE DAILY. CONTROLLER     XARELTO 20 mg Tab  Generic drug: rivaroxaban  TAKE 1 TABLET (20 MG TOTAL) BY MOUTH DAILY WITH DINNER OR EVENING MEAL.        STOP taking these medications    BC PAIN RELIEF ORAL     LORazepam 1 MG tablet  Commonly known as: ATIVAN            Indwelling Lines/Drains at time of discharge:   Lines/Drains/Airways     None                 Time spent on the discharge of patient: 35 minutes         Deandre Bailey MD  Department of Hospital Medicine  [facility] - Intensive Care ([place])

## 2022-06-06 LAB — BACTERIA UR CULT: ABNORMAL

## 2022-06-07 ENCOUNTER — TELEPHONE (OUTPATIENT)
Dept: PRIMARY CARE CLINIC | Facility: CLINIC | Age: 67
End: 2022-06-07
Payer: MEDICARE

## 2022-06-07 NOTE — TELEPHONE ENCOUNTER
----- Message from Isabela Pedraza sent at 6/7/2022  4:33 PM CDT -----  Contact: [phone]  Patient is returning a phone call.  Who left a message for the patient: n/a  Does patient know what this is regarding:  n/a  Would you like a call back, or a response through your [facility] portal?:   call  Comments:

## 2022-06-07 NOTE — TELEPHONE ENCOUNTER
----- Message from Doris Reynoso sent at 6/7/2022  4:44 PM CDT -----  Contact: Self/[phone]  Patient is returning a phone call.  Who left a message for the patient: ?  Does patient know what this is regarding:  yes  Would you like a call back, or a response through your [facility] portal?:  call back   Comments:

## 2022-09-07 DIAGNOSIS — I25.10 CORONARY ARTERY DISEASE: ICD-10-CM

## 2022-09-08 RX ORDER — RIVAROXABAN 20 MG/1
20 TABLET, FILM COATED ORAL
Qty: 30 TABLET | Refills: 1 | Status: SHIPPED | OUTPATIENT
Start: 2022-09-08 | End: 2022-10-20

## 2022-10-14 ENCOUNTER — CLINICAL SUPPORT (OUTPATIENT)
Dept: CARDIOLOGY | Facility: CLINIC | Age: 67
End: 2022-10-14
Payer: MEDICARE

## 2022-10-14 DIAGNOSIS — Z95.810 AICD (AUTOMATIC CARDIOVERTER/DEFIBRILLATOR) PRESENT: Primary | ICD-10-CM

## 2022-10-14 PROCEDURE — 93296 REM INTERROG EVL PM/IDS: CPT | Mod: S$GLB,,, | Performed by: INTERNAL MEDICINE

## 2022-10-14 PROCEDURE — 93296 PR INTERROG EVAL, REMOTE, UP TO 90 DAYS, PACER/DEFIB,TECH REVIEW: ICD-10-PCS | Mod: S$GLB,,, | Performed by: INTERNAL MEDICINE

## 2022-10-14 PROCEDURE — 93295 DEV INTERROG REMOTE 1/2/MLT: CPT | Mod: S$GLB,,, | Performed by: INTERNAL MEDICINE

## 2022-10-14 PROCEDURE — 93295 PR INTERROG EVAL, REMOTE, UP TO 90 DAYS,CARDVERT/DEFIB: ICD-10-PCS | Mod: S$GLB,,, | Performed by: INTERNAL MEDICINE

## 2022-10-18 NOTE — PROGRESS NOTES
Subjective:      Patient ID: Stephania Salmeron is a 67 y.o. female.    Chief Complaint: No chief complaint on file.    HPI:    ROS Medtronic AICD remote interrogation report downloaded and prepared by medical assistant and interpreted by me and full report scanned into Epic media.  Battery OK with SOFYA 2.4 years.  Leads OK.  No events.  Normal device function.    Past Medical History:   Diagnosis Date    Acute coronary syndrome     Atrial fibrillation     Clotting disorder     Coronary artery disease     COVID-19 01/18/2021    Required hospitalization d/t pneumonia and hypoxia    Hyperlipidemia     Hypertension     ICD (implantable cardioverter-defibrillator) in place     Medtronic Evera XT ICD - Model QHBO6T9 (SN: [id number]), 5568-45 (SN: [id number]), 6932-65 (SN: [id number])    Occipital stroke     Left occipital lobe    Quadrantanopia, right     Right lower quadrant        Past Surgical History:   Procedure Laterality Date    APPENDECTOMY      CARDIAC DEFIBRILLATOR PLACEMENT      CHOLECYSTECTOMY      COLONOSCOPY N/A 3/31/2021    Procedure: COLONOSCOPY Suprep;  Surgeon: Murtaza Curry MD;  Location: [facility] ENDO;  Service: Endoscopy;  Laterality: N/A;    CORONARY ANGIOPLASTY      CORONARY ANGIOPLASTY WITH STENT PLACEMENT      CORONARY ARTERY BYPASS GRAFT  1997    ESOPHAGOGASTRODUODENOSCOPY N/A 3/31/2021    Procedure: EGD (ESOPHAGOGASTRODUODENOSCOPY);  Surgeon: Murtaza Curry MD;  Location: [facility] ENDO;  Service: Endoscopy;  Laterality: N/A;    DESIRAE FILTER PLACEMENT  2017    HYSTERECTOMY         Family History   Problem Relation Age of Onset    Lung cancer Mother     Anuerysm Father     Dementia Sister     COPD Sister     Anxiety disorder Sister     Alcohol abuse Sister     Stroke Sister 72    Bone cancer Maternal Aunt     Bipolar disorder Other         Neice    Depression Brother     Depression Brother     Pulmonary embolism Sister     Colon cancer Neg Hx     Diabetes Mellitus Neg Hx     Breast cancer Neg Hx         Social History     Socioeconomic History    Marital status:    Tobacco Use    Smoking status: Former     Packs/day: 4.00     Years: 30.00     Pack years: 120.00     Types: Cigarettes     Quit date: 3/22/1994     Years since quittin.5    Smokeless tobacco: Never   Substance and Sexual Activity    Alcohol use: Not Currently     Alcohol/week: 0.0 standard drinks    Drug use: Never    Sexual activity: Not Currently       Current Outpatient Medications on File Prior to Visit   Medication Sig Dispense Refill    albuterol (PROVENTIL/VENTOLIN HFA) 90 mcg/actuation inhaler INHALE 2 PUFFS INTO THE LUNGS EVERY 6 (SIX) HOURS AS NEEDED FOR WHEEZING. RESCUE 54 g 1    aspirin (ECOTRIN) 81 MG EC tablet Take 81 mg by mouth once daily.      atorvastatin (LIPITOR) 40 MG tablet TAKE 1 TABLET BY MOUTH EVERY DAY 90 tablet 3    buPROPion (WELLBUTRIN XL) 150 MG TB24 tablet Take 450 mg by mouth once daily.      docusate sodium (COLACE) 100 MG capsule TAKE 1 CAPSULE (100 MG TOTAL) BY MOUTH 2 (TWO) TIMES DAILY AS NEEDED FOR CONSTIPATION. 60 capsule 1    ergocalciferol (ERGOCALCIFEROL) 50,000 unit Cap TAKE 1 CAPSULE BY MOUTH ONE TIME PER WEEK 12 capsule 3    esomeprazole (NEXIUM) 40 MG capsule TAKE 1 CAPSULE (40 MG TOTAL) BY MOUTH BEFORE BREAKFAST. 90 capsule 3    meclizine (ANTIVERT) 12.5 mg tablet TAKE 1 OR 2 TABLETS EVERY 4-6 HOURS, AS NEEDED TO CONTROL IMBALANCE OR DIZZINESS OR VERTIGO 50 tablet 5    metoprolol succinate (TOPROL-XL) 25 MG 24 hr tablet TAKE 1 TABLET BY MOUTH TWICE A  tablet 3    nitrofurantoin, macrocrystal-monohydrate, (MACROBID) 100 MG capsule Take 1 capsule (100 mg total) by mouth every 12 (twelve) hours. 13 capsule 0    pregabalin (LYRICA) 100 MG capsule Take 100 mg by mouth 3 (three) times daily as needed.      QUEtiapine (SEROQUEL) 200 MG Tab Take 200 mg by mouth every evening.      SPIRIVA RESPIMAT 1.25 mcg/actuation inhaler INHALE 2 PUFFS INTO THE LUNGS ONCE DAILY. CONTROLLER 12 g 2     SYMBICORT 160-4.5 mcg/actuation HFAA INHALE 2 PUFFS INTO THE LUNGS EVERY 12 (TWELVE) HOURS. CONTROLLER (Patient taking differently: Inhale 1-2 puffs into the lungs 1-2 times per day as needed) 30.6 g 3    XARELTO 20 mg Tab TAKE 1 TABLET (20 MG TOTAL) BY MOUTH DAILY WITH DINNER OR EVENING MEAL. 30 tablet 1    [DISCONTINUED] lorazepam (ATIVAN) 1 MG tablet Take 1 mg by mouth 3 (three) times daily.       No current facility-administered medications on file prior to visit.       Review of patient's allergies indicates:   Allergen Reactions    Compazine [prochlorperazine]      Feels like somethingcrawling underneath the skin    Demerol (pf) [meperidine (pf)]      Feels like somethingcrawling underneath the skin    Toradol [ketorolac]      Feels like somethingcrawling underneath the skin     Objective:   There were no vitals filed for this visit.     Physical Exam     Assessment:     1. AICD (automatic cardioverter/defibrillator) present      Plan:   Diagnoses and all orders for this visit:    AICD (automatic cardioverter/defibrillator) present       No follow-ups on file.

## 2022-11-02 ENCOUNTER — TELEPHONE (OUTPATIENT)
Dept: PRIMARY CARE CLINIC | Facility: CLINIC | Age: 67
End: 2022-11-02
Payer: MEDICARE

## 2022-11-02 NOTE — TELEPHONE ENCOUNTER
----- Message from Efren Lin sent at 11/2/2022  3:50 PM CDT -----  Patient requests a referral to a Pulmonologist. She never made it to one in 2021 when she was supposed to     Thank you

## 2022-11-02 NOTE — TELEPHONE ENCOUNTER
----- Message from Efren Lin sent at 11/2/2022  3:46 PM CDT -----  Contact: Pt [phone]  The patient scheduled her annual visit on 11/25/22, please put labs in for her to do prior.    Thank you

## 2022-11-03 ENCOUNTER — TELEPHONE (OUTPATIENT)
Dept: PRIMARY CARE CLINIC | Facility: CLINIC | Age: 67
End: 2022-11-03
Payer: MEDICARE

## 2022-11-03 NOTE — TELEPHONE ENCOUNTER
----- Message from Belinda Rodarte sent at 11/3/2022 12:36 PM CDT -----  Contact: Patient, [phone]  Calling to get a sooner appointment with Dr CHIQUIS Saeed. Please call him. Thanks.

## 2022-11-07 ENCOUNTER — OFFICE VISIT (OUTPATIENT)
Dept: CARDIOLOGY | Facility: CLINIC | Age: 67
End: 2022-11-07
Payer: MEDICARE

## 2022-11-07 VITALS
HEIGHT: 66 IN | HEART RATE: 70 BPM | DIASTOLIC BLOOD PRESSURE: 65 MMHG | OXYGEN SATURATION: 93 % | BODY MASS INDEX: 36.54 KG/M2 | SYSTOLIC BLOOD PRESSURE: 139 MMHG | WEIGHT: 227.38 LBS

## 2022-11-07 DIAGNOSIS — I48.0 PAROXYSMAL ATRIAL FIBRILLATION: ICD-10-CM

## 2022-11-07 DIAGNOSIS — I47.29 NONSUSTAINED VENTRICULAR TACHYCARDIA: ICD-10-CM

## 2022-11-07 DIAGNOSIS — E66.01 SEVERE OBESITY (BMI 35.0-39.9) WITH COMORBIDITY: ICD-10-CM

## 2022-11-07 DIAGNOSIS — J41.0 SIMPLE CHRONIC BRONCHITIS: ICD-10-CM

## 2022-11-07 DIAGNOSIS — I25.118 CORONARY ARTERY DISEASE OF NATIVE ARTERY OF NATIVE HEART WITH STABLE ANGINA PECTORIS: ICD-10-CM

## 2022-11-07 DIAGNOSIS — Z95.810 AICD (AUTOMATIC CARDIOVERTER/DEFIBRILLATOR) PRESENT: ICD-10-CM

## 2022-11-07 DIAGNOSIS — R06.02 SHORTNESS OF BREATH: Primary | ICD-10-CM

## 2022-11-07 PROCEDURE — 1159F MED LIST DOCD IN RCRD: CPT | Mod: CPTII,S$GLB,, | Performed by: NURSE PRACTITIONER

## 2022-11-07 PROCEDURE — 1100F PR PT FALLS ASSESS DOC 2+ FALLS/FALL W/INJURY/YR: ICD-10-PCS | Mod: CPTII,S$GLB,, | Performed by: NURSE PRACTITIONER

## 2022-11-07 PROCEDURE — 3075F PR MOST RECENT SYSTOLIC BLOOD PRESS GE 130-139MM HG: ICD-10-PCS | Mod: CPTII,S$GLB,, | Performed by: NURSE PRACTITIONER

## 2022-11-07 PROCEDURE — 3288F PR FALLS RISK ASSESSMENT DOCUMENTED: ICD-10-PCS | Mod: CPTII,S$GLB,, | Performed by: NURSE PRACTITIONER

## 2022-11-07 PROCEDURE — 99215 OFFICE O/P EST HI 40 MIN: CPT | Mod: S$GLB,,, | Performed by: NURSE PRACTITIONER

## 2022-11-07 PROCEDURE — 3008F PR BODY MASS INDEX (BMI) DOCUMENTED: ICD-10-PCS | Mod: CPTII,S$GLB,, | Performed by: NURSE PRACTITIONER

## 2022-11-07 PROCEDURE — 3008F BODY MASS INDEX DOCD: CPT | Mod: CPTII,S$GLB,, | Performed by: NURSE PRACTITIONER

## 2022-11-07 PROCEDURE — 1126F PR PAIN SEVERITY QUANTIFIED, NO PAIN PRESENT: ICD-10-PCS | Mod: CPTII,S$GLB,, | Performed by: NURSE PRACTITIONER

## 2022-11-07 PROCEDURE — 1159F PR MEDICATION LIST DOCUMENTED IN MEDICAL RECORD: ICD-10-PCS | Mod: CPTII,S$GLB,, | Performed by: NURSE PRACTITIONER

## 2022-11-07 PROCEDURE — 3078F PR MOST RECENT DIASTOLIC BLOOD PRESSURE < 80 MM HG: ICD-10-PCS | Mod: CPTII,S$GLB,, | Performed by: NURSE PRACTITIONER

## 2022-11-07 PROCEDURE — 3288F FALL RISK ASSESSMENT DOCD: CPT | Mod: CPTII,S$GLB,, | Performed by: NURSE PRACTITIONER

## 2022-11-07 PROCEDURE — 1126F AMNT PAIN NOTED NONE PRSNT: CPT | Mod: CPTII,S$GLB,, | Performed by: NURSE PRACTITIONER

## 2022-11-07 PROCEDURE — 99999 PR PBB SHADOW E&M-EST. PATIENT-LVL V: CPT | Mod: PBBFAC,,, | Performed by: NURSE PRACTITIONER

## 2022-11-07 PROCEDURE — 99215 PR OFFICE/OUTPT VISIT, EST, LEVL V, 40-54 MIN: ICD-10-PCS | Mod: S$GLB,,, | Performed by: NURSE PRACTITIONER

## 2022-11-07 PROCEDURE — 99999 PR PBB SHADOW E&M-EST. PATIENT-LVL V: ICD-10-PCS | Mod: PBBFAC,,, | Performed by: NURSE PRACTITIONER

## 2022-11-07 PROCEDURE — 3078F DIAST BP <80 MM HG: CPT | Mod: CPTII,S$GLB,, | Performed by: NURSE PRACTITIONER

## 2022-11-07 PROCEDURE — 1160F PR REVIEW ALL MEDS BY PRESCRIBER/CLIN PHARMACIST DOCUMENTED: ICD-10-PCS | Mod: CPTII,S$GLB,, | Performed by: NURSE PRACTITIONER

## 2022-11-07 PROCEDURE — 3075F SYST BP GE 130 - 139MM HG: CPT | Mod: CPTII,S$GLB,, | Performed by: NURSE PRACTITIONER

## 2022-11-07 PROCEDURE — 1100F PTFALLS ASSESS-DOCD GE2>/YR: CPT | Mod: CPTII,S$GLB,, | Performed by: NURSE PRACTITIONER

## 2022-11-07 PROCEDURE — 1160F RVW MEDS BY RX/DR IN RCRD: CPT | Mod: CPTII,S$GLB,, | Performed by: NURSE PRACTITIONER

## 2022-11-07 RX ORDER — LORAZEPAM 1 MG/1
1 TABLET ORAL 3 TIMES DAILY
COMMUNITY
Start: 2022-09-26

## 2022-11-07 RX ORDER — PROMETHAZINE HYDROCHLORIDE 12.5 MG/1
12.5 TABLET ORAL EVERY 8 HOURS PRN
Qty: 30 TABLET | Refills: 0 | Status: SHIPPED | OUTPATIENT
Start: 2022-11-07 | End: 2023-01-04

## 2022-11-07 NOTE — PROGRESS NOTES
"        Cardiology    11/7/2022  1:56 PM    Problem list  Patient Active Problem List   Diagnosis    Diarrhea    Anxiety and depression    Chronic intestinal ischemia    Chronic bronchitis    S/P PTCA (percutaneous transluminal coronary angioplasty)    Old MI (myocardial infarction)    S/P CABG (coronary artery bypass graft)    AICD (automatic cardioverter/defibrillator) present    Paroxysmal atrial fibrillation    H/O: GI bleed    Coronary artery disease    Hypoalbuminemia    PAD (peripheral artery disease)    Mesenteric ischemia due to arterial insufficiency    Hypoalbuminemia due to protein-calorie malnutrition    Bright red blood per rectum    Nausea vomiting and diarrhea    Generalized abdominal pain    Osteopenia of both hips    Chest pain    Shortness of breath    Gastroesophageal reflux disease    Tremor    Vertigo    Frequent falls    Balance disorder    Abnormal auditory perception    Tinnitus    Dysphagia    Choking in adult    Sensorineural hearing loss (SNHL) of both ears    Vertigo of central origin    Peripheral vertigo involving right ear    Memory loss    Esophageal web    Nonsustained ventricular tachycardia    Chronic kidney disease, stage 3a    Numbness and tingling of left arm and leg    Headache       CC:  Establish care    HPI:  MI at age 39  Occasionally has to take a NTG for chest pain, happens anywhere from 2-5 times a month.  Pain can be pressure like someone standing on chest.  Has diaphoresis associated with pain.  Saw Dr. Robin in the past but is new to me.  [place] [facility] [place]  Had a recent SBO and was hospitalized- had to be readmitted and then sent home again.  Was having a decreased LOC, hypotension and transferred care to us around that time. Sister had PEs and her father had a ruptured aortic aneurysm.  Has had PEs also and had a carol filter placed many years ago.  Had a TIA  No missed doses of Xarelto, no bleeding issues  Has had "many normal" stress tests and had " have abnormal angiogram.  Does not check BP at home    Pain worsened by activity, also can happen during sleep    Medications  Current Outpatient Medications   Medication Sig Dispense Refill    albuterol (PROVENTIL/VENTOLIN HFA) 90 mcg/actuation inhaler INHALE 2 PUFFS INTO THE LUNGS EVERY 6 (SIX) HOURS AS NEEDED FOR WHEEZING. RESCUE 54 g 1    aspirin (ECOTRIN) 81 MG EC tablet Take 81 mg by mouth once daily.      atorvastatin (LIPITOR) 40 MG tablet TAKE 1 TABLET BY MOUTH EVERY DAY 90 tablet 3    buPROPion (WELLBUTRIN XL) 150 MG TB24 tablet Take 450 mg by mouth once daily.      docusate sodium (COLACE) 100 MG capsule TAKE 1 CAPSULE (100 MG TOTAL) BY MOUTH 2 (TWO) TIMES DAILY AS NEEDED FOR CONSTIPATION. 60 capsule 1    ergocalciferol (ERGOCALCIFEROL) 50,000 unit Cap TAKE 1 CAPSULE BY MOUTH ONE TIME PER WEEK 12 capsule 3    esomeprazole (NEXIUM) 40 MG capsule TAKE 1 CAPSULE (40 MG TOTAL) BY MOUTH BEFORE BREAKFAST. 90 capsule 3    LORazepam (ATIVAN) 1 MG tablet Take 1 mg by mouth 3 (three) times daily.      meclizine (ANTIVERT) 12.5 mg tablet TAKE 1 OR 2 TABLETS EVERY 4-6 HOURS, AS NEEDED TO CONTROL IMBALANCE OR DIZZINESS OR VERTIGO 50 tablet 5    metoprolol succinate (TOPROL-XL) 25 MG 24 hr tablet TAKE 1 TABLET BY MOUTH TWICE A  tablet 3    pregabalin (LYRICA) 100 MG capsule Take 100 mg by mouth 3 (three) times daily as needed.      QUEtiapine (SEROQUEL) 200 MG Tab Take 200 mg by mouth every evening.      SPIRIVA RESPIMAT 1.25 mcg/actuation inhaler INHALE 2 PUFFS INTO THE LUNGS ONCE DAILY. CONTROLLER 12 g 2    SYMBICORT 160-4.5 mcg/actuation HFAA INHALE 2 PUFFS INTO THE LUNGS EVERY 12 (TWELVE) HOURS. CONTROLLER (Patient taking differently: Inhale 1-2 puffs into the lungs 1-2 times per day as needed) 30.6 g 3    XARELTO 20 mg Tab TAKE 1 TABLET (20 MG TOTAL) BY MOUTH DAILY WITH DINNER OR EVENING MEAL. 30 tablet 1    nitrofurantoin, macrocrystal-monohydrate, (MACROBID) 100 MG capsule Take 1 capsule (100 mg total) by  mouth every 12 (twelve) hours. 13 capsule 0     No current facility-administered medications for this visit.      Prior to Admission medications    Medication Sig Start Date End Date Taking? Authorizing Provider   albuterol (PROVENTIL/VENTOLIN HFA) 90 mcg/actuation inhaler INHALE 2 PUFFS INTO THE LUNGS EVERY 6 (SIX) HOURS AS NEEDED FOR WHEEZING. RESCUE 4/27/22  Yes Sinan Saeed MD   aspirin (ECOTRIN) 81 MG EC tablet Take 81 mg by mouth once daily.   Yes Historical Provider   atorvastatin (LIPITOR) 40 MG tablet TAKE 1 TABLET BY MOUTH EVERY DAY 8/4/22  Yes Eric Robin MD   buPROPion (WELLBUTRIN XL) 150 MG TB24 tablet Take 450 mg by mouth once daily.   Yes Historical Provider   docusate sodium (COLACE) 100 MG capsule TAKE 1 CAPSULE (100 MG TOTAL) BY MOUTH 2 (TWO) TIMES DAILY AS NEEDED FOR CONSTIPATION. 10/6/21  Yes Sinan Saeed MD   ergocalciferol (ERGOCALCIFEROL) 50,000 unit Cap TAKE 1 CAPSULE BY MOUTH ONE TIME PER WEEK 7/20/22  Yes Sinan Saeed MD   esomeprazole (NEXIUM) 40 MG capsule TAKE 1 CAPSULE (40 MG TOTAL) BY MOUTH BEFORE BREAKFAST. 7/20/22 7/20/23 Yes Eric Robin MD   LORazepam (ATIVAN) 1 MG tablet Take 1 mg by mouth 3 (three) times daily. 9/26/22  Yes Historical Provider   meclizine (ANTIVERT) 12.5 mg tablet TAKE 1 OR 2 TABLETS EVERY 4-6 HOURS, AS NEEDED TO CONTROL IMBALANCE OR DIZZINESS OR VERTIGO 4/26/22  Yes NATHAN Leal MD   metoprolol succinate (TOPROL-XL) 25 MG 24 hr tablet TAKE 1 TABLET BY MOUTH TWICE A DAY 6/23/22  Yes Eric Robin MD   pregabalin (LYRICA) 100 MG capsule Take 100 mg by mouth 3 (three) times daily as needed. 7/22/21  Yes Historical Provider   QUEtiapine (SEROQUEL) 200 MG Tab Take 200 mg by mouth every evening.   Yes Historical Provider   SPIRIVA RESPIMAT 1.25 mcg/actuation inhaler INHALE 2 PUFFS INTO THE LUNGS ONCE DAILY. CONTROLLER 10/6/21  Yes Sinan Saeed MD   SYMBICORT 160-4.5 mcg/actuation HFAA INHALE 2 PUFFS INTO THE LUNGS  EVERY 12 (TWELVE) HOURS. CONTROLLER  Patient taking differently: Inhale 1-2 puffs into the lungs 1-2 times per day as needed 21  Yes Sinan Saeed MD   XARELTO 20 mg Tab TAKE 1 TABLET (20 MG TOTAL) BY MOUTH DAILY WITH DINNER OR EVENING MEAL. 10/20/22  Yes Sinan Saeed MD   nitrofurantoin, macrocrystal-monohydrate, (MACROBID) 100 MG capsule Take 1 capsule (100 mg total) by mouth every 12 (twelve) hours. 22   Deandre Bailey MD         History  Past Medical History:   Diagnosis Date    Acute coronary syndrome     Atrial fibrillation     Clotting disorder     Coronary artery disease     COVID-19 2021    Required hospitalization d/t pneumonia and hypoxia    Hyperlipidemia     Hypertension     ICD (implantable cardioverter-defibrillator) in place     Med[employer]onic Evera XT ICD - Model PCKJ1Y2 (SN: [id number]), 5568-45 (SN: [id number]), 6932-65 (SN: [id number])    Occipital stroke     Left occipital lobe    Quadrantanopia, right     Right lower quadrant     Past Surgical History:   Procedure Laterality Date    APPENDECTOMY      CARDIAC DEFIBRILLATOR PLACEMENT      CHOLECYSTECTOMY      COLONOSCOPY N/A 3/31/2021    Procedure: COLONOSCOPY Suprep;  Surgeon: Murtaza Curry MD;  Location: [facility] ENDO;  Service: Endoscopy;  Laterality: N/A;    CORONARY ANGIOPLASTY      CORONARY ANGIOPLASTY WITH STENT PLACEMENT      CORONARY ARTERY BYPASS GRAFT      ESOPHAGOGASTRODUODENOSCOPY N/A 3/31/2021    Procedure: EGD (ESOPHAGOGASTRODUODENOSCOPY);  Surgeon: Murtaza Curry MD;  Location: [facility] ENDO;  Service: Endoscopy;  Laterality: N/A;    DESIRAE FILTER PLACEMENT  2017    HYSTERECTOMY       Social History     Socioeconomic History    Marital status:    Tobacco Use    Smoking status: Former     Packs/day: 4.00     Years: 30.00     Pack years: 120.00     Types: Cigarettes     Quit date: 3/22/1994     Years since quittin.6    Smokeless tobacco: Never   Substance and Sexual Activity    Alcohol  use: Not Currently     Alcohol/week: 0.0 standard drinks    Drug use: Never    Sexual activity: Not Currently         Allergies  Review of patient's allergies indicates:   Allergen Reactions    Compazine [prochlorperazine]      Feels like somethingcrawling underneath the skin    Demerol (pf) [meperidine (pf)]      Feels like somethingcrawling underneath the skin    Toradol [ketorolac]      Feels like somethingcrawling underneath the skin         Review of Systems   Review of Systems   Constitutional: Positive for malaise/fatigue. Negative for diaphoresis.   HENT: Negative.     Cardiovascular:  Positive for chest pain and dyspnea on exertion. Negative for claudication, irregular heartbeat, leg swelling, near-syncope, orthopnea, palpitations, paroxysmal nocturnal dyspnea and syncope.   Respiratory:  Negative for shortness of breath.    Endocrine: Negative for polydipsia, polyphagia and polyuria.   Hematologic/Lymphatic: Does not bruise/bleed easily.   Gastrointestinal:  Negative for bloating, nausea and vomiting.   Genitourinary: Negative.    Neurological:  Negative for excessive daytime sleepiness, dizziness, light-headedness, loss of balance and weakness.   Psychiatric/Behavioral:  The patient is not nervous/anxious.    Allergic/Immunologic: Negative.        Physical Exam  Wt Readings from Last 1 Encounters:   11/07/22 103.1 kg (227 lb 6.5 oz)     BP Readings from Last 3 Encounters:   11/07/22 139/65   06/05/22 116/67   03/29/22 120/76     Pulse Readings from Last 1 Encounters:   11/07/22 70     Body mass index is 36.7 kg/m².    Physical Exam  Vitals and nursing note reviewed.   Constitutional:       Appearance: Normal appearance.   HENT:      Head: Normocephalic and atraumatic.      Mouth/Throat:      Mouth: Mucous membranes are moist.   Eyes:      Pupils: Pupils are equal, round, and reactive to light.   Cardiovascular:      Rate and Rhythm: Normal rate and regular rhythm.      Pulses:           Radial pulses are  2+ on the right side and 2+ on the left side.        Dorsalis pedis pulses are 2+ on the right side and 2+ on the left side.        Posterior tibial pulses are 2+ on the right side and 2+ on the left side.      Heart sounds: No murmur heard.  Pulmonary:      Effort: Pulmonary effort is normal. No respiratory distress.      Breath sounds: Normal breath sounds.   Abdominal:      General: There is no distension.      Tenderness: There is no abdominal tenderness.   Musculoskeletal:      Cervical back: Normal range of motion.      Right lower leg: No edema.      Left lower leg: No edema.   Skin:     General: Skin is warm and dry.      Findings: No erythema.   Neurological:      General: No focal deficit present.      Mental Status: She is alert.   Psychiatric:         Mood and Affect: Mood normal.         Behavior: Behavior normal.       Problem List Items Addressed This Visit          Pulmonary    Chronic bronchitis    Relevant Orders    Ambulatory referral/consult to Pulmonology    Shortness of breath - Primary    Relevant Orders    Echo       Cardiac/Vascular    AICD (automatic cardioverter/defibrillator) present    Overview     Checked in clinic  No firing reported         Current Assessment & Plan     As above         Paroxysmal atrial fibrillation    Overview     On Toprol and Xarelto, continue as now.  Xarelto refilled         Current Assessment & Plan     As above         Coronary artery disease of native artery of native heart with stable angina pectoris    Overview     Recent MPI failed to reach THR  Chest pain responds to NTG  Will discuss case as she likely needs an angiogram         Current Assessment & Plan     As above         Nonsustained ventricular tachycardia       Endocrine    Severe obesity (BMI 35.0-39.9) with comorbidity                 Follow Up  TBD, will discuss case with Sallie Vickers and Verito      @Carol Jenkins [facility]

## 2022-11-07 NOTE — PATIENT INSTRUCTIONS
I will discuss your case with Dr. Vickers    I would recommend taking your medications as you have    Talk with Dr. Saeed about restarting the meclizine at your next visit

## 2022-11-08 PROBLEM — E66.01 SEVERE OBESITY (BMI 35.0-39.9) WITH COMORBIDITY: Status: ACTIVE | Noted: 2022-11-08

## 2022-12-06 ENCOUNTER — TELEPHONE (OUTPATIENT)
Dept: CARDIOLOGY | Facility: CLINIC | Age: 67
End: 2022-12-06
Payer: MEDICARE

## 2022-12-06 NOTE — TELEPHONE ENCOUNTER
Attempted to reach patient a 2nd time to advise to go to ED, no answer message left asking for a call back.

## 2022-12-06 NOTE — TELEPHONE ENCOUNTER
----- Message from Grace Tillman sent at 12/6/2022 12:45 PM CST -----  Patient is calling stating that she is having heartburn, and hasn't experienced this since she is on a medication. She stated that she feels shortness of breath and chest tightness.     Please contact patient at [phone]

## 2022-12-06 NOTE — TELEPHONE ENCOUNTER
Left message on daughter's voicemail informing that I am attempting to return patient's call. Call back number supplied on voicemail.

## 2022-12-06 NOTE — TELEPHONE ENCOUNTER
Left voicemail advising patient that if she has SOB with chest tightness or pain, y=that she needs to go to ED immediately.  Will forward message to provider, CADE Jenkins.

## 2022-12-15 ENCOUNTER — HOSPITAL ENCOUNTER (EMERGENCY)
Facility: HOSPITAL | Age: 67
Discharge: ANOTHER HOSPITAL | End: 2022-12-16
Attending: EMERGENCY MEDICINE
Payer: MEDICARE

## 2022-12-15 DIAGNOSIS — R31.9 URINARY TRACT INFECTION WITH HEMATURIA, SITE UNSPECIFIED: ICD-10-CM

## 2022-12-15 DIAGNOSIS — R06.02 SOB (SHORTNESS OF BREATH): Primary | ICD-10-CM

## 2022-12-15 DIAGNOSIS — R07.9 CHEST PAIN: ICD-10-CM

## 2022-12-15 DIAGNOSIS — N39.0 URINARY TRACT INFECTION WITH HEMATURIA, SITE UNSPECIFIED: ICD-10-CM

## 2022-12-15 LAB
ALBUMIN SERPL BCP-MCNC: 3.6 G/DL (ref 3.5–5.2)
ALP SERPL-CCNC: 136 U/L (ref 55–135)
ALT SERPL W/O P-5'-P-CCNC: 19 U/L (ref 10–44)
ANION GAP SERPL CALC-SCNC: 9 MMOL/L (ref 8–16)
AST SERPL-CCNC: 19 U/L (ref 10–40)
BACTERIA #/AREA URNS AUTO: ABNORMAL /HPF
BASOPHILS # BLD AUTO: 0.02 K/UL (ref 0–0.2)
BASOPHILS NFR BLD: 0.3 % (ref 0–1.9)
BILIRUB SERPL-MCNC: 0.2 MG/DL (ref 0.1–1)
BILIRUB UR QL STRIP: NEGATIVE
BNP SERPL-MCNC: 195 PG/ML (ref 0–99)
BUN SERPL-MCNC: 19 MG/DL (ref 8–23)
CALCIUM SERPL-MCNC: 9 MG/DL (ref 8.7–10.5)
CHLORIDE SERPL-SCNC: 107 MMOL/L (ref 95–110)
CLARITY UR REFRACT.AUTO: ABNORMAL
CO2 SERPL-SCNC: 25 MMOL/L (ref 23–29)
COLOR UR AUTO: YELLOW
CREAT SERPL-MCNC: 0.9 MG/DL (ref 0.5–1.4)
DIFFERENTIAL METHOD: ABNORMAL
EOSINOPHIL # BLD AUTO: 0.1 K/UL (ref 0–0.5)
EOSINOPHIL NFR BLD: 1.9 % (ref 0–8)
ERYTHROCYTE [DISTWIDTH] IN BLOOD BY AUTOMATED COUNT: 14.6 % (ref 11.5–14.5)
EST. GFR  (NO RACE VARIABLE): >60 ML/MIN/1.73 M^2
GLUCOSE SERPL-MCNC: 83 MG/DL (ref 70–110)
GLUCOSE UR QL STRIP: NEGATIVE
HCT VFR BLD AUTO: 41 % (ref 37–48.5)
HCV AB SERPL QL IA: NORMAL
HGB BLD-MCNC: 12.3 G/DL (ref 12–16)
HGB UR QL STRIP: ABNORMAL
HIV 1+2 AB+HIV1 P24 AG SERPL QL IA: NORMAL
IMM GRANULOCYTES # BLD AUTO: 0.03 K/UL (ref 0–0.04)
IMM GRANULOCYTES NFR BLD AUTO: 0.4 % (ref 0–0.5)
INR PPP: 1 (ref 0.8–1.2)
KETONES UR QL STRIP: NEGATIVE
LEUKOCYTE ESTERASE UR QL STRIP: ABNORMAL
LYMPHOCYTES # BLD AUTO: 2.3 K/UL (ref 1–4.8)
LYMPHOCYTES NFR BLD: 33.8 % (ref 18–48)
MCH RBC QN AUTO: 28 PG (ref 27–31)
MCHC RBC AUTO-ENTMCNC: 30 G/DL (ref 32–36)
MCV RBC AUTO: 93 FL (ref 82–98)
MICROSCOPIC COMMENT: ABNORMAL
MONOCYTES # BLD AUTO: 0.5 K/UL (ref 0.3–1)
MONOCYTES NFR BLD: 7.9 % (ref 4–15)
NEUTROPHILS # BLD AUTO: 3.8 K/UL (ref 1.8–7.7)
NEUTROPHILS NFR BLD: 55.7 % (ref 38–73)
NITRITE UR QL STRIP: POSITIVE
NRBC BLD-RTO: 0 /100 WBC
PH UR STRIP: 7 [PH] (ref 5–8)
PLATELET # BLD AUTO: 265 K/UL (ref 150–450)
PMV BLD AUTO: 10.3 FL (ref 9.2–12.9)
POTASSIUM SERPL-SCNC: 4 MMOL/L (ref 3.5–5.1)
PROT SERPL-MCNC: 8 G/DL (ref 6–8.4)
PROT UR QL STRIP: ABNORMAL
PROTHROMBIN TIME: 10.6 SEC (ref 9–12.5)
RBC # BLD AUTO: 4.39 M/UL (ref 4–5.4)
RBC #/AREA URNS AUTO: 1 /HPF (ref 0–4)
SODIUM SERPL-SCNC: 141 MMOL/L (ref 136–145)
SP GR UR STRIP: 1.02 (ref 1–1.03)
SQUAMOUS #/AREA URNS AUTO: 1 /HPF
TROPONIN I SERPL DL<=0.01 NG/ML-MCNC: 0.01 NG/ML (ref 0–0.03)
URN SPEC COLLECT METH UR: ABNORMAL
WBC # BLD AUTO: 6.8 K/UL (ref 3.9–12.7)
WBC #/AREA URNS AUTO: 6 /HPF (ref 0–5)

## 2022-12-15 PROCEDURE — 84484 ASSAY OF TROPONIN QUANT: CPT | Mod: HCNC | Performed by: EMERGENCY MEDICINE

## 2022-12-15 PROCEDURE — 93010 ELECTROCARDIOGRAM REPORT: CPT | Mod: HCNC,,, | Performed by: INTERNAL MEDICINE

## 2022-12-15 PROCEDURE — 99285 EMERGENCY DEPT VISIT HI MDM: CPT | Mod: 25,HCNC

## 2022-12-15 PROCEDURE — 96375 TX/PRO/DX INJ NEW DRUG ADDON: CPT | Mod: HCNC

## 2022-12-15 PROCEDURE — 0241U SARS-COV2 (COVID) WITH FLU/RSV BY PCR: CPT | Mod: HCNC | Performed by: EMERGENCY MEDICINE

## 2022-12-15 PROCEDURE — 99285 PR EMERGENCY DEPT VISIT,LEVEL V: ICD-10-PCS | Mod: CS,,, | Performed by: EMERGENCY MEDICINE

## 2022-12-15 PROCEDURE — 87389 HIV-1 AG W/HIV-1&-2 AB AG IA: CPT | Mod: HCNC | Performed by: PHYSICIAN ASSISTANT

## 2022-12-15 PROCEDURE — 96365 THER/PROPH/DIAG IV INF INIT: CPT | Mod: HCNC

## 2022-12-15 PROCEDURE — 99285 EMERGENCY DEPT VISIT HI MDM: CPT | Mod: CS,,, | Performed by: EMERGENCY MEDICINE

## 2022-12-15 PROCEDURE — 94761 N-INVAS EAR/PLS OXIMETRY MLT: CPT | Mod: HCNC

## 2022-12-15 PROCEDURE — 63600175 PHARM REV CODE 636 W HCPCS: Mod: HCNC | Performed by: EMERGENCY MEDICINE

## 2022-12-15 PROCEDURE — 83880 ASSAY OF NATRIURETIC PEPTIDE: CPT | Mod: HCNC | Performed by: EMERGENCY MEDICINE

## 2022-12-15 PROCEDURE — 80053 COMPREHEN METABOLIC PANEL: CPT | Mod: HCNC | Performed by: EMERGENCY MEDICINE

## 2022-12-15 PROCEDURE — 85610 PROTHROMBIN TIME: CPT | Mod: HCNC | Performed by: EMERGENCY MEDICINE

## 2022-12-15 PROCEDURE — 86803 HEPATITIS C AB TEST: CPT | Mod: HCNC | Performed by: PHYSICIAN ASSISTANT

## 2022-12-15 PROCEDURE — 81001 URINALYSIS AUTO W/SCOPE: CPT | Mod: HCNC | Performed by: EMERGENCY MEDICINE

## 2022-12-15 PROCEDURE — 93005 ELECTROCARDIOGRAM TRACING: CPT | Mod: HCNC

## 2022-12-15 PROCEDURE — 85025 COMPLETE CBC W/AUTO DIFF WBC: CPT | Mod: HCNC | Performed by: EMERGENCY MEDICINE

## 2022-12-15 PROCEDURE — 93010 EKG 12-LEAD: ICD-10-PCS | Mod: HCNC,,, | Performed by: INTERNAL MEDICINE

## 2022-12-15 RX ORDER — ONDANSETRON 2 MG/ML
4 INJECTION INTRAMUSCULAR; INTRAVENOUS
Status: COMPLETED | OUTPATIENT
Start: 2022-12-15 | End: 2022-12-15

## 2022-12-15 RX ORDER — MORPHINE SULFATE 4 MG/ML
4 INJECTION, SOLUTION INTRAMUSCULAR; INTRAVENOUS
Status: COMPLETED | OUTPATIENT
Start: 2022-12-15 | End: 2022-12-15

## 2022-12-15 RX ADMIN — ONDANSETRON 4 MG: 2 INJECTION INTRAMUSCULAR; INTRAVENOUS at 11:12

## 2022-12-15 RX ADMIN — MORPHINE SULFATE 4 MG: 4 INJECTION INTRAVENOUS at 11:12

## 2022-12-16 ENCOUNTER — HOSPITAL ENCOUNTER (INPATIENT)
Facility: HOSPITAL | Age: 67
LOS: 3 days | Discharge: HOME | DRG: 287 | End: 2022-12-19
Attending: FAMILY MEDICINE | Admitting: FAMILY MEDICINE
Payer: MEDICARE

## 2022-12-16 ENCOUNTER — CLINICAL SUPPORT (OUTPATIENT)
Dept: CARDIOLOGY | Facility: HOSPITAL | Age: 67
DRG: 287 | End: 2022-12-16
Attending: INTERNAL MEDICINE
Payer: MEDICARE

## 2022-12-16 VITALS
BODY MASS INDEX: 36.64 KG/M2 | RESPIRATION RATE: 14 BRPM | SYSTOLIC BLOOD PRESSURE: 169 MMHG | HEART RATE: 70 BPM | DIASTOLIC BLOOD PRESSURE: 74 MMHG | OXYGEN SATURATION: 98 % | WEIGHT: 227 LBS | TEMPERATURE: 98 F

## 2022-12-16 DIAGNOSIS — R07.9 CHEST PAIN: ICD-10-CM

## 2022-12-16 LAB
ALBUMIN SERPL BCP-MCNC: 3.7 G/DL (ref 3.5–5.2)
ALP SERPL-CCNC: 120 U/L (ref 55–135)
ALT SERPL W/O P-5'-P-CCNC: 20 U/L (ref 10–44)
ANION GAP SERPL CALC-SCNC: 10 MMOL/L (ref 8–16)
AST SERPL-CCNC: 17 U/L (ref 10–40)
BASOPHILS # BLD AUTO: 0.02 K/UL (ref 0–0.2)
BASOPHILS NFR BLD: 0.3 % (ref 0–1.9)
BILIRUB SERPL-MCNC: 0.5 MG/DL (ref 0.1–1)
BUN SERPL-MCNC: 18 MG/DL (ref 8–23)
CALCIUM SERPL-MCNC: 9 MG/DL (ref 8.7–10.5)
CHLORIDE SERPL-SCNC: 102 MMOL/L (ref 95–110)
CO2 SERPL-SCNC: 29 MMOL/L (ref 23–29)
CREAT SERPL-MCNC: 0.9 MG/DL (ref 0.5–1.4)
CV PHARM DOSE: 0.4 MG
CV STRESS BASE HR: 70 BPM
D DIMER PPP IA.FEU-MCNC: 0.34 UG/ML FEU
DIASTOLIC BLOOD PRESSURE: 74 MMHG
DIFFERENTIAL METHOD: ABNORMAL
EOSINOPHIL # BLD AUTO: 0.1 K/UL (ref 0–0.5)
EOSINOPHIL NFR BLD: 1.5 % (ref 0–8)
ERYTHROCYTE [DISTWIDTH] IN BLOOD BY AUTOMATED COUNT: 14.9 % (ref 11.5–14.5)
EST. GFR  (NO RACE VARIABLE): >60 ML/MIN/1.73 M^2
GLUCOSE SERPL-MCNC: 98 MG/DL (ref 70–110)
HCT VFR BLD AUTO: 41.1 % (ref 37–48.5)
HGB BLD-MCNC: 12.3 G/DL (ref 12–16)
IMM GRANULOCYTES # BLD AUTO: 0.02 K/UL (ref 0–0.04)
IMM GRANULOCYTES NFR BLD AUTO: 0.3 % (ref 0–0.5)
INFLUENZA A, MOLECULAR: NOT DETECTED
INFLUENZA B, MOLECULAR: NOT DETECTED
LYMPHOCYTES # BLD AUTO: 2.1 K/UL (ref 1–4.8)
LYMPHOCYTES NFR BLD: 31.9 % (ref 18–48)
MAGNESIUM SERPL-MCNC: 1.6 MG/DL (ref 1.6–2.6)
MCH RBC QN AUTO: 28.1 PG (ref 27–31)
MCHC RBC AUTO-ENTMCNC: 29.9 G/DL (ref 32–36)
MCV RBC AUTO: 94 FL (ref 82–98)
MONOCYTES # BLD AUTO: 0.5 K/UL (ref 0.3–1)
MONOCYTES NFR BLD: 7.9 % (ref 4–15)
NEUTROPHILS # BLD AUTO: 3.8 K/UL (ref 1.8–7.7)
NEUTROPHILS NFR BLD: 58.1 % (ref 38–73)
NRBC BLD-RTO: 0 /100 WBC
OHS CV CPX 1 MINUTE RECOVERY HEART RATE: 77 BPM
OHS CV CPX 85 PERCENT MAX PREDICTED HEART RATE MALE: 125
OHS CV CPX MAX PREDICTED HEART RATE: 147
OHS CV CPX PATIENT IS FEMALE: 1
OHS CV CPX PATIENT IS MALE: 0
OHS CV CPX PEAK DIASTOLIC BLOOD PRESSURE: 80 MMHG
OHS CV CPX PEAK HEAR RATE: 78 BPM
OHS CV CPX PEAK RATE PRESSURE PRODUCT: NORMAL
OHS CV CPX PEAK SYSTOLIC BLOOD PRESSURE: 153 MMHG
OHS CV CPX PERCENT MAX PREDICTED HEART RATE ACHIEVED: 53
OHS CV CPX RATE PRESSURE PRODUCT PRESENTING: 9800
PLATELET # BLD AUTO: 222 K/UL (ref 150–450)
PMV BLD AUTO: 9.9 FL (ref 9.2–12.9)
POTASSIUM SERPL-SCNC: 3.8 MMOL/L (ref 3.5–5.1)
PROCALCITONIN SERPL IA-MCNC: <0.05 NG/ML (ref 0–0.5)
PROT SERPL-MCNC: 7.6 G/DL (ref 6–8.4)
RBC # BLD AUTO: 4.38 M/UL (ref 4–5.4)
RSV AG BY MOLECULAR METHOD: NOT DETECTED
SARS-COV-2 RNA RESP QL NAA+PROBE: NOT DETECTED
SODIUM SERPL-SCNC: 141 MMOL/L (ref 136–145)
SYSTOLIC BLOOD PRESSURE: 140 MMHG
TROPONIN I SERPL DL<=0.01 NG/ML-MCNC: <0.006 NG/ML (ref 0–0.03)
TROPONIN I SERPL HS-MCNC: 5.8 PG/ML (ref 0–14.9)
WBC # BLD AUTO: 6.55 K/UL (ref 3.9–12.7)

## 2022-12-16 PROCEDURE — 94640 AIRWAY INHALATION TREATMENT: CPT

## 2022-12-16 PROCEDURE — 99900031 HC PATIENT EDUCATION (STAT)

## 2022-12-16 PROCEDURE — 63600175 PHARM REV CODE 636 W HCPCS: Mod: HCNC | Performed by: EMERGENCY MEDICINE

## 2022-12-16 PROCEDURE — 94760 N-INVAS EAR/PLS OXIMETRY 1: CPT

## 2022-12-16 PROCEDURE — 25000003 PHARM REV CODE 250: Performed by: INTERNAL MEDICINE

## 2022-12-16 PROCEDURE — 93018 CV STRESS TEST I&R ONLY: CPT | Mod: ,,, | Performed by: INTERNAL MEDICINE

## 2022-12-16 PROCEDURE — 80053 COMPREHEN METABOLIC PANEL: CPT | Performed by: INTERNAL MEDICINE

## 2022-12-16 PROCEDURE — 99223 1ST HOSP IP/OBS HIGH 75: CPT | Mod: ,,, | Performed by: INTERNAL MEDICINE

## 2022-12-16 PROCEDURE — 63600175 PHARM REV CODE 636 W HCPCS: Performed by: INTERNAL MEDICINE

## 2022-12-16 PROCEDURE — 25000003 PHARM REV CODE 250: Mod: HCNC | Performed by: EMERGENCY MEDICINE

## 2022-12-16 PROCEDURE — 93010 ELECTROCARDIOGRAM REPORT: CPT | Mod: ,,, | Performed by: INTERNAL MEDICINE

## 2022-12-16 PROCEDURE — 25000242 PHARM REV CODE 250 ALT 637 W/ HCPCS: Performed by: INTERNAL MEDICINE

## 2022-12-16 PROCEDURE — 85379 FIBRIN DEGRADATION QUANT: CPT | Performed by: INTERNAL MEDICINE

## 2022-12-16 PROCEDURE — 83735 ASSAY OF MAGNESIUM: CPT | Performed by: INTERNAL MEDICINE

## 2022-12-16 PROCEDURE — 94761 N-INVAS EAR/PLS OXIMETRY MLT: CPT

## 2022-12-16 PROCEDURE — 93016 NUCLEAR STRESS TEST (CUPID ONLY): ICD-10-PCS | Mod: ,,, | Performed by: INTERNAL MEDICINE

## 2022-12-16 PROCEDURE — 93016 CV STRESS TEST SUPVJ ONLY: CPT | Mod: ,,, | Performed by: INTERNAL MEDICINE

## 2022-12-16 PROCEDURE — C9113 INJ PANTOPRAZOLE SODIUM, VIA: HCPCS | Performed by: INTERNAL MEDICINE

## 2022-12-16 PROCEDURE — 84484 ASSAY OF TROPONIN QUANT: CPT | Mod: 91 | Performed by: INTERNAL MEDICINE

## 2022-12-16 PROCEDURE — 93010 EKG 12-LEAD: ICD-10-PCS | Mod: ,,, | Performed by: INTERNAL MEDICINE

## 2022-12-16 PROCEDURE — 12000002 HC ACUTE/MED SURGE SEMI-PRIVATE ROOM

## 2022-12-16 PROCEDURE — 85025 COMPLETE CBC W/AUTO DIFF WBC: CPT | Performed by: INTERNAL MEDICINE

## 2022-12-16 PROCEDURE — 99223 PR INITIAL HOSPITAL CARE,LEVL III: ICD-10-PCS | Mod: ,,, | Performed by: INTERNAL MEDICINE

## 2022-12-16 PROCEDURE — 84145 PROCALCITONIN (PCT): CPT | Performed by: INTERNAL MEDICINE

## 2022-12-16 PROCEDURE — 93017 CV STRESS TEST TRACING ONLY: CPT

## 2022-12-16 PROCEDURE — 93018 NUCLEAR STRESS TEST (CUPID ONLY): ICD-10-PCS | Mod: ,,, | Performed by: INTERNAL MEDICINE

## 2022-12-16 PROCEDURE — 99900035 HC TECH TIME PER 15 MIN (STAT)

## 2022-12-16 PROCEDURE — 93005 ELECTROCARDIOGRAM TRACING: CPT | Performed by: INTERNAL MEDICINE

## 2022-12-16 PROCEDURE — 36415 COLL VENOUS BLD VENIPUNCTURE: CPT | Performed by: INTERNAL MEDICINE

## 2022-12-16 PROCEDURE — 84484 ASSAY OF TROPONIN QUANT: CPT | Mod: HCNC | Performed by: EMERGENCY MEDICINE

## 2022-12-16 RX ORDER — ARFORMOTEROL TARTRATE 15 UG/2ML
15 SOLUTION RESPIRATORY (INHALATION) 2 TIMES DAILY
Status: DISCONTINUED | OUTPATIENT
Start: 2022-12-16 | End: 2022-12-19 | Stop reason: HOSPADM

## 2022-12-16 RX ORDER — ACETAMINOPHEN 325 MG/1
650 TABLET ORAL EVERY 4 HOURS PRN
Status: DISCONTINUED | OUTPATIENT
Start: 2022-12-16 | End: 2022-12-19 | Stop reason: SDUPTHER

## 2022-12-16 RX ORDER — NALOXONE HCL 0.4 MG/ML
0.02 VIAL (ML) INJECTION
Status: DISCONTINUED | OUTPATIENT
Start: 2022-12-16 | End: 2022-12-19 | Stop reason: HOSPADM

## 2022-12-16 RX ORDER — FUROSEMIDE 10 MG/ML
20 INJECTION INTRAMUSCULAR; INTRAVENOUS ONCE
Status: COMPLETED | OUTPATIENT
Start: 2022-12-16 | End: 2022-12-16

## 2022-12-16 RX ORDER — LANOLIN ALCOHOL/MO/W.PET/CERES
800 CREAM (GRAM) TOPICAL
Status: DISCONTINUED | OUTPATIENT
Start: 2022-12-16 | End: 2022-12-19 | Stop reason: HOSPADM

## 2022-12-16 RX ORDER — FLUTICASONE FUROATE AND VILANTEROL 100; 25 UG/1; UG/1
1 POWDER RESPIRATORY (INHALATION) DAILY
Status: DISCONTINUED | OUTPATIENT
Start: 2022-12-16 | End: 2022-12-16

## 2022-12-16 RX ORDER — SODIUM CHLORIDE 0.9 % (FLUSH) 0.9 %
10 SYRINGE (ML) INJECTION
Status: DISCONTINUED | OUTPATIENT
Start: 2022-12-16 | End: 2022-12-19 | Stop reason: HOSPADM

## 2022-12-16 RX ORDER — DOCUSATE SODIUM 100 MG/1
100 CAPSULE, LIQUID FILLED ORAL 2 TIMES DAILY PRN
Status: DISCONTINUED | OUTPATIENT
Start: 2022-12-16 | End: 2022-12-19 | Stop reason: HOSPADM

## 2022-12-16 RX ORDER — PANTOPRAZOLE SODIUM 40 MG/1
40 TABLET, DELAYED RELEASE ORAL DAILY
Status: DISCONTINUED | OUTPATIENT
Start: 2022-12-16 | End: 2022-12-16

## 2022-12-16 RX ORDER — IPRATROPIUM BROMIDE 0.5 MG/2.5ML
0.5 SOLUTION RESPIRATORY (INHALATION) EVERY 6 HOURS
Status: DISCONTINUED | OUTPATIENT
Start: 2022-12-16 | End: 2022-12-17

## 2022-12-16 RX ORDER — SODIUM CHLORIDE 0.9 % (FLUSH) 0.9 %
10 SYRINGE (ML) INJECTION EVERY 6 HOURS PRN
Status: DISCONTINUED | OUTPATIENT
Start: 2022-12-16 | End: 2022-12-19 | Stop reason: HOSPADM

## 2022-12-16 RX ORDER — IBUPROFEN 200 MG
16 TABLET ORAL
Status: DISCONTINUED | OUTPATIENT
Start: 2022-12-16 | End: 2022-12-19 | Stop reason: HOSPADM

## 2022-12-16 RX ORDER — PANTOPRAZOLE SODIUM 40 MG/10ML
40 INJECTION, POWDER, LYOPHILIZED, FOR SOLUTION INTRAVENOUS DAILY
Status: DISCONTINUED | OUTPATIENT
Start: 2022-12-16 | End: 2022-12-19 | Stop reason: HOSPADM

## 2022-12-16 RX ORDER — DROPERIDOL 2.5 MG/ML
1.25 INJECTION, SOLUTION INTRAMUSCULAR; INTRAVENOUS
Status: COMPLETED | OUTPATIENT
Start: 2022-12-16 | End: 2022-12-16

## 2022-12-16 RX ORDER — REGADENOSON 0.08 MG/ML
0.4 INJECTION, SOLUTION INTRAVENOUS
Status: COMPLETED | OUTPATIENT
Start: 2022-12-16 | End: 2022-12-16

## 2022-12-16 RX ORDER — ACETAMINOPHEN 500 MG
1000 TABLET ORAL
Status: COMPLETED | OUTPATIENT
Start: 2022-12-16 | End: 2022-12-16

## 2022-12-16 RX ORDER — BENZONATATE 100 MG/1
100 CAPSULE ORAL 3 TIMES DAILY PRN
Status: DISCONTINUED | OUTPATIENT
Start: 2022-12-16 | End: 2022-12-19 | Stop reason: HOSPADM

## 2022-12-16 RX ORDER — ALBUTEROL SULFATE 90 UG/1
2 AEROSOL, METERED RESPIRATORY (INHALATION) EVERY 6 HOURS PRN
Status: DISCONTINUED | OUTPATIENT
Start: 2022-12-16 | End: 2022-12-16

## 2022-12-16 RX ORDER — CLOPIDOGREL BISULFATE 75 MG/1
75 TABLET ORAL DAILY
Status: DISCONTINUED | OUTPATIENT
Start: 2022-12-16 | End: 2022-12-19

## 2022-12-16 RX ORDER — QUETIAPINE FUMARATE 100 MG/1
200 TABLET, FILM COATED ORAL NIGHTLY
Status: DISCONTINUED | OUTPATIENT
Start: 2022-12-16 | End: 2022-12-19 | Stop reason: HOSPADM

## 2022-12-16 RX ORDER — SODIUM,POTASSIUM PHOSPHATES 280-250MG
2 POWDER IN PACKET (EA) ORAL
Status: DISCONTINUED | OUTPATIENT
Start: 2022-12-16 | End: 2022-12-19 | Stop reason: HOSPADM

## 2022-12-16 RX ORDER — GLUCAGON 1 MG
1 KIT INJECTION
Status: DISCONTINUED | OUTPATIENT
Start: 2022-12-16 | End: 2022-12-19 | Stop reason: HOSPADM

## 2022-12-16 RX ORDER — ALBUTEROL SULFATE 0.83 MG/ML
2.5 SOLUTION RESPIRATORY (INHALATION) EVERY 6 HOURS PRN
Status: DISCONTINUED | OUTPATIENT
Start: 2022-12-16 | End: 2022-12-19 | Stop reason: HOSPADM

## 2022-12-16 RX ORDER — IBUPROFEN 200 MG
24 TABLET ORAL
Status: DISCONTINUED | OUTPATIENT
Start: 2022-12-16 | End: 2022-12-19 | Stop reason: HOSPADM

## 2022-12-16 RX ORDER — AMLODIPINE BESYLATE 2.5 MG/1
2.5 TABLET ORAL DAILY
Status: DISCONTINUED | OUTPATIENT
Start: 2022-12-16 | End: 2022-12-19 | Stop reason: HOSPADM

## 2022-12-16 RX ORDER — BUDESONIDE 0.5 MG/2ML
0.5 INHALANT ORAL EVERY 12 HOURS
Status: DISCONTINUED | OUTPATIENT
Start: 2022-12-16 | End: 2022-12-19 | Stop reason: HOSPADM

## 2022-12-16 RX ORDER — METOPROLOL SUCCINATE 25 MG/1
25 TABLET, EXTENDED RELEASE ORAL 2 TIMES DAILY
Status: DISCONTINUED | OUTPATIENT
Start: 2022-12-16 | End: 2022-12-19 | Stop reason: HOSPADM

## 2022-12-16 RX ORDER — ENOXAPARIN SODIUM 100 MG/ML
100 INJECTION SUBCUTANEOUS
Status: DISCONTINUED | OUTPATIENT
Start: 2022-12-16 | End: 2022-12-18

## 2022-12-16 RX ORDER — NITROGLYCERIN 0.4 MG/1
0.4 TABLET SUBLINGUAL EVERY 5 MIN PRN
Status: DISCONTINUED | OUTPATIENT
Start: 2022-12-16 | End: 2022-12-19 | Stop reason: SDUPTHER

## 2022-12-16 RX ORDER — ATORVASTATIN CALCIUM 40 MG/1
40 TABLET, FILM COATED ORAL NIGHTLY
Status: DISCONTINUED | OUTPATIENT
Start: 2022-12-16 | End: 2022-12-19 | Stop reason: HOSPADM

## 2022-12-16 RX ORDER — LORAZEPAM 1 MG/1
1 TABLET ORAL 3 TIMES DAILY
Status: DISCONTINUED | OUTPATIENT
Start: 2022-12-16 | End: 2022-12-19 | Stop reason: HOSPADM

## 2022-12-16 RX ORDER — ONDANSETRON 2 MG/ML
4 INJECTION INTRAMUSCULAR; INTRAVENOUS EVERY 8 HOURS PRN
Status: DISCONTINUED | OUTPATIENT
Start: 2022-12-16 | End: 2022-12-19 | Stop reason: HOSPADM

## 2022-12-16 RX ORDER — ASPIRIN 81 MG/1
81 TABLET ORAL DAILY
Status: DISCONTINUED | OUTPATIENT
Start: 2022-12-16 | End: 2022-12-19 | Stop reason: HOSPADM

## 2022-12-16 RX ADMIN — AMLODIPINE BESYLATE 2.5 MG: 2.5 TABLET ORAL at 06:12

## 2022-12-16 RX ADMIN — ASPIRIN 81 MG: 81 TABLET, COATED ORAL at 09:12

## 2022-12-16 RX ADMIN — BUDESONIDE 0.5 MG: 0.5 INHALANT RESPIRATORY (INHALATION) at 08:12

## 2022-12-16 RX ADMIN — LORAZEPAM 1 MG: 1 TABLET ORAL at 09:12

## 2022-12-16 RX ADMIN — ATORVASTATIN CALCIUM 40 MG: 40 TABLET, FILM COATED ORAL at 08:12

## 2022-12-16 RX ADMIN — PREGABALIN 100 MG: 75 CAPSULE ORAL at 03:12

## 2022-12-16 RX ADMIN — LORAZEPAM 1 MG: 1 TABLET ORAL at 03:12

## 2022-12-16 RX ADMIN — ENOXAPARIN SODIUM 100 MG: 100 INJECTION SUBCUTANEOUS at 06:12

## 2022-12-16 RX ADMIN — FUROSEMIDE 20 MG: 10 INJECTION, SOLUTION INTRAMUSCULAR; INTRAVENOUS at 09:12

## 2022-12-16 RX ADMIN — CLOPIDOGREL BISULFATE 75 MG: 75 TABLET, FILM COATED ORAL at 06:12

## 2022-12-16 RX ADMIN — IPRATROPIUM BROMIDE 0.5 MG: 0.5 SOLUTION RESPIRATORY (INHALATION) at 08:12

## 2022-12-16 RX ADMIN — CEFTRIAXONE 1 G: 1 INJECTION, POWDER, FOR SOLUTION INTRAMUSCULAR; INTRAVENOUS at 01:12

## 2022-12-16 RX ADMIN — QUETIAPINE 200 MG: 100 TABLET ORAL at 08:12

## 2022-12-16 RX ADMIN — ARFORMOTEROL TARTRATE 15 MCG: 15 SOLUTION RESPIRATORY (INHALATION) at 12:12

## 2022-12-16 RX ADMIN — METOPROLOL SUCCINATE 25 MG: 25 TABLET, EXTENDED RELEASE ORAL at 08:12

## 2022-12-16 RX ADMIN — PANTOPRAZOLE SODIUM 40 MG: 40 INJECTION, POWDER, LYOPHILIZED, FOR SOLUTION INTRAVENOUS at 09:12

## 2022-12-16 RX ADMIN — DROPERIDOL 1.25 MG: 2.5 INJECTION, SOLUTION INTRAMUSCULAR; INTRAVENOUS at 01:12

## 2022-12-16 RX ADMIN — REGADENOSON 0.4 MG: 0.08 INJECTION, SOLUTION INTRAVENOUS at 11:12

## 2022-12-16 RX ADMIN — BUDESONIDE 0.5 MG: 0.5 INHALANT RESPIRATORY (INHALATION) at 12:12

## 2022-12-16 RX ADMIN — LORAZEPAM 1 MG: 1 TABLET ORAL at 08:12

## 2022-12-16 RX ADMIN — IPRATROPIUM BROMIDE 0.5 MG: 0.5 SOLUTION RESPIRATORY (INHALATION) at 12:12

## 2022-12-16 RX ADMIN — ACETAMINOPHEN 1000 MG: 500 TABLET ORAL at 01:12

## 2022-12-16 NOTE — H&P
[facility]  History & Physical    Patient Name: Stephania Salmeron  MRN: [id number]  Patient Class: IP- Inpatient  Admission Date: 12/16/2022  Attending Physician: Emily Garza MD   Primary Care Provider: Sinan Saeed MD         Patient information was obtained from patient, past medical records and ER records.     Subjective:     Principal Problem:Chest pain    Chief Complaint:   Chief Complaint   Patient presents with    Chest Pain        HPI: Ms. Salmeron is a 67-year-old female transferred to [facility] [place] due to capacity needs.  Patient reports 1 week history of intermittent chest pain associated with shortness of breath.  States chest pain was midsternal, radiating up into the left jaw, some onset with lying on her side/position changes, traveling numbness sensation down her left arm.  Also reported shortness of breath, cough, nonproductive, left scapula pain/discomfort.  Remote history of CABG followed by PCI, in the past was followed by Cardiology at [facility] but not in the last 2-3 years.  Reports nitroglycerin did not help.  Does report nausea, few days ago 3 episodes of nonbloody emesis which self-resolved.  Known history of COPD with chronic bronchitis, nonsmoker, not on home oxygen.  Denies any fever, chills, constipation, diarrhea, leg swelling.  At outside hospital WBC 6.8, hemoglobin 12.3, BUN/creatinine 19/0.9, troponin by 2 negative, , chest x-ray with basal findings.  Chart review echocardiogram November 2022 EF of 60%, nuclear stress test August 2021 no reversible ischemia with EF of 63%.  Plan of care discussed with patient.  No visitors at bedside.  Communicated with nursing.      Past Medical History:   Diagnosis Date    Acute coronary syndrome     Atrial fibrillation     Clotting disorder     Coronary artery disease     COVID-19 01/18/2021    Required hospitalization d/t pneumonia and hypoxia     Hyperlipidemia     Hypertension     ICD (implantable cardioverter-defibrillator) in place     [employer] Evera XT ICD - Model KCQB7P6 (SN: [id number]), 5568-45 (SN: [id number]), 6932-65 (SN: [id number])    Occipital stroke     Left occipital lobe    Quadrantanopia, right     Right lower quadrant       Past Surgical History:   Procedure Laterality Date    APPENDECTOMY      CARDIAC DEFIBRILLATOR PLACEMENT      CHOLECYSTECTOMY      COLONOSCOPY N/A 3/31/2021    Procedure: COLONOSCOPY Suprep;  Surgeon: Murtaza Curry MD;  Location: [facility] ENDO;  Service: Endoscopy;  Laterality: N/A;    CORONARY ANGIOPLASTY      CORONARY ANGIOPLASTY WITH STENT PLACEMENT      CORONARY ARTERY BYPASS GRAFT  1997    ESOPHAGOGASTRODUODENOSCOPY N/A 3/31/2021    Procedure: EGD (ESOPHAGOGASTRODUODENOSCOPY);  Surgeon: Murtaza Curry MD;  Location: [facility] ENDO;  Service: Endoscopy;  Laterality: N/A;    DESIRAE FILTER PLACEMENT  2017    HYSTERECTOMY         Review of patient's allergies indicates:   Allergen Reactions    Compazine [prochlorperazine]      Feels like somethingcrawling underneath the skin    Demerol (pf) [meperidine (pf)]      Feels like somethingcrawling underneath the skin    Toradol [ketorolac]      Feels like somethingcrawling underneath the skin       Current Facility-Administered Medications on File Prior to Encounter   Medication    [COMPLETED] acetaminophen tablet 1,000 mg    [COMPLETED] cefTRIAXone (ROCEPHIN) 1 g in dextrose 5 % in water (D5W) 5 % 50 mL IVPB (MB+)    [COMPLETED] droperidoL injection 1.25 mg    [COMPLETED] morphine injection 4 mg    [COMPLETED] ondansetron injection 4 mg     Current Outpatient Medications on File Prior to Encounter   Medication Sig    albuterol (PROVENTIL/VENTOLIN HFA) 90 mcg/actuation inhaler INHALE 2 PUFFS INTO THE LUNGS EVERY 6 (SIX) HOURS AS NEEDED FOR WHEEZING. RESCUE    aspirin (ECOTRIN) 81 MG EC tablet Take 81 mg by mouth once daily.    atorvastatin (LIPITOR)  40 MG tablet TAKE 1 TABLET BY MOUTH EVERY DAY    buPROPion (WELLBUTRIN XL) 150 MG TB24 tablet Take 450 mg by mouth once daily.    docusate sodium (COLACE) 100 MG capsule TAKE 1 CAPSULE (100 MG TOTAL) BY MOUTH 2 (TWO) TIMES DAILY AS NEEDED FOR CONSTIPATION.    ergocalciferol (ERGOCALCIFEROL) 50,000 unit Cap TAKE 1 CAPSULE BY MOUTH ONE TIME PER WEEK    esomeprazole (NEXIUM) 40 MG capsule TAKE 1 CAPSULE (40 MG TOTAL) BY MOUTH BEFORE BREAKFAST.    LORazepam (ATIVAN) 1 MG tablet Take 1 mg by mouth 3 (three) times daily.    metoprolol succinate (TOPROL-XL) 25 MG 24 hr tablet TAKE 1 TABLET BY MOUTH TWICE A DAY    nitrofurantoin, macrocrystal-monohydrate, (MACROBID) 100 MG capsule Take 1 capsule (100 mg total) by mouth every 12 (twelve) hours.    pregabalin (LYRICA) 100 MG capsule Take 100 mg by mouth 3 (three) times daily as needed.    promethazine (PHENERGAN) 12.5 MG Tab Take 1 tablet (12.5 mg total) by mouth every 8 (eight) hours as needed (nausea).    QUEtiapine (SEROQUEL) 200 MG Tab Take 200 mg by mouth every evening.    SPIRIVA RESPIMAT 1.25 mcg/actuation inhaler INHALE 2 PUFFS INTO THE LUNGS ONCE DAILY. CONTROLLER    SYMBICORT 160-4.5 mcg/actuation HFAA INHALE 2 PUFFS INTO THE LUNGS EVERY 12 (TWELVE) HOURS. CONTROLLER (Patient taking differently: Inhale 1-2 puffs into the lungs 1-2 times per day as needed)    XARELTO 20 mg Tab TAKE 1 TABLET (20 MG TOTAL) BY MOUTH DAILY WITH DINNER OR EVENING MEAL.     Family History       Problem Relation (Age of Onset)    Alcohol abuse Sister    Anuerysm Father    Anxiety disorder Sister    Bipolar disorder Other    Bone cancer Maternal Aunt    COPD Sister    Dementia Sister    Depression Brother, Brother    Lung cancer Mother    Pulmonary embolism Sister    Stroke Sister (72)          Tobacco Use    Smoking status: Former     Packs/day: 4.00     Years: 30.00     Pack years: 120.00     Types: Cigarettes     Quit date: 3/22/1994     Years since quittin.7     Smokeless tobacco: Never   Substance and Sexual Activity    Alcohol use: Not Currently     Alcohol/week: 0.0 standard drinks    Drug use: Never    Sexual activity: Not Currently     Review of Systems   Constitutional:  Negative for chills and fever.   HENT:  Negative for congestion.    Respiratory:  Positive for cough and shortness of breath.    Cardiovascular:  Positive for chest pain. Negative for leg swelling.   Gastrointestinal:  Positive for nausea and vomiting (3 episodes of emesis few days ago that self resolved).   Genitourinary:  Negative for hematuria.   Musculoskeletal:         Shoulder pain    Skin:  Negative for wound.   Neurological:  Negative for seizures and syncope.   Psychiatric/Behavioral:  Negative for confusion.    Objective:     Vital Signs (Most Recent):    Vital Signs (24h Range):  Temp:  [98 °F (36.7 °C)-98.9 °F (37.2 °C)] 98.3 °F (36.8 °C)  Pulse:  [69-70] 70  Resp:  [14-20] 14  SpO2:  [95 %-98 %] 98 %  BP: (140-185)/(68-94) 169/74        There is no height or weight on file to calculate BMI.    Physical Exam  Vitals and nursing note reviewed.   Constitutional:       General: She is not in acute distress.     Appearance: She is obese. She is not ill-appearing, toxic-appearing or diaphoretic.      Comments: Lying in bed, NAD   HENT:      Head: Normocephalic and atraumatic.      Nose: Nose normal.      Mouth/Throat:      Mouth: Mucous membranes are moist.   Eyes:      General:         Right eye: No discharge.         Left eye: No discharge.   Cardiovascular:      Rate and Rhythm: Normal rate and regular rhythm.      Comments: Left sided AICD in place, no significant LE edema  Pulmonary:      Comments: On RA, inspiratory crackles mostly left base, no wheeze or accessory muscle use  Abdominal:      General: Bowel sounds are normal. There is no distension.      Palpations: Abdomen is soft.      Tenderness: There is no abdominal tenderness. There is no guarding.   Genitourinary:     Comments:  No abernathy  Skin:     General: Skin is warm and dry.   Neurological:      General: No focal deficit present.      Mental Status: She is alert and oriented to person, place, and time.      Comments: Speech intact, moving all four extremities   Psychiatric:      Comments: cooperative           Significant Labs: Blood Culture: No results for input(s): LABBLOO in the last 48 hours.  BMP:   Recent Labs   Lab 12/15/22  2248   GLU 83      K 4.0      CO2 25   BUN 19   CREATININE 0.9   CALCIUM 9.0     CBC:   Recent Labs   Lab 12/15/22  2248   WBC 6.80   HGB 12.3   HCT 41.0        CMP:   Recent Labs   Lab 12/15/22  2248      K 4.0      CO2 25   GLU 83   BUN 19   CREATININE 0.9   CALCIUM 9.0   PROT 8.0   ALBUMIN 3.6   BILITOT 0.2   ALKPHOS 136*   AST 19   ALT 19   ANIONGAP 9     Cardiac Markers:   Recent Labs   Lab 12/15/22  2248   *     Lactic Acid: No results for input(s): LACTATE in the last 48 hours.  Magnesium: No results for input(s): MG in the last 48 hours.  Urine Culture: No results for input(s): LABURIN in the last 48 hours.  Urine Studies:   Recent Labs   Lab 12/15/22  2314   COLORU Yellow   APPEARANCEUA Hazy*   PHUR 7.0   SPECGRAV 1.020   PROTEINUA Trace*   GLUCUA Negative   KETONESU Negative   BILIRUBINUA Negative   OCCULTUA Trace*   NITRITE Positive*   LEUKOCYTESUR Trace*   RBCUA 1   WBCUA 6*   BACTERIA Moderate*   SQUAMEPITHEL 1       Significant Imaging: I have reviewed all pertinent imaging results/findings within the past 24 hours.    X-Ray Chest AP Portable    Result Date: 12/15/2022  EXAMINATION: XR CHEST AP PORTABLE CLINICAL HISTORY: chest pain; TECHNIQUE: Single frontal view of the chest was performed. COMPARISON: Chest radiograph June 2, 2022 FINDINGS: Single portable chest view is submitted.  Cardiac pacemaker and postoperative change noted.  The appearance of the cardiomediastinal silhouette is stable.  Aortic atherosclerotic change noted. Mild accentuated  parenchymal change at the lung bases bilaterally may relate to atelectasis and or mild superimposed infiltrate without dense consolidation. There is no evidence for pleural effusion or pneumothorax.  The osseous structures demonstrate chronic change.     Mild basilar parenchymal change may relate to atelectasis and or mild infiltrate without dense consolidation. Electronically signed by: Sergio Sandoval Date:    12/15/2022 Time:    23:25       Assessment/Plan:     Active Hospital Problems    Diagnosis    *Chest pain    Severe obesity (BMI 35.0-39.9) with comorbidity    Numbness and tingling of left arm and leg    Gastroesophageal reflux disease    Shortness of breath    S/P PTCA (percutaneous transluminal coronary angioplasty)    S/P CABG (coronary artery bypass graft)    AICD (automatic cardioverter/defibrillator) present     Checked in clinic  No firing reported      Paroxysmal atrial fibrillation     On Toprol and Xarelto, continue as now.  Xarelto refilled      PAD (peripheral artery disease)    Chronic bronchitis    Anxiety and depression     Plan:  Admit observation, telemetry floor with continuous cardiac monitoring  NPO for nuclear stress test today   Ordered labs including CBC, CMP, magnesium, procalcitonin, high sensitivity troponin, D-dimer  Recent echocardiogram November 2022 with EF of 60%   Continue aspirin, Lipitor, metoprolol   Continue PPI therapy   Continue breathing treatments, history of chronic bronchitis, follow-up procalcitonin level, antitussives p.r.n.   Electrolytes sliding scale repletion  Trending labs  Nuclear stress test ordered today for further evaluation  Consult cardiology   Further plan as per clinical course    VTE Risk Mitigation (From admission, onward)         Ordered     rivaroxaban tablet 20 mg  with dinner         12/16/22 0703                   Emily Garza MD  Department of Hospital Medicine   [facility]

## 2022-12-16 NOTE — ED PROVIDER NOTES
Encounter Date: 12/15/2022       History     Chief Complaint   Patient presents with    Chest Pain     Pt arrived by EMS from home c/o intermittent chest pain, SOB, and nausea x1 week. Took nitro at home w/o relief. Nitro x1 and 240mg aspirin given by EMS. EKG shows atrial paced rhythm. O2 sat 92% on RA. Placed on 3L nc by ems. Hx of MI.      HPI  67-year-old female past medical history as noted below, she also reports a history of PE on Xarelto (not noted in her PMH below), history of mild cognitive dysfunction per previous charts, coming in with 1-1/2 weeks of intermittent chest pain and shortness of breath.  Chest pain is substernal feels like pressure radiates to her left jaw and left arm.  She says she has intermittent left arm numbness.  Currently her pain level is 7/10.  She took some nitro which did not improve her pain but did give her headache.  No associated fevers, she does have a dry cough.  No new leg swelling or pain.  Shortness of breath is worsened with lying flat and also with exertion such as walking around her house.  She is still able to walk around the house.  She has some accompanying nausea and says she vomited a couple of times yesterday.  No abdominal pain.    Does have some accompanying epigastric discomfort.     She reports compliance with her Xarelto.    She states she lives with her daughter and daughter's .      Review of patient's allergies indicates:   Allergen Reactions    Compazine [prochlorperazine]      Feels like somethingcrawling underneath the skin    Demerol (pf) [meperidine (pf)]      Feels like somethingcrawling underneath the skin    Toradol [ketorolac]      Feels like somethingcrawling underneath the skin     Past Medical History:   Diagnosis Date    Acute coronary syndrome     Atrial fibrillation     Clotting disorder     Coronary artery disease     COVID-19 01/18/2021    Required hospitalization d/t pneumonia and hypoxia    Hyperlipidemia     Hypertension     ICD  (implantable cardioverter-defibrillator) in place     Medtronic Evera XT ICD - Model WRLC5L2 (SN: [id number]), 5568-45 (SN: [id number]), 6932-65 (SN: [id number])    Occipital stroke     Left occipital lobe    Quadrantanopia, right     Right lower quadrant     Past Surgical History:   Procedure Laterality Date    APPENDECTOMY      CARDIAC DEFIBRILLATOR PLACEMENT      CHOLECYSTECTOMY      COLONOSCOPY N/A 3/31/2021    Procedure: COLONOSCOPY Suprep;  Surgeon: Murtaza Curry MD;  Location: [facility] ENDO;  Service: Endoscopy;  Laterality: N/A;    CORONARY ANGIOPLASTY      CORONARY ANGIOPLASTY WITH STENT PLACEMENT      CORONARY ARTERY BYPASS GRAFT      ESOPHAGOGASTRODUODENOSCOPY N/A 3/31/2021    Procedure: EGD (ESOPHAGOGASTRODUODENOSCOPY);  Surgeon: Murtaza Curry MD;  Location: [facility] ENDO;  Service: Endoscopy;  Laterality: N/A;    DESIRAE FILTER PLACEMENT      HYSTERECTOMY       Family History   Problem Relation Age of Onset    Lung cancer Mother     Anuerysm Father     Dementia Sister     COPD Sister     Anxiety disorder Sister     Alcohol abuse Sister     Stroke Sister 72    Bone cancer Maternal Aunt     Bipolar disorder Other         Neice    Depression Brother     Depression Brother     Pulmonary embolism Sister     Colon cancer Neg Hx     Diabetes Mellitus Neg Hx     Breast cancer Neg Hx      Social History     Tobacco Use    Smoking status: Former     Packs/day: 4.00     Years: 30.00     Pack years: 120.00     Types: Cigarettes     Quit date: 3/22/1994     Years since quittin.7    Smokeless tobacco: Never   Substance Use Topics    Alcohol use: Not Currently     Alcohol/week: 0.0 standard drinks    Drug use: Never     Review of Systems    Constitutional:  No Fever, No Chills,   Eyes: No Vision Changes  ENT/Mouth: No sore throat, No rhinorrhea  Cardiovascular:  Positive Chest Pain, No Palpitations  Respiratory:  Positive dry Cough, positive SOB  Gastrointestinal:  Positive Nausea, vomiting yesterday  none today, No Diarrhea, positive epigastric discomfort, no other abdominal pain  Genitourinary:  No  pain, No dysuria   Musculoskeletal:  No Arthralgias, No Back Pain, No Neck Pain, No recent trauma.  Skin:  No skin Lesions  Neuro:  No Weakness, No Numbness, No Dizziness, positive Headache      Physical Exam     Initial Vitals [12/15/22 2116]   BP Pulse Resp Temp SpO2   (!) 185/94 70 18 98.9 °F (37.2 °C) 97 %      MAP       --         Physical Exam    Physical Exam:  CONSTITUTIONAL: Well developed, elevated BMI, in no acute distress.  HENT: Normocephalic, atraumatic    EYES: Sclerae anicteric   NECK: Supple, no thyroid enlargement  CARDIOVASCULAR: Regular rate and rhythm without any murmurs, gallops, rubs.  No lower extremity swelling or tenderness to palpation.  RESPIRATORY: Speaking in full sentences. Breathing comfortably. Auscultation of the lungs revealed decreased breath sounds throughout difficult to auscultate lung sounds.  ABDOMEN: Soft and nontender, no masses, no rebound or guarding   NEUROLOGIC: Alert, interacting normally. No facial droop.   MSK: Moving all four extremities.  Chest pain is not reproducible on my exam.  Skin: Warm and dry. No visible rash on exposed areas of skin.    Psych:  Mildly anxious appearing.      ED Course   Procedures  Labs Reviewed   CBC W/ AUTO DIFFERENTIAL - Abnormal; Notable for the following components:       Result Value    MCHC 30.0 (*)     RDW 14.6 (*)     All other components within normal limits   COMPREHENSIVE METABOLIC PANEL - Abnormal; Notable for the following components:    Alkaline Phosphatase 136 (*)     All other components within normal limits   B-TYPE NATRIURETIC PEPTIDE - Abnormal; Notable for the following components:     (*)     All other components within normal limits   URINALYSIS, REFLEX TO URINE CULTURE - Abnormal; Notable for the following components:    Appearance, UA Hazy (*)     Protein, UA Trace (*)     Occult Blood UA Trace (*)      Nitrite, UA Positive (*)     Leukocytes, UA Trace (*)     All other components within normal limits    Narrative:     Specimen Source->Urine   URINALYSIS MICROSCOPIC - Abnormal; Notable for the following components:    WBC, UA 6 (*)     Bacteria Moderate (*)     All other components within normal limits    Narrative:     Specimen Source->Urine   HIV 1 / 2 ANTIBODY    Narrative:     Release to patient->Immediate   HEPATITIS C ANTIBODY    Narrative:     Release to patient->Immediate   TROPONIN I   PROTIME-INR   SARS-COV2 (COVID) WITH FLU/RSV BY PCR   TROPONIN I     EKG Readings: (Independently Interpreted)   EKG, independently interpreted, atrially paced rhythm at a rate of 70 with some motion artifact, no obvious ischemia.  Intervals are not remarkable.     Imaging Results              X-Ray Chest AP Portable (Final result)  Result time 12/15/22 23:25:38      Final result by Sergio Sandoval MD (12/15/22 23:25:38)                   Impression:      Mild basilar parenchymal change may relate to atelectasis and or mild infiltrate without dense consolidation.      Electronically signed by: Sergio Sandoval  Date:    12/15/2022  Time:    23:25               Narrative:    EXAMINATION:  XR CHEST AP PORTABLE    CLINICAL HISTORY:  chest pain;    TECHNIQUE:  Single frontal view of the chest was performed.    COMPARISON:  Chest radiograph June 2, 2022    FINDINGS:  Single portable chest view is submitted.  Cardiac pacemaker and postoperative change noted.  The appearance of the cardiomediastinal silhouette is stable.  Aortic atherosclerotic change noted.    Mild accentuated parenchymal change at the lung bases bilaterally may relate to atelectasis and or mild superimposed infiltrate without dense consolidation.    There is no evidence for pleural effusion or pneumothorax.  The osseous structures demonstrate chronic change.                                       Medications   morphine injection 4 mg (4 mg Intravenous Given  12/15/22 2319)   ondansetron injection 4 mg (4 mg Intravenous Given 12/15/22 2319)   acetaminophen tablet 1,000 mg (1,000 mg Oral Given 12/16/22 0157)   droperidoL injection 1.25 mg (1.25 mg Intravenous Given 12/16/22 0157)   cefTRIAXone (ROCEPHIN) 1 g in dextrose 5 % in water (D5W) 5 % 50 mL IVPB (MB+) (0 g Intravenous Stopped 12/16/22 0237)     Medical Decision Making:   History:   Old Medical Records: I decided to obtain old medical records.  Old Records Summarized: records from clinic visits.       <> Summary of Records:   Previous discharge summary:  Admission Date: 6/2/2022  HPI:   Patient is a 68 yo woman with significant medical history HTN, COPD, AFib, CAD s/p CABG with multiple stents and ICD, and mild cognitive dysfunction who presents with 5 days of headache and chest pain radiating to left shoulder. She provides history. She states she presented to the ED because of a headache that she has had for 5 days. It has kept her in the bed and she has not been eating or drinking much. She states the HA is to the left and right of her head and some in the back towards her neck, radiating to her shoulders. Massaging her shoulders makes the pain better. She has had several doses of morphine in the ED and this has also made the pain better. NTG makes the HA worse. She has photosensitivity. She has had some nausea. She also c/o chest pain, radiating to the left shoulder which was not relieved by the NTG. Morphine made chest pain better. She has also noticed increased urinary frequency. She is feeling very anxious because she is on Ativan four times a day and has not taken it today. She lives with her daughter who helps her with medications. She denies tobacco and drinking. She owns a dog, Zahida, who is a Shiatzu.         * No surgery found *       Hospital Course:   Pt admitted and work up unrevealing. Improvement in headache/ muscle soreness with fioricet/tylenol. Urine cxr growing GNRs started treatment with  macrobid. Worked with PT/OT who recommend home health. She will dc with a week course of macrobid for UTI, HH and follow up with PCP. She is requesting new referrals for cardiology and neurology as well.     Independently Interpreted Test(s):   I have ordered and independently interpreted EKG Reading(s) - see prior notes  Clinical Tests:   Lab Tests: Ordered and Reviewed  Radiological Study: Ordered and Reviewed  Medical Tests: Ordered and Reviewed  Other:   I have discussed this case with another health care provider.       Risk level high, complexity:  High, level 5.      67-year-old female with past medical history as noted coming in with 1.5 weeks of shortness of breath, left-sided chest pain with radiation to the left jaw and left arm numbness.  On exam no acute distress, mildly anxious appearing, no lower extremity swelling, difficult to auscultate lung sounds.  Nonreproducible chest pain.    Of note, she had a similar presentation in June of this year, had a negative cardiac workup was found to have UTI and discharged home with PT OT.    Differential includes ACS, pneumonia, viral etiologies, metabolic hematologic abnormalities are possible.  Given the fact that she is on Xarelto and reports compliance, no signs of DVT clinically, low suspicion for PE as a cause of her symptoms.  Did have some nausea and vomiting but her abdomen is entirely soft and nontender.  Not consistent with dangerous abdominal pathology at this time.    Will undertake workup with EKG, labs, troponin, BNP, chest x-ray.    Disposition based on ED workup and patient's symptomatology.    Update:  She remains hemodynamically stable.    Labs, COVID swabs are not remarkable.  BNP is 195 which is nondiagnostic.  She is not clinically fluid overloaded.  Troponin is negative.  Chest x-ray with only mild basilar changes likely atelectasis low suspicion for pneumonia.  Urine with positive nitrites she is complaining of urinary frequency that is  not new no dysuria will treat with dose of ceftriaxone.    While sleeping she does desat to 90 but upon waking up and sitting up her O2 sat on room air is back to 96% suspect sleep apnea.    Heart score of 5 will need to be admitted for continued monitoring and risk stratification of ACS.          ED Course as of 12/16/22 0427   Fri Dec 16, 2022   0426 Patient consents to transfer, will go to [place].  She is a daughter that lives in [place].  Second troponin obtain negative.  Patient remains hemodynamically stable.  Will reassess while she is here.  Pending discussion with [place] MDs. [BA]      ED Course User Index  [BA] Carlos Johnson MD            Patient re-evaluated in the morning, states her symptoms are improved.  Second troponin is negative.  Still pending transfer.  Discussed with transfer center.  Offered to discussed the case verbally with receiving MD but they prefer to read the note with labs.         Clinical Impression:   Final diagnoses:  [R07.9] Chest pain  [R06.02] SOB (shortness of breath) (Primary)  [N39.0, R31.9] Urinary tract infection with hematuria, site unspecified        ED Disposition Condition    Transfer to Another Facility Stable                Carlos Johnson MD  12/16/22 6263

## 2022-12-16 NOTE — SUBJECTIVE & OBJECTIVE
Past Medical History:   Diagnosis Date    Acute coronary syndrome     Atrial fibrillation     Clotting disorder     Coronary artery disease     COVID-19 01/18/2021    Required hospitalization d/t pneumonia and hypoxia    Hyperlipidemia     Hypertension     ICD (implantable cardioverter-defibrillator) in place     Medtronic Evera XT ICD - Model AZWE1O0 (SN: [id number]), 5568-45 (SN: [id number]), 6932-65 (SN: [id number])    Occipital stroke     Left occipital lobe    Quadrantanopia, right     Right lower quadrant       Past Surgical History:   Procedure Laterality Date    APPENDECTOMY      CARDIAC DEFIBRILLATOR PLACEMENT      CHOLECYSTECTOMY      COLONOSCOPY N/A 3/31/2021    Procedure: COLONOSCOPY Suprep;  Surgeon: Murtaza Curry MD;  Location: [facility] ENDO;  Service: Endoscopy;  Laterality: N/A;    CORONARY ANGIOPLASTY      CORONARY ANGIOPLASTY WITH STENT PLACEMENT      CORONARY ARTERY BYPASS GRAFT  1997    ESOPHAGOGASTRODUODENOSCOPY N/A 3/31/2021    Procedure: EGD (ESOPHAGOGASTRODUODENOSCOPY);  Surgeon: Murtaza Curry MD;  Location: [facility] ENDO;  Service: Endoscopy;  Laterality: N/A;    DESIRAE FILTER PLACEMENT  2017    HYSTERECTOMY         Review of patient's allergies indicates:   Allergen Reactions    Compazine [prochlorperazine]      Feels like somethingcrawling underneath the skin    Demerol (pf) [meperidine (pf)]      Feels like somethingcrawling underneath the skin    Toradol [ketorolac]      Feels like somethingcrawling underneath the skin       Current Facility-Administered Medications on File Prior to Encounter   Medication    [COMPLETED] acetaminophen tablet 1,000 mg    [COMPLETED] cefTRIAXone (ROCEPHIN) 1 g in dextrose 5 % in water (D5W) 5 % 50 mL IVPB (MB+)    [COMPLETED] droperidoL injection 1.25 mg    [COMPLETED] morphine injection 4 mg    [COMPLETED] ondansetron injection 4 mg     Current Outpatient Medications on File Prior to Encounter   Medication Sig    albuterol (PROVENTIL/VENTOLIN HFA) 90  mcg/actuation inhaler INHALE 2 PUFFS INTO THE LUNGS EVERY 6 (SIX) HOURS AS NEEDED FOR WHEEZING. RESCUE    aspirin (ECOTRIN) 81 MG EC tablet Take 81 mg by mouth once daily.    atorvastatin (LIPITOR) 40 MG tablet TAKE 1 TABLET BY MOUTH EVERY DAY    buPROPion (WELLBUTRIN XL) 150 MG TB24 tablet Take 450 mg by mouth once daily.    docusate sodium (COLACE) 100 MG capsule TAKE 1 CAPSULE (100 MG TOTAL) BY MOUTH 2 (TWO) TIMES DAILY AS NEEDED FOR CONSTIPATION.    ergocalciferol (ERGOCALCIFEROL) 50,000 unit Cap TAKE 1 CAPSULE BY MOUTH ONE TIME PER WEEK    esomeprazole (NEXIUM) 40 MG capsule TAKE 1 CAPSULE (40 MG TOTAL) BY MOUTH BEFORE BREAKFAST.    LORazepam (ATIVAN) 1 MG tablet Take 1 mg by mouth 3 (three) times daily.    metoprolol succinate (TOPROL-XL) 25 MG 24 hr tablet TAKE 1 TABLET BY MOUTH TWICE A DAY    nitrofurantoin, macrocrystal-monohydrate, (MACROBID) 100 MG capsule Take 1 capsule (100 mg total) by mouth every 12 (twelve) hours.    pregabalin (LYRICA) 100 MG capsule Take 100 mg by mouth 3 (three) times daily as needed.    promethazine (PHENERGAN) 12.5 MG Tab Take 1 tablet (12.5 mg total) by mouth every 8 (eight) hours as needed (nausea).    QUEtiapine (SEROQUEL) 200 MG Tab Take 200 mg by mouth every evening.    SPIRIVA RESPIMAT 1.25 mcg/actuation inhaler INHALE 2 PUFFS INTO THE LUNGS ONCE DAILY. CONTROLLER    SYMBICORT 160-4.5 mcg/actuation HFAA INHALE 2 PUFFS INTO THE LUNGS EVERY 12 (TWELVE) HOURS. CONTROLLER (Patient taking differently: Inhale 1-2 puffs into the lungs 1-2 times per day as needed)    XARELTO 20 mg Tab TAKE 1 TABLET (20 MG TOTAL) BY MOUTH DAILY WITH DINNER OR EVENING MEAL.     Family History       Problem Relation (Age of Onset)    Alcohol abuse Sister    Anuerysm Father    Anxiety disorder Sister    Bipolar disorder Other    Bone cancer Maternal Aunt    COPD Sister    Dementia Sister    Depression Brother, Brother    Lung cancer Mother    Pulmonary embolism Sister    Stroke Sister (72)           Tobacco Use    Smoking status: Former     Packs/day: 4.00     Years: 30.00     Pack years: 120.00     Types: Cigarettes     Quit date: 3/22/1994     Years since quittin.7    Smokeless tobacco: Never   Substance and Sexual Activity    Alcohol use: Not Currently     Alcohol/week: 0.0 standard drinks    Drug use: Never    Sexual activity: Not Currently     Review of Systems   Constitutional:  Negative for chills and fever.   HENT:  Negative for congestion.    Respiratory:  Positive for cough and shortness of breath.    Cardiovascular:  Positive for chest pain. Negative for leg swelling.   Gastrointestinal:  Positive for nausea and vomiting (3 episodes of emesis few days ago that self resolved).   Genitourinary:  Negative for hematuria.   Musculoskeletal:         Shoulder pain    Skin:  Negative for wound.   Neurological:  Negative for seizures and syncope.   Psychiatric/Behavioral:  Negative for confusion.    Objective:     Vital Signs (Most Recent):    Vital Signs (24h Range):  Temp:  [98 °F (36.7 °C)-98.9 °F (37.2 °C)] 98.3 °F (36.8 °C)  Pulse:  [69-70] 70  Resp:  [14-20] 14  SpO2:  [95 %-98 %] 98 %  BP: (140-185)/(68-94) 169/74        There is no height or weight on file to calculate BMI.    Physical Exam  Vitals and nursing note reviewed.   Constitutional:       General: She is not in acute distress.     Appearance: She is obese. She is not ill-appearing, toxic-appearing or diaphoretic.      Comments: Lying in bed, NAD   HENT:      Head: Normocephalic and atraumatic.      Nose: Nose normal.      Mouth/Throat:      Mouth: Mucous membranes are moist.   Eyes:      General:         Right eye: No discharge.         Left eye: No discharge.   Cardiovascular:      Rate and Rhythm: Normal rate and regular rhythm.      Comments: Left sided AICD in place, no significant LE edema  Pulmonary:      Comments: On RA, inspiratory crackles mostly left base, no wheeze or accessory muscle use  Abdominal:      General: Bowel  sounds are normal. There is no distension.      Palpations: Abdomen is soft.      Tenderness: There is no abdominal tenderness. There is no guarding.   Genitourinary:     Comments: No abernathy  Skin:     General: Skin is warm and dry.   Neurological:      General: No focal deficit present.      Mental Status: She is alert and oriented to person, place, and time.      Comments: Speech intact, moving all four extremities   Psychiatric:      Comments: cooperative           Significant Labs: Blood Culture: No results for input(s): LABBLOO in the last 48 hours.  BMP:   Recent Labs   Lab 12/15/22  2248   GLU 83      K 4.0      CO2 25   BUN 19   CREATININE 0.9   CALCIUM 9.0     CBC:   Recent Labs   Lab 12/15/22  2248   WBC 6.80   HGB 12.3   HCT 41.0        CMP:   Recent Labs   Lab 12/15/22  2248      K 4.0      CO2 25   GLU 83   BUN 19   CREATININE 0.9   CALCIUM 9.0   PROT 8.0   ALBUMIN 3.6   BILITOT 0.2   ALKPHOS 136*   AST 19   ALT 19   ANIONGAP 9     Cardiac Markers:   Recent Labs   Lab 12/15/22  2248   *     Lactic Acid: No results for input(s): LACTATE in the last 48 hours.  Magnesium: No results for input(s): MG in the last 48 hours.  Urine Culture: No results for input(s): LABURIN in the last 48 hours.  Urine Studies:   Recent Labs   Lab 12/15/22  2314   COLORU Yellow   APPEARANCEUA Hazy*   PHUR 7.0   SPECGRAV 1.020   PROTEINUA Trace*   GLUCUA Negative   KETONESU Negative   BILIRUBINUA Negative   OCCULTUA Trace*   NITRITE Positive*   LEUKOCYTESUR Trace*   RBCUA 1   WBCUA 6*   BACTERIA Moderate*   SQUAMEPITHEL 1       Significant Imaging: I have reviewed all pertinent imaging results/findings within the past 24 hours.    X-Ray Chest AP Portable    Result Date: 12/15/2022  EXAMINATION: XR CHEST AP PORTABLE CLINICAL HISTORY: chest pain; TECHNIQUE: Single frontal view of the chest was performed. COMPARISON: Chest radiograph June 2, 2022 FINDINGS: Single portable chest view is  submitted.  Cardiac pacemaker and postoperative change noted.  The appearance of the cardiomediastinal silhouette is stable.  Aortic atherosclerotic change noted. Mild accentuated parenchymal change at the lung bases bilaterally may relate to atelectasis and or mild superimposed infiltrate without dense consolidation. There is no evidence for pleural effusion or pneumothorax.  The osseous structures demonstrate chronic change.     Mild basilar parenchymal change may relate to atelectasis and or mild infiltrate without dense consolidation. Electronically signed by: Sergio Sandoval Date:    12/15/2022 Time:    23:25

## 2022-12-16 NOTE — PROGRESS NOTES
Automatic Inhaler to Nebulizer Interchange    fluticasone/vilanterol (Breo Ellipta) 100 mcg/25 mcg changed to budesonide 0.5 mg twice daily AND arformoterol 15 mcg twice daily per [facility] Automatic Therapeutic Substitutions Protocol.    Please contact pharmacy at extension [phone] with any questions.     Thank you,   Germaine Koo

## 2022-12-16 NOTE — ED TRIAGE NOTES
Stephania Salmeron, a 67 y.o. female presents to the ED w/ complaint of intermittent SOB and chest pain at home with ambulation. Pt placed on 2 L O2 per NC with EMS. Pt received 1 nitro and 240 of aspirin with EMS. PMH of MI.     Triage note:  Chief Complaint   Patient presents with    Chest Pain     Pt arrived by EMS from home c/o intermittent chest pain, SOB, and nausea x1 week. Took nitro at home w/o relief. Nitro x1 and 240mg aspirin given by EMS. EKG shows atrial paced rhythm. O2 sat 92% on RA. Placed on 3L nc by ems. Hx of MI.      Review of patient's allergies indicates:   Allergen Reactions    Compazine [prochlorperazine]      Feels like somethingcrawling underneath the skin    Demerol (pf) [meperidine (pf)]      Feels like somethingcrawling underneath the skin    Toradol [ketorolac]      Feels like somethingcrawling underneath the skin     Past Medical History:   Diagnosis Date    Acute coronary syndrome     Atrial fibrillation     Clotting disorder     Coronary artery disease     COVID-19 01/18/2021    Required hospitalization d/t pneumonia and hypoxia    Hyperlipidemia     Hypertension     ICD (implantable cardioverter-defibrillator) in place     [employer]tronic Evera XT ICD - Model PTKA9Z1 (SN: [id number]), 5568-45 (SN: [id number]), 6932-65 (SN: [id number])    Occipital stroke     Left occipital lobe    Quadrantanopia, right     Right lower quadrant

## 2022-12-16 NOTE — HPI
Ms. Salmeron is a 67-year-old female transferred to [facility] [place] due to capacity needs.  Patient reports 1 week history of intermittent chest pain associated with shortness of breath.  States chest pain was midsternal, radiating up into the left jaw, some onset with lying on her side/position changes, traveling numbness sensation down her left arm.  Also reported shortness of breath, cough, nonproductive, left scapula pain/discomfort.  Remote history of CABG followed by PCI, in the past was followed by Cardiology at [facility] but not in the last 2-3 years.  Reports nitroglycerin did not help.  Does report nausea, few days ago 3 episodes of nonbloody emesis which self-resolved.  Known history of COPD with chronic bronchitis, nonsmoker, not on home oxygen.  Denies any fever, chills, constipation, diarrhea, leg swelling.  At outside hospital WBC 6.8, hemoglobin 12.3, BUN/creatinine 19/0.9, troponin by 2 negative, , chest x-ray with basal findings.  Chart review echocardiogram November 2022 EF of 60%, nuclear stress test August 2021 no reversible ischemia with EF of 63%.  Plan of care discussed with patient.  No visitors at bedside.  Communicated with nursing.

## 2022-12-16 NOTE — NURSING
0740: received from [facility], no distress or complaints  1030: EKG done, call to [employer] for assessment, spoke with Clarissa and she will call the rep.  1810:  patient in bed with no distress, medicated for headache, has stress test, MD at bedside and talked about angiogram for Monday.

## 2022-12-16 NOTE — PLAN OF CARE
[facility]  Initial Discharge Assessment       Primary Care Provider: Sinan Saeed MD    Admission Diagnosis: Chest pain [R07.9]    Admission Date: 12/16/2022    DC assessment completed with patient at bedside.  Information verified as correct on facesheet.  Patient denies HPOA.  Denies HH/HD/DME at home/Coumadin.  Patient independent in ADL's.  Lives with daughterIrma, and Irma's .  DC plan is home. Yakov King, to provide transport on discharge.             Payor: [employer] MANAGED MEDICARE / Plan: [employer] SNP (SPECIAL NEEDS PLAN) / Product Type: Medicare Advantage /     Extended Emergency Contact Information  Primary Emergency Contact: Irma Salmeron  Mobile Phone: [phone]  Relation: Daughter  Secondary Emergency Contact: Yakov Mcmahon  Mobile Phone: [phone]  Relation: Daughter    Discharge Plan A: (P) Home  Discharge Plan B: (P) Home with family      [id number] - [place], [place] - [address]  [address]  [place] [address]  Phone: [phone] Fax: [phone]      Initial Assessment (most recent)       Adult Discharge Assessment - 12/16/22 1400          Discharge Assessment    Assessment Type Discharge Planning Assessment (P)      Confirmed/corrected address, phone number and insurance Yes (P)      Confirmed Demographics Correct on Facesheet (P)      Source of Information patient (P)      Reason For Admission chest pain (P)      People in Home child(mark), adult (P)      Facility Arrived From: home (P)      Do you expect to return to your current living situation? Yes (P)      Do you have help at home or someone to help you manage your care at home? Yes (P)      Who are your caregiver(s) and their phone number(s)? Irma king, [phone] (P)      Prior to hospitilization cognitive status: Alert/Oriented (P)      Current cognitive status: Alert/Oriented (P)      Equipment Currently Used at Home none (P)      Readmission within 30 days? No (P)       Patient currently being followed by outpatient case management? No (P)      Do you currently have service(s) that help you manage your care at home? No (P)      Do you take prescription medications? Yes (P)      Do you have prescription coverage? Yes (P)      Coverage medicare and medicaid (P)      Do you have any problems affording any of your prescribed medications? No (P)      Is the patient taking medications as prescribed? yes (P)      Who is going to help you get home at discharge? Daughter, Yakov (P)      Are you on dialysis? No (P)      Do you take coumadin? No (P)      Discharge Plan A Home (P)      Discharge Plan B Home with family (P)      DME Needed Upon Discharge  none (P)      Discharge Plan discussed with: Patient (P)         Physical Activity    On average, how many days per week do you engage in moderate to strenuous exercise (like a brisk walk)? 0 days (P)      On average, how many minutes do you engage in exercise at this level? 0 min (P)         Financial Resource Strain    How hard is it for you to pay for the very basics like food, housing, medical care, and heating? Not hard at all (P)         Transportation Needs    In the past 12 months, has lack of transportation kept you from medical appointments or from getting medications? No (P)      In the past 12 months, has lack of transportation kept you from meetings, work, or from getting things needed for daily living? No (P)         Food Insecurity    Within the past 12 months, you worried that your food would run out before you got the money to buy more. Never true (P)      Within the past 12 months, the food you bought just didn't last and you didn't have money to get more. Never true (P)         Social Connections    In a typical week, how many times do you talk on the phone with family, friends, or neighbors? Three times a week (P)      How often do you get together with friends or relatives? Three times a week (P)      How often do you  attend Hoahaoism or Muslim services? Never (P)      Do you belong to any clubs or organizations such as Hoahaoism groups, unions, fraternal or athletic groups, or school groups? No (P)      How often do you attend meetings of the clubs or organizations you belong to? Never (P)         Alcohol Use    Q1: How often do you have a drink containing alcohol? Monthly or less (P)      Q2: How many drinks containing alcohol do you have on a typical day when you are drinking? 1 or 2 (P)      Q3: How often do you have six or more drinks on one occasion? Never (P)

## 2022-12-16 NOTE — Clinical Note
The catheter was inserted into the LIMA to LAD. An angiography was performed of the graft. The angiography was performed via power injection. The injected amount was 6 mL contrast at 3 mL/s.

## 2022-12-16 NOTE — PROGRESS NOTES
Automatic Inhaler to Nebulizer Interchange    Tiotropium (Spiriva Handihaler) 18 mcg changed to Ipratropium 0.5 mg every 6 hours per [facility] Automatic Therapeutic Substitutions Protocol.    Please contact pharmacy at extension [phone] with any questions.     Thank you,   Germaine Koo

## 2022-12-16 NOTE — CONSULTS
[facility]  Department of Cardiology  Consult Note      PATIENT NAME: Stephania Salmeron    MRN: [id number]  TODAY'S DATE: 12/16/2022  ADMIT DATE: 12/16/2022                          CONSULT REQUESTED BY: Emily Garza MD    SUBJECTIVE     PRINCIPAL PROBLEM: Chest pain      REASON FOR CONSULT:  Chest pain      HPI: Ms. Salmeron is a 67-year-old female with past medical history of CAD status post CABG, atrial fibrillation on anticoagulation, hypertension, hyperlipidemia, ICD. Patient reports 1 week history of intermittent chest pain associated with shortness of breath.  States chest pain was midsternal, radiating up into the left jaw, sometimes onset with lying on her side/position changes, associated with numbness sensation down her left arm.  Also reported shortness of breath, cough, nonproductive, left scapula pain/discomfort.  Remote history of CABG followed by PCI, in the past was followed by Cardiology at [facility] but not in the last 2-3 years.  Reports nitroglycerin did not help.  Does report nausea, few days ago 3 episodes of nonbloody emesis which self-resolved.  Known history of COPD with chronic bronchitis, nonsmoker, not on home oxygen.  Denies any fever, chills, constipation, diarrhea, leg swelling.  Patient had an echo last month which showed normal LV function.  Patient states that she had angiogram 3 years ago but did not receive any stents at that time.       Review of patient's allergies indicates:   Allergen Reactions    Compazine [prochlorperazine]      Feels like somethingcrawling underneath the skin    Demerol (pf) [meperidine (pf)]      Feels like somethingcrawling underneath the skin    Toradol [ketorolac]      Feels like somethingcrawling underneath the skin       Past Medical History:   Diagnosis Date    Acute coronary syndrome     Atrial fibrillation     Clotting disorder     Coronary artery disease     COVID-19 01/18/2021    Required hospitalization d/t pneumonia and  hypoxia    Hyperlipidemia     Hypertension     ICD (implantable cardioverter-defibrillator) in place     [employer] Evera XT ICD - Model UIWN6S6 (SN: [id number]), 5568-45 (SN: [id number]), 6932-65 (SN: [id number])    Occipital stroke     Left occipital lobe    Quadrantanopia, right     Right lower quadrant     Past Surgical History:   Procedure Laterality Date    APPENDECTOMY      CARDIAC DEFIBRILLATOR PLACEMENT      CHOLECYSTECTOMY      COLONOSCOPY N/A 3/31/2021    Procedure: COLONOSCOPY Suprep;  Surgeon: Murtaza Curry MD;  Location: [facility] ENDO;  Service: Endoscopy;  Laterality: N/A;    CORONARY ANGIOPLASTY      CORONARY ANGIOPLASTY WITH STENT PLACEMENT      CORONARY ARTERY BYPASS GRAFT      ESOPHAGOGASTRODUODENOSCOPY N/A 3/31/2021    Procedure: EGD (ESOPHAGOGASTRODUODENOSCOPY);  Surgeon: Murtaza Curry MD;  Location: [facility] ENDO;  Service: Endoscopy;  Laterality: N/A;    DESIRAE FILTER PLACEMENT  2017    HYSTERECTOMY       Social History     Tobacco Use    Smoking status: Former     Packs/day: 4.00     Years: 30.00     Pack years: 120.00     Types: Cigarettes     Quit date: 3/22/1994     Years since quittin.7    Smokeless tobacco: Never   Substance Use Topics    Alcohol use: Not Currently     Alcohol/week: 0.0 standard drinks    Drug use: Never        REVIEW OF SYSTEMS  CONSTITUTIONAL: Negative for chills, fatigue and fever.   EYES: No double vision, No blurred vision  NEURO: No headaches, No dizziness  RESPIRATORY: Negative for  wheezing.    CARDIOVASCULAR: . Negative for palpitations and leg swelling.   GI: Negative for abdominal pain, No melena, diarrhea.   : Negative for dysuria and frequency, Negative for hematuria  SKIN: Negative for bruising, Negative for edema or discoloration noted.   ENDOCRINE: Negative for polyphagia, Negative for heat intolerance, Negative for cold intolerance  PSYCHIATRIC: Negative for depression, Negative for anxiety, Negative for memory loss  MUSCULOSKELETAL: Negative  for neck pain, Negative for muscle weakness, Negative for back pain     OBJECTIVE     VITAL SIGNS (Most Recent)  Temp: 98.4 °F (36.9 °C) (12/16/22 1629)  Pulse: 69 (12/16/22 1629)  Resp: 18 (12/16/22 1629)  BP: (!) 155/83 (12/16/22 1629)  SpO2: 99 % (12/16/22 1629)    VENTILATION STATUS  Resp: 18 (12/16/22 1629)  SpO2: 99 % (12/16/22 1629)       I & O (Last 24H):No intake or output data in the 24 hours ending 12/16/22 1718    WEIGHTS  Wt Readings from Last 1 Encounters:   12/16/22 0750 100 kg (220 lb 7.4 oz)   12/16/22 0740 100 kg (220 lb 7.4 oz)       PHYSICAL EXAM  GENERAL: well built, well nourished, well-developed in no apparent distress alert and oriented.   HEENT: Normocephalic. Pupils normal and conjunctivae normal.  Mucous membranes normal, no cyanosis or icterus, trachea central,no pallor or icterus is noted..   NECK: No JVD. No bruit..   THYROID: Thyroid not enlarged. No nodules present..   CARDIAC: Regular rate and rhythm. S1 is normal.S2 is normal.No gallops, clicks or murmurs noted at this time.  CHEST ANATOMY: normal.   LUNGS:  Mild bibasilar crackles   ABDOMEN: Soft no masses or organomegaly.  No abdomen pulsations or bruits.  Normal bowel sounds. No pulsations and no masses felt, No guarding or rebound.   URINARY: No abernathy catheter   EXTREMITIES: No cyanosis, clubbing or edema noted at this time., no calf tenderness bilaterally.   PERIPHERAL VASCULAR SYSTEM: Good palpable distal pulses.   CENTRAL NERVOUS SYSTEM: No focal motor or sensory deficits noted.   SKIN: Skin without lesions, moist, well perfused.   MUSCLE STRENGTH & TONE: No noteable weakness, atrophy or abnormal movement.     HOME MEDICATIONS:  Current Facility-Administered Medications on File Prior to Encounter   Medication Dose Route Frequency Provider Last Rate Last Admin    [COMPLETED] acetaminophen tablet 1,000 mg  1,000 mg Oral ED 1 Time Carlos Johnson MD   1,000 mg at 12/16/22 0157    [COMPLETED] cefTRIAXone (ROCEPHIN) 1 g in  dextrose 5 % in water (D5W) 5 % 50 mL IVPB (MB+)  1 g Intravenous ED 1 Time Carlos Johnson MD   Stopped at 12/16/22 0237    [COMPLETED] droperidoL injection 1.25 mg  1.25 mg Intravenous ED 1 Time Carlos Johnson MD   1.25 mg at 12/16/22 0157    [COMPLETED] morphine injection 4 mg  4 mg Intravenous ED 1 Time Carlos Johnson MD   4 mg at 12/15/22 2319    [COMPLETED] ondansetron injection 4 mg  4 mg Intravenous ED 1 Time Carlos Johnson MD   4 mg at 12/15/22 2319     Current Outpatient Medications on File Prior to Encounter   Medication Sig Dispense Refill    albuterol (PROVENTIL/VENTOLIN HFA) 90 mcg/actuation inhaler INHALE 2 PUFFS INTO THE LUNGS EVERY 6 (SIX) HOURS AS NEEDED FOR WHEEZING. RESCUE 54 g 1    aspirin (ECOTRIN) 81 MG EC tablet Take 81 mg by mouth once daily.      atorvastatin (LIPITOR) 40 MG tablet TAKE 1 TABLET BY MOUTH EVERY DAY 90 tablet 3    buPROPion (WELLBUTRIN XL) 150 MG TB24 tablet Take 450 mg by mouth once daily.      docusate sodium (COLACE) 100 MG capsule TAKE 1 CAPSULE (100 MG TOTAL) BY MOUTH 2 (TWO) TIMES DAILY AS NEEDED FOR CONSTIPATION. 60 capsule 1    ergocalciferol (ERGOCALCIFEROL) 50,000 unit Cap TAKE 1 CAPSULE BY MOUTH ONE TIME PER WEEK 12 capsule 3    esomeprazole (NEXIUM) 40 MG capsule TAKE 1 CAPSULE (40 MG TOTAL) BY MOUTH BEFORE BREAKFAST. 90 capsule 3    LORazepam (ATIVAN) 1 MG tablet Take 1 mg by mouth 3 (three) times daily.      metoprolol succinate (TOPROL-XL) 25 MG 24 hr tablet TAKE 1 TABLET BY MOUTH TWICE A  tablet 3    nitrofurantoin, macrocrystal-monohydrate, (MACROBID) 100 MG capsule Take 1 capsule (100 mg total) by mouth every 12 (twelve) hours. 13 capsule 0    pregabalin (LYRICA) 100 MG capsule Take 100 mg by mouth 3 (three) times daily as needed.      promethazine (PHENERGAN) 12.5 MG Tab Take 1 tablet (12.5 mg total) by mouth every 8 (eight) hours as needed (nausea). 30 tablet 0    QUEtiapine (SEROQUEL) 200 MG Tab Take 200 mg by mouth every evening.       SPIRIVA RESPIMAT 1.25 mcg/actuation inhaler INHALE 2 PUFFS INTO THE LUNGS ONCE DAILY. CONTROLLER 12 g 2    SYMBICORT 160-4.5 mcg/actuation HFAA INHALE 2 PUFFS INTO THE LUNGS EVERY 12 (TWELVE) HOURS. CONTROLLER (Patient taking differently: Inhale 1-2 puffs into the lungs 1-2 times per day as needed) 30.6 g 3    XARELTO 20 mg Tab TAKE 1 TABLET (20 MG TOTAL) BY MOUTH DAILY WITH DINNER OR EVENING MEAL. 30 tablet 1       SCHEDULED MEDS:   budesonide  0.5 mg Nebulization Q12H    And    arformoteroL  15 mcg Nebulization BID    aspirin  81 mg Oral Daily    atorvastatin  40 mg Oral QHS    ipratropium  0.5 mg Nebulization Q6H    LORazepam  1 mg Oral TID    metoprolol succinate  25 mg Oral BID    pantoprazole  40 mg Intravenous Daily    QUEtiapine  200 mg Oral QHS    rivaroxaban  20 mg Oral with dinner       CONTINUOUS INFUSIONS:    PRN MEDS:acetaminophen, albuterol sulfate, benzonatate, dextrose 10%, dextrose 10%, docusate sodium, glucagon (human recombinant), glucose, glucose, magnesium oxide, magnesium oxide, naloxone, nitroGLYCERIN, ondansetron, potassium bicarbonate, potassium bicarbonate, potassium bicarbonate, potassium, sodium phosphates, potassium, sodium phosphates, potassium, sodium phosphates, pregabalin, sodium chloride 0.9%    LABS AND DIAGNOSTICS     CBC LAST 3 DAYS  Recent Labs   Lab 12/15/22  2248 12/16/22  1601   WBC 6.80 6.55   RBC 4.39 4.38   HGB 12.3 12.3   HCT 41.0 41.1   MCV 93 94   MCH 28.0 28.1   MCHC 30.0* 29.9*   RDW 14.6* 14.9*    222   MPV 10.3 9.9   GRAN 55.7  3.8 58.1  3.8   LYMPH 33.8  2.3 31.9  2.1   MONO 7.9  0.5 7.9  0.5   BASO 0.02 0.02   NRBC 0 0       COAGULATION LAST 3 DAYS  Recent Labs   Lab 12/15/22  2248   INR 1.0       CHEMISTRY LAST 3 DAYS  Recent Labs   Lab 12/15/22  2248 12/16/22  1601    141   K 4.0 3.8    102   CO2 25 29   ANIONGAP 9 10   BUN 19 18   CREATININE 0.9 0.9   GLU 83 98   CALCIUM 9.0 9.0   MG  --  1.6   ALBUMIN 3.6 3.7   PROT 8.0 7.6   ALKPHOS  136* 120   ALT 19 20   AST 19 17   BILITOT 0.2 0.5       CARDIAC PROFILE LAST 3 DAYS  Recent Labs   Lab 12/15/22  2248 12/16/22  0210 12/16/22  1601   *  --   --    TROPONINI 0.007 <0.006  --    TROPONINIHS  --   --  5.8       ENDOCRINE LAST 3 DAYS  No results for input(s): TSH, PROCAL in the last 168 hours.    LAST ARTERIAL BLOOD GAS  ABG  No results for input(s): PH, PO2, PCO2, HCO3, BE in the last 168 hours.    LAST 7 DAYS MICROBIOLOGY   Microbiology Results (last 7 days)       ** No results found for the last 168 hours. **            MOST RECENT IMAGING  Nuclear Stress Test    The ECG portion of the study is negative for ischemia.    The patient reported no chest pain during the stress test.    There were no arrhythmias during stress.    The nuclear portion of this study will be reported separately.  NM Myocardial Perfusion Spect Multi Pharmacologic  Myocardial Perfusion Imaging with SPECT Gated acquisition and Ejection Fraction single day protocol    CLINICAL INFORMATION:  Chest pain.    PROCEDURE:    Lexiscan is utilized as a pharmacologic stress agent. At peak stress, 27.4 millicuries of technetium Myoview were administered intravenously.  The rest portion of the study is accomplished on the same day, utilizing 10.6 millicuries of technetium Myoview intravenously.    FINDINGS:    There is diminished tracer activity at the level of the ventricular apex, matched on stress and rest imaging. There is subtly diminished tracer accumulation within the lateral wall near the cardiac base, more pronounced on the stress examination. The distribution of activity appears otherwise unremarkable. The chamber size is within normal limits.  There are no wall motion abnormalities and the estimated ejection fraction is 74%.    IMPRESSION:    SUBTLE AREA OF DIMINISHED TRACER ACCUMULATION WITHIN THE LATERAL WALL NEAR THE CARDIAC BASE ON STRESS AS COMPARED TO REST IMAGING.    DIMINISHED ACCUMULATION AT THE VENTRICULAR APEX,  MATCHED.    2.  ESTIMATED EJECTION FRACTION OF 74%.    Electronically signed by:  Nallely Rivera MD  12/16/2022 1:24 PM [facility] Workstation: [id number]      ECHOCARDIOGRAM RESULTS (last 5)  Results for orders placed during the hospital encounter of 11/15/22    Echo    Interpretation Summary  · Mild concentric hypertrophy and normal systolic function.  · The estimated ejection fraction is 60%.  · Indeterminate left ventricular diastolic function.  · Mild left atrial enlargement.  · Normal central venous pressure (3 mmHg).  · The sinuses of Valsalva is dilated.      Results for orders placed during the hospital encounter of 08/16/21    Echo    Interpretation Summary  · The left ventricle is normal in size with normal systolic function.  · The estimated ejection fraction is 60%.  · Indeterminate left ventricular diastolic function.  · Normal right ventricular size with normal right ventricular systolic function.  · Normal central venous pressure (3 mmHg).      CURRENT/PREVIOUS VISIT EKG  Results for orders placed or performed during the hospital encounter of 12/16/22   EKG 12-lead    Collection Time: 12/16/22 11:20 AM    Narrative    Test Reason : R07.9,    Vent. Rate : 071 BPM     Atrial Rate : 071 BPM     P-R Int : 200 ms          QRS Dur : 092 ms      QT Int : 448 ms       P-R-T Axes : 091 049 047 degrees     QTc Int : 486 ms    Atrial-paced rhythm  Abnormal ECG  When compared with ECG of 15-DEC-2022 21:25,  No significant change was found    Referred By: RACHEL HERMAN           Confirmed By:      ECG reviewed by me:  Atrial paced rhythm, no ischemic changes      ASSESSMENT/PLAN:     Active Hospital Problems    Diagnosis    *Chest pain    Severe obesity (BMI 35.0-39.9) with comorbidity    Numbness and tingling of left arm and leg    Gastroesophageal reflux disease    Shortness of breath    S/P PTCA (percutaneous transluminal coronary angioplasty)    S/P CABG (coronary artery bypass graft)    AICD (automatic  cardioverter/defibrillator) present     Checked in clinic  No firing reported      Paroxysmal atrial fibrillation     On Toprol and Xarelto, continue as now.  Xarelto refilled      PAD (peripheral artery disease)    Chronic bronchitis    Anxiety and depression       ASSESSMENT & PLAN:   Recurrent chest pain- likely worsening angina  History of CAD status post CABG  Afib  ICD  Hypertension      RECOMMENDATIONS:  No ischemic changes on EKG.  Troponins negative.  Continue tele monitoring  Mild lateral ischemia on stress test.  We will schedule the patient for cardiac catheterization in view of significant symptoms.    Cardiac catheterization Monday  NPO after midnight on Sunday  Continue aspirin, statin, beta-blocker  Hold Xarelto  Start therapeutic Lovenox for AFib and hold Lovenox dose from Sunday evening  Start amlodipine 2.5 mg daily and Plavix 75 mg daily          Asif Cowan MD  [facility]  Department of Cardiology  Date of Service: 12/16/2022  5:18 PM

## 2022-12-16 NOTE — H&P (VIEW-ONLY)
[facility]  Department of Cardiology  Consult Note      PATIENT NAME: Stephania Salmeron    MRN: [id number]  TODAY'S DATE: 12/16/2022  ADMIT DATE: 12/16/2022                          CONSULT REQUESTED BY: Emily Garza MD    SUBJECTIVE     PRINCIPAL PROBLEM: Chest pain      REASON FOR CONSULT:  Chest pain      HPI: Ms. Salmeron is a 67-year-old female with past medical history of CAD status post CABG, atrial fibrillation on anticoagulation, hypertension, hyperlipidemia, ICD. Patient reports 1 week history of intermittent chest pain associated with shortness of breath.  States chest pain was midsternal, radiating up into the left jaw, sometimes onset with lying on her side/position changes, associated with numbness sensation down her left arm.  Also reported shortness of breath, cough, nonproductive, left scapula pain/discomfort.  Remote history of CABG followed by PCI, in the past was followed by Cardiology at [facility] but not in the last 2-3 years.  Reports nitroglycerin did not help.  Does report nausea, few days ago 3 episodes of nonbloody emesis which self-resolved.  Known history of COPD with chronic bronchitis, nonsmoker, not on home oxygen.  Denies any fever, chills, constipation, diarrhea, leg swelling.  Patient had an echo last month which showed normal LV function.  Patient states that she had angiogram 3 years ago but did not receive any stents at that time.       Review of patient's allergies indicates:   Allergen Reactions    Compazine [prochlorperazine]      Feels like somethingcrawling underneath the skin    Demerol (pf) [meperidine (pf)]      Feels like somethingcrawling underneath the skin    Toradol [ketorolac]      Feels like somethingcrawling underneath the skin       Past Medical History:   Diagnosis Date    Acute coronary syndrome     Atrial fibrillation     Clotting disorder     Coronary artery disease     COVID-19 01/18/2021    Required hospitalization d/t pneumonia and  hypoxia    Hyperlipidemia     Hypertension     ICD (implantable cardioverter-defibrillator) in place     Med[employer]onic Evera XT ICD - Model TUTP0J0 (SN: [id number]), 5568-45 (SN: [id number]), 6932-65 (SN: [id number])    Occipital stroke     Left occipital lobe    Quadrantanopia, right     Right lower quadrant     Past Surgical History:   Procedure Laterality Date    APPENDECTOMY      CARDIAC DEFIBRILLATOR PLACEMENT      CHOLECYSTECTOMY      COLONOSCOPY N/A 3/31/2021    Procedure: COLONOSCOPY Suprep;  Surgeon: Murtaza Curry MD;  Location: [facility] ENDO;  Service: Endoscopy;  Laterality: N/A;    CORONARY ANGIOPLASTY      CORONARY ANGIOPLASTY WITH STENT PLACEMENT      CORONARY ARTERY BYPASS GRAFT      ESOPHAGOGASTRODUODENOSCOPY N/A 3/31/2021    Procedure: EGD (ESOPHAGOGASTRODUODENOSCOPY);  Surgeon: Murtaza Curry MD;  Location: [facility] ENDO;  Service: Endoscopy;  Laterality: N/A;    DESIRAE FILTER PLACEMENT  2017    HYSTERECTOMY       Social History     Tobacco Use    Smoking status: Former     Packs/day: 4.00     Years: 30.00     Pack years: 120.00     Types: Cigarettes     Quit date: 3/22/1994     Years since quittin.7    Smokeless tobacco: Never   Substance Use Topics    Alcohol use: Not Currently     Alcohol/week: 0.0 standard drinks    Drug use: Never        REVIEW OF SYSTEMS  CONSTITUTIONAL: Negative for chills, fatigue and fever.   EYES: No double vision, No blurred vision  NEURO: No headaches, No dizziness  RESPIRATORY: Negative for  wheezing.    CARDIOVASCULAR: . Negative for palpitations and leg swelling.   GI: Negative for abdominal pain, No melena, diarrhea.   : Negative for dysuria and frequency, Negative for hematuria  SKIN: Negative for bruising, Negative for edema or discoloration noted.   ENDOCRINE: Negative for polyphagia, Negative for heat intolerance, Negative for cold intolerance  PSYCHIATRIC: Negative for depression, Negative for anxiety, Negative for memory loss  MUSCULOSKELETAL: Negative  for neck pain, Negative for muscle weakness, Negative for back pain     OBJECTIVE     VITAL SIGNS (Most Recent)  Temp: 98.4 °F (36.9 °C) (12/16/22 1629)  Pulse: 69 (12/16/22 1629)  Resp: 18 (12/16/22 1629)  BP: (!) 155/83 (12/16/22 1629)  SpO2: 99 % (12/16/22 1629)    VENTILATION STATUS  Resp: 18 (12/16/22 1629)  SpO2: 99 % (12/16/22 1629)       I & O (Last 24H):No intake or output data in the 24 hours ending 12/16/22 1718    WEIGHTS  Wt Readings from Last 1 Encounters:   12/16/22 0750 100 kg (220 lb 7.4 oz)   12/16/22 0740 100 kg (220 lb 7.4 oz)       PHYSICAL EXAM  GENERAL: well built, well nourished, well-developed in no apparent distress alert and oriented.   HEENT: Normocephalic. Pupils normal and conjunctivae normal.  Mucous membranes normal, no cyanosis or icterus, trachea central,no pallor or icterus is noted..   NECK: No JVD. No bruit..   THYROID: Thyroid not enlarged. No nodules present..   CARDIAC: Regular rate and rhythm. S1 is normal.S2 is normal.No gallops, clicks or murmurs noted at this time.  CHEST ANATOMY: normal.   LUNGS:  Mild bibasilar crackles   ABDOMEN: Soft no masses or organomegaly.  No abdomen pulsations or bruits.  Normal bowel sounds. No pulsations and no masses felt, No guarding or rebound.   URINARY: No abernathy catheter   EXTREMITIES: No cyanosis, clubbing or edema noted at this time., no calf tenderness bilaterally.   PERIPHERAL VASCULAR SYSTEM: Good palpable distal pulses.   CENTRAL NERVOUS SYSTEM: No focal motor or sensory deficits noted.   SKIN: Skin without lesions, moist, well perfused.   MUSCLE STRENGTH & TONE: No noteable weakness, atrophy or abnormal movement.     HOME MEDICATIONS:  Current Facility-Administered Medications on File Prior to Encounter   Medication Dose Route Frequency Provider Last Rate Last Admin    [COMPLETED] acetaminophen tablet 1,000 mg  1,000 mg Oral ED 1 Time Carlos Johnson MD   1,000 mg at 12/16/22 0157    [COMPLETED] cefTRIAXone (ROCEPHIN) 1 g in  dextrose 5 % in water (D5W) 5 % 50 mL IVPB (MB+)  1 g Intravenous ED 1 Time Carlos Johnson MD   Stopped at 12/16/22 0237    [COMPLETED] droperidoL injection 1.25 mg  1.25 mg Intravenous ED 1 Time Carlos Johnson MD   1.25 mg at 12/16/22 0157    [COMPLETED] morphine injection 4 mg  4 mg Intravenous ED 1 Time Carlos Johnson MD   4 mg at 12/15/22 2319    [COMPLETED] ondansetron injection 4 mg  4 mg Intravenous ED 1 Time Carlos Johnson MD   4 mg at 12/15/22 2319     Current Outpatient Medications on File Prior to Encounter   Medication Sig Dispense Refill    albuterol (PROVENTIL/VENTOLIN HFA) 90 mcg/actuation inhaler INHALE 2 PUFFS INTO THE LUNGS EVERY 6 (SIX) HOURS AS NEEDED FOR WHEEZING. RESCUE 54 g 1    aspirin (ECOTRIN) 81 MG EC tablet Take 81 mg by mouth once daily.      atorvastatin (LIPITOR) 40 MG tablet TAKE 1 TABLET BY MOUTH EVERY DAY 90 tablet 3    buPROPion (WELLBUTRIN XL) 150 MG TB24 tablet Take 450 mg by mouth once daily.      docusate sodium (COLACE) 100 MG capsule TAKE 1 CAPSULE (100 MG TOTAL) BY MOUTH 2 (TWO) TIMES DAILY AS NEEDED FOR CONSTIPATION. 60 capsule 1    ergocalciferol (ERGOCALCIFEROL) 50,000 unit Cap TAKE 1 CAPSULE BY MOUTH ONE TIME PER WEEK 12 capsule 3    esomeprazole (NEXIUM) 40 MG capsule TAKE 1 CAPSULE (40 MG TOTAL) BY MOUTH BEFORE BREAKFAST. 90 capsule 3    LORazepam (ATIVAN) 1 MG tablet Take 1 mg by mouth 3 (three) times daily.      metoprolol succinate (TOPROL-XL) 25 MG 24 hr tablet TAKE 1 TABLET BY MOUTH TWICE A  tablet 3    nitrofurantoin, macrocrystal-monohydrate, (MACROBID) 100 MG capsule Take 1 capsule (100 mg total) by mouth every 12 (twelve) hours. 13 capsule 0    pregabalin (LYRICA) 100 MG capsule Take 100 mg by mouth 3 (three) times daily as needed.      promethazine (PHENERGAN) 12.5 MG Tab Take 1 tablet (12.5 mg total) by mouth every 8 (eight) hours as needed (nausea). 30 tablet 0    QUEtiapine (SEROQUEL) 200 MG Tab Take 200 mg by mouth every evening.       SPIRIVA RESPIMAT 1.25 mcg/actuation inhaler INHALE 2 PUFFS INTO THE LUNGS ONCE DAILY. CONTROLLER 12 g 2    SYMBICORT 160-4.5 mcg/actuation HFAA INHALE 2 PUFFS INTO THE LUNGS EVERY 12 (TWELVE) HOURS. CONTROLLER (Patient taking differently: Inhale 1-2 puffs into the lungs 1-2 times per day as needed) 30.6 g 3    XARELTO 20 mg Tab TAKE 1 TABLET (20 MG TOTAL) BY MOUTH DAILY WITH DINNER OR EVENING MEAL. 30 tablet 1       SCHEDULED MEDS:   budesonide  0.5 mg Nebulization Q12H    And    arformoteroL  15 mcg Nebulization BID    aspirin  81 mg Oral Daily    atorvastatin  40 mg Oral QHS    ipratropium  0.5 mg Nebulization Q6H    LORazepam  1 mg Oral TID    metoprolol succinate  25 mg Oral BID    pantoprazole  40 mg Intravenous Daily    QUEtiapine  200 mg Oral QHS    rivaroxaban  20 mg Oral with dinner       CONTINUOUS INFUSIONS:    PRN MEDS:acetaminophen, albuterol sulfate, benzonatate, dextrose 10%, dextrose 10%, docusate sodium, glucagon (human recombinant), glucose, glucose, magnesium oxide, magnesium oxide, naloxone, nitroGLYCERIN, ondansetron, potassium bicarbonate, potassium bicarbonate, potassium bicarbonate, potassium, sodium phosphates, potassium, sodium phosphates, potassium, sodium phosphates, pregabalin, sodium chloride 0.9%    LABS AND DIAGNOSTICS     CBC LAST 3 DAYS  Recent Labs   Lab 12/15/22  2248 12/16/22  1601   WBC 6.80 6.55   RBC 4.39 4.38   HGB 12.3 12.3   HCT 41.0 41.1   MCV 93 94   MCH 28.0 28.1   MCHC 30.0* 29.9*   RDW 14.6* 14.9*    222   MPV 10.3 9.9   GRAN 55.7  3.8 58.1  3.8   LYMPH 33.8  2.3 31.9  2.1   MONO 7.9  0.5 7.9  0.5   BASO 0.02 0.02   NRBC 0 0       COAGULATION LAST 3 DAYS  Recent Labs   Lab 12/15/22  2248   INR 1.0       CHEMISTRY LAST 3 DAYS  Recent Labs   Lab 12/15/22  2248 12/16/22  1601    141   K 4.0 3.8    102   CO2 25 29   ANIONGAP 9 10   BUN 19 18   CREATININE 0.9 0.9   GLU 83 98   CALCIUM 9.0 9.0   MG  --  1.6   ALBUMIN 3.6 3.7   PROT 8.0 7.6   ALKPHOS  136* 120   ALT 19 20   AST 19 17   BILITOT 0.2 0.5       CARDIAC PROFILE LAST 3 DAYS  Recent Labs   Lab 12/15/22  2248 12/16/22  0210 12/16/22  1601   *  --   --    TROPONINI 0.007 <0.006  --    TROPONINIHS  --   --  5.8       ENDOCRINE LAST 3 DAYS  No results for input(s): TSH, PROCAL in the last 168 hours.    LAST ARTERIAL BLOOD GAS  ABG  No results for input(s): PH, PO2, PCO2, HCO3, BE in the last 168 hours.    LAST 7 DAYS MICROBIOLOGY   Microbiology Results (last 7 days)       ** No results found for the last 168 hours. **            MOST RECENT IMAGING  Nuclear Stress Test    The ECG portion of the study is negative for ischemia.    The patient reported no chest pain during the stress test.    There were no arrhythmias during stress.    The nuclear portion of this study will be reported separately.  NM Myocardial Perfusion Spect Multi Pharmacologic  Myocardial Perfusion Imaging with SPECT Gated acquisition and Ejection Fraction single day protocol    CLINICAL INFORMATION:  Chest pain.    PROCEDURE:    Lexiscan is utilized as a pharmacologic stress agent. At peak stress, 27.4 millicuries of technetium Myoview were administered intravenously.  The rest portion of the study is accomplished on the same day, utilizing 10.6 millicuries of technetium Myoview intravenously.    FINDINGS:    There is diminished tracer activity at the level of the ventricular apex, matched on stress and rest imaging. There is subtly diminished tracer accumulation within the lateral wall near the cardiac base, more pronounced on the stress examination. The distribution of activity appears otherwise unremarkable. The chamber size is within normal limits.  There are no wall motion abnormalities and the estimated ejection fraction is 74%.    IMPRESSION:    SUBTLE AREA OF DIMINISHED TRACER ACCUMULATION WITHIN THE LATERAL WALL NEAR THE CARDIAC BASE ON STRESS AS COMPARED TO REST IMAGING.    DIMINISHED ACCUMULATION AT THE VENTRICULAR APEX,  MATCHED.    2.  ESTIMATED EJECTION FRACTION OF 74%.    Electronically signed by:  Nallely Rivera MD  12/16/2022 1:24 PM [facility] Workstation: [id number]      ECHOCARDIOGRAM RESULTS (last 5)  Results for orders placed during the hospital encounter of 11/15/22    Echo    Interpretation Summary  · Mild concentric hypertrophy and normal systolic function.  · The estimated ejection fraction is 60%.  · Indeterminate left ventricular diastolic function.  · Mild left atrial enlargement.  · Normal central venous pressure (3 mmHg).  · The sinuses of Valsalva is dilated.      Results for orders placed during the hospital encounter of 08/16/21    Echo    Interpretation Summary  · The left ventricle is normal in size with normal systolic function.  · The estimated ejection fraction is 60%.  · Indeterminate left ventricular diastolic function.  · Normal right ventricular size with normal right ventricular systolic function.  · Normal central venous pressure (3 mmHg).      CURRENT/PREVIOUS VISIT EKG  Results for orders placed or performed during the hospital encounter of 12/16/22   EKG 12-lead    Collection Time: 12/16/22 11:20 AM    Narrative    Test Reason : R07.9,    Vent. Rate : 071 BPM     Atrial Rate : 071 BPM     P-R Int : 200 ms          QRS Dur : 092 ms      QT Int : 448 ms       P-R-T Axes : 091 049 047 degrees     QTc Int : 486 ms    Atrial-paced rhythm  Abnormal ECG  When compared with ECG of 15-DEC-2022 21:25,  No significant change was found    Referred By: RACHEL HERMAN           Confirmed By:      ECG reviewed by me:  Atrial paced rhythm, no ischemic changes      ASSESSMENT/PLAN:     Active Hospital Problems    Diagnosis    *Chest pain    Severe obesity (BMI 35.0-39.9) with comorbidity    Numbness and tingling of left arm and leg    Gastroesophageal reflux disease    Shortness of breath    S/P PTCA (percutaneous transluminal coronary angioplasty)    S/P CABG (coronary artery bypass graft)    AICD (automatic  cardioverter/defibrillator) present     Checked in clinic  No firing reported      Paroxysmal atrial fibrillation     On Toprol and Xarelto, continue as now.  Xarelto refilled      PAD (peripheral artery disease)    Chronic bronchitis    Anxiety and depression       ASSESSMENT & PLAN:   Recurrent chest pain- likely worsening angina  History of CAD status post CABG  Afib  ICD  Hypertension      RECOMMENDATIONS:  No ischemic changes on EKG.  Troponins negative.  Continue tele monitoring  Mild lateral ischemia on stress test.  We will schedule the patient for cardiac catheterization in view of significant symptoms.    Cardiac catheterization Monday  NPO after midnight on Sunday  Continue aspirin, statin, beta-blocker  Hold Xarelto  Start therapeutic Lovenox for AFib and hold Lovenox dose from Sunday evening  Start amlodipine 2.5 mg daily and Plavix 75 mg daily          Asif Cowan MD  [facility]  Department of Cardiology  Date of Service: 12/16/2022  5:18 PM

## 2022-12-16 NOTE — Clinical Note
60 ml of contrast were injected throughout the case. 40 mL of contrast was the total wasted during the case. 100 mL was the total amount used during the case.

## 2022-12-16 NOTE — Clinical Note
The site was marked. The groin and left radial was prepped. The site was prepped with ChloraPrep. The site was clipped. The patient was draped. The patient was positioned supine.

## 2022-12-16 NOTE — PROGRESS NOTES
Automatic Inhaler to Nebulizer Interchange    albuterol (Ventolin, ProAir, or Proventil)  mcg given multiple times per day changed to albuterol 2.5 mg q6h PRN for Wheezing  per [facility] Automatic Therapeutic Substitutions Protocol.    Please contact pharmacy at extension [phone] with any questions.     Thank you,   Darline Estevez

## 2022-12-17 PROBLEM — R06.02 SHORTNESS OF BREATH: Status: RESOLVED | Noted: 2021-08-16 | Resolved: 2022-12-17

## 2022-12-17 PROCEDURE — 25000003 PHARM REV CODE 250: Performed by: INTERNAL MEDICINE

## 2022-12-17 PROCEDURE — 63600175 PHARM REV CODE 636 W HCPCS: Performed by: INTERNAL MEDICINE

## 2022-12-17 PROCEDURE — 25000242 PHARM REV CODE 250 ALT 637 W/ HCPCS: Performed by: INTERNAL MEDICINE

## 2022-12-17 PROCEDURE — 21400001 HC TELEMETRY ROOM

## 2022-12-17 PROCEDURE — 99900031 HC PATIENT EDUCATION (STAT)

## 2022-12-17 PROCEDURE — C9113 INJ PANTOPRAZOLE SODIUM, VIA: HCPCS | Performed by: INTERNAL MEDICINE

## 2022-12-17 PROCEDURE — 99900035 HC TECH TIME PER 15 MIN (STAT)

## 2022-12-17 PROCEDURE — 94640 AIRWAY INHALATION TREATMENT: CPT

## 2022-12-17 PROCEDURE — 94761 N-INVAS EAR/PLS OXIMETRY MLT: CPT

## 2022-12-17 RX ORDER — IPRATROPIUM BROMIDE 0.5 MG/2.5ML
0.5 SOLUTION RESPIRATORY (INHALATION)
Status: DISCONTINUED | OUTPATIENT
Start: 2022-12-18 | End: 2022-12-19 | Stop reason: HOSPADM

## 2022-12-17 RX ADMIN — METOPROLOL SUCCINATE 25 MG: 25 TABLET, EXTENDED RELEASE ORAL at 09:12

## 2022-12-17 RX ADMIN — ATORVASTATIN CALCIUM 40 MG: 40 TABLET, FILM COATED ORAL at 08:12

## 2022-12-17 RX ADMIN — ARFORMOTEROL TARTRATE 15 MCG: 15 SOLUTION RESPIRATORY (INHALATION) at 08:12

## 2022-12-17 RX ADMIN — BUDESONIDE 0.5 MG: 0.5 INHALANT RESPIRATORY (INHALATION) at 08:12

## 2022-12-17 RX ADMIN — IPRATROPIUM BROMIDE 0.5 MG: 0.5 SOLUTION RESPIRATORY (INHALATION) at 08:12

## 2022-12-17 RX ADMIN — BUDESONIDE 0.5 MG: 0.5 INHALANT RESPIRATORY (INHALATION) at 07:12

## 2022-12-17 RX ADMIN — ASPIRIN 81 MG: 81 TABLET, COATED ORAL at 09:12

## 2022-12-17 RX ADMIN — LORAZEPAM 1 MG: 1 TABLET ORAL at 09:12

## 2022-12-17 RX ADMIN — ARFORMOTEROL TARTRATE 15 MCG: 15 SOLUTION RESPIRATORY (INHALATION) at 07:12

## 2022-12-17 RX ADMIN — IPRATROPIUM BROMIDE 0.5 MG: 0.5 SOLUTION RESPIRATORY (INHALATION) at 01:12

## 2022-12-17 RX ADMIN — ENOXAPARIN SODIUM 100 MG: 100 INJECTION SUBCUTANEOUS at 06:12

## 2022-12-17 RX ADMIN — PREGABALIN 100 MG: 75 CAPSULE ORAL at 08:12

## 2022-12-17 RX ADMIN — CLOPIDOGREL BISULFATE 75 MG: 75 TABLET, FILM COATED ORAL at 09:12

## 2022-12-17 RX ADMIN — LORAZEPAM 1 MG: 1 TABLET ORAL at 08:12

## 2022-12-17 RX ADMIN — ENOXAPARIN SODIUM 100 MG: 100 INJECTION SUBCUTANEOUS at 05:12

## 2022-12-17 RX ADMIN — QUETIAPINE 200 MG: 100 TABLET ORAL at 08:12

## 2022-12-17 RX ADMIN — LORAZEPAM 1 MG: 1 TABLET ORAL at 02:12

## 2022-12-17 RX ADMIN — AMLODIPINE BESYLATE 2.5 MG: 2.5 TABLET ORAL at 09:12

## 2022-12-17 RX ADMIN — IPRATROPIUM BROMIDE 0.5 MG: 0.5 SOLUTION RESPIRATORY (INHALATION) at 07:12

## 2022-12-17 RX ADMIN — PANTOPRAZOLE SODIUM 40 MG: 40 INJECTION, POWDER, LYOPHILIZED, FOR SOLUTION INTRAVENOUS at 09:12

## 2022-12-17 RX ADMIN — METOPROLOL SUCCINATE 25 MG: 25 TABLET, EXTENDED RELEASE ORAL at 08:12

## 2022-12-17 NOTE — PLAN OF CARE
Problem: Adult Inpatient Plan of Care  Goal: Plan of Care Review  Outcome: Ongoing, Progressing  Goal: Optimal Comfort and Wellbeing  Outcome: Ongoing, Progressing     POC reviewed with pt.  Pt denies needs at this time.  Pt denies chest pain at this time.  Call light within reach.  Bed in lowest position.

## 2022-12-17 NOTE — CARE UPDATE
12/16/22 2007   Patient Assessment/Suction   Level of Consciousness (AVPU) alert   Respiratory Effort Normal;Unlabored   Expansion/Accessory Muscles/Retractions no use of accessory muscles   All Lung Fields Breath Sounds Anterior:;diminished   HUY Breath Sounds diminished   RUL Breath Sounds diminished   Rhythm/Pattern, Respiratory unlabored   Cough Frequency infrequent   PRE-TX-O2   Device (Oxygen Therapy) room air   SpO2 (!) 93 %   Pulse Oximetry Type Intermittent   $ Pulse Oximetry - Multiple Charge Pulse Oximetry - Multiple   Pulse 70   Resp 18   Aerosol Therapy   $ Aerosol Therapy Charges Aerosol Treatment   Daily Review of Necessity (SVN) completed   Respiratory Treatment Status (SVN) given   Treatment Route (SVN) mask;oxygen   Patient Position (SVN) HOB elevated   Post Treatment Assessment (SVN) increased aeration   Signs of Intolerance (SVN) none   Breath Sounds Post-Respiratory Treatment   Post-treatment Heart Rate (beats/min) 94   Post-treatment Resp Rate (breaths/min) 18   Education   $ Education Bronchodilator   Respiratory Evaluation   $ Care Plan Tech Time 15 min

## 2022-12-17 NOTE — CARE UPDATE
12/17/22 0844   Patient Assessment/Suction   Level of Consciousness (AVPU) alert   Respiratory Effort Normal;Unlabored   Expansion/Accessory Muscles/Retractions no use of accessory muscles;no retractions;expansion symmetric   All Lung Fields Breath Sounds diminished   Rhythm/Pattern, Respiratory unlabored;pattern regular;depth regular;chest wiggle adequate;no shortness of breath reported   Cough Frequency infrequent   Cough Type good   PRE-TX-O2   Device (Oxygen Therapy) room air   SpO2 97 %   Pulse Oximetry Type Intermittent   $ Pulse Oximetry - Multiple Charge Pulse Oximetry - Multiple   Pulse 78   Resp 18   Aerosol Therapy   $ Aerosol Therapy Charges Aerosol Treatment   Daily Review of Necessity (SVN) completed   Respiratory Treatment Status (SVN) given   Treatment Route (SVN) oxygen;mask   Patient Position (SVN) HOB elevated   Post Treatment Assessment (SVN) increased aeration   Signs of Intolerance (SVN) none   Breath Sounds Post-Respiratory Treatment   Throughout All Fields Post-Treatment All Fields   Throughout All Fields Post-Treatment aeration increased   Post-treatment Heart Rate (beats/min) 95   Post-treatment Resp Rate (breaths/min) 18   Education   $ Education Bronchodilator;15 min   Respiratory Evaluation   $ Care Plan Tech Time 15 min

## 2022-12-17 NOTE — HOSPITAL COURSE
Patient was admitted for chest pain concerning for angina in the setting of known history of prior CAD status post CABG and PCI.  ACS was ruled out.  Nuclear stress test with concern for subtle area of diminished tracer accumulation lateral wall, EF of 74%.  Seen by Cardiology and planning left heart catheterization on 12/19/22.  Xarelto held, placed on therapeutic dose Lovenox, amlodipine 2.5 mg added for antianginal.  On 12/19 underwent left heart catheterization, report as outlined below, no intervention performed.  Cleared by Cardiology for discharge with outpatient follow-up and appears medically stable for discharge.  Patient states she will follow-up with local Cardiology at [place], contact details provided.  Discharge plan including medication, follow-up as well as return precautions were reviewed with the patient, she expressed understanding, no questions or concerns.  Discussed with Cardiology and nursing on day of discharge.      Discharge examination   Lying in bed, alert, oriented, on room air, right groin angio access site with clean dry intact dressing, soft     Left heart catheterization report    The estimated blood loss was <50 mL.    The pre-procedure left ventricular end diastolic pressure was 11.    Known occluded vein graft to OM and diagonal    Patent LIMA to LAD with patent ostial proximal stent in the LIMA with moderate nonobstructive InStent restenoses    Patent stent from left main into left circumflex with mild InStent restenosis, nonobstructive    Mild disease in the codominant right coronary artery

## 2022-12-17 NOTE — NURSING
Pt admitted to room 3006.  Pt A/O x 4 able to make needs known.  Pt denies needs at this time.  Pt oriented to call light and room.  Call light within reach.  Bed in lowest position.

## 2022-12-17 NOTE — PROGRESS NOTES
[facility] Medicine  Progress Note    Patient Name: Stephania Salmeron  MRN: [id number]  Patient Class: IP- Inpatient   Admission Date: 12/16/2022  Length of Stay: 1 days  Attending Physician: Emily Garza MD  Primary Care Provider: Sinan Saeed MD        Subjective:     Principal Problem:Chest pain        HPI:  Ms. Salmeron is a 67-year-old female transferred to [facility] from [facility] for further evaluation due to capacity needs.  Patient reports 1 week history of intermittent chest pain associated with shortness of breath.  States chest pain was midsternal, radiating up into the left jaw, some onset with lying on her side/position changes, traveling numbness sensation down her left arm.  Also reported shortness of breath, cough, nonproductive, left scapula pain/discomfort.  Remote history of CABG followed by PCI, in the past was followed by Cardiology at [facility] but not in the last 2-3 years.  Reports nitroglycerin did not help.  Does report nausea, few days ago 3 episodes of nonbloody emesis which self-resolved.  Known history of COPD with chronic bronchitis, nonsmoker, not on home oxygen.  Denies any fever, chills, constipation, diarrhea, leg swelling.  At outside hospital WBC 6.8, hemoglobin 12.3, BUN/creatinine 19/0.9, troponin by 2 negative, , chest x-ray with basal findings.  Chart review echocardiogram November 2022 EF of 60%, nuclear stress test August 2021 no reversible ischemia with EF of 63%.  Plan of care discussed with patient.  No visitors at bedside.  Communicated with nursing.      Overview/Hospital Course:  Patient was admitted for chest pain concerning for angina evaluation the setting of known history of prior CAD status post CABG and PCI.  ACS was ruled out.  Had nuclear stress test with concern for subtle area of diminished tracer accumulation lateral wall, EF of 74%.  Seen by Cardiology and planning left heart catheterization on 12/19/22.  Xarelto being held,  medication adjusted for angina, monitoring.      Interval History:  Patient reports did have recurrent episode of chest tightness earlier today while sitting in bed watching television, lasted about 5 minutes.  Denies any cough today.  No evidence of bleeding.  Afebrile, procalcitonin negative.  Nuclear stress test reviewed.  Discussed with patient.    Review of Systems   Constitutional:  Negative for fever.   Cardiovascular:  Positive for chest pain.   Gastrointestinal:  Negative for nausea and vomiting.   Psychiatric/Behavioral:  Negative for confusion.    Objective:     Vital Signs (Most Recent):  Temp: 98.5 °F (36.9 °C) (12/17/22 1202)  Pulse: 70 (12/17/22 1202)  Resp: 18 (12/17/22 1202)  BP: 120/65 (78) (12/17/22 1202)  SpO2: (!) 94 % (12/17/22 1202)   Vital Signs (24h Range):  Temp:  [97.6 °F (36.4 °C)-98.6 °F (37 °C)] 98.5 °F (36.9 °C)  Pulse:  [61-78] 70  Resp:  [18-69] 18  SpO2:  [1 %-99 %] 94 %  BP: ()/(55-84) 120/65     Weight: 100 kg (220 lb 7.4 oz)  Body mass index is 33.52 kg/m².  No intake or output data in the 24 hours ending 12/17/22 1208   Physical Exam  Vitals and nursing note reviewed.   Constitutional:       Appearance: She is obese.      Comments: Sitting in bed, eating lunch   HENT:      Head: Normocephalic and atraumatic.      Mouth/Throat:      Mouth: Mucous membranes are moist.   Cardiovascular:      Rate and Rhythm: Normal rate and regular rhythm.      Comments: No significant lower extremity edema column peripheries  Pulmonary:      Comments: On room air, no respiratory distress, no wheeze, no accessory muscle use  Abdominal:      Palpations: Abdomen is soft.      Tenderness: There is no abdominal tenderness.   Skin:     General: Skin is warm.   Neurological:      Mental Status: She is alert and oriented to person, place, and time.   Psychiatric:         Mood and Affect: Mood normal.       Significant Labs: Blood Culture: No results for input(s): LABBLOO in the last 48 hours.  BMP:    Recent Labs   Lab 12/16/22  1601   GLU 98      K 3.8      CO2 29   BUN 18   CREATININE 0.9   CALCIUM 9.0   MG 1.6     CBC:   Recent Labs   Lab 12/15/22  2248 12/16/22  1601   WBC 6.80 6.55   HGB 12.3 12.3   HCT 41.0 41.1    222     CMP:   Recent Labs   Lab 12/15/22  2248 12/16/22  1601    141   K 4.0 3.8    102   CO2 25 29   GLU 83 98   BUN 19 18   CREATININE 0.9 0.9   CALCIUM 9.0 9.0   PROT 8.0 7.6   ALBUMIN 3.6 3.7   BILITOT 0.2 0.5   ALKPHOS 136* 120   AST 19 17   ALT 19 20   ANIONGAP 9 10     Magnesium:   Recent Labs   Lab 12/16/22  1601   MG 1.6       Significant Imaging: I have reviewed all pertinent imaging results/findings within the past 24 hours.    X-Ray Chest AP Portable    Result Date: 12/15/2022  EXAMINATION: XR CHEST AP PORTABLE CLINICAL HISTORY: chest pain; TECHNIQUE: Single frontal view of the chest was performed. COMPARISON: Chest radiograph June 2, 2022 FINDINGS: Single portable chest view is submitted.  Cardiac pacemaker and postoperative change noted.  The appearance of the cardiomediastinal silhouette is stable.  Aortic atherosclerotic change noted. Mild accentuated parenchymal change at the lung bases bilaterally may relate to atelectasis and or mild superimposed infiltrate without dense consolidation. There is no evidence for pleural effusion or pneumothorax.  The osseous structures demonstrate chronic change.     Mild basilar parenchymal change may relate to atelectasis and or mild infiltrate without dense consolidation. Electronically signed by: Sergio Sandoval Date:    12/15/2022 Time:    23:25    Nuclear Stress Test    Result Date: 12/16/2022    The ECG portion of the study is negative for ischemia.   The patient reported no chest pain during the stress test.   There were no arrhythmias during stress.   The nuclear portion of this study will be reported separately.     NM Myocardial Perfusion Spect Multi Pharmacologic    Result Date:  12/16/2022  Myocardial Perfusion Imaging with SPECT Gated acquisition and Ejection Fraction single day protocol CLINICAL INFORMATION:  Chest pain. PROCEDURE: Lexiscan is utilized as a pharmacologic stress agent. At peak stress, 27.4 millicuries of technetium Myoview were administered intravenously.  The rest portion of the study is accomplished on the same day, utilizing 10.6 millicuries of technetium Myoview intravenously. FINDINGS: There is diminished tracer activity at the level of the ventricular apex, matched on stress and rest imaging. There is subtly diminished tracer accumulation within the lateral wall near the cardiac base, more pronounced on the stress examination. The distribution of activity appears otherwise unremarkable. The chamber size is within normal limits.  There are no wall motion abnormalities and the estimated ejection fraction is 74%. IMPRESSION: SUBTLE AREA OF DIMINISHED TRACER ACCUMULATION WITHIN THE LATERAL WALL NEAR THE CARDIAC BASE ON STRESS AS COMPARED TO REST IMAGING. DIMINISHED ACCUMULATION AT THE VENTRICULAR APEX, MATCHED. 2.  ESTIMATED EJECTION FRACTION OF 74%. Electronically signed by:  Nallely Rivera MD  12/16/2022 1:24 PM CST Workstation: [id number]         Assessment/Plan:      Active Hospital Problems    Diagnosis    *Chest pain concerning for angina    Severe obesity (BMI 35.0-39.9) with comorbidity    Numbness and tingling of left arm and leg    Gastroesophageal reflux disease    S/P PTCA (percutaneous transluminal coronary angioplasty)    S/P CABG (coronary artery bypass graft)    AICD (automatic cardioverter/defibrillator) present     Checked in clinic  No firing reported      Paroxysmal atrial fibrillation     On Toprol and Xarelto, continue as now.  Xarelto refilled      PAD (peripheral artery disease)    Chronic bronchitis    Anxiety and depression        Plan:  Continue care on telemetry floor with continuous cardiac monitoring  Continue aspirin, Plavix 75 mg  and amlodipine 2.5 mg added this admission, continue Toprol on high-intensity statin   Hold Xarelto in the setting of planned procedure, therapeutic dose Lovenox, hold dose starting evening 12/18  Nuclear stress test with subtle area of diminished tracer accumulation lateral wall, EF of 74%   Recent transthoracic echocardiogram with EF of 60%   Electrolytes sliding scale repletion  Intermittent lab checks  P.r.n. sublingual nitro and repeat EKG recurrent chest pain  Appreciate Cardiology input   Further plan as per clinical course      VTE Risk Mitigation (From admission, onward)         Ordered     enoxaparin injection 100 mg  Every 12 hours (non-standard times)         12/16/22 0376                Discharge Planning   WILDER:      Code Status: Full Code   Is the patient medically ready for discharge?:     Reason for patient still in hospital (select all that apply): Imaging  Discharge Plan A: Home                  Emliy Garza MD  Department of Hospital Medicine   [facility]

## 2022-12-17 NOTE — SUBJECTIVE & OBJECTIVE
Interval History:  Patient reports did have recurrent episode of chest tightness earlier today while sitting in bed watching television, lasted about 5 minutes.  Denies any cough today.  No evidence of bleeding.  Afebrile, procalcitonin negative.  Nuclear stress test reviewed.  Discussed with patient.    Review of Systems   Constitutional:  Negative for fever.   Cardiovascular:  Positive for chest pain.   Gastrointestinal:  Negative for nausea and vomiting.   Psychiatric/Behavioral:  Negative for confusion.    Objective:     Vital Signs (Most Recent):  Temp: 98.5 °F (36.9 °C) (12/17/22 1202)  Pulse: 70 (12/17/22 1202)  Resp: 18 (12/17/22 1202)  BP: 120/65 (78) (12/17/22 1202)  SpO2: (!) 94 % (12/17/22 1202)   Vital Signs (24h Range):  Temp:  [97.6 °F (36.4 °C)-98.6 °F (37 °C)] 98.5 °F (36.9 °C)  Pulse:  [61-78] 70  Resp:  [18-69] 18  SpO2:  [1 %-99 %] 94 %  BP: ()/(55-84) 120/65     Weight: 100 kg (220 lb 7.4 oz)  Body mass index is 33.52 kg/m².  No intake or output data in the 24 hours ending 12/17/22 1208   Physical Exam  Vitals and nursing note reviewed.   Constitutional:       Appearance: She is obese.      Comments: Sitting in bed, eating lunch   HENT:      Head: Normocephalic and atraumatic.      Mouth/Throat:      Mouth: Mucous membranes are moist.   Cardiovascular:      Rate and Rhythm: Normal rate and regular rhythm.      Comments: No significant lower extremity edema column peripheries  Pulmonary:      Comments: On room air, no respiratory distress, no wheeze, no accessory muscle use  Abdominal:      Palpations: Abdomen is soft.      Tenderness: There is no abdominal tenderness.   Skin:     General: Skin is warm.   Neurological:      Mental Status: She is alert and oriented to person, place, and time.   Psychiatric:         Mood and Affect: Mood normal.       Significant Labs: Blood Culture: No results for input(s): LABBLOO in the last 48 hours.  BMP:   Recent Labs   Lab 12/16/22  1601   GLU 98   NA  141   K 3.8      CO2 29   BUN 18   CREATININE 0.9   CALCIUM 9.0   MG 1.6     CBC:   Recent Labs   Lab 12/15/22  2248 12/16/22  1601   WBC 6.80 6.55   HGB 12.3 12.3   HCT 41.0 41.1    222     CMP:   Recent Labs   Lab 12/15/22  2248 12/16/22  1601    141   K 4.0 3.8    102   CO2 25 29   GLU 83 98   BUN 19 18   CREATININE 0.9 0.9   CALCIUM 9.0 9.0   PROT 8.0 7.6   ALBUMIN 3.6 3.7   BILITOT 0.2 0.5   ALKPHOS 136* 120   AST 19 17   ALT 19 20   ANIONGAP 9 10     Magnesium:   Recent Labs   Lab 12/16/22  1601   MG 1.6       Significant Imaging: I have reviewed all pertinent imaging results/findings within the past 24 hours.    X-Ray Chest AP Portable    Result Date: 12/15/2022  EXAMINATION: XR CHEST AP PORTABLE CLINICAL HISTORY: chest pain; TECHNIQUE: Single frontal view of the chest was performed. COMPARISON: Chest radiograph June 2, 2022 FINDINGS: Single portable chest view is submitted.  Cardiac pacemaker and postoperative change noted.  The appearance of the cardiomediastinal silhouette is stable.  Aortic atherosclerotic change noted. Mild accentuated parenchymal change at the lung bases bilaterally may relate to atelectasis and or mild superimposed infiltrate without dense consolidation. There is no evidence for pleural effusion or pneumothorax.  The osseous structures demonstrate chronic change.     Mild basilar parenchymal change may relate to atelectasis and or mild infiltrate without dense consolidation. Electronically signed by: Sergio Sandoval Date:    12/15/2022 Time:    23:25    Nuclear Stress Test    Result Date: 12/16/2022    The ECG portion of the study is negative for ischemia.   The patient reported no chest pain during the stress test.   There were no arrhythmias during stress.   The nuclear portion of this study will be reported separately.     NM Myocardial Perfusion Spect Multi Pharmacologic    Result Date: 12/16/2022  Myocardial Perfusion Imaging with SPECT Gated acquisition  and Ejection Fraction single day protocol CLINICAL INFORMATION:  Chest pain. PROCEDURE: Lexiscan is utilized as a pharmacologic stress agent. At peak stress, 27.4 millicuries of technetium Myoview were administered intravenously.  The rest portion of the study is accomplished on the same day, utilizing 10.6 millicuries of technetium Myoview intravenously. FINDINGS: There is diminished tracer activity at the level of the ventricular apex, matched on stress and rest imaging. There is subtly diminished tracer accumulation within the lateral wall near the cardiac base, more pronounced on the stress examination. The distribution of activity appears otherwise unremarkable. The chamber size is within normal limits.  There are no wall motion abnormalities and the estimated ejection fraction is 74%. IMPRESSION: SUBTLE AREA OF DIMINISHED TRACER ACCUMULATION WITHIN THE LATERAL WALL NEAR THE CARDIAC BASE ON STRESS AS COMPARED TO REST IMAGING. DIMINISHED ACCUMULATION AT THE VENTRICULAR APEX, MATCHED. 2.  ESTIMATED EJECTION FRACTION OF 74%. Electronically signed by:  Nallely Rivera MD  12/16/2022 1:24 PM [facility] Workstation: [id number]

## 2022-12-18 PROCEDURE — 94761 N-INVAS EAR/PLS OXIMETRY MLT: CPT

## 2022-12-18 PROCEDURE — 93005 ELECTROCARDIOGRAM TRACING: CPT | Performed by: INTERNAL MEDICINE

## 2022-12-18 PROCEDURE — 25000242 PHARM REV CODE 250 ALT 637 W/ HCPCS: Performed by: INTERNAL MEDICINE

## 2022-12-18 PROCEDURE — 99900035 HC TECH TIME PER 15 MIN (STAT)

## 2022-12-18 PROCEDURE — C9113 INJ PANTOPRAZOLE SODIUM, VIA: HCPCS | Performed by: INTERNAL MEDICINE

## 2022-12-18 PROCEDURE — 63600175 PHARM REV CODE 636 W HCPCS: Performed by: INTERNAL MEDICINE

## 2022-12-18 PROCEDURE — 25000003 PHARM REV CODE 250: Performed by: INTERNAL MEDICINE

## 2022-12-18 PROCEDURE — 93010 EKG 12-LEAD: ICD-10-PCS | Mod: ,,, | Performed by: INTERNAL MEDICINE

## 2022-12-18 PROCEDURE — 94640 AIRWAY INHALATION TREATMENT: CPT

## 2022-12-18 PROCEDURE — 93010 ELECTROCARDIOGRAM REPORT: CPT | Mod: ,,, | Performed by: INTERNAL MEDICINE

## 2022-12-18 PROCEDURE — 21400001 HC TELEMETRY ROOM

## 2022-12-18 PROCEDURE — 99900031 HC PATIENT EDUCATION (STAT)

## 2022-12-18 RX ORDER — SODIUM CHLORIDE 9 MG/ML
INJECTION, SOLUTION INTRAVENOUS CONTINUOUS
Status: ACTIVE | OUTPATIENT
Start: 2022-12-19 | End: 2022-12-19

## 2022-12-18 RX ORDER — MORPHINE SULFATE 2 MG/ML
2 INJECTION, SOLUTION INTRAMUSCULAR; INTRAVENOUS EVERY 6 HOURS PRN
Status: DISCONTINUED | OUTPATIENT
Start: 2022-12-18 | End: 2022-12-19 | Stop reason: HOSPADM

## 2022-12-18 RX ADMIN — MORPHINE SULFATE 2 MG: 2 INJECTION, SOLUTION INTRAMUSCULAR; INTRAVENOUS at 10:12

## 2022-12-18 RX ADMIN — ASPIRIN 81 MG: 81 TABLET, COATED ORAL at 09:12

## 2022-12-18 RX ADMIN — NITROGLYCERIN 0.4 MG: 0.4 TABLET SUBLINGUAL at 09:12

## 2022-12-18 RX ADMIN — IPRATROPIUM BROMIDE 0.5 MG: 0.5 SOLUTION RESPIRATORY (INHALATION) at 07:12

## 2022-12-18 RX ADMIN — BUDESONIDE 0.5 MG: 0.5 INHALANT RESPIRATORY (INHALATION) at 07:12

## 2022-12-18 RX ADMIN — ENOXAPARIN SODIUM 100 MG: 100 INJECTION SUBCUTANEOUS at 06:12

## 2022-12-18 RX ADMIN — IPRATROPIUM BROMIDE 0.5 MG: 0.5 SOLUTION RESPIRATORY (INHALATION) at 01:12

## 2022-12-18 RX ADMIN — MORPHINE SULFATE 2 MG: 2 INJECTION, SOLUTION INTRAMUSCULAR; INTRAVENOUS at 04:12

## 2022-12-18 RX ADMIN — ONDANSETRON HYDROCHLORIDE 4 MG: 2 INJECTION, SOLUTION INTRAMUSCULAR; INTRAVENOUS at 09:12

## 2022-12-18 RX ADMIN — PREGABALIN 100 MG: 75 CAPSULE ORAL at 11:12

## 2022-12-18 RX ADMIN — CLOPIDOGREL BISULFATE 75 MG: 75 TABLET, FILM COATED ORAL at 09:12

## 2022-12-18 RX ADMIN — METOPROLOL SUCCINATE 25 MG: 25 TABLET, EXTENDED RELEASE ORAL at 08:12

## 2022-12-18 RX ADMIN — LORAZEPAM 1 MG: 1 TABLET ORAL at 04:12

## 2022-12-18 RX ADMIN — ARFORMOTEROL TARTRATE 15 MCG: 15 SOLUTION RESPIRATORY (INHALATION) at 07:12

## 2022-12-18 RX ADMIN — NITROGLYCERIN 0.4 MG: 0.4 TABLET SUBLINGUAL at 11:12

## 2022-12-18 RX ADMIN — MORPHINE SULFATE 2 MG: 2 INJECTION, SOLUTION INTRAMUSCULAR; INTRAVENOUS at 11:12

## 2022-12-18 RX ADMIN — LORAZEPAM 1 MG: 1 TABLET ORAL at 08:12

## 2022-12-18 RX ADMIN — ONDANSETRON HYDROCHLORIDE 4 MG: 2 INJECTION, SOLUTION INTRAMUSCULAR; INTRAVENOUS at 11:12

## 2022-12-18 RX ADMIN — ACETAMINOPHEN 650 MG: 325 TABLET ORAL at 09:12

## 2022-12-18 RX ADMIN — QUETIAPINE 200 MG: 100 TABLET ORAL at 08:12

## 2022-12-18 RX ADMIN — ATORVASTATIN CALCIUM 40 MG: 40 TABLET, FILM COATED ORAL at 08:12

## 2022-12-18 RX ADMIN — LORAZEPAM 1 MG: 1 TABLET ORAL at 09:12

## 2022-12-18 RX ADMIN — PANTOPRAZOLE SODIUM 40 MG: 40 INJECTION, POWDER, LYOPHILIZED, FOR SOLUTION INTRAVENOUS at 09:12

## 2022-12-18 RX ADMIN — AMLODIPINE BESYLATE 2.5 MG: 2.5 TABLET ORAL at 09:12

## 2022-12-18 RX ADMIN — METOPROLOL SUCCINATE 25 MG: 25 TABLET, EXTENDED RELEASE ORAL at 09:12

## 2022-12-18 NOTE — PROGRESS NOTES
C/o chest pressure and nausea. One nitro administered along with zofran and tylenol. Dr. Garza made aware and orders received for EKG.

## 2022-12-18 NOTE — PLAN OF CARE
Problem: Adult Inpatient Plan of Care  Goal: Plan of Care Review  Outcome: Ongoing, Progressing  Goal: Patient-Specific Goal (Individualized)  Outcome: Ongoing, Progressing  Goal: Absence of Hospital-Acquired Illness or Injury  Outcome: Ongoing, Progressing  Goal: Optimal Comfort and Wellbeing  Outcome: Ongoing, Progressing  Goal: Readiness for Transition of Care  Outcome: Ongoing, Progressing     Problem: Fall Injury Risk  Goal: Absence of Fall and Fall-Related Injury  Outcome: Ongoing, Progressing     Remains free of fall thus far this visit. Refusing bed alarm.

## 2022-12-18 NOTE — SUBJECTIVE & OBJECTIVE
Interval History:  Patient had episode of retrosternal squeezing chest discomfort associated with nausea without emesis and shortness of breath, nitro and morphine was administered, states she is now more comfortable.  EKG was obtain.  Isolated fever 100.6 last night but otherwise afebrile, possible error?  Discussed with patient.  No visitors at bedside.  Communicated with nursing.    Review of Systems   Respiratory:  Positive for shortness of breath.    Cardiovascular:  Positive for chest pain.   Gastrointestinal:  Positive for nausea.   Psychiatric/Behavioral:  The patient is nervous/anxious.    Objective:     Vital Signs (Most Recent):  Temp: 98.2 °F (36.8 °C) (12/18/22 1202)  Pulse: 68 (12/18/22 1202)  Resp: 16 (12/18/22 1202)  BP: 120/65 (86) (12/18/22 1202)  SpO2: 95 % (12/18/22 1202) Vital Signs (24h Range):  Temp:  [97.7 °F (36.5 °C)-100.9 °F (38.3 °C)] 98.2 °F (36.8 °C)  Pulse:  [68-90] 68  Resp:  [16-20] 16  SpO2:  [91 %-100 %] 95 %  BP: ()/(65-85) 120/65     Weight: 100 kg (220 lb 7.4 oz)  Body mass index is 33.52 kg/m².    Intake/Output Summary (Last 24 hours) at 12/18/2022 1236  Last data filed at 12/17/2022 1754  Gross per 24 hour   Intake 822 ml   Output --   Net 822 ml      Physical Exam  Vitals and nursing note reviewed.   Constitutional:       Appearance: She is obese.      Comments: Sitting in bed, eating lunch, no apparent acute distress, cooperative   HENT:      Head: Normocephalic and atraumatic.      Mouth/Throat:      Mouth: Mucous membranes are moist.   Cardiovascular:      Rate and Rhythm: Normal rate and regular rhythm.      Comments: Healed sternotomy, no lower extremity edema  Pulmonary:      Comments: On room air, no respiratory distress, no wheeze, no accessory muscle use  Abdominal:      Palpations: Abdomen is soft.      Tenderness: There is no abdominal tenderness.   Musculoskeletal:      Comments: No reproducible chest wall tenderness to palpation, good range of motion to  left shoulder   Skin:     General: Skin is warm.   Neurological:      Mental Status: She is alert and oriented to person, place, and time.   Psychiatric:         Mood and Affect: Mood normal.       Significant Labs: BMP:   Recent Labs   Lab 12/16/22  1601   GLU 98      K 3.8      CO2 29   BUN 18   CREATININE 0.9   CALCIUM 9.0   MG 1.6     CBC:   Recent Labs   Lab 12/16/22  1601   WBC 6.55   HGB 12.3   HCT 41.1        CMP:   Recent Labs   Lab 12/16/22  1601      K 3.8      CO2 29   GLU 98   BUN 18   CREATININE 0.9   CALCIUM 9.0   PROT 7.6   ALBUMIN 3.7   BILITOT 0.5   ALKPHOS 120   AST 17   ALT 20   ANIONGAP 10     POCT Glucose: No results for input(s): POCTGLUCOSE in the last 48 hours.    Significant Imaging: I have reviewed all pertinent imaging results/findings within the past 24 hours.

## 2022-12-18 NOTE — PROGRESS NOTES
[facility] Medicine  Progress Note    Patient Name: Stephania Salmeron  MRN: [id number]  Patient Class: IP- Inpatient   Admission Date: 12/16/2022  Length of Stay: 2 days  Attending Physician: Emily Garza MD  Primary Care Provider: Sinan Saeed MD        Subjective:     Principal Problem:Chest pain        HPI:  Ms. Salmeron is a 67-year-old female transferred to [facility] from [facility] [place] for further evaluation due to capacity needs.  Patient reports 1 week history of intermittent chest pain associated with shortness of breath.  States chest pain was midsternal, radiating up into the left jaw, some onset with lying on her side/position changes, traveling numbness sensation down her left arm.  Also reported shortness of breath, cough, nonproductive, left scapula pain/discomfort.  Remote history of CABG followed by PCI, in the past was followed by Cardiology at [facility] but not in the last 2-3 years.  Reports nitroglycerin did not help.  Does report nausea, few days ago 3 episodes of nonbloody emesis which self-resolved.  Known history of COPD with chronic bronchitis, nonsmoker, not on home oxygen.  Denies any fever, chills, constipation, diarrhea, leg swelling.  At outside hospital WBC 6.8, hemoglobin 12.3, BUN/creatinine 19/0.9, troponin by 2 negative, , chest x-ray with basal findings.  Chart review echocardiogram November 2022 EF of 60%, nuclear stress test August 2021 no reversible ischemia with EF of 63%.  Plan of care discussed with patient.  No visitors at bedside.  Communicated with nursing.      Overview/Hospital Course:  Patient was admitted for chest pain concerning for angina evaluation the setting of known history of prior CAD status post CABG and PCI.  ACS was ruled out.  Had nuclear stress test with concern for subtle area of diminished tracer accumulation lateral wall, EF of 74%.  Seen by Cardiology and planning left heart catheterization on 12/19/22.  Xarelto being held,  medication adjusted for angina, monitoring.      Interval History:  Patient had episode of retrosternal squeezing chest discomfort associated with nausea without emesis and shortness of breath, nitro and morphine was administered, states she is now more comfortable.  EKG was obtain.  Isolated fever 100.6 last night but otherwise afebrile, possible error?  Discussed with patient.  No visitors at bedside.  Communicated with nursing.    Review of Systems   Respiratory:  Positive for shortness of breath.    Cardiovascular:  Positive for chest pain.   Gastrointestinal:  Positive for nausea.   Psychiatric/Behavioral:  The patient is nervous/anxious.    Objective:     Vital Signs (Most Recent):  Temp: 98.2 °F (36.8 °C) (12/18/22 1202)  Pulse: 68 (12/18/22 1202)  Resp: 16 (12/18/22 1202)  BP: 120/65 (86) (12/18/22 1202)  SpO2: 95 % (12/18/22 1202) Vital Signs (24h Range):  Temp:  [97.7 °F (36.5 °C)-100.9 °F (38.3 °C)] 98.2 °F (36.8 °C)  Pulse:  [68-90] 68  Resp:  [16-20] 16  SpO2:  [91 %-100 %] 95 %  BP: ()/(65-85) 120/65     Weight: 100 kg (220 lb 7.4 oz)  Body mass index is 33.52 kg/m².    Intake/Output Summary (Last 24 hours) at 12/18/2022 1236  Last data filed at 12/17/2022 1754  Gross per 24 hour   Intake 822 ml   Output --   Net 822 ml      Physical Exam  Vitals and nursing note reviewed.   Constitutional:       Appearance: She is obese.      Comments: Sitting in bed, eating lunch, no apparent acute distress, cooperative   HENT:      Head: Normocephalic and atraumatic.      Mouth/Throat:      Mouth: Mucous membranes are moist.   Cardiovascular:      Rate and Rhythm: Normal rate and regular rhythm.      Comments: Healed sternotomy, no lower extremity edema  Pulmonary:      Comments: On room air, no respiratory distress, no wheeze, no accessory muscle use  Abdominal:      Palpations: Abdomen is soft.      Tenderness: There is no abdominal tenderness.   Musculoskeletal:      Comments: No reproducible chest wall  tenderness to palpation, good range of motion to left shoulder   Skin:     General: Skin is warm.   Neurological:      Mental Status: She is alert and oriented to person, place, and time.   Psychiatric:         Mood and Affect: Mood normal.       Significant Labs: BMP:   Recent Labs   Lab 12/16/22  1601   GLU 98      K 3.8      CO2 29   BUN 18   CREATININE 0.9   CALCIUM 9.0   MG 1.6     CBC:   Recent Labs   Lab 12/16/22  1601   WBC 6.55   HGB 12.3   HCT 41.1        CMP:   Recent Labs   Lab 12/16/22  1601      K 3.8      CO2 29   GLU 98   BUN 18   CREATININE 0.9   CALCIUM 9.0   PROT 7.6   ALBUMIN 3.7   BILITOT 0.5   ALKPHOS 120   AST 17   ALT 20   ANIONGAP 10     POCT Glucose: No results for input(s): POCTGLUCOSE in the last 48 hours.    Significant Imaging: I have reviewed all pertinent imaging results/findings within the past 24 hours.      Assessment/Plan:      Active Hospital Problems    Diagnosis    *Chest pain concerning for angina    Severe obesity (BMI 35.0-39.9) with comorbidity    Numbness and tingling of left arm and leg    Gastroesophageal reflux disease    S/P PTCA (percutaneous transluminal coronary angioplasty)    S/P CABG (coronary artery bypass graft)    AICD (automatic cardioverter/defibrillator) present     Checked in clinic  No firing reported      Paroxysmal atrial fibrillation     On Toprol and Xarelto, continue as now.  Xarelto refilled      PAD (peripheral artery disease)    Chronic bronchitis    Anxiety and depression        Plan:  Continue care on telemetry floor with continuous cardiac monitoring  Continue aspirin 81 mg, Plavix 75 mg added this admission, (on Xarelto at home will need to clarify antiplatelet/anticoagulation on discharge)   Continue statin therapy, Toprol, amlodipine 2.5 added this admission for anti anginal  Continue to hold home Xarelto, hold therapeutic dose Lovenox starting tonight, in the setting of planned left heart  catheterization  P.r.n. EKG, p.r.n. sublingual nitro and morphine for recurrent chest pain  NPO midnight for planned procedure tomorrow  Nuclear stress test with subtle area of diminished tracer accumulation lateral wall, EF of 74%   Recent transthoracic echocardiogram with EF of 60%   Electrolytes sliding scale repletion  A.m. labs ordered   Chest x-ray ordered  Appreciate Cardiology input   Further plan as per clinical course     VTE Risk Mitigation (From admission, onward)    None          Discharge Planning   WILDER:      Code Status: Full Code   Is the patient medically ready for discharge?:     Reason for patient still in hospital (select all that apply): Imaging  Discharge Plan A: Home                  Emily Garza MD  Department of Hospital Medicine   [facility]

## 2022-12-18 NOTE — CARE UPDATE
Postpartum Progress Note    Patient name: Edd Leung  YOB: 1987   MRN: [id number]  Referring Provider:   Admission Date: 12/15/2021  Date of Service: 2021    ID: [id number] y.o.     Diagnosis:   S/p  delivery *repeat 21*   Patient Active Problem List   Diagnosis   • Preeclampsia in postpartum period       Subjective:      Patient reports feeling better every day.  Denies shortness of breath, chest pain, or any other concerns..  Ambulating, voiding, tolerating diet.  Pain well controlled.  The patient is currently breastfeeding.   This baby is in room    Objective:      Vital signs:  Vital Signs Range for the last 24 hours  Temperature: Temp:  [98 °F (36.7 °C)-98.7 °F (37.1 °C)] 98.7 °F (37.1 °C)   Temp Source: Temp src: Oral   BP: BP: (128-149)/(61-80) 138/65   Pulse: Heart Rate:  [69-84] 69   Respirations: Resp:  [16-20] 18   Weight: (!) 195 kg (429 lb 8 oz)     General: Alert & oriented x4, in no apparent distress  Abdomen: soft, nontender nephric and edema still present in pannus but improving  Uterus: firm, nontender  Incision: clean, dry, intact, signs infection  Extremities: nontender; 1-2+  edema   proving    Labs:  Lab Results   Component Value Date    WBC 11.38 (H) 2021    HGB 7.5 (L) 2021    HCT 25.3 (L) 2021    MCV 82.4 2021     (H) 2021         External Prenatal Results     Pregnancy Outside Results - Transcribed From Office Records - See Scanned Records For Details     Test Value Date Time    ABO  A  21 1056    Rh  Positive  21 1056    Antibody Screen  Negative  21 1522    Varicella IgG       Rubella ^ Immune  19     Hgb  7.5 g/dL 21 1046       8.4 g/dL 12/15/21 1100       7.2 g/dL 21 0927       8.0 g/dL 12/10/21 0806       8.0 g/dL 21 1035       8.1 g/dL 21 0143       7.9 g/dL 21 1612       7.8 g/dL 21 1056       10.4 g/dL 21 1522    Hct  25.3 %     12/18/22 0713   Patient Assessment/Suction   Level of Consciousness (AVPU) alert   Respiratory Effort Normal;Unlabored   Expansion/Accessory Muscles/Retractions no use of accessory muscles;no retractions;expansion symmetric   All Lung Fields Breath Sounds clear;diminished   Rhythm/Pattern, Respiratory unlabored;pattern regular;depth regular;chest wiggle adequate;no shortness of breath reported   Cough Frequency infrequent   Cough Type good   PRE-TX-O2   Device (Oxygen Therapy) room air   SpO2 95 %   Pulse Oximetry Type Intermittent   $ Pulse Oximetry - Multiple Charge Pulse Oximetry - Multiple   Pulse 70   Resp 17   Aerosol Therapy   $ Aerosol Therapy Charges Aerosol Treatment   Daily Review of Necessity (SVN) completed   Respiratory Treatment Status (SVN) given   Treatment Route (SVN) mask;oxygen   Patient Position (SVN) HOB elevated   Post Treatment Assessment (SVN) increased aeration   Signs of Intolerance (SVN) none   Breath Sounds Post-Respiratory Treatment   Throughout All Fields Post-Treatment All Fields   Throughout All Fields Post-Treatment aeration increased   Post-treatment Heart Rate (beats/min) 78   Post-treatment Resp Rate (breaths/min) 18   Education   $ Education Bronchodilator;15 min   Respiratory Evaluation   $ Care Plan Tech Time 15 min        21 1046       28.3 % 12/15/21 1100       24.5 % 21 0927       26.6 % 12/10/21 0806       26.5 % 21 1035       26.0 % 21 0143       26.6 % 21 1612       25.8 % 21 1056       34.1 % 21 1522    Glucose Fasting GTT       Glucose Tolerance Test 1 hour       Glucose Tolerance Test 3 hour       Gonorrhea (discrete)       Chlamydia (discrete)       RPR ^ Non-Reactive  19     VDRL       Syphilis Antibody       HBsAg ^ Negative  19     Herpes Simplex Virus PCR       Herpes Simplex VIrus Culture       HIV ^ Non-Reactive  19     Hep C RNA Quant PCR       Hep C Antibody       AFP       Group B Strep       GBS Susceptibility to Clindamycin       GBS Susceptibility to Erythromycin       Fetal Fibronectin       Genetic Testing, Maternal Blood             Drug Screening     Test Value Date Time    Urine Drug Screen       Amphetamine Screen  Negative  21 0748    Barbiturate Screen  Negative  21 0748    Benzodiazepine Screen  Negative  21 0748    Methadone Screen  Negative  21 0748    Phencyclidine Screen  Negative  21 0748    Opiates Screen  Negative  21 0748    THC Screen  Negative  21 0748    Cocaine Screen       Propoxyphene Screen  Negative  21 0748    Buprenorphine Screen  Negative  21 0748    Methamphetamine Screen       Oxycodone Screen  Negative  21 0748    Tricyclic Antidepressants Screen  Negative  21 0748          Legend    ^: Historical                        Assessment/Plan:    ID note appreciated  Status post  on 21  1.  Chronic hypertension with preeclampsia postpartum period.      -Blood pressure normalized, labetalol 200 mg every 8 hours and Procardia 30 XL twice daily      -Pulmonary edema resolved        Patient continues to diurese well.  10 pound weight loss over the past 24-hour.  2.  Positive blood cultures believed to be  A contaminant. (Infectious disease following )  3.   Postpartum anemia asymptomatic  4.  Type 2 diabetes blood sugars improving       Decreased long-acting by 10% due to episode hypoglycemia  5.  DVT prophylaxis with SCUDs and Lovenox  PLAN  1.  Continue inpatient management  2.  Encourage incentive spirometer use and ambulation  3.  Continue Lasix twice daily  4.  CMP in a.m.     Questions were answered.

## 2022-12-18 NOTE — PROGRESS NOTES
"Verbalizes "some relief" after morphine and states that pain is now "tolerable", scoring it a  3/10.  "

## 2022-12-18 NOTE — CARE UPDATE
12/17/22 1938   Patient Assessment/Suction   Level of Consciousness (AVPU) alert   Respiratory Effort Normal;Unlabored   Expansion/Accessory Muscles/Retractions no use of accessory muscles   All Lung Fields Breath Sounds Anterior:;clear;diminished   Rhythm/Pattern, Respiratory unlabored   Cough Frequency infrequent   PRE-TX-O2   Device (Oxygen Therapy) room air   SpO2 (!) 94 %   Pulse Oximetry Type Intermittent   $ Pulse Oximetry - Multiple Charge Pulse Oximetry - Multiple   Pulse 69   Resp 18   Aerosol Therapy   $ Aerosol Therapy Charges Aerosol Treatment   Daily Review of Necessity (SVN) completed   Respiratory Treatment Status (SVN) given   Treatment Route (SVN) mask;oxygen   Patient Position (SVN) HOB elevated   Post Treatment Assessment (SVN) increased aeration   Signs of Intolerance (SVN) none   Breath Sounds Post-Respiratory Treatment   Post-treatment Heart Rate (beats/min) 96   Post-treatment Resp Rate (breaths/min) 18   Education   $ Education Bronchodilator;15 min   Respiratory Evaluation   $ Care Plan Tech Time 15 min

## 2022-12-19 VITALS
HEIGHT: 68 IN | BODY MASS INDEX: 33.62 KG/M2 | DIASTOLIC BLOOD PRESSURE: 87 MMHG | RESPIRATION RATE: 18 BRPM | WEIGHT: 221.81 LBS | OXYGEN SATURATION: 93 % | TEMPERATURE: 98 F | HEART RATE: 78 BPM | SYSTOLIC BLOOD PRESSURE: 152 MMHG

## 2022-12-19 LAB
ANION GAP SERPL CALC-SCNC: 5 MMOL/L (ref 8–16)
BUN SERPL-MCNC: 21 MG/DL (ref 8–23)
CALCIUM SERPL-MCNC: 8.6 MG/DL (ref 8.7–10.5)
CHLORIDE SERPL-SCNC: 106 MMOL/L (ref 95–110)
CO2 SERPL-SCNC: 27 MMOL/L (ref 23–29)
CREAT SERPL-MCNC: 0.8 MG/DL (ref 0.5–1.4)
ERYTHROCYTE [DISTWIDTH] IN BLOOD BY AUTOMATED COUNT: 14.6 % (ref 11.5–14.5)
EST. GFR  (NO RACE VARIABLE): >60 ML/MIN/1.73 M^2
GLUCOSE SERPL-MCNC: 109 MG/DL (ref 70–110)
HCT VFR BLD AUTO: 38.5 % (ref 37–48.5)
HGB BLD-MCNC: 11.7 G/DL (ref 12–16)
INR PPP: 1.1
MAGNESIUM SERPL-MCNC: 1.9 MG/DL (ref 1.6–2.6)
MCH RBC QN AUTO: 28.6 PG (ref 27–31)
MCHC RBC AUTO-ENTMCNC: 30.4 G/DL (ref 32–36)
MCV RBC AUTO: 94 FL (ref 82–98)
PHOSPHATE SERPL-MCNC: 3.4 MG/DL (ref 2.7–4.5)
PLATELET # BLD AUTO: 193 K/UL (ref 150–450)
PMV BLD AUTO: 10.1 FL (ref 9.2–12.9)
POTASSIUM SERPL-SCNC: 3.9 MMOL/L (ref 3.5–5.1)
PROTHROMBIN TIME: 13.4 SEC (ref 11.4–13.7)
RBC # BLD AUTO: 4.09 M/UL (ref 4–5.4)
SODIUM SERPL-SCNC: 138 MMOL/L (ref 136–145)
TROPONIN I SERPL HS-MCNC: 6 PG/ML (ref 0–14.9)
WBC # BLD AUTO: 4.63 K/UL (ref 3.9–12.7)

## 2022-12-19 PROCEDURE — 27201423 OPTIME MED/SURG SUP & DEVICES STERILE SUPPLY: Performed by: INTERNAL MEDICINE

## 2022-12-19 PROCEDURE — 93010 ELECTROCARDIOGRAM REPORT: CPT | Mod: ,,, | Performed by: INTERNAL MEDICINE

## 2022-12-19 PROCEDURE — C1887 CATHETER, GUIDING: HCPCS | Performed by: INTERNAL MEDICINE

## 2022-12-19 PROCEDURE — 93459 L HRT ART/GRFT ANGIO: CPT | Performed by: INTERNAL MEDICINE

## 2022-12-19 PROCEDURE — 85610 PROTHROMBIN TIME: CPT | Performed by: INTERNAL MEDICINE

## 2022-12-19 PROCEDURE — 93005 ELECTROCARDIOGRAM TRACING: CPT | Performed by: INTERNAL MEDICINE

## 2022-12-19 PROCEDURE — 94761 N-INVAS EAR/PLS OXIMETRY MLT: CPT

## 2022-12-19 PROCEDURE — 80048 BASIC METABOLIC PNL TOTAL CA: CPT | Performed by: INTERNAL MEDICINE

## 2022-12-19 PROCEDURE — 99900035 HC TECH TIME PER 15 MIN (STAT)

## 2022-12-19 PROCEDURE — C1894 INTRO/SHEATH, NON-LASER: HCPCS | Performed by: INTERNAL MEDICINE

## 2022-12-19 PROCEDURE — 83735 ASSAY OF MAGNESIUM: CPT | Performed by: INTERNAL MEDICINE

## 2022-12-19 PROCEDURE — 25000003 PHARM REV CODE 250: Performed by: NURSE PRACTITIONER

## 2022-12-19 PROCEDURE — 63600175 PHARM REV CODE 636 W HCPCS: Performed by: INTERNAL MEDICINE

## 2022-12-19 PROCEDURE — 93010 EKG 12-LEAD: ICD-10-PCS | Mod: ,,, | Performed by: INTERNAL MEDICINE

## 2022-12-19 PROCEDURE — 99152 MOD SED SAME PHYS/QHP 5/>YRS: CPT | Mod: ,,, | Performed by: INTERNAL MEDICINE

## 2022-12-19 PROCEDURE — 94640 AIRWAY INHALATION TREATMENT: CPT

## 2022-12-19 PROCEDURE — 85027 COMPLETE CBC AUTOMATED: CPT | Performed by: INTERNAL MEDICINE

## 2022-12-19 PROCEDURE — 84100 ASSAY OF PHOSPHORUS: CPT | Performed by: INTERNAL MEDICINE

## 2022-12-19 PROCEDURE — 93459: ICD-10-PCS | Mod: 26,,, | Performed by: INTERNAL MEDICINE

## 2022-12-19 PROCEDURE — 27000221 HC OXYGEN, UP TO 24 HOURS

## 2022-12-19 PROCEDURE — 36415 COLL VENOUS BLD VENIPUNCTURE: CPT | Performed by: INTERNAL MEDICINE

## 2022-12-19 PROCEDURE — 25500020 PHARM REV CODE 255: Performed by: INTERNAL MEDICINE

## 2022-12-19 PROCEDURE — 99152 PR MOD CONSCIOUS SEDATION, SAME PHYS, 5+ YRS, FIRST 15 MIN: ICD-10-PCS | Mod: ,,, | Performed by: INTERNAL MEDICINE

## 2022-12-19 PROCEDURE — 25000003 PHARM REV CODE 250: Performed by: INTERNAL MEDICINE

## 2022-12-19 PROCEDURE — 25000242 PHARM REV CODE 250 ALT 637 W/ HCPCS: Performed by: INTERNAL MEDICINE

## 2022-12-19 PROCEDURE — 84484 ASSAY OF TROPONIN QUANT: CPT | Performed by: INTERNAL MEDICINE

## 2022-12-19 PROCEDURE — 99900031 HC PATIENT EDUCATION (STAT)

## 2022-12-19 PROCEDURE — C1769 GUIDE WIRE: HCPCS | Performed by: INTERNAL MEDICINE

## 2022-12-19 PROCEDURE — C1760 CLOSURE DEV, VASC: HCPCS | Performed by: INTERNAL MEDICINE

## 2022-12-19 PROCEDURE — 93459 L HRT ART/GRFT ANGIO: CPT | Mod: 26,,, | Performed by: INTERNAL MEDICINE

## 2022-12-19 DEVICE — ANGIO-SEAL VIP VASCULAR CLOSURE DEVICE
Type: IMPLANTABLE DEVICE | Site: GROIN | Status: FUNCTIONAL
Brand: ANGIO-SEAL

## 2022-12-19 RX ORDER — AMLODIPINE BESYLATE 2.5 MG/1
2.5 TABLET ORAL DAILY
Qty: 30 TABLET | Refills: 0 | Status: SHIPPED | OUTPATIENT
Start: 2022-12-20 | End: 2024-02-20

## 2022-12-19 RX ORDER — MIDAZOLAM HYDROCHLORIDE 1 MG/ML
INJECTION INTRAMUSCULAR; INTRAVENOUS
Status: DISCONTINUED | OUTPATIENT
Start: 2022-12-19 | End: 2022-12-19 | Stop reason: HOSPADM

## 2022-12-19 RX ORDER — NITROGLYCERIN 0.4 MG/1
0.4 TABLET SUBLINGUAL EVERY 5 MIN PRN
Status: DISCONTINUED | OUTPATIENT
Start: 2022-12-19 | End: 2022-12-19 | Stop reason: HOSPADM

## 2022-12-19 RX ORDER — SODIUM CHLORIDE 450 MG/100ML
INJECTION, SOLUTION INTRAVENOUS CONTINUOUS
Status: DISCONTINUED | OUTPATIENT
Start: 2022-12-19 | End: 2022-12-19

## 2022-12-19 RX ORDER — ACETAMINOPHEN 325 MG/1
650 TABLET ORAL EVERY 4 HOURS PRN
Status: DISCONTINUED | OUTPATIENT
Start: 2022-12-19 | End: 2022-12-19 | Stop reason: HOSPADM

## 2022-12-19 RX ORDER — LIDOCAINE HYDROCHLORIDE 10 MG/ML
INJECTION, SOLUTION EPIDURAL; INFILTRATION; INTRACAUDAL; PERINEURAL
Status: DISCONTINUED | OUTPATIENT
Start: 2022-12-19 | End: 2022-12-19 | Stop reason: HOSPADM

## 2022-12-19 RX ADMIN — IPRATROPIUM BROMIDE 0.5 MG: 0.5 SOLUTION RESPIRATORY (INHALATION) at 07:12

## 2022-12-19 RX ADMIN — SODIUM CHLORIDE: 0.9 INJECTION, SOLUTION INTRAVENOUS at 02:12

## 2022-12-19 RX ADMIN — NITROGLYCERIN 0.4 MG: 0.4 TABLET SUBLINGUAL at 08:12

## 2022-12-19 RX ADMIN — MORPHINE SULFATE 2 MG: 2 INJECTION, SOLUTION INTRAMUSCULAR; INTRAVENOUS at 08:12

## 2022-12-19 RX ADMIN — SODIUM CHLORIDE: 0.45 INJECTION, SOLUTION INTRAVENOUS at 11:12

## 2022-12-19 RX ADMIN — BUDESONIDE 0.5 MG: 0.5 INHALANT RESPIRATORY (INHALATION) at 07:12

## 2022-12-19 RX ADMIN — CLOPIDOGREL BISULFATE 75 MG: 75 TABLET, FILM COATED ORAL at 09:12

## 2022-12-19 RX ADMIN — ARFORMOTEROL TARTRATE 15 MCG: 15 SOLUTION RESPIRATORY (INHALATION) at 07:12

## 2022-12-19 RX ADMIN — IPRATROPIUM BROMIDE 0.5 MG: 0.5 SOLUTION RESPIRATORY (INHALATION) at 02:12

## 2022-12-19 RX ADMIN — ASPIRIN 81 MG: 81 TABLET, COATED ORAL at 09:12

## 2022-12-19 RX ADMIN — ONDANSETRON HYDROCHLORIDE 4 MG: 2 INJECTION, SOLUTION INTRAMUSCULAR; INTRAVENOUS at 08:12

## 2022-12-19 NOTE — PROGRESS NOTES
Patient tolerated procedure well.   She may discharged later today after her bedrest is complete and if ok with primary team.  Can DC Plavix  Resume anticoagulation tomorrow.   Follow up in office with Dr. Cowan in 1-2 weeks time.    See full cath report for details.

## 2022-12-19 NOTE — PLAN OF CARE
Problem: Adult Inpatient Plan of Care  Goal: Plan of Care Review  Outcome: Met  Goal: Patient-Specific Goal (Individualized)  Outcome: Met  Goal: Absence of Hospital-Acquired Illness or Injury  Outcome: Met  Goal: Optimal Comfort and Wellbeing  Outcome: Met  Goal: Readiness for Transition of Care  Outcome: Met     Problem: Fall Injury Risk  Goal: Absence of Fall and Fall-Related Injury  Outcome: Met     Problem: Infection  Goal: Absence of Infection Signs and Symptoms  Outcome: Met

## 2022-12-19 NOTE — NURSING
Patient discharged per orders, all medications reviewed with patient.  Patient verbalized understanding.  Pharmacy delivered patients medications to bedside.  IV discontinued x 2, Tele box discontinued x 1.  All belongings with patient.  Patient transferred to wheelchair x 1.

## 2022-12-19 NOTE — CARE UPDATE
12/18/22 1951   Patient Assessment/Suction   Level of Consciousness (AVPU) alert   Respiratory Effort Normal;Unlabored   Expansion/Accessory Muscles/Retractions no use of accessory muscles   All Lung Fields Breath Sounds Anterior:;clear;diminished   Rhythm/Pattern, Respiratory unlabored   Cough Frequency infrequent   PRE-TX-O2   Device (Oxygen Therapy) room air   SpO2 (!) 92 %   Pulse Oximetry Type Intermittent   $ Pulse Oximetry - Multiple Charge Pulse Oximetry - Multiple   Pulse 70   Resp 18   Aerosol Therapy   $ Aerosol Therapy Charges Aerosol Treatment   Daily Review of Necessity (SVN) completed   Respiratory Treatment Status (SVN) given   Treatment Route (SVN) mask;oxygen   Patient Position (SVN) HOB elevated   Post Treatment Assessment (SVN) increased aeration   Signs of Intolerance (SVN) none   Breath Sounds Post-Respiratory Treatment   Post-treatment Heart Rate (beats/min) 78   Post-treatment Resp Rate (breaths/min) 18   Education   $ Education Bronchodilator;15 min   Respiratory Evaluation   $ Care Plan Tech Time 15 min

## 2022-12-19 NOTE — INTERVAL H&P NOTE
The patient has been examined and the H&P has been reviewed:    I concur with the findings and no changes have occurred since H&P was written.    Procedure risks, benefits and alternative options discussed and understood by patient/family.          Active Hospital Problems    Diagnosis  POA    *Chest pain concerning for angina [R07.9]  Yes    Severe obesity (BMI 35.0-39.9) with comorbidity [E66.01]  Yes    Numbness and tingling of left arm and leg [R20.0, R20.2]  Yes    Gastroesophageal reflux disease [K21.9]  Yes    S/P PTCA (percutaneous transluminal coronary angioplasty) [Z98.61]  Not Applicable    S/P CABG (coronary artery bypass graft) [Z95.1]  Not Applicable    AICD (automatic cardioverter/defibrillator) present [Z95.810]  Yes     Checked in clinic  No firing reported      Paroxysmal atrial fibrillation [I48.0]  Yes     On Toprol and Xarelto, continue as now.  Xarelto refilled      PAD (peripheral artery disease) [I73.9]  Yes    Chronic bronchitis [J42]  Yes    Anxiety and depression [F41.9, F32.A]  Yes      Resolved Hospital Problems    Diagnosis Date Resolved POA    Shortness of breath [R06.02] 12/17/2022 Yes

## 2022-12-19 NOTE — CARE UPDATE
12/19/22 0742   Patient Assessment/Suction   Level of Consciousness (AVPU) alert   Respiratory Effort Normal;Unlabored   Expansion/Accessory Muscles/Retractions expansion symmetric   All Lung Fields Breath Sounds clear;diminished   Rhythm/Pattern, Respiratory unlabored   PRE-TX-O2   Device (Oxygen Therapy) room air  (RA at rest 88-93%. will place back on NC after breathing treatment)   $ Is the patient on Low Flow Oxygen? Yes   Flow (L/min) 1.5   SpO2 (!) 93 %   Pulse Oximetry Type Intermittent   $ Pulse Oximetry - Multiple Charge Pulse Oximetry - Multiple   Pulse 70   Resp 20   Aerosol Therapy   $ Aerosol Therapy Charges Aerosol Treatment   Daily Review of Necessity (SVN) completed   Respiratory Treatment Status (SVN) given   Treatment Route (SVN) mask   Patient Position (SVN) semi-Draper's   Post Treatment Assessment (SVN) increased aeration   Signs of Intolerance (SVN) none   Breath Sounds Post-Respiratory Treatment   Throughout All Fields Post-Treatment aeration increased   Post-treatment Heart Rate (beats/min) 70   Post-treatment Resp Rate (breaths/min) 20   Education   $ Education Bronchodilator;DME Oxygen;15 min   Respiratory Evaluation   $ Care Plan Tech Time 15 min

## 2022-12-19 NOTE — PLAN OF CARE
Patient cleared for discharge from case management standpoint.    Follow up appointments scheduled and added to AVS.    Chart and discharge orders reviewed.  Patient discharged home with no further case management needs.         12/19/22 1651   Final Note   Assessment Type Final Discharge Note   Anticipated Discharge Disposition Home   Hospital Resources/Appts/Education Provided Provided patient/caregiver with written discharge plan information;Appointments scheduled and added to AVS

## 2022-12-20 ENCOUNTER — TELEPHONE (OUTPATIENT)
Dept: CARDIOLOGY | Facility: CLINIC | Age: 67
End: 2022-12-20
Payer: MEDICARE

## 2022-12-20 NOTE — TELEPHONE ENCOUNTER
----- Message from Rowdy Atwood sent at 12/19/2022  3:43 PM CST -----  Type:  Sooner Appointment Request    Caller is requesting a sooner appointment.  Caller declined first available appointment listed below.  Caller will not accept being placed on the waitlist and is requesting a message be sent to doctor.    Name of Caller:  [facility]  When is the first available appointment?     Symptoms:  Hosp F/u  2 wks discharge 12/19  Best Call Back Number:  [phone]   Additional Information:  Please advise -- Thank you

## 2022-12-20 NOTE — DISCHARGE SUMMARY
[facility] Medicine  Discharge Summary      Patient Name: Stephania Salmeron  MRN: [id number]  BERNA: [id number]  Patient Class: IP- Inpatient  Admission Date: 12/16/2022  Hospital Length of Stay: 3 days  Discharge Date and Time: 12/19/2022  6:00 PM  Attending Physician: No att. providers found   Discharging Provider: Emily Garza MD  Primary Care Provider: Sinan Saeed MD    Primary Care Team: Networked reference to record PCT     HPI:   Ms. Salmeron is a 67-year-old female transferred to [facility] from [facility] for further evaluation due to capacity needs.  Patient reports 1 week history of intermittent chest pain associated with shortness of breath.  States chest pain was midsternal, radiating up into the left jaw, some onset with lying on her side/position changes, traveling numbness sensation down her left arm.  Also reported shortness of breath, cough, nonproductive, left scapula pain/discomfort.  Remote history of CABG followed by PCI, in the past was followed by Cardiology at [facility] but not in the last 2-3 years.  Reports nitroglycerin did not help.  Does report nausea, few days ago 3 episodes of nonbloody emesis which self-resolved.  Known history of COPD with chronic bronchitis, nonsmoker, not on home oxygen.  Denies any fever, chills, constipation, diarrhea, leg swelling.  At outside hospital WBC 6.8, hemoglobin 12.3, BUN/creatinine 19/0.9, troponin by 2 negative, , chest x-ray with basal findings.  Chart review echocardiogram November 2022 EF of 60%, nuclear stress test August 2021 no reversible ischemia with EF of 63%.  Plan of care discussed with patient.  No visitors at bedside.  Communicated with nursing.      Procedure(s) (LRB):  ANGIOGRAM, CORONARY, INCLUDING BYPASS GRAFT, WITH LEFT HEART CATHETERIZATION (N/A)      Hospital Course:   Patient was admitted for chest pain concerning for angina in the setting of known history of prior CAD status post CABG and PCI.  ACS  was ruled out.  Nuclear stress test with concern for subtle area of diminished tracer accumulation lateral wall, EF of 74%.  Seen by Cardiology and planning left heart catheterization on 12/19/22.  Xarelto held, placed on therapeutic dose Lovenox, amlodipine 2.5 mg added for antianginal.  On 12/19 underwent left heart catheterization, report as outlined below, no intervention performed.  Cleared by Cardiology for discharge with outpatient follow-up and appears medically stable for discharge.  Patient states she will follow-up with local Cardiology at [place], contact details provided.  Discharge plan including medication, follow-up as well as return precautions were reviewed with the patient, she expressed understanding, no questions or concerns.  Discussed with Cardiology and nursing on day of discharge.      Discharge examination   Lying in bed, alert, oriented, on room air, right groin angio access site with clean dry intact dressing, soft     Left heart catheterization report    The estimated blood loss was <50 mL.    The pre-procedure left ventricular end diastolic pressure was 11.    Known occluded vein graft to OM and diagonal    Patent LIMA to LAD with patent ostial proximal stent in the LIMA with moderate nonobstructive InStent restenoses    Patent stent from left main into left circumflex with mild InStent restenosis, nonobstructive    Mild disease in the codominant right coronary artery       Goals of Care Treatment Preferences:  Code Status: Full Code      Consults:   Consults (From admission, onward)        Status Ordering Provider     Inpatient consult to Midline team  Once        Provider:  (Not yet assigned)    Acknowledged FILIBERTO RUIZ     Inpatient consult to Cardiology  Once        Provider:  Uri Guzman MD    Completed CELINA GROVE          No new Assessment & Plan notes have been filed under this hospital service since the last note was generated.  Service: [facility]  Medicine    Final Active Diagnoses:    Diagnosis Date Noted POA    PRINCIPAL PROBLEM:  Chest pain concerning for angina [R07.9] 08/16/2021 Yes    Severe obesity (BMI 35.0-39.9) with comorbidity [E66.01] 11/08/2022 Yes    Numbness and tingling of left arm and leg [R20.0, R20.2] 06/02/2022 Yes    Gastroesophageal reflux disease [K21.9] 08/23/2021 Yes    S/P PTCA (percutaneous transluminal coronary angioplasty) [Z98.61] 03/22/2021 Not Applicable    S/P CABG (coronary artery bypass graft) [Z95.1] 03/22/2021 Not Applicable    AICD (automatic cardioverter/defibrillator) present [Z95.810] 03/22/2021 Yes    Paroxysmal atrial fibrillation [I48.0] 03/22/2021 Yes    PAD (peripheral artery disease) [I73.9] 03/22/2021 Yes    Chronic bronchitis [J42]  Yes    Anxiety and depression [F41.9, F32.A] 02/13/2021 Yes      Problems Resolved During this Admission:    Diagnosis Date Noted Date Resolved POA    Shortness of breath [R06.02] 08/16/2021 12/17/2022 Yes       Discharged Condition: good    Disposition: Home or Self Care    Follow Up:   Follow-up Information     Sinan Saeed MD. Go on 12/30/2022.    Specialty: Family Medicine  Why: first avail appoint 11 am  Contact information:  [address]  [address]  [place] [address] [phone]             Asif Cowan MD. Schedule an appointment as soon as possible for a visit in 2 week(s).    Specialty: Cardiology  Why: follow up    Office will call patient with appointment details.  Contact information:  [address]  [address]  [place] [address] [phone]                       Patient Instructions:      Ambulatory referral/consult to Outpatient Case Management   Referral Priority: Routine Referral Type: Consultation   Referral Reason: Specialty Services Required   Number of Visits Requested: 1     Diet Cardiac     Notify your health care provider if you experience any of the following:  temperature >100.4     Notify your health care  provider if you experience any of the following:  severe uncontrolled pain     Notify your health care provider if you experience any of the following:  redness, tenderness, or signs of infection (pain, swelling, redness, odor or green/yellow discharge around incision site)     Activity as tolerated       Significant Diagnostic Studies: Labs:   BMP:   Recent Labs   Lab 12/19/22  0358         K 3.9      CO2 27   BUN 21   CREATININE 0.8   CALCIUM 8.6*   MG 1.9   , CMP   Recent Labs   Lab 12/19/22  0358      K 3.9      CO2 27      BUN 21   CREATININE 0.8   CALCIUM 8.6*   ANIONGAP 5*   , CBC   Recent Labs   Lab 12/19/22 0358   WBC 4.63   HGB 11.7*   HCT 38.5      , INR   Lab Results   Component Value Date    INR 1.1 12/19/2022    INR 1.0 12/15/2022   , Lipid Panel   Lab Results   Component Value Date    CHOL 181 02/28/2022    HDL 47 02/28/2022    LDLCALC 106.2 02/28/2022    TRIG 139 02/28/2022    CHOLHDL 26.0 02/28/2022   , Troponin   Recent Labs   Lab 12/15/22  2248 12/16/22  0210   TROPONINI 0.007 <0.006    and A1C: No results for input(s): HGBA1C in the last 4320 hours.    Pending Diagnostic Studies:     Procedure Component Value Units Date/Time    EKG 12-lead [[id number]] Collected: 12/19/22 0000    Order Status: Sent Lab Status: In process Updated: 12/19/22 0506    Narrative:      Test Reason : R07.9,    Vent. Rate : 070 BPM     Atrial Rate : 070 BPM     P-R Int : 202 ms          QRS Dur : 088 ms      QT Int : 428 ms       P-R-T Axes : 029 049 074 degrees     QTc Int : 462 ms    Atrial-paced rhythm  Low voltage QRS  Abnormal ECG  When compared with ECG of 18-DEC-2022 09:36,  Criteria for Septal infarct are no longer Present  QT has shortened    Referred By: RACHEL HERMAN           Confirmed By:        X-Ray Chest AP Portable    Result Date: 12/19/2022  EXAMINATION: XR CHEST AP PORTABLE CLINICAL HISTORY: dyspnea; FINDINGS: Portable chest at 624 compared with 12/15/2022  shows normal cardiomediastinal silhouette with postsurgical changes of CABG. Left transvenous ICD unchanged. Lungs are clear. Pulmonary vasculature is normal. No acute osseous abnormality.     No acute cardiopulmonary abnormality. Electronically signed by: Servando Calvillo MD Date:    12/19/2022 Time:    07:45    X-Ray Chest AP Portable    Result Date: 12/15/2022  EXAMINATION: XR CHEST AP PORTABLE CLINICAL HISTORY: chest pain; TECHNIQUE: Single frontal view of the chest was performed. COMPARISON: Chest radiograph June 2, 2022 FINDINGS: Single portable chest view is submitted.  Cardiac pacemaker and postoperative change noted.  The appearance of the cardiomediastinal silhouette is stable.  Aortic atherosclerotic change noted. Mild accentuated parenchymal change at the lung bases bilaterally may relate to atelectasis and or mild superimposed infiltrate without dense consolidation. There is no evidence for pleural effusion or pneumothorax.  The osseous structures demonstrate chronic change.     Mild basilar parenchymal change may relate to atelectasis and or mild infiltrate without dense consolidation. Electronically signed by: Sergio Sandoval Date:    12/15/2022 Time:    23:25    Cardiac catheterization    Result Date: 12/19/2022    The estimated blood loss was <50 mL.   The pre-procedure left ventricular end diastolic pressure was 11.   Known occluded vein graft to OM and diagonal   Patent LIMA to LAD with patent ostial proximal stent in the LIMA with moderate nonobstructive InStent restenoses   Patent stent from left main into left circumflex with mild InStent restenosis, nonobstructive   Mild disease in the codominant right coronary artery The procedure log was documented by Documenter: Tiffany Villatoro RN; Kathi LEWIS RN and verified by Franklin Weems MD. Date: 12/19/2022  Time: 1:37 PM     Nuclear Stress Test    Result Date: 12/16/2022    The ECG portion of the study is negative for ischemia.   The patient  reported no chest pain during the stress test.   There were no arrhythmias during stress.   The nuclear portion of this study will be reported separately.     NM Myocardial Perfusion Spect Multi Pharmacologic    Result Date: 12/16/2022  Myocardial Perfusion Imaging with SPECT Gated acquisition and Ejection Fraction single day protocol CLINICAL INFORMATION:  Chest pain. PROCEDURE: Lexiscan is utilized as a pharmacologic stress agent. At peak stress, 27.4 millicuries of technetium Myoview were administered intravenously.  The rest portion of the study is accomplished on the same day, utilizing 10.6 millicuries of technetium Myoview intravenously. FINDINGS: There is diminished tracer activity at the level of the ventricular apex, matched on stress and rest imaging. There is subtly diminished tracer accumulation within the lateral wall near the cardiac base, more pronounced on the stress examination. The distribution of activity appears otherwise unremarkable. The chamber size is within normal limits.  There are no wall motion abnormalities and the estimated ejection fraction is 74%. IMPRESSION: SUBTLE AREA OF DIMINISHED TRACER ACCUMULATION WITHIN THE LATERAL WALL NEAR THE CARDIAC BASE ON STRESS AS COMPARED TO REST IMAGING. DIMINISHED ACCUMULATION AT THE VENTRICULAR APEX, MATCHED. 2.  ESTIMATED EJECTION FRACTION OF 74%. Electronically signed by:  Nallely Rivera MD  12/16/2022 1:24 PM CST Workstation: [id number]    Medications:  Reconciled Home Medications:      Medication List      START taking these medications    amLODIPine 2.5 MG tablet  Commonly known as: NORVASC  Take 1 tablet (2.5 mg total) by mouth once daily.  Start taking on: December 20, 2022        CHANGE how you take these medications    SYMBICORT 160-4.5 mcg/actuation Hfaa  Generic drug: budesonide-formoterol 160-4.5 mcg  INHALE 2 PUFFS INTO THE LUNGS EVERY 12 (TWELVE) HOURS. CONTROLLER  What changed:   · how much to take  · how to take this  · when to take  this  · additional instructions        CONTINUE taking these medications    albuterol 90 mcg/actuation inhaler  Commonly known as: PROVENTIL/VENTOLIN HFA  INHALE 2 PUFFS INTO THE LUNGS EVERY 6 (SIX) HOURS AS NEEDED FOR WHEEZING. RESCUE     aspirin 81 MG EC tablet  Commonly known as: ECOTRIN  Take 81 mg by mouth once daily.     atorvastatin 40 MG tablet  Commonly known as: LIPITOR  TAKE 1 TABLET BY MOUTH EVERY DAY     buPROPion 150 MG TB24 tablet  Commonly known as: WELLBUTRIN XL  Take 450 mg by mouth once daily.     docusate sodium 100 MG capsule  Commonly known as: COLACE  TAKE 1 CAPSULE (100 MG TOTAL) BY MOUTH 2 (TWO) TIMES DAILY AS NEEDED FOR CONSTIPATION.     ergocalciferol 50,000 unit Cap  Commonly known as: ERGOCALCIFEROL  TAKE 1 CAPSULE BY MOUTH ONE TIME PER WEEK     esomeprazole 40 MG capsule  Commonly known as: NEXIUM  TAKE 1 CAPSULE (40 MG TOTAL) BY MOUTH BEFORE BREAKFAST.     LORazepam 1 MG tablet  Commonly known as: ATIVAN  Take 1 mg by mouth 3 (three) times daily.     metoprolol succinate 25 MG 24 hr tablet  Commonly known as: TOPROL-XL  TAKE 1 TABLET BY MOUTH TWICE A DAY     pregabalin 100 MG capsule  Commonly known as: LYRICA  Take 100 mg by mouth 3 (three) times daily as needed.     promethazine 12.5 MG Tab  Commonly known as: PHENERGAN  Take 1 tablet (12.5 mg total) by mouth every 8 (eight) hours as needed (nausea).     QUEtiapine 200 MG Tab  Commonly known as: SEROQUEL  Take 200 mg by mouth every evening.     SPIRIVA RESPIMAT 1.25 mcg/actuation inhaler  Generic drug: tiotropium bromide  INHALE 2 PUFFS INTO THE LUNGS ONCE DAILY. CONTROLLER     XARELTO 20 mg Tab  Generic drug: rivaroxaban  TAKE 1 TABLET (20 MG TOTAL) BY MOUTH DAILY WITH DINNER OR EVENING MEAL.        STOP taking these medications    nitrofurantoin (macrocrystal-monohydrate) 100 MG capsule  Commonly known as: MACROBID            Indwelling Lines/Drains at time of discharge:   Lines/Drains/Airways     None                 Time spent on  the discharge of patient: 32 minutes         Emily Garza MD  Department of Hospital Medicine  [facility]   1-2 drinks

## 2022-12-27 ENCOUNTER — OUTPATIENT CASE MANAGEMENT (OUTPATIENT)
Dept: ADMINISTRATIVE | Facility: OTHER | Age: 67
End: 2022-12-27
Payer: MEDICARE

## 2022-12-27 NOTE — LETTER
January 4, 2023    Stephania Salmeron  [address] [place] [address] [place] [address]             [facility] [address] [place] [address] Dear Ms Salmeron:    I am a Nurse  with [facility] program. The program is free of charge and is made up of Nurses and Social Workers who help connect to resources and education that can help them in managing their health.    We received a referral through the [facility] system and in coordination with Dr Sinan Saeed, Primary Care Physician to offer assistance, if needed. I have attempted to reach you by phone and will attempt to reach you two more time in the next week.     If you received this letter and have questions or concerns, please don't hesitate to call. I will be glad to speak with you.      Sincerely,        Dottie Galan RN, CCM [facility]  [email or web address]  [phone]

## 2022-12-28 ENCOUNTER — TELEPHONE (OUTPATIENT)
Dept: PRIMARY CARE CLINIC | Facility: CLINIC | Age: 67
End: 2022-12-28
Payer: MEDICARE

## 2022-12-28 NOTE — TELEPHONE ENCOUNTER
----- Message from Bev Zamarripa sent at 12/28/2022  1:18 PM CST -----  Contact: [phone]  Pt states she had a ramon from the office yesterday in regards to some test results please give return call

## 2023-01-04 ENCOUNTER — OFFICE VISIT (OUTPATIENT)
Dept: PRIMARY CARE CLINIC | Facility: CLINIC | Age: 68
End: 2023-01-04
Payer: MEDICARE

## 2023-01-04 VITALS
HEIGHT: 68 IN | RESPIRATION RATE: 16 BRPM | BODY MASS INDEX: 33.91 KG/M2 | OXYGEN SATURATION: 94 % | DIASTOLIC BLOOD PRESSURE: 66 MMHG | WEIGHT: 223.75 LBS | HEART RATE: 69 BPM | TEMPERATURE: 97 F | SYSTOLIC BLOOD PRESSURE: 120 MMHG

## 2023-01-04 DIAGNOSIS — Q39.4 ESOPHAGEAL WEB: ICD-10-CM

## 2023-01-04 DIAGNOSIS — H81.4 VERTIGO OF CENTRAL ORIGIN: ICD-10-CM

## 2023-01-04 DIAGNOSIS — I48.0 PAROXYSMAL ATRIAL FIBRILLATION: ICD-10-CM

## 2023-01-04 DIAGNOSIS — E66.01 SEVERE OBESITY (BMI 35.0-39.9) WITH COMORBIDITY: ICD-10-CM

## 2023-01-04 DIAGNOSIS — F33.9 RECURRENT MAJOR DEPRESSIVE DISORDER, REMISSION STATUS UNSPECIFIED: ICD-10-CM

## 2023-01-04 DIAGNOSIS — K59.00 CONSTIPATION, UNSPECIFIED CONSTIPATION TYPE: ICD-10-CM

## 2023-01-04 DIAGNOSIS — I73.9 PAD (PERIPHERAL ARTERY DISEASE): ICD-10-CM

## 2023-01-04 DIAGNOSIS — I25.118 CORONARY ARTERY DISEASE OF NATIVE ARTERY OF NATIVE HEART WITH STABLE ANGINA PECTORIS: ICD-10-CM

## 2023-01-04 DIAGNOSIS — J44.9 CHRONIC OBSTRUCTIVE PULMONARY DISEASE, UNSPECIFIED COPD TYPE: ICD-10-CM

## 2023-01-04 DIAGNOSIS — R11.0 NAUSEA: ICD-10-CM

## 2023-01-04 DIAGNOSIS — K21.9 GASTROESOPHAGEAL REFLUX DISEASE, UNSPECIFIED WHETHER ESOPHAGITIS PRESENT: Primary | ICD-10-CM

## 2023-01-04 DIAGNOSIS — R09.89 CHOKING IN ADULT: ICD-10-CM

## 2023-01-04 PROBLEM — N18.31 CHRONIC KIDNEY DISEASE, STAGE 3A: Status: RESOLVED | Noted: 2022-03-29 | Resolved: 2023-01-04

## 2023-01-04 PROCEDURE — 3078F PR MOST RECENT DIASTOLIC BLOOD PRESSURE < 80 MM HG: ICD-10-PCS | Mod: HCNC,CPTII,S$GLB, | Performed by: STUDENT IN AN ORGANIZED HEALTH CARE EDUCATION/TRAINING PROGRAM

## 2023-01-04 PROCEDURE — 1159F MED LIST DOCD IN RCRD: CPT | Mod: HCNC,CPTII,S$GLB, | Performed by: STUDENT IN AN ORGANIZED HEALTH CARE EDUCATION/TRAINING PROGRAM

## 2023-01-04 PROCEDURE — 3074F PR MOST RECENT SYSTOLIC BLOOD PRESSURE < 130 MM HG: ICD-10-PCS | Mod: HCNC,CPTII,S$GLB, | Performed by: STUDENT IN AN ORGANIZED HEALTH CARE EDUCATION/TRAINING PROGRAM

## 2023-01-04 PROCEDURE — 1159F PR MEDICATION LIST DOCUMENTED IN MEDICAL RECORD: ICD-10-PCS | Mod: HCNC,CPTII,S$GLB, | Performed by: STUDENT IN AN ORGANIZED HEALTH CARE EDUCATION/TRAINING PROGRAM

## 2023-01-04 PROCEDURE — 99214 OFFICE O/P EST MOD 30 MIN: CPT | Mod: HCNC,S$GLB,, | Performed by: STUDENT IN AN ORGANIZED HEALTH CARE EDUCATION/TRAINING PROGRAM

## 2023-01-04 PROCEDURE — 99999 PR PBB SHADOW E&M-EST. PATIENT-LVL V: CPT | Mod: PBBFAC,HCNC,, | Performed by: STUDENT IN AN ORGANIZED HEALTH CARE EDUCATION/TRAINING PROGRAM

## 2023-01-04 PROCEDURE — 1111F PR DISCHARGE MEDS RECONCILED W/ CURRENT OUTPATIENT MED LIST: ICD-10-PCS | Mod: HCNC,CPTII,S$GLB, | Performed by: STUDENT IN AN ORGANIZED HEALTH CARE EDUCATION/TRAINING PROGRAM

## 2023-01-04 PROCEDURE — 3074F SYST BP LT 130 MM HG: CPT | Mod: HCNC,CPTII,S$GLB, | Performed by: STUDENT IN AN ORGANIZED HEALTH CARE EDUCATION/TRAINING PROGRAM

## 2023-01-04 PROCEDURE — 3008F PR BODY MASS INDEX (BMI) DOCUMENTED: ICD-10-PCS | Mod: HCNC,CPTII,S$GLB, | Performed by: STUDENT IN AN ORGANIZED HEALTH CARE EDUCATION/TRAINING PROGRAM

## 2023-01-04 PROCEDURE — 3008F BODY MASS INDEX DOCD: CPT | Mod: HCNC,CPTII,S$GLB, | Performed by: STUDENT IN AN ORGANIZED HEALTH CARE EDUCATION/TRAINING PROGRAM

## 2023-01-04 PROCEDURE — 3078F DIAST BP <80 MM HG: CPT | Mod: HCNC,CPTII,S$GLB, | Performed by: STUDENT IN AN ORGANIZED HEALTH CARE EDUCATION/TRAINING PROGRAM

## 2023-01-04 PROCEDURE — 3288F PR FALLS RISK ASSESSMENT DOCUMENTED: ICD-10-PCS | Mod: HCNC,CPTII,S$GLB, | Performed by: STUDENT IN AN ORGANIZED HEALTH CARE EDUCATION/TRAINING PROGRAM

## 2023-01-04 PROCEDURE — 1126F AMNT PAIN NOTED NONE PRSNT: CPT | Mod: HCNC,CPTII,S$GLB, | Performed by: STUDENT IN AN ORGANIZED HEALTH CARE EDUCATION/TRAINING PROGRAM

## 2023-01-04 PROCEDURE — 1160F PR REVIEW ALL MEDS BY PRESCRIBER/CLIN PHARMACIST DOCUMENTED: ICD-10-PCS | Mod: HCNC,CPTII,S$GLB, | Performed by: STUDENT IN AN ORGANIZED HEALTH CARE EDUCATION/TRAINING PROGRAM

## 2023-01-04 PROCEDURE — 99214 PR OFFICE/OUTPT VISIT, EST, LEVL IV, 30-39 MIN: ICD-10-PCS | Mod: HCNC,S$GLB,, | Performed by: STUDENT IN AN ORGANIZED HEALTH CARE EDUCATION/TRAINING PROGRAM

## 2023-01-04 PROCEDURE — 1126F PR PAIN SEVERITY QUANTIFIED, NO PAIN PRESENT: ICD-10-PCS | Mod: HCNC,CPTII,S$GLB, | Performed by: STUDENT IN AN ORGANIZED HEALTH CARE EDUCATION/TRAINING PROGRAM

## 2023-01-04 PROCEDURE — 1160F RVW MEDS BY RX/DR IN RCRD: CPT | Mod: HCNC,CPTII,S$GLB, | Performed by: STUDENT IN AN ORGANIZED HEALTH CARE EDUCATION/TRAINING PROGRAM

## 2023-01-04 PROCEDURE — 1111F DSCHRG MED/CURRENT MED MERGE: CPT | Mod: HCNC,CPTII,S$GLB, | Performed by: STUDENT IN AN ORGANIZED HEALTH CARE EDUCATION/TRAINING PROGRAM

## 2023-01-04 PROCEDURE — 1101F PT FALLS ASSESS-DOCD LE1/YR: CPT | Mod: HCNC,CPTII,S$GLB, | Performed by: STUDENT IN AN ORGANIZED HEALTH CARE EDUCATION/TRAINING PROGRAM

## 2023-01-04 PROCEDURE — 99999 PR PBB SHADOW E&M-EST. PATIENT-LVL V: ICD-10-PCS | Mod: PBBFAC,HCNC,, | Performed by: STUDENT IN AN ORGANIZED HEALTH CARE EDUCATION/TRAINING PROGRAM

## 2023-01-04 PROCEDURE — 1101F PR PT FALLS ASSESS DOC 0-1 FALLS W/OUT INJ PAST YR: ICD-10-PCS | Mod: HCNC,CPTII,S$GLB, | Performed by: STUDENT IN AN ORGANIZED HEALTH CARE EDUCATION/TRAINING PROGRAM

## 2023-01-04 PROCEDURE — 3288F FALL RISK ASSESSMENT DOCD: CPT | Mod: HCNC,CPTII,S$GLB, | Performed by: STUDENT IN AN ORGANIZED HEALTH CARE EDUCATION/TRAINING PROGRAM

## 2023-01-04 RX ORDER — MECLIZINE HYDROCHLORIDE 25 MG/1
25 TABLET ORAL 3 TIMES DAILY PRN
Qty: 30 TABLET | Refills: 1 | Status: SHIPPED | OUTPATIENT
Start: 2023-01-04 | End: 2023-02-20

## 2023-01-04 RX ORDER — HYDROXYZINE PAMOATE 25 MG/1
25 CAPSULE ORAL 4 TIMES DAILY PRN
Qty: 120 CAPSULE | Refills: 3 | Status: SHIPPED | OUTPATIENT
Start: 2023-01-04 | End: 2023-06-01

## 2023-01-04 RX ORDER — ESOMEPRAZOLE MAGNESIUM 40 MG/1
40 CAPSULE, DELAYED RELEASE ORAL
Qty: 90 CAPSULE | Refills: 3 | Status: SHIPPED | OUTPATIENT
Start: 2023-01-04 | End: 2024-01-19

## 2023-01-04 RX ORDER — MECLIZINE HYDROCHLORIDE 25 MG/1
25 TABLET ORAL 3 TIMES DAILY PRN
COMMUNITY
End: 2023-01-04 | Stop reason: SDUPTHER

## 2023-01-04 RX ORDER — ALBUTEROL SULFATE 1.25 MG/3ML
1.25 SOLUTION RESPIRATORY (INHALATION) 2 TIMES DAILY PRN
Qty: 75 ML | Refills: 3 | Status: SHIPPED | OUTPATIENT
Start: 2023-01-04 | End: 2024-01-04

## 2023-01-04 RX ORDER — BUDESONIDE AND FORMOTEROL FUMARATE DIHYDRATE 160; 4.5 UG/1; UG/1
2 AEROSOL RESPIRATORY (INHALATION) EVERY 12 HOURS
Qty: 30.6 G | Refills: 5 | Status: SHIPPED | OUTPATIENT
Start: 2023-01-04 | End: 2024-01-19

## 2023-01-04 RX ORDER — DOCUSATE SODIUM 100 MG/1
100 CAPSULE, LIQUID FILLED ORAL 2 TIMES DAILY PRN
Qty: 60 CAPSULE | Refills: 5 | Status: SHIPPED | OUTPATIENT
Start: 2023-01-04

## 2023-01-04 RX ORDER — TIOTROPIUM BROMIDE INHALATION SPRAY 1.56 UG/1
2 SPRAY, METERED RESPIRATORY (INHALATION) DAILY
Qty: 12 G | Refills: 5 | Status: SHIPPED | OUTPATIENT
Start: 2023-01-04

## 2023-01-04 NOTE — PROGRESS NOTES
"Subjective:           Patient ID: Stephania Salmeron is a 67 y.o. female who presents today with a chief complaint of hospital f/u appt.    Chief Complaint:   Hospital Follow Up (Chest pain & SOB)      History of Present Illness:    66yo female presenting for f/u on hospitalization.    States presented because she could not breath and was getting HA.     [facility], but was completely full; so they transferred her to [place].         "Admission Date: 12/16/2022  Hospital Length of Stay: 3 days  Discharge Date and Time: 12/19/2022  6:00 PM  Attending Physician: No att. providers found   Discharging Provider: Emily Garza MD  Primary Care Provider: Sinan Saeed MD     Primary Care Team: Networked reference to record PCT      HPI:   Ms. Salmeron is a 67-year-old female transferred [place] [facility] [place] [facility] due to capacity needs.  Patient reports 1 week history of intermittent chest pain associated with shortness of breath.  States chest pain was midsternal, radiating up into the left jaw, some onset with lying on her side/position changes, traveling numbness sensation down her left arm.  Also reported shortness of breath, cough, nonproductive, left scapula pain/discomfort.  Remote history of CABG followed by PCI, in the past was followed by Cardiology at [facility] but not in the last 2-3 years.  Reports nitroglycerin did not help.  Does report nausea, few days ago 3 episodes of nonbloody emesis which self-resolved.  Known history of COPD with chronic bronchitis, nonsmoker, not on home oxygen.  Denies any fever, chills, constipation, diarrhea, leg swelling.  At outside hospital WBC 6.8, hemoglobin 12.3, BUN/creatinine 19/0.9, troponin by 2 negative, , chest x-ray with basal findings.  Chart review echocardiogram November 2022 EF of 60%, nuclear stress test August 2021 no reversible ischemia with EF of 63%.  Plan of care discussed with patient.  No visitors at bedside.  " "Communicated with nursing.        Procedure(s) (LRB):  ANGIOGRAM, CORONARY, INCLUDING BYPASS GRAFT, WITH LEFT HEART CATHETERIZATION (N/A)       Hospital Course:   Patient was admitted for chest pain concerning for angina in the setting of known history of prior CAD status post CABG and PCI.  ACS was ruled out.  Nuclear stress test with concern for subtle area of diminished tracer accumulation lateral wall, EF of 74%.  Seen by Cardiology and planning left heart catheterization on 12/19/22.  Xarelto held, placed on therapeutic dose Lovenox, amlodipine 2.5 mg added for antianginal.  On 12/19 underwent left heart catheterization, report as outlined below, no intervention performed.  Cleared by Cardiology for discharge with outpatient follow-up and appears medically stable for discharge.  Patient states she will follow-up with local Cardiology at [place], contact details provided.  Discharge plan including medication, follow-up as well as return precautions were reviewed with the patient, she expressed understanding, no questions or concerns.  Discussed with Cardiology and nursing on day of discharge.      Discharge examination   Lying in bed, alert, oriented, on room air, right groin angio access site with clean dry intact dressing, soft      Left heart catheterization report    The estimated blood loss was <50 mL.    The pre-procedure left ventricular end diastolic pressure was 11.    Known occluded vein graft to OM and diagonal    Patent LIMA to LAD with patent ostial proximal stent in the LIMA with moderate nonobstructive InStent restenoses    Patent stent from left main into left circumflex with mild InStent restenosis, nonobstructive    Mild disease in the codominant right coronary artery        Goals of Care Treatment Preferences:  Code Status: Full Code"    Has a f/u appt with Cardiology in 1/25/2022, states that she was advised that she would have f/u with a cardiac specialist to deal with her blocked stents. " "    Has not been having pressure on the chest since discharge, but had had some SOB but does have hx of COPD.    States she used a nebulizer in the hospital and that was most helpful, would like one for home.           Review of Systems   Constitutional:  Negative for activity change and chills.   HENT:  Negative for congestion, ear pain, sinus pressure and sore throat.    Respiratory:  Positive for shortness of breath. Negative for cough.    Cardiovascular:  Negative for chest pain, palpitations and leg swelling.   Gastrointestinal:  Negative for abdominal distention, constipation, diarrhea and nausea.   Neurological:  Positive for headaches.           Objective:        Vitals:    01/04/23 1541   BP: 120/66   BP Location: Right arm   Patient Position: Sitting   BP Method: Medium (Manual)   Pulse: 69   Resp: 16   Temp: 97.1 °F (36.2 °C)   TempSrc: Temporal   SpO2: (!) 94%   Weight: 101.5 kg (223 lb 12.3 oz)   Height: 5' 8" (1.727 m)       Body mass index is 34.02 kg/m².      Physical Exam  Constitutional:       General: She is not in acute distress.     Appearance: Normal appearance. She is not ill-appearing.   HENT:      Head: Normocephalic and atraumatic.      Right Ear: External ear normal.      Left Ear: External ear normal.      Nose: No rhinorrhea.      Mouth/Throat:      Mouth: Mucous membranes are moist.   Eyes:      Extraocular Movements: Extraocular movements intact.      Conjunctiva/sclera: Conjunctivae normal.   Cardiovascular:      Rate and Rhythm: Normal rate.      Pulses: Normal pulses.   Pulmonary:      Effort: Pulmonary effort is normal. No respiratory distress.      Breath sounds: No wheezing.   Musculoskeletal:      Right lower leg: No edema.      Left lower leg: No edema.   Skin:     Capillary Refill: Capillary refill takes less than 2 seconds.      Coloration: Skin is not jaundiced.      Findings: No bruising.   Neurological:      General: No focal deficit present.      Mental Status: She is " alert and oriented to person, place, and time.      Motor: No weakness.      Gait: Gait normal.   Psychiatric:         Mood and Affect: Mood normal.           Lab Results   Component Value Date     12/19/2022    K 3.9 12/19/2022     12/19/2022    CO2 27 12/19/2022    BUN 21 12/19/2022    CREATININE 0.8 12/19/2022    ANIONGAP 5 (L) 12/19/2022     Lab Results   Component Value Date    HGBA1C 5.2 02/13/2021     Lab Results   Component Value Date     (H) 12/15/2022    BNP 44 06/02/2022    BNP 69 02/28/2022       Lab Results   Component Value Date    WBC 4.63 12/19/2022    HGB 11.7 (L) 12/19/2022    HCT 38.5 12/19/2022    HCT 37 06/02/2022     12/19/2022    GRAN 3.8 12/16/2022    GRAN 58.1 12/16/2022     Lab Results   Component Value Date    CHOL 181 02/28/2022    HDL 47 02/28/2022    LDLCALC 106.2 02/28/2022    TRIG 139 02/28/2022          Current Outpatient Medications:     albuterol (PROVENTIL/VENTOLIN HFA) 90 mcg/actuation inhaler, INHALE 2 PUFFS INTO THE LUNGS EVERY 6 (SIX) HOURS AS NEEDED FOR WHEEZING. RESCUE, Disp: 54 g, Rfl: 1    amLODIPine (NORVASC) 2.5 MG tablet, Take 1 tablet (2.5 mg total) by mouth once daily., Disp: 30 tablet, Rfl: 0    aspirin (ECOTRIN) 81 MG EC tablet, Take 81 mg by mouth once daily., Disp: , Rfl:     atorvastatin (LIPITOR) 40 MG tablet, TAKE 1 TABLET BY MOUTH EVERY DAY, Disp: 90 tablet, Rfl: 3    buPROPion (WELLBUTRIN XL) 150 MG TB24 tablet, Take 450 mg by mouth once daily., Disp: , Rfl:     ergocalciferol (ERGOCALCIFEROL) 50,000 unit Cap, TAKE 1 CAPSULE BY MOUTH ONE TIME PER WEEK, Disp: 12 capsule, Rfl: 3    LORazepam (ATIVAN) 1 MG tablet, Take 1 mg by mouth 3 (three) times daily., Disp: , Rfl:     metoprolol succinate (TOPROL-XL) 25 MG 24 hr tablet, TAKE 1 TABLET BY MOUTH TWICE A DAY, Disp: 180 tablet, Rfl: 3    pregabalin (LYRICA) 100 MG capsule, Take 100 mg by mouth 3 (three) times daily as needed., Disp: , Rfl:     QUEtiapine (SEROQUEL) 200 MG Tab, Take 200  mg by mouth every evening., Disp: , Rfl:     XARELTO 20 mg Tab, TAKE 1 TABLET (20 MG TOTAL) BY MOUTH DAILY WITH DINNER OR EVENING MEAL., Disp: 30 tablet, Rfl: 1    albuterol (ACCUNEB) 1.25 mg/3 mL Nebu, Take 3 mLs (1.25 mg total) by nebulization 2 (two) times daily as needed (Shortness of breath). Rescue, Disp: 75 mL, Rfl: 3    budesonide-formoterol 160-4.5 mcg (SYMBICORT) 160-4.5 mcg/actuation HFAA, Inhale 2 puffs into the lungs every 12 (twelve) hours. Controller, Disp: 30.6 g, Rfl: 5    docusate sodium (COLACE) 100 MG capsule, Take 1 capsule (100 mg total) by mouth 2 (two) times daily as needed for Constipation., Disp: 60 capsule, Rfl: 5    esomeprazole (NEXIUM) 40 MG capsule, Take 1 capsule (40 mg total) by mouth before breakfast., Disp: 90 capsule, Rfl: 3    hydrOXYzine pamoate (VISTARIL) 25 MG Cap, Take 1 capsule (25 mg total) by mouth 4 (four) times daily as needed (nausea)., Disp: 120 capsule, Rfl: 3    meclizine (ANTIVERT) 25 mg tablet, Take 1 tablet (25 mg total) by mouth 3 (three) times daily as needed for Dizziness., Disp: 30 tablet, Rfl: 1    tiotropium bromide (SPIRIVA RESPIMAT) 1.25 mcg/actuation inhaler, Inhale 2 puffs into the lungs once daily. Controller, Disp: 12 g, Rfl: 5     Outpatient Encounter Medications as of 1/4/2023   Medication Sig Dispense Refill    albuterol (PROVENTIL/VENTOLIN HFA) 90 mcg/actuation inhaler INHALE 2 PUFFS INTO THE LUNGS EVERY 6 (SIX) HOURS AS NEEDED FOR WHEEZING. RESCUE 54 g 1    amLODIPine (NORVASC) 2.5 MG tablet Take 1 tablet (2.5 mg total) by mouth once daily. 30 tablet 0    aspirin (ECOTRIN) 81 MG EC tablet Take 81 mg by mouth once daily.      atorvastatin (LIPITOR) 40 MG tablet TAKE 1 TABLET BY MOUTH EVERY DAY 90 tablet 3    buPROPion (WELLBUTRIN XL) 150 MG TB24 tablet Take 450 mg by mouth once daily.      ergocalciferol (ERGOCALCIFEROL) 50,000 unit Cap TAKE 1 CAPSULE BY MOUTH ONE TIME PER WEEK 12 capsule 3    LORazepam (ATIVAN) 1 MG tablet Take 1 mg by mouth 3  (three) times daily.      metoprolol succinate (TOPROL-XL) 25 MG 24 hr tablet TAKE 1 TABLET BY MOUTH TWICE A  tablet 3    pregabalin (LYRICA) 100 MG capsule Take 100 mg by mouth 3 (three) times daily as needed.      QUEtiapine (SEROQUEL) 200 MG Tab Take 200 mg by mouth every evening.      XARELTO 20 mg Tab TAKE 1 TABLET (20 MG TOTAL) BY MOUTH DAILY WITH DINNER OR EVENING MEAL. 30 tablet 1    [DISCONTINUED] docusate sodium (COLACE) 100 MG capsule TAKE 1 CAPSULE (100 MG TOTAL) BY MOUTH 2 (TWO) TIMES DAILY AS NEEDED FOR CONSTIPATION. 60 capsule 1    [DISCONTINUED] esomeprazole (NEXIUM) 40 MG capsule TAKE 1 CAPSULE (40 MG TOTAL) BY MOUTH BEFORE BREAKFAST. 90 capsule 3    [DISCONTINUED] meclizine (ANTIVERT) 25 mg tablet Take 25 mg by mouth 3 (three) times daily as needed.      [DISCONTINUED] SPIRIVA RESPIMAT 1.25 mcg/actuation inhaler INHALE 2 PUFFS INTO THE LUNGS ONCE DAILY. CONTROLLER 12 g 2    [DISCONTINUED] SYMBICORT 160-4.5 mcg/actuation HFAA INHALE 2 PUFFS INTO THE LUNGS EVERY 12 (TWELVE) HOURS. CONTROLLER (Patient taking differently: Inhale 1-2 puffs into the lungs 1-2 times per day as needed) 30.6 g 3    albuterol (ACCUNEB) 1.25 mg/3 mL Nebu Take 3 mLs (1.25 mg total) by nebulization 2 (two) times daily as needed (Shortness of breath). Rescue 75 mL 3    budesonide-formoterol 160-4.5 mcg (SYMBICORT) 160-4.5 mcg/actuation HFAA Inhale 2 puffs into the lungs every 12 (twelve) hours. Controller 30.6 g 5    docusate sodium (COLACE) 100 MG capsule Take 1 capsule (100 mg total) by mouth 2 (two) times daily as needed for Constipation. 60 capsule 5    esomeprazole (NEXIUM) 40 MG capsule Take 1 capsule (40 mg total) by mouth before breakfast. 90 capsule 3    hydrOXYzine pamoate (VISTARIL) 25 MG Cap Take 1 capsule (25 mg total) by mouth 4 (four) times daily as needed (nausea). 120 capsule 3    meclizine (ANTIVERT) 25 mg tablet Take 1 tablet (25 mg total) by mouth 3 (three) times daily as needed for Dizziness. 30  tablet 1    tiotropium bromide (SPIRIVA RESPIMAT) 1.25 mcg/actuation inhaler Inhale 2 puffs into the lungs once daily. Controller 12 g 5    [DISCONTINUED] promethazine (PHENERGAN) 12.5 MG Tab Take 1 tablet (12.5 mg total) by mouth every 8 (eight) hours as needed (nausea). 30 tablet 0     No facility-administered encounter medications on file as of 1/4/2023.          Assessment:       1. Gastroesophageal reflux disease, unspecified whether esophagitis present    2. Chronic obstructive pulmonary disease, unspecified COPD type    3. Constipation, unspecified constipation type    4. Nausea    5. Esophageal web    6. Choking in adult    7. Vertigo of central origin    8. Recurrent major depressive disorder, remission status unspecified    9. Severe obesity (BMI 35.0-39.9) with comorbidity    10. Coronary artery disease of native artery of native heart with stable angina pectoris    11. Paroxysmal atrial fibrillation    12. PAD (peripheral artery disease)           Plan:       Gastroesophageal reflux disease, unspecified whether esophagitis present  -     esomeprazole (NEXIUM) 40 MG capsule; Take 1 capsule (40 mg total) by mouth before breakfast.  Dispense: 90 capsule; Refill: 3    Chronic obstructive pulmonary disease, unspecified COPD type  -     budesonide-formoterol 160-4.5 mcg (SYMBICORT) 160-4.5 mcg/actuation HFAA; Inhale 2 puffs into the lungs every 12 (twelve) hours. Controller  Dispense: 30.6 g; Refill: 5  -     tiotropium bromide (SPIRIVA RESPIMAT) 1.25 mcg/actuation inhaler; Inhale 2 puffs into the lungs once daily. Controller  Dispense: 12 g; Refill: 5  -     NEBULIZER FOR HOME USE  -     NEBULIZER KIT (SUPPLIES) FOR HOME USE  -     albuterol (ACCUNEB) 1.25 mg/3 mL Nebu; Take 3 mLs (1.25 mg total) by nebulization 2 (two) times daily as needed (Shortness of breath). Rescue  Dispense: 75 mL; Refill: 3    Constipation, unspecified constipation type  -     docusate sodium (COLACE) 100 MG capsule; Take 1 capsule  (100 mg total) by mouth 2 (two) times daily as needed for Constipation.  Dispense: 60 capsule; Refill: 5    Nausea  -     hydrOXYzine pamoate (VISTARIL) 25 MG Cap; Take 1 capsule (25 mg total) by mouth 4 (four) times daily as needed (nausea).  Dispense: 120 capsule; Refill: 3    Esophageal web  -     Ambulatory referral/consult to Gastroenterology; Future; Expected date: 01/11/2023    Choking in adult  -     Ambulatory referral/consult to Gastroenterology; Future; Expected date: 01/11/2023    Vertigo of central origin  -     meclizine (ANTIVERT) 25 mg tablet; Take 1 tablet (25 mg total) by mouth 3 (three) times daily as needed for Dizziness.  Dispense: 30 tablet; Refill: 1    Recurrent major depressive disorder, remission status unspecified    Severe obesity (BMI 35.0-39.9) with comorbidity    Coronary artery disease of native artery of native heart with stable angina pectoris    Paroxysmal atrial fibrillation    PAD (peripheral artery disease)                   Hospital F/u:    - day from [facility] for chest pain.  Has follow-up with her cardiologist on January 25th.  There were to discuss revision of stents that were found to be partially obstructed in angiogram hospital.    Esophageal web/dysphagia:   - patient having difficulty swallowing.  Filling things getting stuck in her throat.  Barium swallow from 2021 showed esophageal web that has not been addressed.  Referring to GI to discuss treatment.    Vertigo:   - refilling Meclizine.    Nausea:   - uses Vistaril for nausea, advised she can continue to use up to 4 times a day as needed, 25 mg.    Afib/CAD:   -patient has known CAD with blockages.  Monitoring her blood pressure controlling cholesterol.  Has follow-up with Cardiology this month.    Chronic kidney disease:  - notes suggest patient previously had chronic kidney disease, last renal function was normal.  Resolving this issue.      Depression:   - patient taking Seroquel, Wellbutrin and Ativan  "from outside provider.   - filling Vistaril today.   - tolerating well at this time.    Weight Loss:   - Body mass index is 34.02 kg/m².   - Normal weight is BMI 18-25, Overweight 25-30, and Obesity is 30+.   - would recommend weight loss for improved overall health.   - recommended moderate weight change, 1-2lbs per weeks.   - focus on eating a healthy sustainable diet.  Use food diary.   - consider gabriela such as "Lose It" or "Noom".   - avoid empty calories that you may use daily from items such as like soda, sweet tea, sugary coffee, ice cream or candy.  An occasional piece of birthday cake is not the cause of obesity, but a daily Frappaccino could be to blame.    - Exercise has many benefits (heart health, improved mood/energy, higher self esteem, less depression, greater strength/flexibility, better sleep, less stress/anxiety, improved immune system, stronger bones, improved cognition, fewer colds/asthma exacerbations), it also does help lose weight.  But weight loss from exercise is much less impactful than when a change in diet can achieve.  Exercise is highly encouraged, but diet change should be the primary tool used to lose weight.     "

## 2023-01-04 NOTE — PATIENT INSTRUCTIONS
"  Hospital F/u:    - day from [facility] for chest pain.  Has follow-up with her cardiologist on January 25th.  There were to discuss revision of stents that were found to be partially obstructed in angiogram hospital.    Esophageal web/dysphagia:   - patient having difficulty swallowing.  Filling things getting stuck in her throat.  Barium swallow from 2021 showed esophageal web that has not been addressed.  Referring to GI to discuss treatment.    Vertigo:   - refilling Meclizine.    Nausea:   - uses Vistaril for nausea, advised she can continue to use up to 4 times a day as needed, 25 mg.    Afib/CAD:   -patient has known CAD with blockages.  Monitoring her blood pressure controlling cholesterol.  Has follow-up with Cardiology this month.    Chronic kidney disease:  - notes suggest patient previously had chronic kidney disease, last renal function was normal.  Resolving this issue.      Depression:   - patient taking Seroquel, Wellbutrin and Ativan from outside provider.   - filling Vistaril today.   - tolerating well at this time.    Weight Loss:   - Body mass index is 34.02 kg/m².   - Normal weight is BMI 18-25, Overweight 25-30, and Obesity is 30+.   - would recommend weight loss for improved overall health.   - recommended moderate weight change, 1-2lbs per weeks.   - focus on eating a healthy sustainable diet.  Use food diary.   - consider gabriela such as "Lose It" or "Noom".   - avoid empty calories that you may use daily from items such as like soda, sweet tea, sugary coffee, ice cream or candy.  An occasional piece of birthday cake is not the cause of obesity, but a daily Frappaccino could be to blame.    - Exercise has many benefits (heart health, improved mood/energy, higher self esteem, less depression, greater strength/flexibility, better sleep, less stress/anxiety, improved immune system, stronger bones, improved cognition, fewer colds/asthma exacerbations), it also does help lose weight.  But " weight loss from exercise is much less impactful than when a change in diet can achieve.  Exercise is highly encouraged, but diet change should be the primary tool used to lose weight.

## 2023-01-10 ENCOUNTER — TELEPHONE (OUTPATIENT)
Dept: PRIMARY CARE CLINIC | Facility: CLINIC | Age: 68
End: 2023-01-10
Payer: MEDICARE

## 2023-01-10 NOTE — PROGRESS NOTES
Outpatient Care Management  Patient Does Not Consent    Patient: Stephania Salmeron  MRN:  [id number]  Date of Service:  1/10/2023  Completed by:  Sayra Galan RN    Chief Complaint   Patient presents with    OPCM Chart Review     12/27/2022    OPCM RN First Assessment Attempt     1/3/2023    OPCM RN Second Assessment Attempt     1/4/2023 - letter sent    OPCM RN Third Assessment Attempt     1/9/2023    Case Closure     Last attempt to reach patient, left another message.       Patient Summary     Program:  OPCM High Risk     Consent Received:  Decline      Unable to reach patient after multiple attempts with messages left and letter sent to home. No return phone call to the messages left, CM will close case.

## 2023-01-10 NOTE — TELEPHONE ENCOUNTER
----- Message from Laurie Baron sent at 1/10/2023  4:51 PM CST -----  Contact: [phone]  Pt is returning a call she just missed. Please call back she is waiting by the phone.              Thank you

## 2023-01-10 NOTE — TELEPHONE ENCOUNTER
----- Message from Janay Hung sent at 1/10/2023  3:08 PM CST -----  Contact: [phone]  Requesting an RX refill or new RX.  Is this a refill or new RX: Refill 1  RX name and strength (copy/paste from chart):  Mask nebulizer  Is this a 30 day or 90 day RX:   Pharmacy name and phone # (copy/paste from chart):  [facility] on  [address] [facility]  The doctors have asked that we provide their patients with the following 2 reminders -- prescription refills can take up to 72 hours, and a friendly reminder that in the future you can use your [facility] account to request refills:       Patient said that the medication was already sent but the mask was not call in. Patient stated that she want the mask not just the little two tubes. Please call and advise. Thank you

## 2023-02-24 ENCOUNTER — TELEPHONE (OUTPATIENT)
Dept: PULMONOLOGY | Facility: CLINIC | Age: 68
End: 2023-02-24
Payer: MEDICARE

## 2023-02-24 NOTE — TELEPHONE ENCOUNTER
----- Message from Sarah Alicia sent at 2/24/2023  2:28 PM CST -----  Type:  Sooner Apoointment Request    Caller is requesting a sooner appointment.  Caller declined first available appointment listed below.  Caller will not accept being placed on the waitlist and is requesting a message be sent to doctor.  Name of Caller:pt   When is the first available appointment?none   Symptoms:copd est care   Would the patient rather a call back or a response via [facility]? call  Best Call Back Number:[phone]  Additional Information: pt states she was told to see provider and would like available appt for Monday or Tuesday

## 2023-03-20 ENCOUNTER — OFFICE VISIT (OUTPATIENT)
Dept: GASTROENTEROLOGY | Facility: CLINIC | Age: 68
End: 2023-03-20
Payer: MEDICARE

## 2023-03-20 ENCOUNTER — TELEPHONE (OUTPATIENT)
Dept: GASTROENTEROLOGY | Facility: CLINIC | Age: 68
End: 2023-03-20

## 2023-03-20 VITALS
BODY MASS INDEX: 32.91 KG/M2 | HEIGHT: 68 IN | WEIGHT: 217.13 LBS | HEART RATE: 69 BPM | SYSTOLIC BLOOD PRESSURE: 130 MMHG | DIASTOLIC BLOOD PRESSURE: 76 MMHG

## 2023-03-20 DIAGNOSIS — K21.9 GASTROESOPHAGEAL REFLUX DISEASE, UNSPECIFIED WHETHER ESOPHAGITIS PRESENT: ICD-10-CM

## 2023-03-20 DIAGNOSIS — R13.10 DYSPHAGIA, UNSPECIFIED TYPE: Primary | ICD-10-CM

## 2023-03-20 DIAGNOSIS — Z87.898 HISTORY OF VERTIGO: ICD-10-CM

## 2023-03-20 DIAGNOSIS — Z86.010 HISTORY OF COLON POLYPS: ICD-10-CM

## 2023-03-20 DIAGNOSIS — R09.89 CHOKING SENSATION: ICD-10-CM

## 2023-03-20 DIAGNOSIS — K59.00 CONSTIPATION, UNSPECIFIED CONSTIPATION TYPE: ICD-10-CM

## 2023-03-20 DIAGNOSIS — I47.29 NONSUSTAINED VENTRICULAR TACHYCARDIA: Primary | ICD-10-CM

## 2023-03-20 DIAGNOSIS — Q39.4 ESOPHAGEAL WEB: ICD-10-CM

## 2023-03-20 DIAGNOSIS — R09.A2 GLOBUS SENSATION: ICD-10-CM

## 2023-03-20 DIAGNOSIS — R14.2 BELCHING: ICD-10-CM

## 2023-03-20 DIAGNOSIS — K22.4 ESOPHAGEAL DYSMOTILITY: ICD-10-CM

## 2023-03-20 DIAGNOSIS — Z79.899 LONG-TERM CURRENT USE OF PROTON PUMP INHIBITOR THERAPY: ICD-10-CM

## 2023-03-20 DIAGNOSIS — K44.9 HIATAL HERNIA: ICD-10-CM

## 2023-03-20 DIAGNOSIS — R05.8 DRY COUGH: ICD-10-CM

## 2023-03-20 DIAGNOSIS — R11.2 NAUSEA AND VOMITING, UNSPECIFIED VOMITING TYPE: ICD-10-CM

## 2023-03-20 DIAGNOSIS — Z95.1 S/P CABG (CORONARY ARTERY BYPASS GRAFT): ICD-10-CM

## 2023-03-20 DIAGNOSIS — I25.2 OLD MI (MYOCARDIAL INFARCTION): ICD-10-CM

## 2023-03-20 PROCEDURE — 99213 OFFICE O/P EST LOW 20 MIN: CPT | Mod: HCNC,S$GLB,,

## 2023-03-20 PROCEDURE — 3075F PR MOST RECENT SYSTOLIC BLOOD PRESS GE 130-139MM HG: ICD-10-PCS | Mod: HCNC,CPTII,S$GLB,

## 2023-03-20 PROCEDURE — 3078F DIAST BP <80 MM HG: CPT | Mod: HCNC,CPTII,S$GLB,

## 2023-03-20 PROCEDURE — 3078F PR MOST RECENT DIASTOLIC BLOOD PRESSURE < 80 MM HG: ICD-10-PCS | Mod: HCNC,CPTII,S$GLB,

## 2023-03-20 PROCEDURE — 3075F SYST BP GE 130 - 139MM HG: CPT | Mod: HCNC,CPTII,S$GLB,

## 2023-03-20 PROCEDURE — 1159F PR MEDICATION LIST DOCUMENTED IN MEDICAL RECORD: ICD-10-PCS | Mod: HCNC,CPTII,S$GLB,

## 2023-03-20 PROCEDURE — 99999 PR PBB SHADOW E&M-EST. PATIENT-LVL V: ICD-10-PCS | Mod: PBBFAC,HCNC,,

## 2023-03-20 PROCEDURE — 3288F FALL RISK ASSESSMENT DOCD: CPT | Mod: HCNC,CPTII,S$GLB,

## 2023-03-20 PROCEDURE — 1126F PR PAIN SEVERITY QUANTIFIED, NO PAIN PRESENT: ICD-10-PCS | Mod: HCNC,CPTII,S$GLB,

## 2023-03-20 PROCEDURE — 1160F RVW MEDS BY RX/DR IN RCRD: CPT | Mod: HCNC,CPTII,S$GLB,

## 2023-03-20 PROCEDURE — 1160F PR REVIEW ALL MEDS BY PRESCRIBER/CLIN PHARMACIST DOCUMENTED: ICD-10-PCS | Mod: HCNC,CPTII,S$GLB,

## 2023-03-20 PROCEDURE — 1101F PR PT FALLS ASSESS DOC 0-1 FALLS W/OUT INJ PAST YR: ICD-10-PCS | Mod: HCNC,CPTII,S$GLB,

## 2023-03-20 PROCEDURE — 1159F MED LIST DOCD IN RCRD: CPT | Mod: HCNC,CPTII,S$GLB,

## 2023-03-20 PROCEDURE — 1126F AMNT PAIN NOTED NONE PRSNT: CPT | Mod: HCNC,CPTII,S$GLB,

## 2023-03-20 PROCEDURE — 99999 PR PBB SHADOW E&M-EST. PATIENT-LVL V: CPT | Mod: PBBFAC,HCNC,,

## 2023-03-20 PROCEDURE — 99213 PR OFFICE/OUTPT VISIT, EST, LEVL III, 20-29 MIN: ICD-10-PCS | Mod: HCNC,S$GLB,,

## 2023-03-20 PROCEDURE — 1101F PT FALLS ASSESS-DOCD LE1/YR: CPT | Mod: HCNC,CPTII,S$GLB,

## 2023-03-20 PROCEDURE — 3008F PR BODY MASS INDEX (BMI) DOCUMENTED: ICD-10-PCS | Mod: HCNC,CPTII,S$GLB,

## 2023-03-20 PROCEDURE — 3008F BODY MASS INDEX DOCD: CPT | Mod: HCNC,CPTII,S$GLB,

## 2023-03-20 PROCEDURE — 3288F PR FALLS RISK ASSESSMENT DOCUMENTED: ICD-10-PCS | Mod: HCNC,CPTII,S$GLB,

## 2023-03-20 NOTE — TELEPHONE ENCOUNTER
Patient is scheduled for Egd 4/11/2023. Please send clearance to hold Xarelto 3 days prior to procedure?  Thank you

## 2023-03-20 NOTE — PROGRESS NOTES
Subjective:       Patient ID: Stephania Salmeron is a 68 y.o. female Body mass index is 33.02 kg/m².    Chief Complaint: Dysphagia    This patient is new to me.  Referring Provider: Dr. Sinan Saeed for esophageal web & choking in adult.  Established patient of Dr. Curry.     Gastroesophageal Reflux  She complains of belching (occasional), choking (Chief complaint: Intermittent choking sensation that requires her to force herself to vomit but sticking a finger down her throat or squeezing/massaging her neck to improve sensation; denies requiring the Heimlich maneuver), coughing (Intermittent dry cough; she does report having a cold recently), dysphagia (Chief complaint: Chronic, but worsened; occurs with solid food, liquids (thick liquids), and pills; occurs daily or when she puts anything in her mouth; EGD with dilation in the past improve symptom), globus sensation (Reports frequent throat clearing) and nausea (Intermittently occurs; reports history of vertigo; when nausea is present it lasts between 7-10 days; currently prescribed meclizine). She reports no abdominal pain, no chest pain, no early satiety, no heartburn, no hoarse voice, no sore throat or no water brash. Constipation: currently having 1 BM every other day rated stool 1 on [place] scale; reports straining occasionally; takes Colace p.r.n. with mild improvement. This is a chronic problem. The current episode started more than 1 year ago. The problem occurs rarely. The problem has been resolved (She reports reflux is well-controlled on Nexium). The symptoms are aggravated by medications (vitamins). Associated symptoms include weight loss (Intentional; reports she is been limiting the amount of sweets she is been eating). Pertinent negatives include no anemia, fatigue, melena or muscle weakness. Risk factors include smoking/tobacco exposure, lack of exercise and hiatal hernia (Stop smoking in 1994). She has tried a PPI (Currently taking Nexium 40 mg  "once daily) for the symptoms. The treatment provided significant relief. Past procedures include an EGD (03/31/21 -  Normal esophagus. Small hiatal hernia. Gastritis. Biopsied. Normal examined duodenum; patho: benign, no bacteria) and a UGI (10/11/21 - "Esophageal web. Mild esophageal dysmotility. Small hiatal hernia."). Past procedures do not include an abdominal ultrasound, esophageal manometry, esophageal pH monitoring or H. pylori antibody titer. Past invasive treatments do not include gastroplasty, gastroplication or reflux surgery.     Review of Systems   Constitutional:  Positive for appetite change and weight loss (Intentional; reports she is been limiting the amount of sweets she is been eating). Negative for activity change, chills, diaphoresis, fatigue, fever and unexpected weight change.   HENT:  Positive for trouble swallowing. Negative for hoarse voice and sore throat.    Respiratory:  Positive for cough (Intermittent dry cough; she does report having a cold recently) and choking (Chief complaint: Intermittent choking sensation that requires her to force herself to vomit but sticking a finger down her throat or squeezing/massaging her neck to improve sensation; denies requiring the Heimlich maneuver). Negative for shortness of breath.    Cardiovascular:  Negative for chest pain.   Gastrointestinal:  Positive for constipation, dysphagia (Chief complaint: Chronic, but worsened; occurs with solid food, liquids (thick liquids), and pills; occurs daily or when she puts anything in her mouth; EGD with dilation in the past improve symptom), nausea (Intermittently occurs; reports history of vertigo; when nausea is present it lasts between 7-10 days; currently prescribed meclizine) and vomiting (reports having to force herself to vomit at times due to dysphagia). Negative for abdominal distention, abdominal pain, anal bleeding, blood in stool, diarrhea, heartburn, melena and rectal pain.   Musculoskeletal:  " Negative for muscle weakness.       No LMP recorded. Patient is postmenopausal.  Past Medical History:   Diagnosis Date    Acute coronary syndrome     Atrial fibrillation     Clotting disorder     Coronary artery disease     COVID-19 01/18/2021    Required hospitalization d/t pneumonia and hypoxia    GERD (gastroesophageal reflux disease)     Hyperlipidemia     Hypertension     ICD (implantable cardioverter-defibrillator) in place     [employer] Evera XT ICD - Model ISOD1G2 (SN: [id number]), 5568-45 (SN: [id number]), 6932-65 (SN: [id number])    Occipital stroke     Left occipital lobe    Quadrantanopia, right     Right lower quadrant     Past Surgical History:   Procedure Laterality Date    APPENDECTOMY      CARDIAC DEFIBRILLATOR PLACEMENT      CHOLECYSTECTOMY      COLONOSCOPY N/A 03/31/2021    Procedure: COLONOSCOPY Suprep;  Surgeon: Murtaza Curry MD;  Location: [facility] ENDO;  Service: Endoscopy;  Laterality: N/A;    CORONARY ANGIOGRAPHY INCLUDING BYPASS GRAFTS WITH CATHETERIZATION OF LEFT HEART N/A 12/19/2022    Procedure: ANGIOGRAM, CORONARY, INCLUDING BYPASS GRAFT, WITH LEFT HEART CATHETERIZATION;  Surgeon: Franklin Weems MD;  Location: [facility] CATH/EP LAB;  Service: Cardiology;  Laterality: N/A;    CORONARY ANGIOPLASTY      CORONARY ANGIOPLASTY WITH STENT PLACEMENT      CORONARY ARTERY BYPASS GRAFT  1997    ESOPHAGOGASTRODUODENOSCOPY N/A 03/31/2021    Procedure: EGD (ESOPHAGOGASTRODUODENOSCOPY);  Surgeon: Murtaza Curry MD;  Location: [facility] ENDO;  Service: Endoscopy;  Laterality: N/A;    DESIRAE FILTER PLACEMENT  2017    HYSTERECTOMY      UPPER GASTROINTESTINAL ENDOSCOPY       Family History   Problem Relation Age of Onset    Lung cancer Mother     Anuerysm Father     Colon polyps Sister     Dementia Sister     COPD Sister     Anxiety disorder Sister     Alcohol abuse Sister     Stroke Sister 72    Pulmonary embolism Sister     Depression Brother     Depression Brother     Bone cancer Maternal Aunt     Bipolar  disorder Other         Neice    Colon cancer Neg Hx     Diabetes Mellitus Neg Hx     Breast cancer Neg Hx     Crohn's disease Neg Hx     Ulcerative colitis Neg Hx     Stomach cancer Neg Hx     Esophageal cancer Neg Hx      Social History     Tobacco Use    Smoking status: Former     Packs/day: 4.00     Years: 30.00     Pack years: 120.00     Types: Cigarettes     Quit date: 3/22/1994     Years since quittin.0    Smokeless tobacco: Never   Substance Use Topics    Alcohol use: Not Currently     Alcohol/week: 0.0 standard drinks    Drug use: Never     Wt Readings from Last 10 Encounters:   23 98.5 kg (217 lb 2.5 oz)   23 101.5 kg (223 lb 12.3 oz)   22 100.6 kg (221 lb 12.5 oz)   12/15/22 103 kg (227 lb)   11/15/22 103 kg (227 lb)   22 103.1 kg (227 lb 6.5 oz)   22 100.8 kg (222 lb 3.6 oz)   22 97.7 kg (215 lb 4.5 oz)   22 94.7 kg (208 lb 10.7 oz)   22 92.6 kg (204 lb 2.3 oz)     Lab Results   Component Value Date    WBC 4.63 2022    HGB 11.7 (L) 2022    HCT 38.5 2022    MCV 94 2022     2022     CMP  Sodium   Date Value Ref Range Status   2022 138 136 - 145 mmol/L Final     Potassium   Date Value Ref Range Status   2022 3.9 3.5 - 5.1 mmol/L Final     Chloride   Date Value Ref Range Status   2022 106 95 - 110 mmol/L Final     CO2   Date Value Ref Range Status   2022 27 23 - 29 mmol/L Final     Glucose   Date Value Ref Range Status   2022 109 70 - 110 mg/dL Final     BUN   Date Value Ref Range Status   2022 21 8 - 23 mg/dL Final     Creatinine   Date Value Ref Range Status   2022 0.8 0.5 - 1.4 mg/dL Final     Calcium   Date Value Ref Range Status   2022 8.6 (L) 8.7 - 10.5 mg/dL Final     Total Protein   Date Value Ref Range Status   2022 7.6 6.0 - 8.4 g/dL Final     Albumin   Date Value Ref Range Status   2022 3.7 3.5 - 5.2 g/dL Final     Total Bilirubin   Date Value Ref  Range Status   12/16/2022 0.5 0.1 - 1.0 mg/dL Final     Comment:     For infants and newborns, interpretation of results should be based  on gestational age, weight and in agreement with clinical  observations.    Premature Infant recommended reference ranges:  Up to 24 hours.............<8.0 mg/dL  Up to 48 hours............<12.0 mg/dL  3-5 days..................<15.0 mg/dL  6-29 days.................<15.0 mg/dL       Alkaline Phosphatase   Date Value Ref Range Status   12/16/2022 120 55 - 135 U/L Final     AST   Date Value Ref Range Status   12/16/2022 17 10 - 40 U/L Final     ALT   Date Value Ref Range Status   12/16/2022 20 10 - 44 U/L Final     Anion Gap   Date Value Ref Range Status   12/19/2022 5 (L) 8 - 16 mmol/L Final     eGFR if    Date Value Ref Range Status   06/05/2022 >60.0 >60 mL/min/1.73 m^2 Final     eGFR if non    Date Value Ref Range Status   06/05/2022 >60.0 >60 mL/min/1.73 m^2 Final     Comment:     Calculation used to obtain the estimated glomerular filtration  rate (eGFR) is the CKD-EPI equation.        Lab Results   Component Value Date    LIPASE 40 08/16/2021     Lab Results   Component Value Date    TSH 2.603 02/28/2022     Reviewed prior medical records including radiology report of chest x-ray 12/19/2022, esophagram 11/09/2021, KUB 08/16/2021 & endoscopy history (see surgical history).    Objective:      Physical Exam  Vitals and nursing note reviewed.   Constitutional:       General: She is not in acute distress.     Appearance: Normal appearance. She is obese. She is not ill-appearing.   HENT:      Mouth/Throat:      Lips: Pink. No lesions.   Cardiovascular:      Rate and Rhythm: Normal rate and regular rhythm.      Pulses: Normal pulses.   Pulmonary:      Effort: Pulmonary effort is normal. No respiratory distress.   Abdominal:      General: Abdomen is protuberant. Bowel sounds are normal. There is no distension or abdominal bruit. There are no signs of  injury.      Palpations: Abdomen is soft. There is no shifting dullness, fluid wave, hepatomegaly, splenomegaly or mass.      Tenderness: There is no abdominal tenderness. There is no guarding or rebound. Negative signs include Arredondo's sign, Rovsing's sign and McBurney's sign.   Skin:     General: Skin is warm and dry.      Coloration: Skin is not jaundiced or pale.   Neurological:      Mental Status: She is alert and oriented to person, place, and time.   Psychiatric:         Attention and Perception: Attention normal.         Mood and Affect: Mood normal.         Speech: Speech normal.         Behavior: Behavior normal.       Assessment:       1. Dysphagia, unspecified type    2. Choking sensation    3. Esophageal web    4. Esophageal dysmotility    5. Gastroesophageal reflux disease, unspecified whether esophagitis present    6. Hiatal hernia    7. Belching    8. Globus sensation    9. Dry cough    10. History of vertigo    11. Nausea and vomiting, unspecified vomiting type    12. Long-term current use of proton pump inhibitor therapy    13. Constipation, unspecified constipation type    14. History of colon polyps        Plan:       Dysphagia, unspecified type  - schedule EGD, discussed procedure with patient and possible esophageal dilation may be performed during procedure if indicated, patient verbalized understanding  - educated patient to eat smaller more frequent meals and to eat slowly and advised to eat a soft diet.  - possible UGI/esophagram/esophageal manometry if symptoms persist  -     Case Request Endoscopy: EGD (ESOPHAGOGASTRODUODENOSCOPY)    Choking sensation  - schedule EGD, discussed procedure with patient and possible esophageal dilation may be performed during procedure if indicated, patient verbalized understanding  - educated patient to eat smaller more frequent meals and to eat slowly and advised to eat a soft diet.  - possible UGI/esophagram/esophageal manometry if symptoms  persist    Esophageal web  - schedule EGD, discussed procedure with patient, including risks and benefits, patient verbalized understanding  -     Case Request Endoscopy: EGD (ESOPHAGOGASTRODUODENOSCOPY)    Esophageal dysmotility  - schedule EGD, discussed procedure with patient and possible esophageal dilation may be performed during procedure if indicated, patient verbalized understanding  - educated patient to eat smaller more frequent meals and to eat slowly and advised to eat a soft diet.  - possible UGI/esophagram/esophageal manometry if symptoms persist    Gastroesophageal reflux disease, unspecified whether esophagitis present  - schedule EGD, discussed procedure with patient, including risks and benefits, patient verbalized understanding  -avoid large meals, avoid eating within 2-3 hours of bedtime (avoid late night eating & lying down soon after eating), elevate head of bed if nocturnal symptoms are present, smoking cessation (if current smoker), & weight loss (if overweight).   -avoid known foods which trigger reflux symptoms & to minimize/avoid high-fat foods, chocolate, caffeine, citrus, alcohol, & tomato products.  -avoid/limit use of NSAID's, since they can cause GI upset, bleeding, and/or ulcers. If needed, take with food.   -continue Nexium 40 mg once daily 30 minutes to an hour before breakfast    Hiatal hernia  -this is usually managed by controlling reflux symptoms, surgery is an option, but usually performed if reflux is uncontrolled by medication management and lifestyle/dietary modifications; if symptoms persist despite medication management and lifestyle/dietary modifications, we can refer to general surgery to consult about surgical options, patient verbalized understanding    Belching  - schedule EGD, discussed procedure with patient, including risks and benefits, patient verbalized understanding    Globus sensation & Dry cough  - schedule EGD, discussed procedure with patient, including  risks and benefits, patient verbalized understanding  -consider ENT referral    History of vertigo  -follow-up with PCP for continued evaluation and management    Nausea and vomiting, unspecified vomiting type  - schedule EGD, discussed procedure with patient, including risks and benefits, patient verbalized understanding  -consider GES    Long-term current use of proton pump inhibitor therapy  - discussed with patient about long term use of reflux medications (preference to use lowest effective dose or discontinuing if possible)  -recommend a diet high in calcium and/or taking OTC calcium and vitamin d supplements as directed (such as Citracal +D)  - recommend annual monitoring with blood work to include CMP, CBC, vitamin B12, and magnesium.  -     Vitamin B12; Future; Expected date: 03/20/2023    Constipation, unspecified constipation type  -Recommend daily exercise as tolerated, adequate water intake (six 8-oz glasses of water daily), and high fiber diet. OTC fiber supplements are recommended if diet does not reach daily fiber goal (20-30 grams daily), such as Metamucil, Citrucel, or FiberCon (take as directed, separate from other oral medications by >2 hours).  -Recommend trying OTC MiraLax once daily (17g PO) as directed  -If no improvement with above recommendations, try intermittently dosed Dulcolax OTC as directed (every 3-4 days) PRN to facilitate bowel movements  -If no relief with this, consider adding a emollient laxative (castor oil or mineral oil) +/- enema  -If still no improvement with these measures, call/follow-up    History of colon polyps  Follow-up for surveillance colonoscopy 03/31/2026    Follow up in about 4 weeks (around 4/17/2023), or if symptoms worsen or fail to improve.      If no improvement in symptoms or symptoms worsen, call/follow-up at clinic or go to ER.        30 minutes of total time spent on the encounter, which includes face to face time and non-face to face time preparing to  see the patient (eg, review of tests), Obtaining and/or reviewing separately obtained history, Documenting clinical information in the electronic or other health record, Independently interpreting results (not separately reported) and communicating results to the patient/family/caregiver, or Care coordination (not separately reported).     A dictation software program was used for this note. Please expect some simple typographical  errors in this note.

## 2023-03-22 ENCOUNTER — PATIENT OUTREACH (OUTPATIENT)
Dept: ADMINISTRATIVE | Facility: HOSPITAL | Age: 68
End: 2023-03-22
Payer: MEDICARE

## 2023-03-28 ENCOUNTER — PATIENT OUTREACH (OUTPATIENT)
Dept: ADMINISTRATIVE | Facility: HOSPITAL | Age: 68
End: 2023-03-28
Payer: MEDICARE

## 2023-03-31 ENCOUNTER — TELEPHONE (OUTPATIENT)
Dept: PRIMARY CARE CLINIC | Facility: CLINIC | Age: 68
End: 2023-03-31
Payer: MEDICARE

## 2023-03-31 NOTE — TELEPHONE ENCOUNTER
----- Message from Fozia Rosas LPN sent at 3/30/2023  5:06 PM CDT -----  Contact: [phone]    ----- Message -----  From: Kenyon Garsai  Sent: 3/30/2023   4:31 PM CDT  To: Darlin Menon Staff    Pt missed a call and would like a call back.

## 2023-04-04 ENCOUNTER — OFFICE VISIT (OUTPATIENT)
Dept: PRIMARY CARE CLINIC | Facility: CLINIC | Age: 68
End: 2023-04-04
Payer: MEDICARE

## 2023-04-04 VITALS
HEIGHT: 68 IN | RESPIRATION RATE: 18 BRPM | WEIGHT: 218.5 LBS | DIASTOLIC BLOOD PRESSURE: 60 MMHG | TEMPERATURE: 97 F | HEART RATE: 70 BPM | BODY MASS INDEX: 33.12 KG/M2 | SYSTOLIC BLOOD PRESSURE: 128 MMHG | OXYGEN SATURATION: 94 %

## 2023-04-04 DIAGNOSIS — R79.89 ELEVATED BRAIN NATRIURETIC PEPTIDE (BNP) LEVEL: ICD-10-CM

## 2023-04-04 DIAGNOSIS — Z95.1 S/P CABG (CORONARY ARTERY BYPASS GRAFT): ICD-10-CM

## 2023-04-04 DIAGNOSIS — R05.3 CHRONIC COUGH: ICD-10-CM

## 2023-04-04 DIAGNOSIS — J40 BRONCHITIS: Primary | ICD-10-CM

## 2023-04-04 DIAGNOSIS — J06.9 UPPER RESPIRATORY TRACT INFECTION, UNSPECIFIED TYPE: ICD-10-CM

## 2023-04-04 PROBLEM — E88.09 HYPOALBUMINEMIA DUE TO PROTEIN-CALORIE MALNUTRITION: Status: RESOLVED | Noted: 2021-03-22 | Resolved: 2023-04-04

## 2023-04-04 PROBLEM — E46 HYPOALBUMINEMIA DUE TO PROTEIN-CALORIE MALNUTRITION: Status: RESOLVED | Noted: 2021-03-22 | Resolved: 2023-04-04

## 2023-04-04 PROCEDURE — 1159F MED LIST DOCD IN RCRD: CPT | Mod: HCNC,CPTII,S$GLB, | Performed by: STUDENT IN AN ORGANIZED HEALTH CARE EDUCATION/TRAINING PROGRAM

## 2023-04-04 PROCEDURE — 3288F FALL RISK ASSESSMENT DOCD: CPT | Mod: HCNC,CPTII,S$GLB, | Performed by: STUDENT IN AN ORGANIZED HEALTH CARE EDUCATION/TRAINING PROGRAM

## 2023-04-04 PROCEDURE — 3074F PR MOST RECENT SYSTOLIC BLOOD PRESSURE < 130 MM HG: ICD-10-PCS | Mod: HCNC,CPTII,S$GLB, | Performed by: STUDENT IN AN ORGANIZED HEALTH CARE EDUCATION/TRAINING PROGRAM

## 2023-04-04 PROCEDURE — 96372 THER/PROPH/DIAG INJ SC/IM: CPT | Mod: HCNC,S$GLB,, | Performed by: STUDENT IN AN ORGANIZED HEALTH CARE EDUCATION/TRAINING PROGRAM

## 2023-04-04 PROCEDURE — 3078F PR MOST RECENT DIASTOLIC BLOOD PRESSURE < 80 MM HG: ICD-10-PCS | Mod: HCNC,CPTII,S$GLB, | Performed by: STUDENT IN AN ORGANIZED HEALTH CARE EDUCATION/TRAINING PROGRAM

## 2023-04-04 PROCEDURE — 1125F AMNT PAIN NOTED PAIN PRSNT: CPT | Mod: HCNC,CPTII,S$GLB, | Performed by: STUDENT IN AN ORGANIZED HEALTH CARE EDUCATION/TRAINING PROGRAM

## 2023-04-04 PROCEDURE — 3288F PR FALLS RISK ASSESSMENT DOCUMENTED: ICD-10-PCS | Mod: HCNC,CPTII,S$GLB, | Performed by: STUDENT IN AN ORGANIZED HEALTH CARE EDUCATION/TRAINING PROGRAM

## 2023-04-04 PROCEDURE — 99214 PR OFFICE/OUTPT VISIT, EST, LEVL IV, 30-39 MIN: ICD-10-PCS | Mod: HCNC,25,S$GLB, | Performed by: STUDENT IN AN ORGANIZED HEALTH CARE EDUCATION/TRAINING PROGRAM

## 2023-04-04 PROCEDURE — 1101F PR PT FALLS ASSESS DOC 0-1 FALLS W/OUT INJ PAST YR: ICD-10-PCS | Mod: HCNC,CPTII,S$GLB, | Performed by: STUDENT IN AN ORGANIZED HEALTH CARE EDUCATION/TRAINING PROGRAM

## 2023-04-04 PROCEDURE — 1101F PT FALLS ASSESS-DOCD LE1/YR: CPT | Mod: HCNC,CPTII,S$GLB, | Performed by: STUDENT IN AN ORGANIZED HEALTH CARE EDUCATION/TRAINING PROGRAM

## 2023-04-04 PROCEDURE — 99999 PR PBB SHADOW E&M-EST. PATIENT-LVL V: CPT | Mod: PBBFAC,HCNC,, | Performed by: STUDENT IN AN ORGANIZED HEALTH CARE EDUCATION/TRAINING PROGRAM

## 2023-04-04 PROCEDURE — 99999 PR PBB SHADOW E&M-EST. PATIENT-LVL V: ICD-10-PCS | Mod: PBBFAC,HCNC,, | Performed by: STUDENT IN AN ORGANIZED HEALTH CARE EDUCATION/TRAINING PROGRAM

## 2023-04-04 PROCEDURE — 3078F DIAST BP <80 MM HG: CPT | Mod: HCNC,CPTII,S$GLB, | Performed by: STUDENT IN AN ORGANIZED HEALTH CARE EDUCATION/TRAINING PROGRAM

## 2023-04-04 PROCEDURE — 1125F PR PAIN SEVERITY QUANTIFIED, PAIN PRESENT: ICD-10-PCS | Mod: HCNC,CPTII,S$GLB, | Performed by: STUDENT IN AN ORGANIZED HEALTH CARE EDUCATION/TRAINING PROGRAM

## 2023-04-04 PROCEDURE — 3008F BODY MASS INDEX DOCD: CPT | Mod: HCNC,CPTII,S$GLB, | Performed by: STUDENT IN AN ORGANIZED HEALTH CARE EDUCATION/TRAINING PROGRAM

## 2023-04-04 PROCEDURE — 1160F PR REVIEW ALL MEDS BY PRESCRIBER/CLIN PHARMACIST DOCUMENTED: ICD-10-PCS | Mod: HCNC,CPTII,S$GLB, | Performed by: STUDENT IN AN ORGANIZED HEALTH CARE EDUCATION/TRAINING PROGRAM

## 2023-04-04 PROCEDURE — 99214 OFFICE O/P EST MOD 30 MIN: CPT | Mod: HCNC,25,S$GLB, | Performed by: STUDENT IN AN ORGANIZED HEALTH CARE EDUCATION/TRAINING PROGRAM

## 2023-04-04 PROCEDURE — 1159F PR MEDICATION LIST DOCUMENTED IN MEDICAL RECORD: ICD-10-PCS | Mod: HCNC,CPTII,S$GLB, | Performed by: STUDENT IN AN ORGANIZED HEALTH CARE EDUCATION/TRAINING PROGRAM

## 2023-04-04 PROCEDURE — 3008F PR BODY MASS INDEX (BMI) DOCUMENTED: ICD-10-PCS | Mod: HCNC,CPTII,S$GLB, | Performed by: STUDENT IN AN ORGANIZED HEALTH CARE EDUCATION/TRAINING PROGRAM

## 2023-04-04 PROCEDURE — 1160F RVW MEDS BY RX/DR IN RCRD: CPT | Mod: HCNC,CPTII,S$GLB, | Performed by: STUDENT IN AN ORGANIZED HEALTH CARE EDUCATION/TRAINING PROGRAM

## 2023-04-04 PROCEDURE — 3074F SYST BP LT 130 MM HG: CPT | Mod: HCNC,CPTII,S$GLB, | Performed by: STUDENT IN AN ORGANIZED HEALTH CARE EDUCATION/TRAINING PROGRAM

## 2023-04-04 PROCEDURE — 96372 PR INJECTION,THERAP/PROPH/DIAG2ST, IM OR SUBCUT: ICD-10-PCS | Mod: HCNC,S$GLB,, | Performed by: STUDENT IN AN ORGANIZED HEALTH CARE EDUCATION/TRAINING PROGRAM

## 2023-04-04 RX ORDER — BETAMETHASONE SODIUM PHOSPHATE AND BETAMETHASONE ACETATE 3; 3 MG/ML; MG/ML
6 INJECTION, SUSPENSION INTRA-ARTICULAR; INTRALESIONAL; INTRAMUSCULAR; SOFT TISSUE
Status: COMPLETED | OUTPATIENT
Start: 2023-04-04 | End: 2023-04-04

## 2023-04-04 RX ORDER — DOXYCYCLINE 100 MG/1
100 CAPSULE ORAL EVERY 12 HOURS
Qty: 14 CAPSULE | Refills: 0 | Status: SHIPPED | OUTPATIENT
Start: 2023-04-04 | End: 2023-04-11

## 2023-04-04 RX ORDER — CODEINE PHOSPHATE AND GUAIFENESIN 10; 100 MG/5ML; MG/5ML
5 SOLUTION ORAL 3 TIMES DAILY PRN
Qty: 180 ML | Refills: 0 | Status: SHIPPED | OUTPATIENT
Start: 2023-04-04 | End: 2023-04-24 | Stop reason: SDUPTHER

## 2023-04-04 RX ADMIN — BETAMETHASONE SODIUM PHOSPHATE AND BETAMETHASONE ACETATE 6 MG: 3; 3 INJECTION, SUSPENSION INTRA-ARTICULAR; INTRALESIONAL; INTRAMUSCULAR; SOFT TISSUE at 04:04

## 2023-04-04 NOTE — PROGRESS NOTES
Verified pt ID using name and .  Administered 6 mg. Celestone in left ventrogluteal area per physician order using aseptic technique. Aspirated and no blood return noted. Pt tolerated well with no adverse reactions noted.

## 2023-04-04 NOTE — PATIENT INSTRUCTIONS
URI/Bronchitis:   - patient has 5 weeks of cough, minimally productive.   - possibly improving slightly, but with duration would like steroids and antibiotics.   - on exam has congestion to all parts of lungs. Ears, throat are clear.  No lymphnodes.  Patient is coughing forcefully through interview.    - start Doxy 100mg BID x 7 days.  - Celestone injection.   - codeine cough syrup.  - has nebulizer solution, needs device and tubing which was ordered.    BNP:   - patient last BNP in Dec was elevated.   - has wet congested lungs on exam.  Reports SOB.   - rechecking BNP despite reassuring Echo last fall.

## 2023-04-04 NOTE — PROGRESS NOTES
Subjective:           Patient ID: Stephania Salmeron is a 68 y.o. female who presents today with a chief complaint of constant cough and uri.    Chief Complaint:   Cough (X2 weeks), Nasal Congestion, Rib Injury (Felt pop when coughing), and Generalized Body Aches      History of Present Illness:    67yo female presenting for likely URI.    Has had 5 weeks of cough, wet.  States is better for the last 4-5 days, since Saturday.     Cough is productive at times, but just little bits. Was more productive before.    Associated s/s: ear pain, sore throat, sinus drip. Nausea.    Negative: vomiting    Has tried all the meds.  Robatussin DM, Nyquil, and about 4 different cough syrups.      Wants shots, cough meds to help sleep.     Review of Systems   Constitutional:  Positive for activity change and fatigue. Negative for appetite change, fever and unexpected weight change.   HENT:  Positive for congestion, ear pain (mostly the right ear), postnasal drip and sore throat. Negative for nosebleeds, sinus pressure, sneezing and trouble swallowing.    Respiratory:  Positive for cough and chest tightness. Negative for shortness of breath and wheezing.    Cardiovascular:  Negative for chest pain, palpitations and leg swelling.   Gastrointestinal:  Positive for nausea. Negative for abdominal distention, constipation and diarrhea.   Genitourinary:  Negative for difficulty urinating and dysuria.   Musculoskeletal:  Negative for back pain and gait problem.   Skin:  Negative for pallor and rash.   Neurological:  Positive for weakness and headaches. Negative for numbness.   Psychiatric/Behavioral:  Positive for sleep disturbance. Negative for agitation. The patient is not nervous/anxious.          Objective:        Vitals:    04/04/23 1512   BP: 128/60   BP Location: Right arm   Patient Position: Sitting   BP Method: Medium (Manual)   Pulse: 70   Resp: 18   Temp: 97.4 °F (36.3 °C)   TempSrc: Temporal   SpO2: (!) 94%   Weight: 99.1 kg (218 lb  "7.6 oz)   Height: 5' 8" (1.727 m)       Body mass index is 33.22 kg/m².      Physical Exam  Vitals reviewed.   Constitutional:       General: She is not in acute distress.     Appearance: Normal appearance. She is obese. She is ill-appearing.   HENT:      Head: Normocephalic and atraumatic.      Right Ear: Tympanic membrane and external ear normal.      Left Ear: Tympanic membrane and external ear normal.      Nose: No rhinorrhea.      Mouth/Throat:      Mouth: Mucous membranes are moist.      Pharynx: No posterior oropharyngeal erythema.   Eyes:      Extraocular Movements: Extraocular movements intact.      Conjunctiva/sclera: Conjunctivae normal.   Cardiovascular:      Rate and Rhythm: Normal rate and regular rhythm.      Heart sounds: No murmur heard.  Pulmonary:      Breath sounds: Wheezing and rhonchi present.      Comments: coughing  Abdominal:      Tenderness: There is no right CVA tenderness or left CVA tenderness.   Musculoskeletal:      Right lower leg: No edema.      Left lower leg: No edema.   Lymphadenopathy:      Cervical: No cervical adenopathy.   Skin:     Capillary Refill: Capillary refill takes less than 2 seconds.      Coloration: Skin is not jaundiced.      Findings: No bruising.   Neurological:      General: No focal deficit present.      Mental Status: She is alert and oriented to person, place, and time.      Motor: No weakness.      Gait: Gait normal.   Psychiatric:         Mood and Affect: Mood normal.           Lab Results   Component Value Date     04/04/2023    K 4.2 04/04/2023     (H) 04/04/2023    CO2 22 (L) 04/04/2023    BUN 17 04/04/2023    CREATININE 1.0 04/04/2023    ANIONGAP 12 04/04/2023     Lab Results   Component Value Date    HGBA1C 5.2 02/13/2021     Lab Results   Component Value Date     (H) 04/04/2023     (H) 12/15/2022    BNP 44 06/02/2022       Lab Results   Component Value Date    WBC 7.26 04/04/2023    HGB 11.8 (L) 04/04/2023    HCT 40.8 " 04/04/2023    HCT 37 06/02/2022     04/04/2023    GRAN 4.5 04/04/2023    GRAN 61.5 04/04/2023     Lab Results   Component Value Date    CHOL 181 02/28/2022    HDL 47 02/28/2022    LDLCALC 106.2 02/28/2022    TRIG 139 02/28/2022          Current Outpatient Medications:     albuterol (PROVENTIL/VENTOLIN HFA) 90 mcg/actuation inhaler, INHALE 2 PUFFS INTO THE LUNGS EVERY 6 (SIX) HOURS AS NEEDED FOR WHEEZING. RESCUE, Disp: 54 g, Rfl: 1    amLODIPine (NORVASC) 2.5 MG tablet, Take 1 tablet (2.5 mg total) by mouth once daily., Disp: 30 tablet, Rfl: 0    aspirin (ECOTRIN) 81 MG EC tablet, Take 81 mg by mouth once daily., Disp: , Rfl:     atorvastatin (LIPITOR) 40 MG tablet, TAKE 1 TABLET BY MOUTH EVERY DAY, Disp: 90 tablet, Rfl: 3    budesonide-formoterol 160-4.5 mcg (SYMBICORT) 160-4.5 mcg/actuation HFAA, Inhale 2 puffs into the lungs every 12 (twelve) hours. Controller, Disp: 30.6 g, Rfl: 5    buPROPion (WELLBUTRIN XL) 150 MG TB24 tablet, Take 450 mg by mouth once daily., Disp: , Rfl:     docusate sodium (COLACE) 100 MG capsule, Take 1 capsule (100 mg total) by mouth 2 (two) times daily as needed for Constipation., Disp: 60 capsule, Rfl: 5    ergocalciferol (ERGOCALCIFEROL) 50,000 unit Cap, TAKE 1 CAPSULE BY MOUTH ONE TIME PER WEEK, Disp: 12 capsule, Rfl: 3    esomeprazole (NEXIUM) 40 MG capsule, Take 1 capsule (40 mg total) by mouth before breakfast., Disp: 90 capsule, Rfl: 3    hydrOXYzine pamoate (VISTARIL) 25 MG Cap, Take 1 capsule (25 mg total) by mouth 4 (four) times daily as needed (nausea)., Disp: 120 capsule, Rfl: 3    LORazepam (ATIVAN) 1 MG tablet, Take 1 mg by mouth 3 (three) times daily., Disp: , Rfl:     meclizine (ANTIVERT) 25 mg tablet, Not standard daily medication. Limit to 5 tabs per week. 20 tabs per month., Disp: 20 tablet, Rfl: 2    metoprolol succinate (TOPROL-XL) 25 MG 24 hr tablet, TAKE 1 TABLET BY MOUTH TWICE A DAY, Disp: 180 tablet, Rfl: 3    pregabalin (LYRICA) 100 MG capsule, Take 100 mg  by mouth 3 (three) times daily as needed., Disp: , Rfl:     QUEtiapine (SEROQUEL) 200 MG Tab, Take 200 mg by mouth every evening., Disp: , Rfl:     tiotropium bromide (SPIRIVA RESPIMAT) 1.25 mcg/actuation inhaler, Inhale 2 puffs into the lungs once daily. Controller, Disp: 12 g, Rfl: 5    XARELTO 20 mg Tab, TAKE 1 TABLET (20 MG TOTAL) BY MOUTH DAILY WITH DINNER OR EVENING MEAL., Disp: 30 tablet, Rfl: 1    albuterol (ACCUNEB) 1.25 mg/3 mL Nebu, Take 3 mLs (1.25 mg total) by nebulization 2 (two) times daily as needed (Shortness of breath). Rescue (Patient not taking: Reported on 4/4/2023), Disp: 75 mL, Rfl: 3    doxycycline (VIBRAMYCIN) 100 MG Cap, Take 1 capsule (100 mg total) by mouth every 12 (twelve) hours. for 7 days, Disp: 14 capsule, Rfl: 0    guaiFENesin-codeine 100-10 mg/5 ml (TUSSI-ORGANIDIN NR)  mg/5 mL syrup, Take 5 mLs by mouth 3 (three) times daily as needed for Cough., Disp: 180 mL, Rfl: 0  No current facility-administered medications for this visit.     Outpatient Encounter Medications as of 4/4/2023   Medication Sig Dispense Refill    albuterol (PROVENTIL/VENTOLIN HFA) 90 mcg/actuation inhaler INHALE 2 PUFFS INTO THE LUNGS EVERY 6 (SIX) HOURS AS NEEDED FOR WHEEZING. RESCUE 54 g 1    amLODIPine (NORVASC) 2.5 MG tablet Take 1 tablet (2.5 mg total) by mouth once daily. 30 tablet 0    aspirin (ECOTRIN) 81 MG EC tablet Take 81 mg by mouth once daily.      atorvastatin (LIPITOR) 40 MG tablet TAKE 1 TABLET BY MOUTH EVERY DAY 90 tablet 3    budesonide-formoterol 160-4.5 mcg (SYMBICORT) 160-4.5 mcg/actuation HFAA Inhale 2 puffs into the lungs every 12 (twelve) hours. Controller 30.6 g 5    buPROPion (WELLBUTRIN XL) 150 MG TB24 tablet Take 450 mg by mouth once daily.      docusate sodium (COLACE) 100 MG capsule Take 1 capsule (100 mg total) by mouth 2 (two) times daily as needed for Constipation. 60 capsule 5    ergocalciferol (ERGOCALCIFEROL) 50,000 unit Cap TAKE 1 CAPSULE BY MOUTH ONE TIME PER WEEK 12  capsule 3    esomeprazole (NEXIUM) 40 MG capsule Take 1 capsule (40 mg total) by mouth before breakfast. 90 capsule 3    hydrOXYzine pamoate (VISTARIL) 25 MG Cap Take 1 capsule (25 mg total) by mouth 4 (four) times daily as needed (nausea). 120 capsule 3    LORazepam (ATIVAN) 1 MG tablet Take 1 mg by mouth 3 (three) times daily.      meclizine (ANTIVERT) 25 mg tablet Not standard daily medication. Limit to 5 tabs per week. 20 tabs per month. 20 tablet 2    metoprolol succinate (TOPROL-XL) 25 MG 24 hr tablet TAKE 1 TABLET BY MOUTH TWICE A  tablet 3    pregabalin (LYRICA) 100 MG capsule Take 100 mg by mouth 3 (three) times daily as needed.      QUEtiapine (SEROQUEL) 200 MG Tab Take 200 mg by mouth every evening.      tiotropium bromide (SPIRIVA RESPIMAT) 1.25 mcg/actuation inhaler Inhale 2 puffs into the lungs once daily. Controller 12 g 5    XARELTO 20 mg Tab TAKE 1 TABLET (20 MG TOTAL) BY MOUTH DAILY WITH DINNER OR EVENING MEAL. 30 tablet 1    albuterol (ACCUNEB) 1.25 mg/3 mL Nebu Take 3 mLs (1.25 mg total) by nebulization 2 (two) times daily as needed (Shortness of breath). Rescue (Patient not taking: Reported on 4/4/2023) 75 mL 3    doxycycline (VIBRAMYCIN) 100 MG Cap Take 1 capsule (100 mg total) by mouth every 12 (twelve) hours. for 7 days 14 capsule 0    guaiFENesin-codeine 100-10 mg/5 ml (TUSSI-ORGANIDIN NR)  mg/5 mL syrup Take 5 mLs by mouth 3 (three) times daily as needed for Cough. 180 mL 0     Facility-Administered Encounter Medications as of 4/4/2023   Medication Dose Route Frequency Provider Last Rate Last Admin    [COMPLETED] betamethasone acetate-betamethasone sodium phosphate injection 6 mg  6 mg Intramuscular 1 time in Clinic/HOD Sinan Saeed MD   6 mg at 04/04/23 7073          Assessment:       1. Bronchitis    2. Upper respiratory tract infection, unspecified type    3. Chronic cough    4. Elevated brain natriuretic peptide (BNP) level    5. S/P CABG (coronary artery bypass  graft)           Plan:       Bronchitis  -     betamethasone acetate-betamethasone sodium phosphate injection 6 mg  -     doxycycline (VIBRAMYCIN) 100 MG Cap; Take 1 capsule (100 mg total) by mouth every 12 (twelve) hours. for 7 days  Dispense: 14 capsule; Refill: 0  -     CBC Auto Differential; Future; Expected date: 04/04/2023  -     Comprehensive Metabolic Panel; Future; Expected date: 04/04/2023  -     NEBULIZER KIT (SUPPLIES) FOR HOME USE  -     NEBULIZER FOR HOME USE  -     guaiFENesin-codeine 100-10 mg/5 ml (TUSSI-ORGANIDIN NR)  mg/5 mL syrup; Take 5 mLs by mouth 3 (three) times daily as needed for Cough.  Dispense: 180 mL; Refill: 0    Upper respiratory tract infection, unspecified type  -     BNP; Future; Expected date: 04/04/2023  -     X-Ray Chest PA And Lateral; Future; Expected date: 04/04/2023  -     betamethasone acetate-betamethasone sodium phosphate injection 6 mg  -     doxycycline (VIBRAMYCIN) 100 MG Cap; Take 1 capsule (100 mg total) by mouth every 12 (twelve) hours. for 7 days  Dispense: 14 capsule; Refill: 0  -     CBC Auto Differential; Future; Expected date: 04/04/2023  -     Comprehensive Metabolic Panel; Future; Expected date: 04/04/2023  -     NEBULIZER KIT (SUPPLIES) FOR HOME USE  -     NEBULIZER FOR HOME USE  -     guaiFENesin-codeine 100-10 mg/5 ml (TUSSI-ORGANIDIN NR)  mg/5 mL syrup; Take 5 mLs by mouth 3 (three) times daily as needed for Cough.  Dispense: 180 mL; Refill: 0    Chronic cough  -     BNP; Future; Expected date: 04/04/2023  -     X-Ray Chest PA And Lateral; Future; Expected date: 04/04/2023  -     betamethasone acetate-betamethasone sodium phosphate injection 6 mg  -     doxycycline (VIBRAMYCIN) 100 MG Cap; Take 1 capsule (100 mg total) by mouth every 12 (twelve) hours. for 7 days  Dispense: 14 capsule; Refill: 0  -     CBC Auto Differential; Future; Expected date: 04/04/2023  -     Comprehensive Metabolic Panel; Future; Expected date: 04/04/2023    Elevated  brain natriuretic peptide (BNP) level  -     BNP; Future; Expected date: 04/04/2023  -     X-Ray Chest PA And Lateral; Future; Expected date: 04/04/2023    S/P CABG (coronary artery bypass graft)                 URI/Bronchitis:   - patient has 5 weeks of cough, minimally productive.   - possibly improving slightly, but with duration would like steroids and antibiotics.   - on exam has congestion to all parts of lungs. Ears, throat are clear.  No lymphnodes.  Patient is coughing forcefully through interview.    - start Doxy 100mg BID x 7 days.  - Celestone injection.   - codeine cough syrup.  - has nebulizer solution, needs device and tubing which was ordered.    BNP:   - patient last BNP in Dec was elevated.   - has wet congested lungs on exam.  Reports SOB.   - rechecking BNP despite reassuring Echo last fall.

## 2023-04-10 ENCOUNTER — TELEPHONE (OUTPATIENT)
Dept: PRIMARY CARE CLINIC | Facility: CLINIC | Age: 68
End: 2023-04-10
Payer: MEDICARE

## 2023-04-10 DIAGNOSIS — J42 CHRONIC BRONCHITIS, UNSPECIFIED CHRONIC BRONCHITIS TYPE: Primary | ICD-10-CM

## 2023-04-10 NOTE — TELEPHONE ENCOUNTER
----- Message from Arleth Forte MA sent at 4/10/2023  2:08 PM CDT -----  Contact: Patient, [phone]    ----- Message -----  From: Belinda Rodarte  Sent: 4/10/2023   1:59 PM CDT  To: Darlin Menon Staff    Calling to inquire about refill request for Rx guaiFENesin-codeine 100-10 mg/5 ml (TUSSI-ORGANIDIN NR)  mg/5 mL syrup [Pharmacy Med Name: GUAIFEN-CODEINE 100-10 MG/5 ML]. Please call her. Thanks.

## 2023-04-11 ENCOUNTER — ANESTHESIA (OUTPATIENT)
Dept: ENDOSCOPY | Facility: HOSPITAL | Age: 68
End: 2023-04-11
Payer: MEDICARE

## 2023-04-11 ENCOUNTER — ANESTHESIA EVENT (OUTPATIENT)
Dept: ENDOSCOPY | Facility: HOSPITAL | Age: 68
End: 2023-04-11
Payer: MEDICARE

## 2023-04-11 ENCOUNTER — HOSPITAL ENCOUNTER (OUTPATIENT)
Facility: HOSPITAL | Age: 68
Discharge: HOME | End: 2023-04-11
Attending: INTERNAL MEDICINE | Admitting: INTERNAL MEDICINE
Payer: MEDICARE

## 2023-04-11 VITALS
TEMPERATURE: 98 F | BODY MASS INDEX: 33.04 KG/M2 | HEIGHT: 68 IN | RESPIRATION RATE: 18 BRPM | HEART RATE: 70 BPM | SYSTOLIC BLOOD PRESSURE: 155 MMHG | OXYGEN SATURATION: 100 % | DIASTOLIC BLOOD PRESSURE: 70 MMHG | WEIGHT: 218 LBS

## 2023-04-11 DIAGNOSIS — Q39.4 ESOPHAGEAL WEB: ICD-10-CM

## 2023-04-11 DIAGNOSIS — R13.10 DYSPHAGIA, UNSPECIFIED TYPE: Primary | ICD-10-CM

## 2023-04-11 PROCEDURE — 43248 EGD GUIDE WIRE INSERTION: CPT | Mod: HCNC | Performed by: INTERNAL MEDICINE

## 2023-04-11 PROCEDURE — 88305 TISSUE EXAM BY PATHOLOGIST: ICD-10-PCS | Mod: 26,HCNC,, | Performed by: PATHOLOGY

## 2023-04-11 PROCEDURE — 43248 PR EGD, FLEX, W/DILATION OVER GUIDEWIRE: ICD-10-PCS | Mod: HCNC,,, | Performed by: INTERNAL MEDICINE

## 2023-04-11 PROCEDURE — 43239 PR EGD, FLEX, W/BIOPSY, SGL/MULTI: ICD-10-PCS | Mod: 59,HCNC,, | Performed by: INTERNAL MEDICINE

## 2023-04-11 PROCEDURE — 88305 TISSUE EXAM BY PATHOLOGIST: CPT | Mod: HCNC | Performed by: PATHOLOGY

## 2023-04-11 PROCEDURE — 88342 IMHCHEM/IMCYTCHM 1ST ANTB: CPT | Mod: 26,HCNC,, | Performed by: PATHOLOGY

## 2023-04-11 PROCEDURE — 88342 CHG IMMUNOCYTOCHEMISTRY: ICD-10-PCS | Mod: 26,HCNC,, | Performed by: PATHOLOGY

## 2023-04-11 PROCEDURE — 25000003 PHARM REV CODE 250: Mod: HCNC | Performed by: NURSE ANESTHETIST, CERTIFIED REGISTERED

## 2023-04-11 PROCEDURE — D9220A PRA ANESTHESIA: ICD-10-PCS | Mod: HCNC,ANES,, | Performed by: ANESTHESIOLOGY

## 2023-04-11 PROCEDURE — 43239 EGD BIOPSY SINGLE/MULTIPLE: CPT | Mod: 59,HCNC,, | Performed by: INTERNAL MEDICINE

## 2023-04-11 PROCEDURE — 88342 IMHCHEM/IMCYTCHM 1ST ANTB: CPT | Mod: HCNC | Performed by: PATHOLOGY

## 2023-04-11 PROCEDURE — 37000008 HC ANESTHESIA 1ST 15 MINUTES: Mod: HCNC | Performed by: INTERNAL MEDICINE

## 2023-04-11 PROCEDURE — 27201012 HC FORCEPS, HOT/COLD, DISP: Mod: HCNC | Performed by: INTERNAL MEDICINE

## 2023-04-11 PROCEDURE — D9220A PRA ANESTHESIA: Mod: HCNC,ANES,, | Performed by: ANESTHESIOLOGY

## 2023-04-11 PROCEDURE — D9220A PRA ANESTHESIA: Mod: HCNC,CRNA,, | Performed by: NURSE ANESTHETIST, CERTIFIED REGISTERED

## 2023-04-11 PROCEDURE — 63600175 PHARM REV CODE 636 W HCPCS: Mod: HCNC | Performed by: NURSE ANESTHETIST, CERTIFIED REGISTERED

## 2023-04-11 PROCEDURE — 25000003 PHARM REV CODE 250: Mod: HCNC | Performed by: INTERNAL MEDICINE

## 2023-04-11 PROCEDURE — 88305 TISSUE EXAM BY PATHOLOGIST: CPT | Mod: 26,HCNC,, | Performed by: PATHOLOGY

## 2023-04-11 PROCEDURE — 43248 EGD GUIDE WIRE INSERTION: CPT | Mod: HCNC,,, | Performed by: INTERNAL MEDICINE

## 2023-04-11 PROCEDURE — 37000009 HC ANESTHESIA EA ADD 15 MINS: Mod: HCNC | Performed by: INTERNAL MEDICINE

## 2023-04-11 PROCEDURE — 43239 EGD BIOPSY SINGLE/MULTIPLE: CPT | Mod: 59,HCNC | Performed by: INTERNAL MEDICINE

## 2023-04-11 PROCEDURE — D9220A PRA ANESTHESIA: ICD-10-PCS | Mod: HCNC,CRNA,, | Performed by: NURSE ANESTHETIST, CERTIFIED REGISTERED

## 2023-04-11 PROCEDURE — C1769 GUIDE WIRE: HCPCS | Mod: HCNC | Performed by: INTERNAL MEDICINE

## 2023-04-11 RX ORDER — LIDOCAINE HYDROCHLORIDE 20 MG/ML
INJECTION INTRAVENOUS
Status: DISCONTINUED | OUTPATIENT
Start: 2023-04-11 | End: 2023-04-11

## 2023-04-11 RX ORDER — SODIUM CHLORIDE 9 MG/ML
INJECTION, SOLUTION INTRAVENOUS CONTINUOUS
Status: DISCONTINUED | OUTPATIENT
Start: 2023-04-11 | End: 2023-04-11 | Stop reason: HOSPADM

## 2023-04-11 RX ORDER — PROPOFOL 10 MG/ML
VIAL (ML) INTRAVENOUS
Status: DISCONTINUED | OUTPATIENT
Start: 2023-04-11 | End: 2023-04-11

## 2023-04-11 RX ADMIN — LIDOCAINE HYDROCHLORIDE 100 MG: 20 INJECTION, SOLUTION INTRAVENOUS at 12:04

## 2023-04-11 RX ADMIN — PROPOFOL 30 MG: 10 INJECTION, EMULSION INTRAVENOUS at 12:04

## 2023-04-11 RX ADMIN — SODIUM CHLORIDE: 0.9 INJECTION, SOLUTION INTRAVENOUS at 12:04

## 2023-04-11 RX ADMIN — GLYCOPYRROLATE 0.1 MG: 0.2 INJECTION, SOLUTION INTRAMUSCULAR; INTRAVITREAL at 12:04

## 2023-04-11 RX ADMIN — PROPOFOL 20 MG: 10 INJECTION, EMULSION INTRAVENOUS at 12:04

## 2023-04-11 RX ADMIN — PROPOFOL 100 MG: 10 INJECTION, EMULSION INTRAVENOUS at 12:04

## 2023-04-11 NOTE — PLAN OF CARE
Patient awake, alert, and oriented.  No complaints of pain; abdomen soft and tender.  Vital signs stable.  Patient tolerated po fluids well.  Dr. Henry spoke with patient and family member prior to discharge.  Patient and family verbalize understanding of all discharge instructions given.  Patient discharged to home accompanied by family

## 2023-04-11 NOTE — PROVATION PATIENT INSTRUCTIONS
Discharge Summary/Instructions after an Endoscopic Procedure  Patient Name: Stephania Salmeron  Patient MRN: [id number]  Patient YOB: 1955 Tuesday, April 11, 2023  Adryan Henry MD  Dear patient,  As a result of recent federal legislation (The Federal Cures Act), you may   receive lab or pathology results from your procedure in your [facility]   account before your physician is able to contact you. Your physician or   their representative will relay the results to you with their   recommendations at their soonest availability.  Thank you,  RESTRICTIONS:  During your procedure today, you received medications for sedation.  These   medications may affect your judgment, balance and coordination.  Therefore,   for 24 hours, you have the following restrictions:   - DO NOT drive a car, operate machinery, make legal/financial decisions,   sign important papers or drink alcohol.    ACTIVITY:  Today: no heavy lifting, straining or running due to procedural   sedation/anesthesia.  The following day: return to full activity including work.  DIET:  Eat and drink normally unless instructed otherwise.     TREATMENT FOR COMMON SIDE EFFECTS:  - Mild abdominal pain, nausea, belching, bloating or excessive gas:  rest,   eat lightly and use a heating pad.  - Sore Throat: treat with throat lozenges and/or gargle with warm salt   water.  - Because air was used during the procedure, expelling large amounts of air   from your rectum or belching is normal.  - If a bowel prep was taken, you may not have a bowel movement for 1-3 days.    This is normal.  SYMPTOMS TO WATCH FOR AND REPORT TO YOUR PHYSICIAN:  1. Abdominal pain or bloating, other than gas cramps.  2. Chest pain.  3. Back pain.  4. Signs of infection such as: chills or fever occurring within 24 hours   after the procedure.  5. Rectal bleeding, which would show as bright red, maroon, or black stools.   (A tablespoon of blood from the rectum is not serious, especially if    hemorrhoids are present.)  6. Vomiting.  7. Weakness or dizziness.  GO DIRECTLY TO THE NEAREST EMERGENCY ROOM IF YOU HAVE ANY OF THE FOLLOWING:      Difficulty breathing              Chills and/or fever over 101 F   Persistent vomiting and/or vomiting blood   Severe abdominal pain   Severe chest pain   Black, tarry stools   Bleeding- more than one tablespoon   Any other symptom or condition that you feel may need urgent attention  Your doctor recommends these additional instructions:  If any biopsies were taken, your doctors clinic will contact you in 1 to 2   weeks with any results.  - Discharge patient to home.   - Advance diet as tolerated.   - Continue present medications.   - Await pathology results.  For questions, problems or results please call your physician - Adryan Henry MD at Work:  [phone].  [facility], EMERGENCY ROOM PHONE NUMBER: [phone]  IF A COMPLICATION OR EMERGENCY SITUATION ARISES AND YOU ARE UNABLE TO REACH   YOUR PHYSICIAN - GO DIRECTLY TO THE EMERGENCY ROOM.  Adryan Henry MD  4/11/2023 1:01:09 PM  This report has been verified and signed electronically.  Dear patient,  As a result of recent federal legislation (The Federal Cures Act), you may   receive lab or pathology results from your procedure in your [facility]   account before your physician is able to contact you. Your physician or   their representative will relay the results to you with their   recommendations at their soonest availability.  Thank you,  [employer]OVATION

## 2023-04-11 NOTE — TRANSFER OF CARE
"Anesthesia Transfer of Care Note    Patient: Stephania Salmeron    Procedure(s) Performed: Procedure(s) (LRB):  EGD (ESOPHAGOGASTRODUODENOSCOPY) (N/A)    Patient location: PACU    Anesthesia Type: general    Transport from OR: Transported from OR on room air with adequate spontaneous ventilation    Post pain: adequate analgesia    Post assessment: no apparent anesthetic complications and tolerated procedure well    Post vital signs: stable    Level of consciousness: sedated    Nausea/Vomiting: no nausea/vomiting    Complications: none    Transfer of care protocol was followed      Last vitals:   Visit Vitals  /65 (BP Location: Left arm, Patient Position: Lying)   Pulse 70   Temp 36.2 °C (97.2 °F) (Skin)   Resp 15   Ht 5' 8" (1.727 m)   Wt 98.9 kg (218 lb)   SpO2 96%   BMI 33.15 kg/m²     "

## 2023-04-11 NOTE — DISCHARGE INSTRUCTIONS
"Discharge Instructions: After Your Surgery/Procedure  Youve just had surgery. During surgery you were given medicine called anesthesia to keep you relaxed and free of pain. After surgery you may have some pain or nausea. This is common. Here are some tips for feeling better and getting well after surgery.     Stay on schedule with your medication.   Going home  Your doctor or nurse will show you how to take care of yourself when you go home. He or she will also answer your questions. Have an adult family member or friend drive you home.      For your safety we recommend these precaution for the first 24 hours after your procedure:  Do not drive or use heavy equipment.  Do not make important decisions or sign legal papers.  Do not drink alcohol.  Have someone stay with you, if needed. He or she can watch for problems and help keep you safe.  Your concentration, balance, coordination, and judgement may be impaired for many hours after anesthesia.  Use caution when ambulating or standing up.     You may feel weak and "washed out" after anesthesia and surgery.      Subtle residual effects of general anesthesia or sedation with regional / local anesthesia can last more than 24 hours.  Rest for the remainder of the day or longer if your Doctor/Surgeon has advised you to do so.  Although you may feel normal within the first 24 hours, your reflexes and mental ability may be impaired without you realizing it.  You may feel dizzy, lightheaded or sleepy for 24 hours or longer.      Be sure to go to all follow-up visits with your doctor. And rest after your surgery for as long as your doctor tells you to.  Coping with pain  If you have pain after surgery, pain medicine will help you feel better. Take it as told, before pain becomes severe. Also, ask your doctor or pharmacist about other ways to control pain. This might be with heat, ice, or relaxation. And follow any other instructions your surgeon or nurse gives you.  Tips " for taking pain medicine  To get the best relief possible, remember these points:  Pain medicines can upset your stomach. Taking them with a little food may help.  Most pain relievers taken by mouth need at least 20 to 30 minutes to start to work.  Taking medicine on a schedule can help you remember to take it. Try to time your medicine so that you can take it before starting an activity. This might be before you get dressed, go for a walk, or sit down for dinner.  Constipation is a common side effect of pain medicines. Call your doctor before taking any medicines such as laxatives or stool softeners to help ease constipation. Also ask if you should skip any foods. Drinking lots of fluids and eating foods such as fruits and vegetables that are high in fiber can also help. Remember, do not take laxatives unless your surgeon has prescribed them.  Drinking alcohol and taking pain medicine can cause dizziness and slow your breathing. It can even be deadly. Do not drink alcohol while taking pain medicine.  Pain medicine can make you react more slowly to things. Do not drive or run machinery while taking pain medicine.  Your health care provider may tell you to take acetaminophen to help ease your pain. Ask him or her how much you are supposed to take each day. Acetaminophen or other pain relievers may interact with your prescription medicines or other over-the-counter (OTC) drugs. Some prescription medicines have acetaminophen and other ingredients. Using both prescription and OTC acetaminophen for pain can cause you to overdose. Read the labels on your OTC medicines with care. This will help you to clearly know the list of ingredients, how much to take, and any warnings. It may also help you not take too much acetaminophen. If you have questions or do not understand the information, ask your pharmacist or health care provider to explain it to you before you take the OTC medicine.  Managing nausea  Some people have an  upset stomach after surgery. This is often because of anesthesia, pain, or pain medicine, or the stress of surgery. These tips will help you handle nausea and eat healthy foods as you get better. If you were on a special food plan before surgery, ask your doctor if you should follow it while you get better. These tips may help:  Do not push yourself to eat. Your body will tell you when to eat and how much.  Start off with clear liquids and soup. They are easier to digest.  Next try semi-solid foods, such as mashed potatoes, applesauce, and gelatin, as you feel ready.  Slowly move to solid foods. Dont eat fatty, rich, or spicy foods at first.  Do not force yourself to have 3 large meals a day. Instead eat smaller amounts more often.  Take pain medicines with a small amount of solid food, such as crackers or toast, to avoid nausea.     Call your surgeon if  You still have pain an hour after taking medicine. The medicine may not be strong enough.  You feel too sleepy, dizzy, or groggy. The medicine may be too strong.  You have side effects like nausea, vomiting, or skin changes, such as rash, itching, or hives.       If you have obstructive sleep apnea  You were given anesthesia medicine during surgery to keep you comfortable and free of pain. After surgery, you may have more apnea spells because of this medicine and other medicines you were given. The spells may last longer than usual.   At home:  Keep using the continuous positive airway pressure (CPAP) device when you sleep. Unless your health care provider tells you not to, use it when you sleep, day or night. CPAP is a common device used to treat obstructive sleep apnea.  Talk with your provider before taking any pain medicine, muscle relaxants, or sedatives. Your provider will tell you about the possible dangers of taking these medicines.  © 9511-9993 [employer]. [address] [place] [address], [place] [address]. All rights reserved. This information is  "not intended as a substitute for professional medical care. Always follow your healthcare professional's instructions. astritis   The Basics   Written by the doctors and editors at [facility]   What is gastritis? -- "Gastritis" means inflammation of the stomach lining (figure 1).  Some people have gastritis that comes on suddenly and lasts only for a short time. Doctors call this "acute" gastritis. Other people have gastritis that lasts for months or years. Doctors call this "chronic" gastritis.  What causes gastritis? -- Different things can cause gastritis, including:  An infection in the stomach from bacteria called "H. pylori"  Medicines called "nonsteroidal antiinflammatory drugs" (NSAIDs) - These include aspirin, ibuprofen (brand names: Advil, Motrin), and naproxen (brand names: Aleve, Naprosyn).  Drinking alcohol  Conditions in which the body's infection-fighting system attacks the stomach lining  Having a serious or life-threatening illness  What are the symptoms of gastritis? -- People with gastritis have no symptoms. When people do have symptoms, they are due to other conditions that can happen with gastritis, like ulcers. Symptoms from ulcers include:  Pain in the upper belly  Feeling bloated, or feeling full after eating a small amount of food  Decreased appetite  Nausea or vomiting  Vomiting blood, or having black-colored bowel movements  Feeling more tired than usual - This happens if people with gastritis get a condition called "anemia."  Should I call my doctor or nurse? -- Call your doctor or nurse if:  You have belly pain that gets worse or doesn't go away  You vomit blood or have black bowel movements  You are losing weight (without trying to)  Will I need tests? -- Probably. Your doctor or nurse will ask about your symptoms and do an exam. They might also do:  An upper endoscopy - During this procedure, the doctor puts a thin tube with a camera on the end into your mouth and down into your stomach " (figure 2). they will look at the inside of your stomach. During the procedure, they might also do a test called a biopsy. For a biopsy, the doctor takes a small sample of the stomach lining. Then another doctor looks at the sample under a microscope.  Tests to check for H. pylori infection. These can include:  Blood tests  Breath tests - These tests measure substances in your breath after you drink a special liquid.  Tests on a small sample of your bowel movement  Blood tests to check for anemia  How is gastritis treated? -- Treatment depends on what's causing your gastritis.  For example, if NSAIDs are causing your gastritis, your doctor will recommend that you not take those medicines. If alcohol is causing your gastritis, they will recommend that you stop drinking alcohol.  Doctors can use medicines to treat gastritis caused by an H. pylori infection. Most people take 3 or more medicines for 2 weeks. The treatment includes antibiotics plus medicine that helps the stomach make less acid.  Doctors can use medicines that reduce or block stomach acid to treat other causes of gastritis (table 1). The main types of medicines that reduce or block stomach acid are:  Antacids  Surface agents  Histamine blockers  Proton pump inhibitors  If your doctor recommends acid-reducing treatment, they will tell you which medicine to use.  What happens after treatment? -- Sometimes, people who are treated for an H. pylori infection need follow-up tests to make sure the infection is gone. Follow-up tests include breath tests, lab tests on a sample of bowel movement, or endoscopy.  All topics are updated as new evidence becomes available and our peer review process is complete.  This topic retrieved from [employer] on: Sep 21, 2021.  Topic 28596 Version 8.0  Release: 29.4.2 - C29.263  © 2021 [employer], Inc. and/or its affiliates. All rights reserved Hiatal Hernia   The Basics   Written by the doctors and editors at [facility]   What is a  hiatal hernia? -- A hiatal hernia is what doctors call it when a part of the stomach moves up into the chest area. Normally, the stomach sits below the diaphragm, the layer of muscle that separates the organs in the chest from the organs in the belly. The esophagus, the tube that carries food from the mouth to the stomach, passes through a hole in the diaphragm. In people with a hiatal hernia, the stomach pushes up through that hole, too.  There are 2 types of hiatal hernia (figure 1):  Sliding hernia - A sliding hernia happens when the top of the stomach and the lower part of the esophagus squeeze up into the space above the diaphragm. This is the most common type of hiatal hernia.  Paraesophageal hernia - A paraesophageal hernia happens when the top of the stomach squeezes up into the space above the diaphragm. This is not very common, but it can be serious if the stomach folds up on itself. It can also cause bleeding from the stomach or trouble breathing.  What are the symptoms of a hiatal hernia? -- Hiatal hernias do not usually cause symptoms. In some cases, though, hiatal hernias cause stomach acid to leak into the esophagus. This is called acid reflux or gastroesophageal reflux, and it can cause symptoms, including:  Burning in the chest, known as heartburn  Burning in the throat or an acid taste in the throat  Stomach or chest pain  Trouble swallowing  A raspy voice or a sore throat  Unexplained cough  Is there a test for hiatal hernia? -- Yes, but doctors do not usually test for hiatal hernia. Instead, most people learn they have a hiatal hernia when they are having tests to find the cause of symptoms, or for other reasons. For instance, some people find out they have a hiatal hernia when they have an X-ray. Others find out when their doctor puts a tube with a tiny camera down their throat (called an endoscopy) (figure 2).  How are hiatal hernias treated? -- People who have symptoms caused by a hiatal  hernia can get treated for their symptoms.  Treatment for symptoms involves taking the medicines that are used for acid reflux (table 1). People with a paraesophageal hernia, and some people with a sliding hernia, need surgery. For this surgery, the surgeon pulls the stomach back down and repairs the hole in the diaphragm so the stomach does not slide up again.  All topics are updated as new evidence becomes available and our peer review process is complete.  This topic retrieved from [employer] on: Sep 21, 2021.  Topic 61806 Version 8.0  Release: 29.4.2 - C29.263  © 2021 UpToDate, Inc. and/or its affiliates. All rights reserved.  figure 1: Hiatal hernia    sophageal Dilation   Why is this procedure done?   Esophageal dilation is a procedure used to dilate or stretch a narrow place in the esophagus. The esophagus is a tube that food moves through to get from your mouth to your stomach. When the esophagus narrows, this condition is called esophageal stricture.  What will the results be?   The narrowed portion of the esophagus will be stretched. This will make swallowing food and liquids easier.  What happens before the procedure?   Your doctor will take your history and do an exam. Talk to your doctor about:  All the drugs you are taking. Be sure to include all prescription and over-the-counter (OTC) drugs, and herbal supplements. Tell the doctor about any drug allergy. Bring a list of drugs you take with you.  Any bleeding problems. Be sure to tell your doctor if you are taking any drugs that may cause bleeding. Some of these are warfarin, rivaroxaban, apixaban, ticagrelor, clopidogrel, ketorolac, ibuprofen, naproxen, or aspirin. Certain vitamins and herbs, such as garlic and fish oil, may also add to the risk for bleeding. You may need to stop these drugs as well. Talk to your doctor about them.  If you need to stop eating or drinking before your procedure.  You will not be allowed to drive right away after the  procedure. Ask a family member or a friend to drive you home.  What happens during the procedure?   You will lie on the exam table. Your blood pressure and heart rate will be closely checked during the procedure.  Your doctor may put an IV in your arm to give you fluids and drugs. You may be asleep or awake during the procedure.  The doctor will spray a numbing drug into your throat. This will help you stay pain free and comfortable during the procedure.  A flexible tube with a tiny camera inside may be put in through your mouth and down into your esophagus. You may feel some pressure on your throat as it passes.  A special balloon or soft dilators are used to stretch the narrowed part of your esophagus.  X-rays will be done after the stretching to see if the esophagus is wide enough.  The procedure takes about 30 minutes.  What happens after the procedure?   You will go to the Recovery Room for 1 to 3 hours. Your doctor will tell you when you can go home.  Your throat will be sore. Your doctor will give you drugs for this.  What care is needed at home?   Ask your doctor what you need to do when you go home. Make sure you ask questions if you do not understand what the doctor says. This way you will know what you need to do.  It will be painful to swallow for a few days. Your throat will also be sore. Suck on ice chips to help ease throat pain.  Soft foods like soups and pureed fruits and vegetables will be easier to eat at first. Cold or warm liquids may help soothe your sore throat. Do not start eating solid foods until your doctor tells you it is safe to.  Your doctor may tell you to use a saline mouth rinse. Use this after meals or when needed. Mix 1/2 teaspoon (2.5 grams) of salt in 1 cup (240 mL) of water.  Rest for the rest of the day. Ask your doctor when you are able to go back to your normal activities.  What follow-up care is needed?   Your doctor may ask you to make visits to the office to check on your  progress. Be sure to keep these visits.  Your doctor may send you to a dietitian to help with your diet.   What lifestyle changes are needed?   Stay away from foods that may bother your throat, such as spicy or sour foods.  Prevent heartburn by eating small meals more often. Avoid eating too much in one meal.  What problems could happen?   Scarring of the esophagus  Esophagus may have a hole or tear in it  Narrowing may return  When do I need to call the doctor?   Signs of infection. These include a fever of 100.4°F (38°C) or higher, chills.  Throat pain not relieved by drugs  Throwing up with or without blood  Problems keeping any food or liquids down  You are not feeling better in 2 to 3 days or you are feeling worse  Trouble breathing  Chest pain  Bright red stools or black tarry stools  Last Reviewed Date   2021-05-06  Consumer Information Use and Disclaimer   This information is not specific medical advice and does not replace information you receive from your health care provider. This is only a brief summary of general information. It does NOT include all information about conditions, illnesses, injuries, tests, procedures, treatments, therapies, discharge instructions or life-style choices that may apply to you. You must talk with your health care provider for complete information about your health and treatment options. This information should not be used to decide whether or not to accept your health care providers advice, instructions or recommendations. Only your health care provider has the knowledge and training to provide advice that is right for you.  Copyright   Copyright © 2021 [employer] and its affiliates and/or licensors. All rights reserved.

## 2023-04-11 NOTE — H&P
History & Physical - Short Stay  Gastroenterology      SUBJECTIVE:     Procedure: EGD    Chief Complaint/Indication for Procedure: Dysphagia    PTA Medications   Medication Sig    albuterol (PROVENTIL/VENTOLIN HFA) 90 mcg/actuation inhaler INHALE 2 PUFFS INTO THE LUNGS EVERY 6 (SIX) HOURS AS NEEDED FOR WHEEZING. RESCUE    amLODIPine (NORVASC) 2.5 MG tablet Take 1 tablet (2.5 mg total) by mouth once daily.    atorvastatin (LIPITOR) 40 MG tablet TAKE 1 TABLET BY MOUTH EVERY DAY    budesonide-formoterol 160-4.5 mcg (SYMBICORT) 160-4.5 mcg/actuation HFAA Inhale 2 puffs into the lungs every 12 (twelve) hours. Controller    buPROPion (WELLBUTRIN XL) 150 MG TB24 tablet Take 450 mg by mouth once daily.    docusate sodium (COLACE) 100 MG capsule Take 1 capsule (100 mg total) by mouth 2 (two) times daily as needed for Constipation.    doxycycline (VIBRAMYCIN) 100 MG Cap Take 1 capsule (100 mg total) by mouth every 12 (twelve) hours. for 7 days    esomeprazole (NEXIUM) 40 MG capsule Take 1 capsule (40 mg total) by mouth before breakfast.    guaiFENesin-codeine 100-10 mg/5 ml (TUSSI-ORGANIDIN NR)  mg/5 mL syrup Take 5 mLs by mouth 3 (three) times daily as needed for Cough.    hydrOXYzine pamoate (VISTARIL) 25 MG Cap Take 1 capsule (25 mg total) by mouth 4 (four) times daily as needed (nausea).    LORazepam (ATIVAN) 1 MG tablet Take 1 mg by mouth 3 (three) times daily.    meclizine (ANTIVERT) 25 mg tablet Not standard daily medication. Limit to 5 tabs per week. 20 tabs per month.    metoprolol succinate (TOPROL-XL) 25 MG 24 hr tablet TAKE 1 TABLET BY MOUTH TWICE A DAY    pregabalin (LYRICA) 100 MG capsule Take 100 mg by mouth 3 (three) times daily as needed.    QUEtiapine (SEROQUEL) 200 MG Tab Take 200 mg by mouth every evening.    tiotropium bromide (SPIRIVA RESPIMAT) 1.25 mcg/actuation inhaler Inhale 2 puffs into the lungs once daily. Controller    albuterol (ACCUNEB) 1.25 mg/3 mL Nebu Take 3 mLs (1.25 mg total) by  nebulization 2 (two) times daily as needed (Shortness of breath). Rescue (Patient not taking: Reported on 4/4/2023)    aspirin (ECOTRIN) 81 MG EC tablet Take 81 mg by mouth once daily.    ergocalciferol (ERGOCALCIFEROL) 50,000 unit Cap TAKE 1 CAPSULE BY MOUTH ONE TIME PER WEEK    XARELTO 20 mg Tab TAKE 1 TABLET (20 MG TOTAL) BY MOUTH DAILY WITH DINNER OR EVENING MEAL.       Review of patient's allergies indicates:   Allergen Reactions    Compazine [prochlorperazine]      Feels like somethingcrawling underneath the skin    Demerol (pf) [meperidine (pf)]      Feels like somethingcrawling underneath the skin    Toradol [ketorolac]      Feels like somethingcrawling underneath the skin        Past Medical History:   Diagnosis Date    Acute coronary syndrome     Atrial fibrillation     Clotting disorder     COPD (chronic obstructive pulmonary disease)     Coronary artery disease     COVID-19 01/18/2021    Required hospitalization d/t pneumonia and hypoxia    GERD (gastroesophageal reflux disease)     Hyperlipidemia     Hypertension     ICD (implantable cardioverter-defibrillator) in place     [employer] Evera XT ICD - Model DIJH6C6 (SN: [id number]), 5568-45 (SN: [id number]), 6932-65 (SN: [id number])    Occipital stroke     Left occipital lobe    Quadrantanopia, right     Right lower quadrant     Past Surgical History:   Procedure Laterality Date    APPENDECTOMY      CARDIAC DEFIBRILLATOR PLACEMENT      CHOLECYSTECTOMY      COLONOSCOPY N/A 03/31/2021    Procedure: COLONOSCOPY Suprep;  Surgeon: Murtaza Curry MD;  Location: [facility] ENDO;  Service: Endoscopy;  Laterality: N/A;    CORONARY ANGIOGRAPHY INCLUDING BYPASS GRAFTS WITH CATHETERIZATION OF LEFT HEART N/A 12/19/2022    Procedure: ANGIOGRAM, CORONARY, INCLUDING BYPASS GRAFT, WITH LEFT HEART CATHETERIZATION;  Surgeon: Franklin Weems MD;  Location: [facility] CATH/EP LAB;  Service: Cardiology;  Laterality: N/A;    CORONARY ANGIOPLASTY      CORONARY ANGIOPLASTY WITH STENT  PLACEMENT      CORONARY ARTERY BYPASS GRAFT      ESOPHAGOGASTRODUODENOSCOPY N/A 2021    Procedure: EGD (ESOPHAGOGASTRODUODENOSCOPY);  Surgeon: Murtaza Curry MD;  [facility];  Service: Endoscopy;  Laterality: N/A;    DESIRAE FILTER PLACEMENT  2017    HYSTERECTOMY      UPPER GASTROINTESTINAL ENDOSCOPY       Family History   Problem Relation Age of Onset    Lung cancer Mother     Anuerysm Father     Colon polyps Sister     Dementia Sister     COPD Sister     Anxiety disorder Sister     Alcohol abuse Sister     Stroke Sister 72    Pulmonary embolism Sister     Depression Brother     Depression Brother     Bone cancer Maternal Aunt     Bipolar disorder Other         Neice    Colon cancer Neg Hx     Diabetes Mellitus Neg Hx     Breast cancer Neg Hx     Crohn's disease Neg Hx     Ulcerative colitis Neg Hx     Stomach cancer Neg Hx     Esophageal cancer Neg Hx      Social History     Tobacco Use    Smoking status: Former     Packs/day: 4.00     Years: 30.00     Pack years: 120.00     Types: Cigarettes     Quit date: 3/22/1994     Years since quittin.0    Smokeless tobacco: Never   Substance Use Topics    Alcohol use: Yes     Comment: occasional    Drug use: Never     OBJECTIVE:     Vital Signs (Most Recent)  Temp: 97.2 °F (36.2 °C) (23 1134)  Pulse: 70 (23 1134)  Resp: 15 (23 1134)  BP: 136/65 (23 1134)  SpO2: 96 % (23 1134)    Physical Exam:                                                       GENERAL:  Comfortable, in no acute distress.                                 HEENT EXAM:  Nonicteric.  No adenopathy.  Oropharynx is clear.               NECK:  Supple.                                                               LUNGS:  Clear.                                                               CARDIAC:  Regular rate and rhythm.  S1, S2.  No murmur.                      ABDOMEN:  Soft, positive bowel sounds, nontender.  No hepatosplenomegaly or masses.  No  rebound or guarding.                                             EXTREMITIES:  No edema.     MENTAL STATUS:  Normal, alert and oriented.    ASSESSMENT/PLAN:     Assessment: Dysphagia    Plan: EGD

## 2023-04-12 NOTE — ANESTHESIA POSTPROCEDURE EVALUATION
Anesthesia Post Evaluation    Patient: Stephania Salmeron    Procedure(s) Performed: Procedure(s) (LRB):  EGD (ESOPHAGOGASTRODUODENOSCOPY) (N/A)    Final Anesthesia Type: general      Patient location during evaluation: PACU  Patient participation: Yes- Able to Participate  Level of consciousness: awake and alert  Post-procedure vital signs: reviewed and stable  Pain management: adequate  Airway patency: patent    PONV status at discharge: No PONV  Anesthetic complications: no      Cardiovascular status: blood pressure returned to baseline  Respiratory status: unassisted  Hydration status: euvolemic  Follow-up not needed.          Vitals Value Taken Time   /70 04/11/23 1325   Temp 36.5 °C (97.7 °F) 04/11/23 1300   Pulse 70 04/11/23 1325   Resp 18 04/11/23 1325   SpO2 100 % 04/11/23 1325         Event Time   Out of Recovery 13:55:08         Pain/Noelle Score: Noelle Score: 10 (4/11/2023  1:25 PM)

## 2023-04-14 ENCOUNTER — TELEPHONE (OUTPATIENT)
Dept: GASTROENTEROLOGY | Facility: CLINIC | Age: 68
End: 2023-04-14
Payer: MEDICARE

## 2023-04-14 NOTE — TELEPHONE ENCOUNTER
--Left message for the patient to call back to [phone] to get a n appt for pulmonary.      --- Message from Susana Love, CRTT sent at 4/13/2023  1:16 PM CDT -----    ----- Message -----  From: Elena Nguyen  Sent: 4/13/2023  12:25 PM CDT  To: , #    Chronic bronchitis, unspecified chronic bronchitis type [J42] patient needs appointment please call patient back to schedule

## 2023-04-20 LAB
FINAL PATHOLOGIC DIAGNOSIS: NORMAL
GROSS: NORMAL
Lab: NORMAL

## 2023-04-21 DIAGNOSIS — J40 BRONCHITIS: ICD-10-CM

## 2023-04-21 DIAGNOSIS — J06.9 UPPER RESPIRATORY TRACT INFECTION, UNSPECIFIED TYPE: ICD-10-CM

## 2023-04-21 NOTE — TELEPHONE ENCOUNTER
----- Message from Khadijah Paiz sent at 4/21/2023 10:22 AM CDT -----  Contact: Pt [phone]  Requesting an RX refill or new RX.  Is this a refill or new RX: Refill  RX name and strength (copy/paste from chart):  guaiFENesin-codeine 100-10 mg/5 ml (TUSSI-ORGANIDIN NR)  mg/5 mL syrup  Is this a 30 day or 90 day RX: 30  Pharmacy name and phone # (copy/paste from chart):    [facility]/pharmacy #[id number] - [place] - [address]  [address]  [place] [address]  Phone: [phone] Fax: [phone]    Comment:  Patient can not stop coughing. Patient states she may need some antibiotics as well    Please call and advise.    Thank You

## 2023-04-23 RX ORDER — CODEINE PHOSPHATE AND GUAIFENESIN 10; 100 MG/5ML; MG/5ML
5 SOLUTION ORAL 3 TIMES DAILY PRN
Qty: 180 ML | Refills: 0 | OUTPATIENT
Start: 2023-04-23

## 2023-04-24 ENCOUNTER — OFFICE VISIT (OUTPATIENT)
Dept: PRIMARY CARE CLINIC | Facility: CLINIC | Age: 68
End: 2023-04-24
Payer: MEDICARE

## 2023-04-24 VITALS
OXYGEN SATURATION: 95 % | DIASTOLIC BLOOD PRESSURE: 58 MMHG | HEIGHT: 68 IN | SYSTOLIC BLOOD PRESSURE: 110 MMHG | TEMPERATURE: 98 F | BODY MASS INDEX: 32.71 KG/M2 | RESPIRATION RATE: 16 BRPM | WEIGHT: 215.81 LBS | HEART RATE: 50 BPM

## 2023-04-24 DIAGNOSIS — J06.9 RECURRENT URI (UPPER RESPIRATORY INFECTION): Primary | ICD-10-CM

## 2023-04-24 DIAGNOSIS — R07.81 RIB PAIN ON LEFT SIDE: ICD-10-CM

## 2023-04-24 DIAGNOSIS — J06.9 UPPER RESPIRATORY TRACT INFECTION, UNSPECIFIED TYPE: ICD-10-CM

## 2023-04-24 DIAGNOSIS — J40 BRONCHITIS: ICD-10-CM

## 2023-04-24 PROCEDURE — 99214 PR OFFICE/OUTPT VISIT, EST, LEVL IV, 30-39 MIN: ICD-10-PCS | Mod: HCNC,S$GLB,, | Performed by: STUDENT IN AN ORGANIZED HEALTH CARE EDUCATION/TRAINING PROGRAM

## 2023-04-24 PROCEDURE — 1101F PT FALLS ASSESS-DOCD LE1/YR: CPT | Mod: HCNC,CPTII,S$GLB, | Performed by: STUDENT IN AN ORGANIZED HEALTH CARE EDUCATION/TRAINING PROGRAM

## 2023-04-24 PROCEDURE — 1125F PR PAIN SEVERITY QUANTIFIED, PAIN PRESENT: ICD-10-PCS | Mod: HCNC,CPTII,S$GLB, | Performed by: STUDENT IN AN ORGANIZED HEALTH CARE EDUCATION/TRAINING PROGRAM

## 2023-04-24 PROCEDURE — 1159F MED LIST DOCD IN RCRD: CPT | Mod: HCNC,CPTII,S$GLB, | Performed by: STUDENT IN AN ORGANIZED HEALTH CARE EDUCATION/TRAINING PROGRAM

## 2023-04-24 PROCEDURE — 3008F BODY MASS INDEX DOCD: CPT | Mod: HCNC,CPTII,S$GLB, | Performed by: STUDENT IN AN ORGANIZED HEALTH CARE EDUCATION/TRAINING PROGRAM

## 2023-04-24 PROCEDURE — 1125F AMNT PAIN NOTED PAIN PRSNT: CPT | Mod: HCNC,CPTII,S$GLB, | Performed by: STUDENT IN AN ORGANIZED HEALTH CARE EDUCATION/TRAINING PROGRAM

## 2023-04-24 PROCEDURE — 99999 PR PBB SHADOW E&M-EST. PATIENT-LVL V: ICD-10-PCS | Mod: PBBFAC,HCNC,, | Performed by: STUDENT IN AN ORGANIZED HEALTH CARE EDUCATION/TRAINING PROGRAM

## 2023-04-24 PROCEDURE — 99214 OFFICE O/P EST MOD 30 MIN: CPT | Mod: HCNC,S$GLB,, | Performed by: STUDENT IN AN ORGANIZED HEALTH CARE EDUCATION/TRAINING PROGRAM

## 2023-04-24 PROCEDURE — 3078F DIAST BP <80 MM HG: CPT | Mod: HCNC,CPTII,S$GLB, | Performed by: STUDENT IN AN ORGANIZED HEALTH CARE EDUCATION/TRAINING PROGRAM

## 2023-04-24 PROCEDURE — 3078F PR MOST RECENT DIASTOLIC BLOOD PRESSURE < 80 MM HG: ICD-10-PCS | Mod: HCNC,CPTII,S$GLB, | Performed by: STUDENT IN AN ORGANIZED HEALTH CARE EDUCATION/TRAINING PROGRAM

## 2023-04-24 PROCEDURE — 3288F PR FALLS RISK ASSESSMENT DOCUMENTED: ICD-10-PCS | Mod: HCNC,CPTII,S$GLB, | Performed by: STUDENT IN AN ORGANIZED HEALTH CARE EDUCATION/TRAINING PROGRAM

## 2023-04-24 PROCEDURE — 1159F PR MEDICATION LIST DOCUMENTED IN MEDICAL RECORD: ICD-10-PCS | Mod: HCNC,CPTII,S$GLB, | Performed by: STUDENT IN AN ORGANIZED HEALTH CARE EDUCATION/TRAINING PROGRAM

## 2023-04-24 PROCEDURE — 3008F PR BODY MASS INDEX (BMI) DOCUMENTED: ICD-10-PCS | Mod: HCNC,CPTII,S$GLB, | Performed by: STUDENT IN AN ORGANIZED HEALTH CARE EDUCATION/TRAINING PROGRAM

## 2023-04-24 PROCEDURE — 99999 PR PBB SHADOW E&M-EST. PATIENT-LVL V: CPT | Mod: PBBFAC,HCNC,, | Performed by: STUDENT IN AN ORGANIZED HEALTH CARE EDUCATION/TRAINING PROGRAM

## 2023-04-24 PROCEDURE — 1101F PR PT FALLS ASSESS DOC 0-1 FALLS W/OUT INJ PAST YR: ICD-10-PCS | Mod: HCNC,CPTII,S$GLB, | Performed by: STUDENT IN AN ORGANIZED HEALTH CARE EDUCATION/TRAINING PROGRAM

## 2023-04-24 PROCEDURE — 3074F SYST BP LT 130 MM HG: CPT | Mod: HCNC,CPTII,S$GLB, | Performed by: STUDENT IN AN ORGANIZED HEALTH CARE EDUCATION/TRAINING PROGRAM

## 2023-04-24 PROCEDURE — 3074F PR MOST RECENT SYSTOLIC BLOOD PRESSURE < 130 MM HG: ICD-10-PCS | Mod: HCNC,CPTII,S$GLB, | Performed by: STUDENT IN AN ORGANIZED HEALTH CARE EDUCATION/TRAINING PROGRAM

## 2023-04-24 PROCEDURE — 3288F FALL RISK ASSESSMENT DOCD: CPT | Mod: HCNC,CPTII,S$GLB, | Performed by: STUDENT IN AN ORGANIZED HEALTH CARE EDUCATION/TRAINING PROGRAM

## 2023-04-24 RX ORDER — LEVOFLOXACIN 750 MG/1
750 TABLET ORAL DAILY
Qty: 5 TABLET | Refills: 0 | Status: SHIPPED | OUTPATIENT
Start: 2023-04-24 | End: 2023-04-29

## 2023-04-24 RX ORDER — PREDNISONE 10 MG/1
TABLET ORAL
Qty: 9 TABLET | Refills: 0 | Status: SHIPPED | OUTPATIENT
Start: 2023-04-24 | End: 2024-02-20

## 2023-04-24 RX ORDER — CODEINE PHOSPHATE AND GUAIFENESIN 10; 100 MG/5ML; MG/5ML
5 SOLUTION ORAL 3 TIMES DAILY PRN
Qty: 180 ML | Refills: 0 | Status: SHIPPED | OUTPATIENT
Start: 2023-04-24 | End: 2024-02-20

## 2023-04-24 NOTE — PATIENT INSTRUCTIONS
Recurrent URI:   - had treatment on 4/4/23 with Doxycycline, codeine and Celestone 6mg injection at that visit.  Had CXR that was wnl.   - symptoms for about 5 days, then had symptoms return and has had fevers up to 101.4 over the last several days.   - productive cough.  Poor rest.   - had pop on the left side of ribs a few days ago with cough, wanting to get chest xray.   - giving coedine cough med, Levaquin 750mg x 5 days, steroids orally.     Rib Pain:   - patient with pain to left rib wall, started with cough.   - TTP on exam.   - concern for broken rib.   - advise use of NSAID.   - getting CXR and left rib xray.

## 2023-04-24 NOTE — PROGRESS NOTES
"Subjective:           Patient ID: Stephania Salmeron is a 68 y.o. female who presents today with a chief complaint of cough and SOB.    Chief Complaint:   Shortness of Breath, Cough, and Chest Pain (Left sided rib pain)      History of Present Illness:    68 y.o. female who presents today with a chief complaint of cough and SOB.  Having left sided chest pain from where she felt a rib popping.     Was see on April 4th for URI.    Was given Doxycycline 100mg BID for 7 days.     Got better for about 5 days, but has been sick again since.  Has had fever on Fri/Sat/Sunday to 101.     Had EGD on 4/11/23.     Has an appt with Pulmonology on 5/22/23 with Dr. Uche Perrin.        Review of Systems   Constitutional:  Positive for activity change and fatigue. Negative for appetite change, fever and unexpected weight change.   HENT:  Positive for congestion, ear pain (mostly the right ear), postnasal drip and sore throat. Negative for nosebleeds, sinus pressure, sneezing and trouble swallowing.    Respiratory:  Positive for cough and chest tightness. Negative for shortness of breath and wheezing.    Cardiovascular:  Negative for chest pain, palpitations and leg swelling.   Gastrointestinal:  Positive for nausea. Negative for abdominal distention, constipation and diarrhea.   Genitourinary:  Negative for difficulty urinating and dysuria.   Musculoskeletal:  Negative for back pain and gait problem.   Skin:  Negative for pallor and rash.   Neurological:  Positive for weakness and headaches. Negative for numbness.   Psychiatric/Behavioral:  Positive for sleep disturbance. Negative for agitation. The patient is not nervous/anxious.          Objective:        Vitals:    04/24/23 1515   BP: (!) 110/58   BP Location: Right arm   Patient Position: Sitting   BP Method: Medium (Manual)   Pulse: (!) 50   Resp: 16   Temp: 97.5 °F (36.4 °C)   TempSrc: Temporal   SpO2: 95%   Weight: 97.9 kg (215 lb 13.3 oz)   Height: 5' 8" (1.727 m) "       Body mass index is 32.82 kg/m².      Physical Exam  Vitals reviewed.   Constitutional:       General: She is not in acute distress.     Appearance: Normal appearance. She is obese. She is ill-appearing.   HENT:      Head: Normocephalic and atraumatic.      Right Ear: Tympanic membrane and external ear normal.      Left Ear: Tympanic membrane and external ear normal.      Nose: No rhinorrhea.      Mouth/Throat:      Mouth: Mucous membranes are moist.      Pharynx: No posterior oropharyngeal erythema.   Eyes:      Extraocular Movements: Extraocular movements intact.      Conjunctiva/sclera: Conjunctivae normal.   Cardiovascular:      Rate and Rhythm: Normal rate and regular rhythm.      Heart sounds: No murmur heard.  Pulmonary:      Breath sounds: Wheezing and rhonchi present.      Comments: coughing  Abdominal:      Tenderness: There is no right CVA tenderness or left CVA tenderness.   Musculoskeletal:      Right lower leg: No edema.      Left lower leg: No edema.   Lymphadenopathy:      Cervical: No cervical adenopathy.   Skin:     Capillary Refill: Capillary refill takes less than 2 seconds.      Coloration: Skin is not jaundiced.      Findings: No bruising.   Neurological:      General: No focal deficit present.      Mental Status: She is alert and oriented to person, place, and time.      Motor: No weakness.      Gait: Gait normal.   Psychiatric:         Mood and Affect: Mood normal.           Lab Results   Component Value Date     04/04/2023    K 4.2 04/04/2023     (H) 04/04/2023    CO2 22 (L) 04/04/2023    BUN 17 04/04/2023    CREATININE 1.0 04/04/2023    ANIONGAP 12 04/04/2023     Lab Results   Component Value Date    HGBA1C 5.2 02/13/2021     Lab Results   Component Value Date     (H) 04/04/2023     (H) 12/15/2022    BNP 44 06/02/2022       Lab Results   Component Value Date    WBC 7.26 04/04/2023    HGB 11.8 (L) 04/04/2023    HCT 40.8 04/04/2023    HCT 37 06/02/2022    PLT  272 04/04/2023    GRAN 4.5 04/04/2023    GRAN 61.5 04/04/2023     Lab Results   Component Value Date    CHOL 181 02/28/2022    HDL 47 02/28/2022    LDLCALC 106.2 02/28/2022    TRIG 139 02/28/2022          Current Outpatient Medications:     albuterol (ACCUNEB) 1.25 mg/3 mL Nebu, Take 3 mLs (1.25 mg total) by nebulization 2 (two) times daily as needed (Shortness of breath). Rescue, Disp: 75 mL, Rfl: 3    albuterol (PROVENTIL/VENTOLIN HFA) 90 mcg/actuation inhaler, INHALE 2 PUFFS INTO THE LUNGS EVERY 6 (SIX) HOURS AS NEEDED FOR WHEEZING. RESCUE, Disp: 54 g, Rfl: 1    amLODIPine (NORVASC) 2.5 MG tablet, Take 1 tablet (2.5 mg total) by mouth once daily., Disp: 30 tablet, Rfl: 0    aspirin (ECOTRIN) 81 MG EC tablet, Take 81 mg by mouth once daily., Disp: , Rfl:     atorvastatin (LIPITOR) 40 MG tablet, TAKE 1 TABLET BY MOUTH EVERY DAY, Disp: 90 tablet, Rfl: 3    budesonide-formoterol 160-4.5 mcg (SYMBICORT) 160-4.5 mcg/actuation HFAA, Inhale 2 puffs into the lungs every 12 (twelve) hours. Controller, Disp: 30.6 g, Rfl: 5    buPROPion (WELLBUTRIN XL) 150 MG TB24 tablet, Take 450 mg by mouth once daily., Disp: , Rfl:     docusate sodium (COLACE) 100 MG capsule, Take 1 capsule (100 mg total) by mouth 2 (two) times daily as needed for Constipation., Disp: 60 capsule, Rfl: 5    ergocalciferol (ERGOCALCIFEROL) 50,000 unit Cap, TAKE 1 CAPSULE BY MOUTH ONE TIME PER WEEK, Disp: 12 capsule, Rfl: 3    esomeprazole (NEXIUM) 40 MG capsule, Take 1 capsule (40 mg total) by mouth before breakfast., Disp: 90 capsule, Rfl: 3    hydrOXYzine pamoate (VISTARIL) 25 MG Cap, Take 1 capsule (25 mg total) by mouth 4 (four) times daily as needed (nausea)., Disp: 120 capsule, Rfl: 3    LORazepam (ATIVAN) 1 MG tablet, Take 1 mg by mouth 3 (three) times daily., Disp: , Rfl:     meclizine (ANTIVERT) 25 mg tablet, Not standard daily medication. Limit to 5 tabs per week. 20 tabs per month., Disp: 20 tablet, Rfl: 2    metoprolol succinate (TOPROL-XL) 25  MG 24 hr tablet, TAKE 1 TABLET BY MOUTH TWICE A DAY, Disp: 180 tablet, Rfl: 3    pregabalin (LYRICA) 100 MG capsule, Take 100 mg by mouth 3 (three) times daily as needed., Disp: , Rfl:     QUEtiapine (SEROQUEL) 200 MG Tab, Take 200 mg by mouth every evening., Disp: , Rfl:     tiotropium bromide (SPIRIVA RESPIMAT) 1.25 mcg/actuation inhaler, Inhale 2 puffs into the lungs once daily. Controller, Disp: 12 g, Rfl: 5    XARELTO 20 mg Tab, TAKE 1 TABLET (20 MG TOTAL) BY MOUTH DAILY WITH DINNER OR EVENING MEAL., Disp: 30 tablet, Rfl: 1    guaiFENesin-codeine 100-10 mg/5 ml (TUSSI-ORGANIDIN NR)  mg/5 mL syrup, Take 5 mLs by mouth 3 (three) times daily as needed for Cough., Disp: 180 mL, Rfl: 0    levoFLOXacin (LEVAQUIN) 750 MG tablet, Take 1 tablet (750 mg total) by mouth once daily. for 5 days, Disp: 5 tablet, Rfl: 0    predniSONE (DELTASONE) 10 MG tablet, 2 tabs for 2 days, 1 tab for 3 days, 1/2 tab for 4 days., Disp: 9 tablet, Rfl: 0     Outpatient Encounter Medications as of 4/24/2023   Medication Sig Dispense Refill    albuterol (ACCUNEB) 1.25 mg/3 mL Nebu Take 3 mLs (1.25 mg total) by nebulization 2 (two) times daily as needed (Shortness of breath). Rescue 75 mL 3    albuterol (PROVENTIL/VENTOLIN HFA) 90 mcg/actuation inhaler INHALE 2 PUFFS INTO THE LUNGS EVERY 6 (SIX) HOURS AS NEEDED FOR WHEEZING. RESCUE 54 g 1    amLODIPine (NORVASC) 2.5 MG tablet Take 1 tablet (2.5 mg total) by mouth once daily. 30 tablet 0    aspirin (ECOTRIN) 81 MG EC tablet Take 81 mg by mouth once daily.      atorvastatin (LIPITOR) 40 MG tablet TAKE 1 TABLET BY MOUTH EVERY DAY 90 tablet 3    budesonide-formoterol 160-4.5 mcg (SYMBICORT) 160-4.5 mcg/actuation HFAA Inhale 2 puffs into the lungs every 12 (twelve) hours. Controller 30.6 g 5    buPROPion (WELLBUTRIN XL) 150 MG TB24 tablet Take 450 mg by mouth once daily.      docusate sodium (COLACE) 100 MG capsule Take 1 capsule (100 mg total) by mouth 2 (two) times daily as needed for  Constipation. 60 capsule 5    ergocalciferol (ERGOCALCIFEROL) 50,000 unit Cap TAKE 1 CAPSULE BY MOUTH ONE TIME PER WEEK 12 capsule 3    esomeprazole (NEXIUM) 40 MG capsule Take 1 capsule (40 mg total) by mouth before breakfast. 90 capsule 3    hydrOXYzine pamoate (VISTARIL) 25 MG Cap Take 1 capsule (25 mg total) by mouth 4 (four) times daily as needed (nausea). 120 capsule 3    LORazepam (ATIVAN) 1 MG tablet Take 1 mg by mouth 3 (three) times daily.      meclizine (ANTIVERT) 25 mg tablet Not standard daily medication. Limit to 5 tabs per week. 20 tabs per month. 20 tablet 2    metoprolol succinate (TOPROL-XL) 25 MG 24 hr tablet TAKE 1 TABLET BY MOUTH TWICE A  tablet 3    pregabalin (LYRICA) 100 MG capsule Take 100 mg by mouth 3 (three) times daily as needed.      QUEtiapine (SEROQUEL) 200 MG Tab Take 200 mg by mouth every evening.      tiotropium bromide (SPIRIVA RESPIMAT) 1.25 mcg/actuation inhaler Inhale 2 puffs into the lungs once daily. Controller 12 g 5    XARELTO 20 mg Tab TAKE 1 TABLET (20 MG TOTAL) BY MOUTH DAILY WITH DINNER OR EVENING MEAL. 30 tablet 1    guaiFENesin-codeine 100-10 mg/5 ml (TUSSI-ORGANIDIN NR)  mg/5 mL syrup Take 5 mLs by mouth 3 (three) times daily as needed for Cough. 180 mL 0    levoFLOXacin (LEVAQUIN) 750 MG tablet Take 1 tablet (750 mg total) by mouth once daily. for 5 days 5 tablet 0    predniSONE (DELTASONE) 10 MG tablet 2 tabs for 2 days, 1 tab for 3 days, 1/2 tab for 4 days. 9 tablet 0    [DISCONTINUED] guaiFENesin-codeine 100-10 mg/5 ml (TUSSI-ORGANIDIN NR)  mg/5 mL syrup Take 5 mLs by mouth 3 (three) times daily as needed for Cough. (Patient not taking: Reported on 4/24/2023) 180 mL 0     No facility-administered encounter medications on file as of 4/24/2023.          Assessment:       1. Recurrent URI (upper respiratory infection)    2. Rib pain on left side    3. Upper respiratory tract infection, unspecified type    4. Bronchitis           Plan:        Recurrent URI (upper respiratory infection)  -     levoFLOXacin (LEVAQUIN) 750 MG tablet; Take 1 tablet (750 mg total) by mouth once daily. for 5 days  Dispense: 5 tablet; Refill: 0  -     guaiFENesin-codeine 100-10 mg/5 ml (TUSSI-ORGANIDIN NR)  mg/5 mL syrup; Take 5 mLs by mouth 3 (three) times daily as needed for Cough.  Dispense: 180 mL; Refill: 0  -     predniSONE (DELTASONE) 10 MG tablet; 2 tabs for 2 days, 1 tab for 3 days, 1/2 tab for 4 days.  Dispense: 9 tablet; Refill: 0  -     X-Ray Chest PA And Lateral; Future; Expected date: 04/24/2023  -     X-Ray Ribs 2 View Left; Future; Expected date: 04/24/2023    Rib pain on left side  -     X-Ray Chest PA And Lateral; Future; Expected date: 04/24/2023  -     X-Ray Ribs 2 View Left; Future; Expected date: 04/24/2023    Upper respiratory tract infection, unspecified type  -     guaiFENesin-codeine 100-10 mg/5 ml (TUSSI-ORGANIDIN NR)  mg/5 mL syrup; Take 5 mLs by mouth 3 (three) times daily as needed for Cough.  Dispense: 180 mL; Refill: 0  -     predniSONE (DELTASONE) 10 MG tablet; 2 tabs for 2 days, 1 tab for 3 days, 1/2 tab for 4 days.  Dispense: 9 tablet; Refill: 0    Bronchitis  -     guaiFENesin-codeine 100-10 mg/5 ml (TUSSI-ORGANIDIN NR)  mg/5 mL syrup; Take 5 mLs by mouth 3 (three) times daily as needed for Cough.  Dispense: 180 mL; Refill: 0  -     predniSONE (DELTASONE) 10 MG tablet; 2 tabs for 2 days, 1 tab for 3 days, 1/2 tab for 4 days.  Dispense: 9 tablet; Refill: 0  -     X-Ray Chest PA And Lateral; Future; Expected date: 04/24/2023  -     X-Ray Ribs 2 View Left; Future; Expected date: 04/24/2023               Recurrent URI:   - had treatment on 4/4/23 with Doxycycline, codeine and Celestone 6mg injection at that visit.  Had CXR that was wnl.   - symptoms for about 5 days, then had symptoms return and has had fevers up to 101.4 over the last several days.   - productive cough.  Poor rest.   - had pop on the left side of ribs a few  days ago with cough, wanting to get chest xray.   - giving coedine cough med, Levaquin 750mg x 5 days, steroids orally.     Rib Pain:   - patient with pain to left rib wall, started with cough.   - TTP on exam.   - concern for broken rib.   - advise use of NSAID.   - getting CXR and left rib xray.

## 2023-05-02 ENCOUNTER — TELEPHONE (OUTPATIENT)
Dept: PRIMARY CARE CLINIC | Facility: CLINIC | Age: 68
End: 2023-05-02
Payer: MEDICARE

## 2023-05-02 NOTE — TELEPHONE ENCOUNTER
----- Message from Laurie Baron sent at 5/2/2023 10:07 AM CDT -----  Contact: [phone]  Pt is returning a call she missed from the office. Please call.                      Thank you

## 2023-05-22 ENCOUNTER — OFFICE VISIT (OUTPATIENT)
Dept: PULMONOLOGY | Facility: CLINIC | Age: 68
End: 2023-05-22
Payer: MEDICARE

## 2023-05-22 VITALS
BODY MASS INDEX: 32.23 KG/M2 | WEIGHT: 212.63 LBS | HEART RATE: 78 BPM | SYSTOLIC BLOOD PRESSURE: 154 MMHG | HEIGHT: 68 IN | DIASTOLIC BLOOD PRESSURE: 97 MMHG | OXYGEN SATURATION: 98 %

## 2023-05-22 DIAGNOSIS — R05.8 POST-VIRAL COUGH SYNDROME: Primary | ICD-10-CM

## 2023-05-22 DIAGNOSIS — H81.4 VERTIGO OF CENTRAL ORIGIN: ICD-10-CM

## 2023-05-22 DIAGNOSIS — J42 CHRONIC BRONCHITIS, UNSPECIFIED CHRONIC BRONCHITIS TYPE: ICD-10-CM

## 2023-05-22 DIAGNOSIS — R05.2 SUBACUTE COUGH: ICD-10-CM

## 2023-05-22 PROCEDURE — 3077F SYST BP >= 140 MM HG: CPT | Mod: HCNC,CPTII,S$GLB, | Performed by: INTERNAL MEDICINE

## 2023-05-22 PROCEDURE — 1100F PTFALLS ASSESS-DOCD GE2>/YR: CPT | Mod: HCNC,CPTII,S$GLB, | Performed by: INTERNAL MEDICINE

## 2023-05-22 PROCEDURE — 1126F AMNT PAIN NOTED NONE PRSNT: CPT | Mod: HCNC,CPTII,S$GLB, | Performed by: INTERNAL MEDICINE

## 2023-05-22 PROCEDURE — 1159F MED LIST DOCD IN RCRD: CPT | Mod: HCNC,CPTII,S$GLB, | Performed by: INTERNAL MEDICINE

## 2023-05-22 PROCEDURE — 99999 PR PBB SHADOW E&M-EST. PATIENT-LVL IV: ICD-10-PCS | Mod: PBBFAC,HCNC,, | Performed by: INTERNAL MEDICINE

## 2023-05-22 PROCEDURE — 99204 PR OFFICE/OUTPT VISIT, NEW, LEVL IV, 45-59 MIN: ICD-10-PCS | Mod: HCNC,S$GLB,, | Performed by: INTERNAL MEDICINE

## 2023-05-22 PROCEDURE — 3008F BODY MASS INDEX DOCD: CPT | Mod: HCNC,CPTII,S$GLB, | Performed by: INTERNAL MEDICINE

## 2023-05-22 PROCEDURE — 1126F PR PAIN SEVERITY QUANTIFIED, NO PAIN PRESENT: ICD-10-PCS | Mod: HCNC,CPTII,S$GLB, | Performed by: INTERNAL MEDICINE

## 2023-05-22 PROCEDURE — 3080F PR MOST RECENT DIASTOLIC BLOOD PRESSURE >= 90 MM HG: ICD-10-PCS | Mod: HCNC,CPTII,S$GLB, | Performed by: INTERNAL MEDICINE

## 2023-05-22 PROCEDURE — 99204 OFFICE O/P NEW MOD 45 MIN: CPT | Mod: HCNC,S$GLB,, | Performed by: INTERNAL MEDICINE

## 2023-05-22 PROCEDURE — 3288F FALL RISK ASSESSMENT DOCD: CPT | Mod: HCNC,CPTII,S$GLB, | Performed by: INTERNAL MEDICINE

## 2023-05-22 PROCEDURE — 3288F PR FALLS RISK ASSESSMENT DOCUMENTED: ICD-10-PCS | Mod: HCNC,CPTII,S$GLB, | Performed by: INTERNAL MEDICINE

## 2023-05-22 PROCEDURE — 99999 PR PBB SHADOW E&M-EST. PATIENT-LVL IV: CPT | Mod: PBBFAC,HCNC,, | Performed by: INTERNAL MEDICINE

## 2023-05-22 PROCEDURE — 3080F DIAST BP >= 90 MM HG: CPT | Mod: HCNC,CPTII,S$GLB, | Performed by: INTERNAL MEDICINE

## 2023-05-22 PROCEDURE — 3008F PR BODY MASS INDEX (BMI) DOCUMENTED: ICD-10-PCS | Mod: HCNC,CPTII,S$GLB, | Performed by: INTERNAL MEDICINE

## 2023-05-22 PROCEDURE — 1100F PR PT FALLS ASSESS DOC 2+ FALLS/FALL W/INJURY/YR: ICD-10-PCS | Mod: HCNC,CPTII,S$GLB, | Performed by: INTERNAL MEDICINE

## 2023-05-22 PROCEDURE — 1159F PR MEDICATION LIST DOCUMENTED IN MEDICAL RECORD: ICD-10-PCS | Mod: HCNC,CPTII,S$GLB, | Performed by: INTERNAL MEDICINE

## 2023-05-22 PROCEDURE — 99214 OFFICE O/P EST MOD 30 MIN: CPT | Mod: HCNC,PN | Performed by: INTERNAL MEDICINE

## 2023-05-22 PROCEDURE — 3077F PR MOST RECENT SYSTOLIC BLOOD PRESSURE >= 140 MM HG: ICD-10-PCS | Mod: HCNC,CPTII,S$GLB, | Performed by: INTERNAL MEDICINE

## 2023-05-22 RX ORDER — PREDNISONE 20 MG/1
20 TABLET ORAL DAILY
Qty: 5 TABLET | Refills: 0 | Status: SHIPPED | OUTPATIENT
Start: 2023-05-22 | End: 2024-02-20

## 2023-05-22 RX ORDER — AZITHROMYCIN 250 MG/1
TABLET, FILM COATED ORAL
Qty: 6 TABLET | Refills: 0 | Status: SHIPPED | OUTPATIENT
Start: 2023-05-22 | End: 2023-05-27

## 2023-05-22 NOTE — PROGRESS NOTES
History & Physical  [facility]    SUBJECTIVE:     Chief Complaint:   Subacute cough    History of Present Illness:  Stephania Salmeron is a 68 y.o. female who presents for evaluation of subacute cough. Pt reports that she had a cold at the start of April; her primary symptom was an acute cough & chest congestion. She saw her PCP who told her she had a respiratory infection. She took a course of doxycycline. Her cough improved some over the course of two weeks, but her cough is still persisting today. The cough is non-productive.    Pt pt denies nasal congestion & nasal drainage. The pt has GERD & takes nexium.    The pt experiences dyspnea on exertion for many years.     She is taking symbicort, spiriva, & a nebulizer.    She started smoking at 17 years-old. She quit smoking at 39 years-old. She was a heavy smoker. She would go through 2-3 packs per day.    She did not have asthma as a child. She is no longer working. She used to work as a .    PMH: CAD s/p CABG, afib on xarelto, PE, carol filter    Review of patient's allergies indicates:   Allergen Reactions    Compazine [prochlorperazine]      Feels like somethingcrawling underneath the skin    Demerol (pf) [meperidine (pf)]      Feels like somethingcrawling underneath the skin    Toradol [ketorolac]      Feels like somethingcrawling underneath the skin     Past Medical History:   Diagnosis Date    Acute coronary syndrome     Atrial fibrillation     Clotting disorder     COPD (chronic obstructive pulmonary disease)     Coronary artery disease     COVID-19 01/18/2021    Required hospitalization d/t pneumonia and hypoxia    GERD (gastroesophageal reflux disease)     Hyperlipidemia     Hypertension     ICD (implantable cardioverter-defibrillator) in place     Medtronic Evera XT ICD - Model RMCO8C0 (SN: [id number]), 5568-45 (SN: [id number]), 6932-65 (SN: [id number])    Occipital stroke     Left occipital lobe    Quadrantanopia, right     Right lower  quadrant     Past Surgical History:   Procedure Laterality Date    APPENDECTOMY      CARDIAC DEFIBRILLATOR PLACEMENT      CHOLECYSTECTOMY      COLONOSCOPY N/A 03/31/2021    Procedure: COLONOSCOPY Suprep;  Surgeon: Murtaza Curry MD;  Location: [facility] ENDO;  Service: Endoscopy;  Laterality: N/A;    CORONARY ANGIOGRAPHY INCLUDING BYPASS GRAFTS WITH CATHETERIZATION OF LEFT HEART N/A 12/19/2022    Procedure: ANGIOGRAM, CORONARY, INCLUDING BYPASS GRAFT, WITH LEFT HEART CATHETERIZATION;  Surgeon: Franklin Weems MD;  [facility] CATH/EP LAB;  Service: Cardiology;  Laterality: N/A;    CORONARY ANGIOPLASTY      CORONARY ANGIOPLASTY WITH STENT PLACEMENT      CORONARY ARTERY BYPASS GRAFT  1997    ESOPHAGOGASTRODUODENOSCOPY N/A 03/31/2021    Procedure: EGD (ESOPHAGOGASTRODUODENOSCOPY);  Surgeon: Murtaza Curry MD;  Location: [facility] ENDO;  Service: Endoscopy;  Laterality: N/A;    ESOPHAGOGASTRODUODENOSCOPY N/A 4/11/2023    Procedure: EGD (ESOPHAGOGASTRODUODENOSCOPY);  Surgeon: Adryan Henry MD;  Location: [facility] ENDO;  Service: Endoscopy;  Laterality: N/A;    DESIRAE FILTER PLACEMENT  2017    HYSTERECTOMY      UPPER GASTROINTESTINAL ENDOSCOPY       Family History   Problem Relation Age of Onset    Lung cancer Mother     Anuerysm Father     Colon polyps Sister     Dementia Sister     COPD Sister     Anxiety disorder Sister     Alcohol abuse Sister     Stroke Sister 72    Pulmonary embolism Sister     Depression Brother     Depression Brother     Bone cancer Maternal Aunt     Bipolar disorder Other         Neice    Colon cancer Neg Hx     Diabetes Mellitus Neg Hx     Breast cancer Neg Hx     Crohn's disease Neg Hx     Ulcerative colitis Neg Hx     Stomach cancer Neg Hx     Esophageal cancer Neg Hx      Social History     Socioeconomic History    Marital status:    Tobacco Use    Smoking status: Former     Packs/day: 4.00     Years: 30.00     Pack years: 120.00     Types: Cigarettes     Quit date: 3/22/1994      "Years since quittin.1    Smokeless tobacco: Never   Substance and Sexual Activity    Alcohol use: Yes     Comment: occasional    Drug use: Never    Sexual activity: Not Currently     Social Determinants of Health     Financial Resource Strain: Low Risk     Difficulty of Paying Living Expenses: Not hard at all   Food Insecurity: No Food Insecurity    Worried About Running Out of Food in the Last Year: Never true    Ran Out of Food in the Last Year: Never true   Transportation Needs: No Transportation Needs    Lack of Transportation (Medical): No    Lack of Transportation (Non-Medical): No   Physical Activity: Inactive    Days of Exercise per Week: 0 days    Minutes of Exercise per Session: 0 min   Social Connections: Unknown    Frequency of Communication with Friends and Family: Three times a week    Frequency of Social Gatherings with Friends and Family: Three times a week    Attends Gnosticist Services: Never    Active Member of Clubs or Organizations: No    Attends Club or Organization Meetings: Never     Review of Systems:  No pertinent positives    OBJECTIVE:     Vital Signs  Vitals:    23 1604   BP: (!) 154/97   BP Location: Left arm   Patient Position: Sitting   BP Method: Medium (Automatic)   Pulse: 78   SpO2: 98%   Weight: 96.5 kg (212 lb 10.1 oz)   Height: 5' 8" (1.727 m)     Body mass index is 32.33 kg/m².    Physical Exam:  General: no distress  Eyes:  conjunctivae/corneas clear  Nose: no discharge  Neck: trachea midline with no masses appreciated  Lungs:  normal respiratory effort, no wheezes, no rales  Heart: regular rate and rhythm and no murmur  Abdomen: non-distended  Extremities: no cyanosis, no edema, no clubbing  Skin: No rashes or lesions. good skin turgor  Neurologic: alert, oriented, thought content appropriate    Laboratory:  Lab Results   Component Value Date    WBC 7.26 2023    HGB 11.8 (L) 2023    HCT 40.8 2023    MCV 99 (H) 2023     2023 "     Chest Imaging, My Impression:   CXR 4/2023: cardiomegaly, AICD, lungs are clear    Diagnostic Results:  Spirometry 3/2021: suggestive of restriction. There is no obstruction.    TTE 11/2022:  Mild concentric hypertrophy and normal systolic function.  The estimated ejection fraction is 60%.  Indeterminate left ventricular diastolic function.  Mild left atrial enlargement.  Normal central venous pressure (3 mmHg).  The sinuses of Valsalva is dilated.    Angiogram 12/2022:    Known occluded vein graft to OM and diagonal    Patent LIMA to LAD with patent ostial proximal stent in the LIMA with moderate nonobstructive InStent restenoses    Patent stent from left main into left circumflex with mild InStent restenosis, nonobstructive    Mild disease in the codominant right coronary artery    ASSESSMENT/PLAN:     1) Post viral cough syndrome  2) Chronic bronchitis  3) Former smoker. Quit 30 years ago.    - Repeat a course of azithromycin & prednisone.  - Obtain PFT.    Follow up in 12 months.    Total professional time spent for the encounter: 45 minutes  Time was spent preparing to see the patient, reviewing results of prior testing, obtaining and/or reviewing separately obtained history, performing a medically appropriate examination and interview, counseling and educating the patient/family, ordering medications/tests/procedures, referring and communicating with other health care professionals, documenting clinical information in the electronic health record, and independently interpreting results.    [place] Ware, MD  [facility]

## 2023-05-23 ENCOUNTER — TELEPHONE (OUTPATIENT)
Dept: PULMONOLOGY | Facility: CLINIC | Age: 68
End: 2023-05-23
Payer: MEDICARE

## 2023-05-23 RX ORDER — MECLIZINE HYDROCHLORIDE 25 MG/1
TABLET ORAL
Qty: 16 TABLET | Refills: 2 | Status: SHIPPED | OUTPATIENT
Start: 2023-05-23 | End: 2023-09-11

## 2023-05-23 NOTE — TELEPHONE ENCOUNTER
No care due was identified.  [facility] Embedded Care Due Messages. Reference number: [id number].   5/22/2023 5:20:24 PM CDT  
Please see the attached refill request.  
no

## 2023-05-31 DIAGNOSIS — R11.0 NAUSEA: ICD-10-CM

## 2023-06-01 RX ORDER — HYDROXYZINE PAMOATE 25 MG/1
25 CAPSULE ORAL 4 TIMES DAILY PRN
Qty: 90 CAPSULE | Refills: 1 | Status: SHIPPED | OUTPATIENT
Start: 2023-06-01 | End: 2023-09-11

## 2023-06-26 DIAGNOSIS — I25.10 CORONARY ARTERY DISEASE: ICD-10-CM

## 2023-06-26 RX ORDER — RIVAROXABAN 20 MG/1
TABLET, FILM COATED ORAL
Qty: 30 TABLET | Refills: 5 | Status: SHIPPED | OUTPATIENT
Start: 2023-06-26 | End: 2024-01-16

## 2023-06-29 DIAGNOSIS — I25.10 CORONARY ARTERY DISEASE: ICD-10-CM

## 2023-06-30 RX ORDER — METOPROLOL SUCCINATE 25 MG/1
TABLET, EXTENDED RELEASE ORAL
Qty: 180 TABLET | Refills: 3 | Status: SHIPPED | OUTPATIENT
Start: 2023-06-30

## 2023-07-10 ENCOUNTER — PATIENT OUTREACH (OUTPATIENT)
Dept: ADMINISTRATIVE | Facility: HOSPITAL | Age: 68
End: 2023-07-10
Payer: MEDICARE

## 2023-07-10 NOTE — PROGRESS NOTES
Health Maintenance Due   Topic Date Due    Mammogram  Never done    Shingles Vaccine (1 of 2) Never done    COVID-19 Vaccine (3 - Pfizer series) 12/24/2021    Lipid Panel  02/28/2023        Chart review done.    updated.   Immunizations reviewed & updated.   Care Everywhere updated.

## 2023-08-21 ENCOUNTER — PES CALL (OUTPATIENT)
Dept: ADMINISTRATIVE | Facility: CLINIC | Age: 68
End: 2023-08-21
Payer: MEDICARE

## 2023-09-05 ENCOUNTER — TELEPHONE (OUTPATIENT)
Dept: RADIOLOGY | Facility: HOSPITAL | Age: 68
End: 2023-09-05
Payer: MEDICARE

## 2023-09-07 ENCOUNTER — TELEPHONE (OUTPATIENT)
Dept: PRIMARY CARE CLINIC | Facility: CLINIC | Age: 68
End: 2023-09-07

## 2023-09-07 NOTE — TELEPHONE ENCOUNTER
----- Message from Efren Lin sent at 9/7/2023 11:23 AM CDT -----  Contact: Pt [phone]  She fell Tuesday this week and hurt knees, hip, and rib cage, and lower back. She wants to know if you or a colleague can see her today or tomorrow.     Thank you

## 2023-09-11 DIAGNOSIS — R11.0 NAUSEA: ICD-10-CM

## 2023-09-11 DIAGNOSIS — H81.4 VERTIGO OF CENTRAL ORIGIN: ICD-10-CM

## 2023-09-11 RX ORDER — HYDROXYZINE PAMOATE 25 MG/1
25 CAPSULE ORAL 4 TIMES DAILY PRN
Qty: 90 CAPSULE | Refills: 1 | Status: SHIPPED | OUTPATIENT
Start: 2023-09-11

## 2023-09-11 RX ORDER — MECLIZINE HYDROCHLORIDE 25 MG/1
TABLET ORAL
Qty: 16 TABLET | Refills: 2 | Status: SHIPPED | OUTPATIENT
Start: 2023-09-11 | End: 2023-12-18

## 2023-09-11 NOTE — TELEPHONE ENCOUNTER
No care due was identified.  [facility] Embedded Care Due Messages. Reference number: [id number].   9/11/2023 11:17:21 AM CDT

## 2023-10-03 ENCOUNTER — OFFICE VISIT (OUTPATIENT)
Dept: PRIMARY CARE CLINIC | Facility: CLINIC | Age: 68
End: 2023-10-03
Payer: MEDICARE

## 2023-10-03 DIAGNOSIS — Z95.1 S/P CABG (CORONARY ARTERY BYPASS GRAFT): Primary | ICD-10-CM

## 2023-10-03 DIAGNOSIS — J44.9 CHRONIC OBSTRUCTIVE PULMONARY DISEASE, UNSPECIFIED COPD TYPE: ICD-10-CM

## 2023-10-03 PROCEDURE — 99499 NO LOS: ICD-10-PCS | Mod: HCNC,S$GLB,, | Performed by: STUDENT IN AN ORGANIZED HEALTH CARE EDUCATION/TRAINING PROGRAM

## 2023-10-03 PROCEDURE — 99499 UNLISTED E&M SERVICE: CPT | Mod: HCNC,S$GLB,, | Performed by: STUDENT IN AN ORGANIZED HEALTH CARE EDUCATION/TRAINING PROGRAM

## 2023-10-05 ENCOUNTER — PATIENT OUTREACH (OUTPATIENT)
Dept: EMERGENCY MEDICINE | Facility: HOSPITAL | Age: 68
End: 2023-10-05

## 2023-12-01 ENCOUNTER — PATIENT OUTREACH (OUTPATIENT)
Dept: ADMINISTRATIVE | Facility: HOSPITAL | Age: 68
End: 2023-12-01
Payer: MEDICARE

## 2023-12-01 DIAGNOSIS — Z12.31 BREAST CANCER SCREENING BY MAMMOGRAM: Primary | ICD-10-CM

## 2023-12-01 NOTE — PROGRESS NOTES
Health Maintenance Due   Topic Date Due    Mammogram  Never done    Shingles Vaccine (1 of 2) Never done    RSV Vaccine (Age 60+ and Pregnant patients) (1 - 1-dose 60+ series) Never done    Lipid Panel  02/28/2023    Influenza Vaccine (1) 09/01/2023    COVID-19 Vaccine (3 - 2023-24 season) 09/01/2023     Immunizations - reviewed and updated   Care Everywhere - triggered   Care Teams - updated   Outreach - Mammogram order placed. Called patient to schedule, no answer. LM on .   [facility] reviewed.

## 2023-12-16 DIAGNOSIS — H81.4 VERTIGO OF CENTRAL ORIGIN: ICD-10-CM

## 2023-12-16 NOTE — TELEPHONE ENCOUNTER
No care due was identified.  [facility] Embedded Care Due Messages. Reference number: [id number].   12/16/2023 2:42:27 AM CST

## 2023-12-18 RX ORDER — MECLIZINE HYDROCHLORIDE 25 MG/1
TABLET ORAL
Qty: 16 TABLET | Refills: 2 | Status: SHIPPED | OUTPATIENT
Start: 2023-12-18 | End: 2024-02-20

## 2024-01-16 DIAGNOSIS — I25.10 CORONARY ARTERY DISEASE: ICD-10-CM

## 2024-01-16 RX ORDER — RIVAROXABAN 20 MG/1
TABLET, FILM COATED ORAL
Qty: 30 TABLET | Refills: 5 | Status: SHIPPED | OUTPATIENT
Start: 2024-01-16

## 2024-01-19 DIAGNOSIS — J44.9 CHRONIC OBSTRUCTIVE PULMONARY DISEASE, UNSPECIFIED COPD TYPE: ICD-10-CM

## 2024-01-19 DIAGNOSIS — K21.9 GASTROESOPHAGEAL REFLUX DISEASE, UNSPECIFIED WHETHER ESOPHAGITIS PRESENT: ICD-10-CM

## 2024-01-19 RX ORDER — ESOMEPRAZOLE MAGNESIUM 40 MG/1
40 CAPSULE, DELAYED RELEASE ORAL
Qty: 90 CAPSULE | Refills: 3 | Status: SHIPPED | OUTPATIENT
Start: 2024-01-19

## 2024-01-19 RX ORDER — BUDESONIDE AND FORMOTEROL FUMARATE DIHYDRATE 160; 4.5 UG/1; UG/1
2 AEROSOL RESPIRATORY (INHALATION) EVERY 12 HOURS
Qty: 10.2 G | Refills: 6 | Status: SHIPPED | OUTPATIENT
Start: 2024-01-19

## 2024-01-19 NOTE — TELEPHONE ENCOUNTER
No care due was identified.  Health [facility] Embedded Care Due Messages. Reference number: [id number].   1/19/2024 12:13:38 AM CST

## 2024-02-14 ENCOUNTER — TELEPHONE (OUTPATIENT)
Dept: PRIMARY CARE CLINIC | Facility: CLINIC | Age: 69
End: 2024-02-14
Payer: MEDICARE

## 2024-02-14 NOTE — TELEPHONE ENCOUNTER
----- Message from Belinda Rodarte sent at 2/14/2024  3:54 PM CST -----  Contact: Patient, [phone]  1MEDICALADVICE     Patient is calling for Medical Advice regarding: Angina    How long has patient had these symptoms: Off an on for the past 3 weeks    Pharmacy name and phone#:   [id number] - [place], [place] - [address]  [address] [place] [address]  Phone: [phone] Fax: [phone]    Would like response via [employer]:  Call back    Comments: Calling for an appointment, none available. Please call her. Thanks.

## 2024-02-20 ENCOUNTER — OFFICE VISIT (OUTPATIENT)
Dept: PRIMARY CARE CLINIC | Facility: CLINIC | Age: 69
End: 2024-02-20
Payer: MEDICARE

## 2024-02-20 VITALS
HEIGHT: 68 IN | SYSTOLIC BLOOD PRESSURE: 130 MMHG | DIASTOLIC BLOOD PRESSURE: 60 MMHG | OXYGEN SATURATION: 96 % | RESPIRATION RATE: 18 BRPM | WEIGHT: 217.25 LBS | HEART RATE: 68 BPM | BODY MASS INDEX: 32.92 KG/M2

## 2024-02-20 DIAGNOSIS — I48.0 PAROXYSMAL ATRIAL FIBRILLATION: ICD-10-CM

## 2024-02-20 DIAGNOSIS — Z95.1 S/P CABG (CORONARY ARTERY BYPASS GRAFT): Primary | ICD-10-CM

## 2024-02-20 DIAGNOSIS — J06.9 RECURRENT URI (UPPER RESPIRATORY INFECTION): ICD-10-CM

## 2024-02-20 DIAGNOSIS — J44.9 CHRONIC OBSTRUCTIVE PULMONARY DISEASE, UNSPECIFIED COPD TYPE: ICD-10-CM

## 2024-02-20 DIAGNOSIS — E66.01 SEVERE OBESITY (BMI 35.0-39.9) WITH COMORBIDITY: ICD-10-CM

## 2024-02-20 DIAGNOSIS — H81.4 VERTIGO OF CENTRAL ORIGIN: ICD-10-CM

## 2024-02-20 DIAGNOSIS — R07.9 CHEST PAIN, UNSPECIFIED TYPE: ICD-10-CM

## 2024-02-20 PROCEDURE — 3288F FALL RISK ASSESSMENT DOCD: CPT | Mod: HCNC,CPTII,S$GLB, | Performed by: STUDENT IN AN ORGANIZED HEALTH CARE EDUCATION/TRAINING PROGRAM

## 2024-02-20 PROCEDURE — 1159F MED LIST DOCD IN RCRD: CPT | Mod: HCNC,CPTII,S$GLB, | Performed by: STUDENT IN AN ORGANIZED HEALTH CARE EDUCATION/TRAINING PROGRAM

## 2024-02-20 PROCEDURE — 99214 OFFICE O/P EST MOD 30 MIN: CPT | Mod: HCNC,S$GLB,, | Performed by: STUDENT IN AN ORGANIZED HEALTH CARE EDUCATION/TRAINING PROGRAM

## 2024-02-20 PROCEDURE — 3008F BODY MASS INDEX DOCD: CPT | Mod: HCNC,CPTII,S$GLB, | Performed by: STUDENT IN AN ORGANIZED HEALTH CARE EDUCATION/TRAINING PROGRAM

## 2024-02-20 PROCEDURE — 99999 PR PBB SHADOW E&M-EST. PATIENT-LVL V: CPT | Mod: PBBFAC,HCNC,, | Performed by: STUDENT IN AN ORGANIZED HEALTH CARE EDUCATION/TRAINING PROGRAM

## 2024-02-20 PROCEDURE — 3078F DIAST BP <80 MM HG: CPT | Mod: HCNC,CPTII,S$GLB, | Performed by: STUDENT IN AN ORGANIZED HEALTH CARE EDUCATION/TRAINING PROGRAM

## 2024-02-20 PROCEDURE — 1125F AMNT PAIN NOTED PAIN PRSNT: CPT | Mod: HCNC,CPTII,S$GLB, | Performed by: STUDENT IN AN ORGANIZED HEALTH CARE EDUCATION/TRAINING PROGRAM

## 2024-02-20 PROCEDURE — 3075F SYST BP GE 130 - 139MM HG: CPT | Mod: HCNC,CPTII,S$GLB, | Performed by: STUDENT IN AN ORGANIZED HEALTH CARE EDUCATION/TRAINING PROGRAM

## 2024-02-20 PROCEDURE — 1100F PTFALLS ASSESS-DOCD GE2>/YR: CPT | Mod: HCNC,CPTII,S$GLB, | Performed by: STUDENT IN AN ORGANIZED HEALTH CARE EDUCATION/TRAINING PROGRAM

## 2024-02-20 NOTE — PROGRESS NOTES
Subjective:           Patient ID: Stephania Salmeron   Age:  69 y.o.  Sex: female     Chief Complaint:   Shortness of Breath, Dizziness, and Chest Pain      History of Present Illness:    Stephania Salmeron is a 69 y.o. female who presents today with a chief complaint of Shortness of Breath, Dizziness, and Chest Pain  .    Has been using the Symbicort inhailer 2 times daily, and taking Spiriva in the AM daily as well.   Uses albuterol inhalers up to 2 times on worse days, but not on a daily basis.  Also uses a nebulizer at times.     Gets intermittent SOB.    Has been having angina to the chest, like a squeezing pain that then passes after.   States she can feel the pressure building, happens are rest or when active.    Staets does not tolerate activity, will feels SOB when active.  Has been using Nitroglycerine under her tongue for helping her chest pain.  Last used this last week, states after using 2 will feel her s/s improve.   Was getting from Dr. Rm before.    Has not seen a cardiologist for some time.     Stress test in Dec 2022 - no ischemia or arrhythmia noted.       Right knee pain, chronic with right ankle pain.    Has weakness and swelling on the leg.     Dizziness: states gets dizzy with laying flat, with change of position or with starting to walk.      Will feel nauseous with it at times.     Taking Lorazepam 1mg TID from Psychiatrist Maikel Longoria.  Also taking Pregabalin 100mg TID.    Also taking Seroquel 200mg nightly.   Also Wellbutrin 450mg in the AM.   Hydroxyzine 25mg    Taking Xarelto due to hx of PE.      Not currently taking Amlodipine 2.5mg daily despite the med list.  Is taking Metoprolol for HTN.     Has not seen a cardiologist since last summer.       Review of Systems   Constitutional:  Negative for fever and unexpected weight change.   HENT:  Negative for congestion, nosebleeds, postnasal drip and sore throat.    Respiratory:  Positive for chest tightness and shortness of breath. Negative for  "cough and wheezing.    Cardiovascular:  Negative for chest pain, palpitations and leg swelling.   Gastrointestinal:  Negative for abdominal distention, constipation, diarrhea and nausea.   Genitourinary:  Negative for difficulty urinating and dysuria.   Musculoskeletal:  Negative for back pain and gait problem.   Skin:  Negative for pallor and rash.   Neurological:  Positive for dizziness, weakness, light-headedness and headaches. Negative for numbness.   Psychiatric/Behavioral:  Positive for sleep disturbance. Negative for agitation. The patient is not nervous/anxious.            Objective:        Vitals:    02/20/24 1141   BP: (!) 154/77   BP Location: Left arm   Patient Position: Sitting   BP Method: Medium (Automatic)   Pulse: 68   Resp: 18   SpO2: 96%   Weight: 98.5 kg (217 lb 4.2 oz)   Height: 5' 8" (1.727 m)       Body mass index is 33.03 kg/m².      Physical Exam  Vitals reviewed.   Constitutional:       General: She is not in acute distress.     Appearance: Normal appearance. She is obese. She is ill-appearing.   HENT:      Head: Normocephalic and atraumatic.      Right Ear: Tympanic membrane and external ear normal.      Left Ear: Tympanic membrane and external ear normal.      Nose: No rhinorrhea.      Mouth/Throat:      Mouth: Mucous membranes are moist.      Pharynx: No posterior oropharyngeal erythema.   Eyes:      Extraocular Movements: Extraocular movements intact.      Conjunctiva/sclera: Conjunctivae normal.   Cardiovascular:      Rate and Rhythm: Normal rate and regular rhythm.      Heart sounds: No murmur heard.  Pulmonary:      Breath sounds: Wheezing and rhonchi present.      Comments: coughing  Abdominal:      Tenderness: There is no right CVA tenderness or left CVA tenderness.   Musculoskeletal:      Right lower leg: No edema.      Left lower leg: No edema.   Lymphadenopathy:      Cervical: No cervical adenopathy.   Skin:     Capillary Refill: Capillary refill takes less than 2 seconds.      " Coloration: Skin is not jaundiced.      Findings: No bruising.   Neurological:      General: No focal deficit present.      Mental Status: She is alert and oriented to person, place, and time.      Motor: No weakness.      Gait: Gait normal.   Psychiatric:         Mood and Affect: Mood normal.           Past Medical History:   Diagnosis Date    Acute coronary syndrome     Atrial fibrillation     Clotting disorder     COPD (chronic obstructive pulmonary disease)     Coronary artery disease     COVID-19 01/18/2021    Required hospitalization d/t pneumonia and hypoxia    GERD (gastroesophageal reflux disease)     Hyperlipidemia     Hypertension     ICD (implantable cardioverter-defibrillator) in place     Medtronic Evera XT ICD - Model VYLP1Z1 (SN: [id number]), 5568-45 (SN: [id number]), 6932-65 (SN: [id number])    Occipital stroke     Left occipital lobe    Quadrantanopia, right     Right lower quadrant       Lab Results   Component Value Date     10/03/2023    K 4.1 10/03/2023     10/03/2023    CO2 21 (L) 10/03/2023    BUN 21 10/03/2023    CREATININE 0.9 10/03/2023    ANIONGAP 9 10/03/2023     Lab Results   Component Value Date    HGBA1C 5.2 02/13/2021     Lab Results   Component Value Date     (H) 10/03/2023     (H) 04/04/2023     (H) 12/15/2022       Lab Results   Component Value Date    WBC 5.07 10/03/2023    HGB 11.7 (L) 10/03/2023    HCT 38.9 10/03/2023    HCT 37 06/02/2022     10/03/2023    GRAN 2.9 10/03/2023    GRAN 56.5 10/03/2023     Lab Results   Component Value Date    CHOL 181 02/28/2022    HDL 47 02/28/2022    LDLCALC 106.2 02/28/2022    TRIG 139 02/28/2022        Outpatient Encounter Medications as of 2/20/2024   Medication Sig Dispense Refill    albuterol (PROVENTIL/VENTOLIN HFA) 90 mcg/actuation inhaler INHALE 2 PUFFS INTO THE LUNGS EVERY 6 (SIX) HOURS AS NEEDED FOR WHEEZING. RESCUE 54 g 1    amLODIPine (NORVASC) 2.5 MG tablet Take 1 tablet (2.5 mg total) by  mouth once daily. 30 tablet 0    aspirin (ECOTRIN) 81 MG EC tablet Take 81 mg by mouth once daily.      atorvastatin (LIPITOR) 40 MG tablet TAKE 1 TABLET BY MOUTH EVERY DAY 90 tablet 3    budesonide-formoterol 160-4.5 mcg (SYMBICORT) 160-4.5 mcg/actuation HFAA INHALE 2 PUFFS INTO THE LUNGS EVERY 12 (TWELVE) HOURS. CONTROLLER 10.2 g 6    buPROPion (WELLBUTRIN XL) 150 MG TB24 tablet Take 450 mg by mouth once daily.      docusate sodium (COLACE) 100 MG capsule Take 1 capsule (100 mg total) by mouth 2 (two) times daily as needed for Constipation. 60 capsule 5    esomeprazole (NEXIUM) 40 MG capsule TAKE 1 CAPSULE BY MOUTH BEFORE BREAKFAST. 90 capsule 3    hydrOXYzine pamoate (VISTARIL) 25 MG Cap TAKE 1 CAPSULE (25 MG TOTAL) BY MOUTH 4 (FOUR) TIMES DAILY AS NEEDED (NAUSEA). 90 capsule 1    LORazepam (ATIVAN) 1 MG tablet Take 1 mg by mouth 3 (three) times daily.      meclizine (ANTIVERT) 25 mg tablet TAKE AS DIRECTED UP TO 4 TABLETS PER WEEK OR 16 TABLETS PER MONTH 16 tablet 2    metoprolol succinate (TOPROL-XL) 25 MG 24 hr tablet TAKE 1 TABLET BY MOUTH TWICE A  tablet 3    QUEtiapine (SEROQUEL) 200 MG Tab Take 200 mg by mouth every evening.      tiotropium bromide (SPIRIVA RESPIMAT) 1.25 mcg/actuation inhaler Inhale 2 puffs into the lungs once daily. Controller 12 g 5    XARELTO 20 mg Tab TAKE 1 TABLET BY MOUTH EVERY DAY WITH DINNER OR EVENING MEAL 30 tablet 5    ergocalciferol (ERGOCALCIFEROL) 50,000 unit Cap TAKE 1 CAPSULE BY MOUTH ONE TIME PER WEEK (Patient not taking: Reported on 2/20/2024) 12 capsule 3    predniSONE (DELTASONE) 20 MG tablet Take 1 tablet (20 mg total) by mouth once daily. 5 tablet 0    [DISCONTINUED] guaiFENesin-codeine 100-10 mg/5 ml (TUSSI-ORGANIDIN NR)  mg/5 mL syrup Take 5 mLs by mouth 3 (three) times daily as needed for Cough. (Patient not taking: Reported on 5/22/2023) 180 mL 0    [DISCONTINUED] predniSONE (DELTASONE) 10 MG tablet 2 tabs for 2 days, 1 tab for 3 days, 1/2 tab for 4  days. (Patient not taking: Reported on 5/22/2023) 9 tablet 0    [DISCONTINUED] pregabalin (LYRICA) 100 MG capsule Take 100 mg by mouth 3 (three) times daily as needed.       No facility-administered encounter medications on file as of 2/20/2024.          Assessment:       1. S/P CABG (coronary artery bypass graft)    2. Recurrent URI (upper respiratory infection)    3. Chronic obstructive pulmonary disease, unspecified COPD type    4. Paroxysmal atrial fibrillation    5. Vertigo of central origin           Plan:           S/P CABG (coronary artery bypass graft)   - has history of blood clots, MI, CABG.  Is taking beta-blocker, aspirin and anticoagulant currently.  Has been having some dizziness and lightheadedness.  Additionally reports angina with exertion.  Patient had negative cardiac workup in November last year, blood pressure normal at this time.  Heart rate regular.   - had normal stress test and echo in December of 2022.   - will refer to Cardiology to re-evaluate.   - patient's chest sensations may be related to anxiety or GERD.  Takes esomeprazole 40 mg daily.   - reports using Nitro glycerine last a week ago for chest pain.  States got this from her last cardiologist.     Chronic obstructive pulmonary disease, unspecified COPD type   - patient with respiratory concerns, takes Symbicort inhailer 2 times daily, and taking Spiriva in the AM daily as well.   Uses albuterol inhalers up to 2 times on worse days.   - advise continue monitoring.  SpO2 at 97%.  Regular respiratory rate.    Paroxysmal atrial fibrillation   - heart sounds consistent on auscultation.    Vertigo of central origin   - concern for possible over sedation from  - Taking Lorazepam 1mg TID from Psychiatrist Maikel Longoria.  - Also taking Pregabalin 100mg TID.    - Also taking Seroquel 200mg nightly.   - Also Wellbutrin 450mg in the AM.   - Hydroxyzine 25mg medications from Psychiatry.  - recommend speaking to the provider by weaning back on  some of these medications as can be more sedating as we age.

## 2024-02-20 NOTE — PATIENT INSTRUCTIONS
S/P CABG (coronary artery bypass graft)   - has history of blood clots, MI, CABG.  Is taking beta-blocker, aspirin and anticoagulant currently.  Has been having some dizziness and lightheadedness.  Additionally reports angina with exertion.  Patient had negative cardiac workup in November last year, blood pressure normal at this time.  Heart rate regular.   - had normal stress test and echo in December of 2022.   - will refer to Cardiology to re-evaluate.   - patient's chest sensations may be related to anxiety or GERD.  Takes esomeprazole 40 mg daily.   - reports using Nitro glycerine last a week ago for chest pain.  States got this from her last cardiologist.     Chronic obstructive pulmonary disease, unspecified COPD type   - patient with respiratory concerns, takes Symbicort inhailer 2 times daily, and taking Spiriva in the AM daily as well.   Uses albuterol inhalers up to 2 times on worse days.   - advise continue monitoring.  SpO2 at 97%.  Regular respiratory rate.    Paroxysmal atrial fibrillation   - heart sounds consistent on auscultation.    Vertigo of central origin   - concern for possible over sedation from  - Taking Lorazepam 1mg TID from Psychiatrist Maikel Longoria.  - Also taking Pregabalin 100mg TID.    - Also taking Seroquel 200mg nightly.   - Also Wellbutrin 450mg in the AM.   - Hydroxyzine 25mg medications from Psychiatry.  - recommend speaking to the provider by weaning back on some of these medications as can be more sedating as we age.

## 2024-03-07 DIAGNOSIS — H81.4 VERTIGO OF CENTRAL ORIGIN: ICD-10-CM

## 2024-03-07 RX ORDER — MECLIZINE HYDROCHLORIDE 25 MG/1
TABLET ORAL
Qty: 16 TABLET | Refills: 2 | OUTPATIENT
Start: 2024-03-07

## 2024-03-07 NOTE — TELEPHONE ENCOUNTER
No care due was identified.  [facility] Embedded Care Due Messages. Reference number: [id number].   3/07/2024 12:21:52 AM CST

## 2024-03-07 NOTE — TELEPHONE ENCOUNTER
I am concerned that patient is having dizziness due to a dangerous combination of medications including lorazepam 1 mg 3 times a day, pregabalin 100 mg 3 times a day, Seroquel 200 mg at night, Wellbutrin 450 mg in the morning and p.r.n. hydroxyzine.    Using Antivert to try and deal with the side effects of excess sedative medication is not appropriate.  If patient is having dizziness, this is a sign that she should be reducing the dose of her Seroquel, Ativan and Wellbutrin.      Discussed this in last appt with patient.  Will not refill Antivert at this time.     Advised she speak to her psychiatrist about her medication doses being too high.     - Sinan Saeed

## 2024-04-09 ENCOUNTER — PATIENT OUTREACH (OUTPATIENT)
Dept: ADMINISTRATIVE | Facility: HOSPITAL | Age: 69
End: 2024-04-09
Payer: MEDICARE

## 2024-04-09 NOTE — PROGRESS NOTES
Health Maintenance Due   Topic Date Due    TETANUS VACCINE  Never done    Mammogram  Never done    Shingles Vaccine (1 of 2) Never done    RSV Vaccine (Age 60+ and Pregnant patients) (1 - 1-dose 60+ series) Never done    Pneumococcal Vaccines (Age 65+) (2 of 2 - PCV) 04/16/2015    Lipid Panel  02/28/2023    Influenza Vaccine (1) 09/01/2023    COVID-19 Vaccine (3 - 2023-24 season) 09/01/2023    Hemoglobin A1c (Diabetic Prevention Screening)  02/13/2024    DEXA Scan  05/24/2024        Chart review done.    updated.   Immunizations reviewed & updated.   Care Everywhere updated.  DIS reviewed

## 2024-04-17 DIAGNOSIS — H81.4 VERTIGO OF CENTRAL ORIGIN: ICD-10-CM

## 2024-04-17 RX ORDER — MECLIZINE HYDROCHLORIDE 25 MG/1
TABLET ORAL
Qty: 16 TABLET | Refills: 2 | Status: SHIPPED | OUTPATIENT
Start: 2024-04-17

## 2024-04-17 NOTE — TELEPHONE ENCOUNTER
No care due was identified.  Health [facility] Embedded Care Due Messages. Reference number: [id number].   4/17/2024 11:13:47 AM CDT

## 2024-04-19 ENCOUNTER — TELEPHONE (OUTPATIENT)
Dept: CARDIOLOGY | Facility: CLINIC | Age: 69
End: 2024-04-19
Payer: MEDICARE

## 2024-04-19 NOTE — TELEPHONE ENCOUNTER
Attempted to reach patient to reschedule appointment with Dr. Vickers, provider out of office on 4/22/24.   Unable to leave a voicemail.

## 2024-04-22 ENCOUNTER — TELEPHONE (OUTPATIENT)
Dept: CARDIOLOGY | Facility: CLINIC | Age: 69
End: 2024-04-22
Payer: MEDICARE

## 2024-04-22 NOTE — TELEPHONE ENCOUNTER
----- Message from Marley Singletary sent at 4/22/2024  2:02 PM CDT -----  Regarding: Appt  Contact: [phone]  Missed Callback         Pt returning callback from missed call. Requesting to speak with somebody in   office regarding cancelled appt. Please call. [phone]

## 2024-04-23 ENCOUNTER — TELEPHONE (OUTPATIENT)
Dept: PRIMARY CARE CLINIC | Facility: CLINIC | Age: 69
End: 2024-04-23
Payer: MEDICARE

## 2024-04-23 NOTE — TELEPHONE ENCOUNTER
----- Message from Dominique Salas sent at 4/23/2024  2:26 PM CDT -----  Patient dropped off paperwork for eye surgery. Please call [phone]

## 2024-04-23 NOTE — TELEPHONE ENCOUNTER
----- Message from Belinda Rodarte sent at 4/23/2024  4:09 PM CDT -----  Contact: Patient, [phone]  Patient is returning a phone call.  Who left a message for the patient: Arleth  Does patient know what this is regarding:  Pre op form  Would you like a call back, or a response through your [facility] portal?:   Call back  Comments: Missed your call, please call her back. Thanks.

## 2024-04-23 NOTE — TELEPHONE ENCOUNTER
----- Message from Kenyon Garsia sent at 4/23/2024  2:38 PM CDT -----  Contact: [phone]  1MEDICALADVICE     Patient is calling for Medical Advice regarding: Pt had an appt for today but she thought it was for a later time and would like a call back     How long has patient had these symptoms:    Pharmacy name and phone#:    Would like response via [employer]:  call back     Comments:    Please call pt back she needs a clearance

## 2024-04-24 ENCOUNTER — OFFICE VISIT (OUTPATIENT)
Dept: PRIMARY CARE CLINIC | Facility: CLINIC | Age: 69
End: 2024-04-24
Payer: MEDICARE

## 2024-04-24 VITALS
TEMPERATURE: 98 F | RESPIRATION RATE: 16 BRPM | DIASTOLIC BLOOD PRESSURE: 70 MMHG | HEIGHT: 68 IN | HEART RATE: 70 BPM | WEIGHT: 211.56 LBS | OXYGEN SATURATION: 98 % | BODY MASS INDEX: 32.06 KG/M2 | SYSTOLIC BLOOD PRESSURE: 130 MMHG

## 2024-04-24 DIAGNOSIS — H25.89 OTHER AGE-RELATED CATARACT OF BOTH EYES: ICD-10-CM

## 2024-04-24 DIAGNOSIS — Z95.1 S/P CABG (CORONARY ARTERY BYPASS GRAFT): ICD-10-CM

## 2024-04-24 DIAGNOSIS — Z01.818 PREOP EXAMINATION: Primary | ICD-10-CM

## 2024-04-24 DIAGNOSIS — Z79.899 OTHER LONG TERM (CURRENT) DRUG THERAPY: ICD-10-CM

## 2024-04-24 PROCEDURE — 3078F DIAST BP <80 MM HG: CPT | Mod: CPTII,S$GLB,, | Performed by: STUDENT IN AN ORGANIZED HEALTH CARE EDUCATION/TRAINING PROGRAM

## 2024-04-24 PROCEDURE — 3288F FALL RISK ASSESSMENT DOCD: CPT | Mod: CPTII,S$GLB,, | Performed by: STUDENT IN AN ORGANIZED HEALTH CARE EDUCATION/TRAINING PROGRAM

## 2024-04-24 PROCEDURE — 1160F RVW MEDS BY RX/DR IN RCRD: CPT | Mod: CPTII,S$GLB,, | Performed by: STUDENT IN AN ORGANIZED HEALTH CARE EDUCATION/TRAINING PROGRAM

## 2024-04-24 PROCEDURE — 3008F BODY MASS INDEX DOCD: CPT | Mod: CPTII,S$GLB,, | Performed by: STUDENT IN AN ORGANIZED HEALTH CARE EDUCATION/TRAINING PROGRAM

## 2024-04-24 PROCEDURE — 1159F MED LIST DOCD IN RCRD: CPT | Mod: CPTII,S$GLB,, | Performed by: STUDENT IN AN ORGANIZED HEALTH CARE EDUCATION/TRAINING PROGRAM

## 2024-04-24 PROCEDURE — 99214 OFFICE O/P EST MOD 30 MIN: CPT | Mod: S$GLB,,, | Performed by: STUDENT IN AN ORGANIZED HEALTH CARE EDUCATION/TRAINING PROGRAM

## 2024-04-24 PROCEDURE — 1126F AMNT PAIN NOTED NONE PRSNT: CPT | Mod: CPTII,S$GLB,, | Performed by: STUDENT IN AN ORGANIZED HEALTH CARE EDUCATION/TRAINING PROGRAM

## 2024-04-24 PROCEDURE — 1101F PT FALLS ASSESS-DOCD LE1/YR: CPT | Mod: CPTII,S$GLB,, | Performed by: STUDENT IN AN ORGANIZED HEALTH CARE EDUCATION/TRAINING PROGRAM

## 2024-04-24 PROCEDURE — 3075F SYST BP GE 130 - 139MM HG: CPT | Mod: CPTII,S$GLB,, | Performed by: STUDENT IN AN ORGANIZED HEALTH CARE EDUCATION/TRAINING PROGRAM

## 2024-04-24 PROCEDURE — 99999 PR PBB SHADOW E&M-EST. PATIENT-LVL IV: CPT | Mod: PBBFAC,,, | Performed by: STUDENT IN AN ORGANIZED HEALTH CARE EDUCATION/TRAINING PROGRAM

## 2024-04-24 RX ORDER — QUETIAPINE FUMARATE 300 MG/1
300 TABLET, FILM COATED ORAL NIGHTLY
COMMUNITY
Start: 2024-04-22

## 2024-04-24 RX ORDER — PREGABALIN 100 MG/1
100 CAPSULE ORAL 3 TIMES DAILY
COMMUNITY
Start: 2024-04-03

## 2024-04-24 NOTE — PROGRESS NOTES
Subjective:           Patient ID: Stephania Salmeron   Age:  69 y.o.  Sex: female     Chief Complaint:   Pre-op Exam      History of Present Illness:    Stephania Salmeron is a 69 y.o. female who presents today with a chief complaint of Pre-op Exam  .      69 Year old female presenting for preop exam for clearance for eye surgery.    Patient has documented is taking both Xarelto in baby aspirin.  Has history of heart attack with stents.    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  Surgery planned for 5/1/24.      Any prior reaction to anaesthesia: No  History of prolonged bleeding after surgery or injury?: No  High risk surgery?: No  History of MI, abnormal stress, anginal chest pain, stent, NTG use?: Yes, MI 1994.  History of CHF?: Yes  History of TIA or stroke?: Yes, small TIA.  On insulin?:  No  Pre-op Cr >2 mg/dL?:  0.9 on 10/3/23    Mallampati score: 2  (class 1 complete visualization to class 4 obscure)      States she is feeling well today, just some back pain from sleeping funny.        Review of Systems   Constitutional:  Negative for fever and unexpected weight change.   HENT:  Negative for congestion, nosebleeds, postnasal drip and sore throat.    Eyes:  Positive for visual disturbance.   Respiratory:  Negative for cough, chest tightness, shortness of breath and wheezing.    Cardiovascular:  Negative for chest pain, palpitations and leg swelling.   Gastrointestinal:  Negative for abdominal distention, constipation, diarrhea and nausea.   Genitourinary:  Negative for difficulty urinating and dysuria.   Musculoskeletal:  Negative for back pain and gait problem.   Skin:  Negative for pallor and rash.   Neurological:  Positive for tremors, weakness and headaches. Negative for numbness.   Psychiatric/Behavioral:  Positive for sleep disturbance. Negative for agitation. The patient is not nervous/anxious.            Objective:        Vitals:    04/24/24 0846   BP: 130/70   BP Location: Right arm  "  Patient Position: Sitting   BP Method: Medium (Manual)   Pulse: 70   Resp: 16   Temp: 98.3 °F (36.8 °C)   TempSrc: Oral   SpO2: 98%   Weight: 95.9 kg (211 lb 8.5 oz)   Height: 5' 8" (1.727 m)       Body mass index is 32.16 kg/m².      Physical Exam  Vitals reviewed.   Constitutional:       General: She is not in acute distress.     Appearance: Normal appearance. She is obese. She is not ill-appearing.   HENT:      Head: Normocephalic and atraumatic.      Right Ear: Tympanic membrane and external ear normal.      Left Ear: Tympanic membrane and external ear normal.      Nose: No rhinorrhea.      Mouth/Throat:      Mouth: Mucous membranes are moist.      Pharynx: No posterior oropharyngeal erythema.   Eyes:      Extraocular Movements: Extraocular movements intact.      Conjunctiva/sclera: Conjunctivae normal.   Cardiovascular:      Rate and Rhythm: Normal rate and regular rhythm.      Pulses: Normal pulses.      Heart sounds: No murmur heard.  Pulmonary:      Breath sounds: No wheezing.      Comments: coughing  Abdominal:      Tenderness: There is no right CVA tenderness or left CVA tenderness.   Musculoskeletal:      Right lower leg: No edema.      Left lower leg: No edema.   Lymphadenopathy:      Cervical: No cervical adenopathy.   Skin:     Capillary Refill: Capillary refill takes less than 2 seconds.      Coloration: Skin is not jaundiced.      Findings: No bruising.   Neurological:      General: No focal deficit present.      Mental Status: She is alert and oriented to person, place, and time.      Motor: No weakness.      Gait: Gait normal.   Psychiatric:         Mood and Affect: Mood normal.           Past Medical History:   Diagnosis Date    Acute coronary syndrome     Atrial fibrillation     Clotting disorder     COPD (chronic obstructive pulmonary disease)     Coronary artery disease     COVID-19 01/18/2021    Required hospitalization d/t pneumonia and hypoxia    GERD (gastroesophageal reflux disease)     " Hyperlipidemia     Hypertension     ICD (implantable cardioverter-defibrillator) in place     [employer] Evera XT ICD - Model DTHL3Y7 (SN: [id number]), 5568-45 (SN: [id number]), 6932-65 (SN: [id number])    Occipital stroke     Left occipital lobe    Quadrantanopia, right     Right lower quadrant       Lab Results   Component Value Date     10/03/2023    K 4.1 10/03/2023     10/03/2023    CO2 21 (L) 10/03/2023    BUN 21 10/03/2023    CREATININE 0.9 10/03/2023    ANIONGAP 9 10/03/2023     Lab Results   Component Value Date    HGBA1C 5.2 02/13/2021     Lab Results   Component Value Date     (H) 10/03/2023     (H) 04/04/2023     (H) 12/15/2022       Lab Results   Component Value Date    WBC 5.07 10/03/2023    HGB 11.7 (L) 10/03/2023    HCT 38.9 10/03/2023    HCT 37 06/02/2022     10/03/2023    GRAN 2.9 10/03/2023    GRAN 56.5 10/03/2023     Lab Results   Component Value Date    CHOL 181 02/28/2022    HDL 47 02/28/2022    LDLCALC 106.2 02/28/2022    TRIG 139 02/28/2022        Outpatient Encounter Medications as of 4/24/2024   Medication Sig Dispense Refill    albuterol (PROVENTIL/VENTOLIN HFA) 90 mcg/actuation inhaler INHALE 2 PUFFS INTO THE LUNGS EVERY 6 (SIX) HOURS AS NEEDED FOR WHEEZING. RESCUE 54 g 1    aspirin (ECOTRIN) 81 MG EC tablet Take 81 mg by mouth once daily.      atorvastatin (LIPITOR) 40 MG tablet TAKE 1 TABLET BY MOUTH EVERY DAY 90 tablet 3    budesonide-formoterol 160-4.5 mcg (SYMBICORT) 160-4.5 mcg/actuation HFAA INHALE 2 PUFFS INTO THE LUNGS EVERY 12 (TWELVE) HOURS. CONTROLLER 10.2 g 6    buPROPion (WELLBUTRIN XL) 150 MG TB24 tablet Take 450 mg by mouth once daily.      docusate sodium (COLACE) 100 MG capsule Take 1 capsule (100 mg total) by mouth 2 (two) times daily as needed for Constipation. 60 capsule 5    esomeprazole (NEXIUM) 40 MG capsule TAKE 1 CAPSULE BY MOUTH BEFORE BREAKFAST. 90 capsule 3    hydrOXYzine pamoate (VISTARIL) 25 MG Cap TAKE 1 CAPSULE (25 MG  TOTAL) BY MOUTH 4 (FOUR) TIMES DAILY AS NEEDED (NAUSEA). 90 capsule 1    LORazepam (ATIVAN) 1 MG tablet Take 1 mg by mouth 3 (three) times daily.      meclizine (ANTIVERT) 25 mg tablet TAKE AS DIRECTED UP TO 4 TABLETS PER WEEK OR 16 TABLETS PER MONTH 16 tablet 2    metoprolol succinate (TOPROL-XL) 25 MG 24 hr tablet TAKE 1 TABLET BY MOUTH TWICE A  tablet 3    pregabalin (LYRICA) 100 MG capsule Take 100 mg by mouth 3 (three) times daily.      QUEtiapine (SEROQUEL) 300 MG Tab Take 300 mg by mouth every evening.      tiotropium bromide (SPIRIVA RESPIMAT) 1.25 mcg/actuation inhaler Inhale 2 puffs into the lungs once daily. Controller 12 g 5    XARELTO 20 mg Tab TAKE 1 TABLET BY MOUTH EVERY DAY WITH DINNER OR EVENING MEAL 30 tablet 5    [DISCONTINUED] QUEtiapine (SEROQUEL) 200 MG Tab Take 200 mg by mouth every evening.       No facility-administered encounter medications on file as of 4/24/2024.          Assessment:       1. Preop examination    2. Other age-related cataract of both eyes    3. S/P CABG (coronary artery bypass graft)    4. Other long term (current) drug therapy           Plan:     Preop examination  -  CBC, CMP, TSH, Lipids and A1c.    -   stop Aspirin today, will hold Xeralto after Sunday before surgery on Wednesday.      Any prior reaction to anaesthesia: No  History of prolonged bleeding after surgery or injury?: No  High risk surgery?: No  History of MI, abnormal stress, anginal chest pain, stent, NTG use?: Yes, MI 1994.  History of CHF?: Yes  History of TIA or stroke?: Yes, small TIA.  On insulin?:  No  Pre-op Cr >2 mg/dL?:  0.9 on 10/3/23    Mallampati score: 2  (class 1 complete visualization to class 4 obscure)      Patient has reassuring physical exam with normal vitals.  She appears appropriate for surgery, though would like to recheck her blood count kidney function before giving full medical clearance.  Patient plans to get this blood work done later today or tomorrow morning.  Patient  was instructed to hold her aspirin for 7 days prior, Xarelto for 3 days prior.    Other age-related cataract of both eyes  -  CBC, CMP, TSH, Lipids and A1c.    -   getting right eye surgery next week, then left eye in the future.        S/P CABG (coronary artery bypass graft)  -  CBC, CMP, TSH, Lipids and A1c.    -   should resume use of anticoagulation on the day after procedure.    - blood pressure well controlled this time.  Rechecking cholesterol.    Other long term (current) drug therapy  -  CBC, CMP, TSH, Lipids and A1c.    -   continue to take other medications as directed.  Will review full labs when resulted.

## 2024-04-24 NOTE — PATIENT INSTRUCTIONS
Preop examination  -  CBC, CMP, TSH, Lipids and A1c.    -   stop Aspirin today, will hold Xeralto after Sunday before surgery on Wednesday.      Any prior reaction to anaesthesia: No  History of prolonged bleeding after surgery or injury?: No  High risk surgery?: No  History of MI, abnormal stress, anginal chest pain, stent, NTG use?: Yes, MI 1994.  History of CHF?: Yes  History of TIA or stroke?: Yes, small TIA.  On insulin?:  No  Pre-op Cr >2 mg/dL?:  0.9 on 10/3/23    Mallampati score: 2  (class 1 complete visualization to class 4 obscure)      Patient has reassuring physical exam with normal vitals.  She appears appropriate for surgery, though would like to recheck her blood count kidney function before giving full medical clearance.  Patient plans to get this blood work done later today or tomorrow morning.  Patient was instructed to hold her aspirin for 7 days prior, Xarelto for 3 days prior.    Other age-related cataract of both eyes  -  CBC, CMP, TSH, Lipids and A1c.    -   getting right eye surgery next week, then left eye in the future.        S/P CABG (coronary artery bypass graft)  -  CBC, CMP, TSH, Lipids and A1c.    -   should resume use of anticoagulation on the day after procedure.    - blood pressure well controlled this time.  Rechecking cholesterol.    Other long term (current) drug therapy  -  CBC, CMP, TSH, Lipids and A1c.    -   continue to take other medications as directed.  Will review full labs when resulted.

## 2024-04-25 DIAGNOSIS — K59.00 CONSTIPATION, UNSPECIFIED CONSTIPATION TYPE: ICD-10-CM

## 2024-04-25 DIAGNOSIS — R11.0 NAUSEA: ICD-10-CM

## 2024-04-25 RX ORDER — DOCUSATE SODIUM 100 MG/1
100 CAPSULE, LIQUID FILLED ORAL 2 TIMES DAILY PRN
Qty: 60 CAPSULE | Refills: 5 | Status: SHIPPED | OUTPATIENT
Start: 2024-04-25

## 2024-04-25 RX ORDER — HYDROXYZINE PAMOATE 25 MG/1
25 CAPSULE ORAL 4 TIMES DAILY PRN
Qty: 90 CAPSULE | Refills: 1 | Status: SHIPPED | OUTPATIENT
Start: 2024-04-25

## 2024-04-25 NOTE — TELEPHONE ENCOUNTER
No care due was identified.  [facility] Embedded Care Due Messages. Reference number: [id number].   4/25/2024 1:57:38 PM CDT

## 2024-04-25 NOTE — TELEPHONE ENCOUNTER
----- Message from Bev Zamarripa sent at 4/25/2024  1:38 PM CDT -----  Contact: [phone]  1MEDICALADVICE     Patient is calling for Medical Advice regarding:speak to Arleth     How long has patient had these symptoms:    Pharmacy name and phone#:  [id number] - [place] - [address]  [address]  [place] [address]  Phone: [phone] Fax: [phone]         Would like response via [facility]:  no     Comments:  She sattes there was suppose to be 2 prescriptions called in for for the Nausea and a stool softener she does not know the names of them please give return call

## 2024-04-29 ENCOUNTER — TELEPHONE (OUTPATIENT)
Dept: PRIMARY CARE CLINIC | Facility: CLINIC | Age: 69
End: 2024-04-29
Payer: MEDICARE

## 2024-04-29 NOTE — TELEPHONE ENCOUNTER
Spoke with patient to let her know as soon as Dr. Saeed looks at her labs I will fax her clearance

## 2024-04-29 NOTE — TELEPHONE ENCOUNTER
----- Message from Bev Zamarripa sent at 4/29/2024  4:23 PM CDT -----  Contact: [phone]  Patient is returning a phone call.  Who left a message for the patient: Arleth Forte MA  Does patient know what this is regarding:  yes   Would you like a call back, or a response through your [facility] portal?:   call back   Comments:

## 2024-04-29 NOTE — TELEPHONE ENCOUNTER
----- Message from Belinda Rodarte sent at 4/29/2024  4:01 PM CDT -----  Contact: Patient, [phone]  Calling to request the pre op clearance faxed to [phone] attn Elena. Thanks.

## 2024-05-06 ENCOUNTER — TELEPHONE (OUTPATIENT)
Dept: PRIMARY CARE CLINIC | Facility: CLINIC | Age: 69
End: 2024-05-06
Payer: MEDICARE

## 2024-05-06 NOTE — TELEPHONE ENCOUNTER
----- Message from Bev Zamarripa sent at 5/2/2024  4:22 PM CDT -----  Contact: [phone]  Patient is returning a phone call.  Who left a message for the patient: Arleth  Does patient know what this is regarding:  yes   Would you like a call back, or a response through your [facility] portal?:   call back   Comments:

## 2024-06-05 DIAGNOSIS — R11.0 NAUSEA: ICD-10-CM

## 2024-06-05 RX ORDER — HYDROXYZINE PAMOATE 25 MG/1
25 CAPSULE ORAL 4 TIMES DAILY PRN
Qty: 90 CAPSULE | Refills: 1 | OUTPATIENT
Start: 2024-06-05

## 2024-06-05 NOTE — TELEPHONE ENCOUNTER
Refill Routing Note   Medication(s) are not appropriate for processing by [facility] for the following reason(s):        Outside of protocol    [facility] action(s):  Route               Appointments  past 12m or future 3m with PCP    Date Provider   Last Visit   4/24/2024 Sinan Saeed MD   Next Visit   Visit date not found Sinan Saeed MD   ED visits in past 90 days: 0        Note composed:3:54 PM 06/05/2024

## 2024-06-20 DIAGNOSIS — H81.4 VERTIGO OF CENTRAL ORIGIN: ICD-10-CM

## 2024-06-20 RX ORDER — MECLIZINE HYDROCHLORIDE 25 MG/1
TABLET ORAL
Qty: 16 TABLET | Refills: 2 | Status: SHIPPED | OUTPATIENT
Start: 2024-06-20

## 2024-06-20 NOTE — TELEPHONE ENCOUNTER
No care due was identified.  [facility] Embedded Care Due Messages. Reference number: [id number].   6/20/2024 3:51:33 PM CDT

## 2024-06-27 DIAGNOSIS — Z78.0 MENOPAUSE: ICD-10-CM

## 2024-07-31 DIAGNOSIS — R11.0 NAUSEA: ICD-10-CM

## 2024-07-31 RX ORDER — HYDROXYZINE PAMOATE 25 MG/1
25 CAPSULE ORAL 4 TIMES DAILY PRN
Qty: 90 CAPSULE | Refills: 1 | Status: SHIPPED | OUTPATIENT
Start: 2024-07-31

## 2024-08-06 DIAGNOSIS — J44.9 CHRONIC OBSTRUCTIVE PULMONARY DISEASE, UNSPECIFIED COPD TYPE: ICD-10-CM

## 2024-08-06 RX ORDER — BUDESONIDE AND FORMOTEROL FUMARATE DIHYDRATE 160; 4.5 UG/1; UG/1
2 AEROSOL RESPIRATORY (INHALATION) EVERY 12 HOURS
Qty: 30.6 G | Refills: 2 | Status: SHIPPED | OUTPATIENT
Start: 2024-08-06

## 2024-08-09 ENCOUNTER — PATIENT OUTREACH (OUTPATIENT)
Dept: ADMINISTRATIVE | Facility: HOSPITAL | Age: 69
End: 2024-08-09
Payer: MEDICARE

## 2024-08-12 DIAGNOSIS — I25.10 CORONARY ARTERY DISEASE: ICD-10-CM

## 2024-08-12 RX ORDER — METOPROLOL SUCCINATE 25 MG/1
TABLET, EXTENDED RELEASE ORAL
Qty: 180 TABLET | Refills: 3 | Status: SHIPPED | OUTPATIENT
Start: 2024-08-12

## 2024-09-10 ENCOUNTER — TELEPHONE (OUTPATIENT)
Dept: PRIMARY CARE CLINIC | Facility: CLINIC | Age: 69
End: 2024-09-10
Payer: MEDICARE

## 2024-09-10 NOTE — TELEPHONE ENCOUNTER
----- Message from Deandre Morin sent at 9/10/2024  4:34 PM CDT -----  Regarding: RX Discussion  Contact: Pt [phone]  1MEDICALADVICE     Patient is calling for Medical Advice regarding: Patient called because she has questions regarding XARELTO 20 mg Tab. She wanted to speak to nurse about this medication.    How long has patient had these symptoms:    Pharmacy name and phone#:    Patient wants a call back or thru [facility]: Call    Comments:    Please advise patient replies from provider may take up to 48 hours.

## 2024-09-17 DIAGNOSIS — R11.0 NAUSEA: ICD-10-CM

## 2024-09-17 RX ORDER — HYDROXYZINE PAMOATE 25 MG/1
25 CAPSULE ORAL 4 TIMES DAILY PRN
Qty: 90 CAPSULE | Refills: 1 | Status: SHIPPED | OUTPATIENT
Start: 2024-09-17

## 2024-09-18 DIAGNOSIS — I25.10 CORONARY ARTERY DISEASE: ICD-10-CM

## 2024-09-18 NOTE — TELEPHONE ENCOUNTER
----- Message from Bev Zamarripa sent at 9/18/2024  1:22 PM CDT -----  Contact: [phone]  Requesting an RX refill or new RX.    Is this a refill or new RX: refill    RX name and strength (copy/paste from chart):  XARELTO 20 mg Tab    Is this a 30 day or 90 day RX: 30    Pharmacy name and phone # (copy/paste from chart):    [id number] - [place] - [address]  [address]  [place] [address]  Phone: [phone] Fax: [phone]       The doctors have asked that we provide their patients with the following 2 reminders -- prescription refills can take up to 72 hours, and a friendly reminder that in the future you can use your [facility] account to request refills: yes     Requesting an RX refill or new RX.    Is this a refill or new RX: refill    RX name and strength (copy/paste from chart):  esomeprazole (NEXIUM) 40 MG capsule    Is this a 30 day or 90 day RX: 90    Pharmacy name and phone # (copy/paste from chart):    [id number] - [place], [place] - [address]  [address]  [place] [address]  Phone: [phone] Fax: [phone]       The doctors have asked that we provide their patients with the following 2 reminders -- prescription refills can take up to 72 hours, and a friendly reminder that in the future you can use your [facility] account to request refills: yes

## 2024-09-19 ENCOUNTER — TELEPHONE (OUTPATIENT)
Dept: PRIMARY CARE CLINIC | Facility: CLINIC | Age: 69
End: 2024-09-19
Payer: MEDICARE

## 2024-09-19 NOTE — TELEPHONE ENCOUNTER
----- Message from Echo Brown sent at 9/19/2024 12:00 PM CDT -----  Contact: [phone]@patient  Requesting an RX refill or new RX.esomeprazole (NEXIUM) 40 MG capsule    Is this a refill or new RX: refill     RX name and strength (copy/paste from chart):      Is this a 30 day or 90 day RX:     Pharmacy name and phone #   [id number]  [place] - [address]        The doctors have asked that we provide their patients with the following 2 reminders -- prescription refills can take up to 72 hours, and a friendly reminder that in the future you can use your [facility] account to request refills:

## 2024-09-19 NOTE — TELEPHONE ENCOUNTER
Left voice message that Esomeprazole was sent in in 1/2024 for a year supply.  She just has to call [facility] for refill

## 2024-09-21 DIAGNOSIS — H81.4 VERTIGO OF CENTRAL ORIGIN: ICD-10-CM

## 2024-09-21 NOTE — TELEPHONE ENCOUNTER
No care due was identified.  [facility] Embedded Care Due Messages. Reference number: [id number].   9/21/2024 12:49:31 AM CDT

## 2024-09-23 RX ORDER — MECLIZINE HYDROCHLORIDE 25 MG/1
TABLET ORAL
Qty: 16 TABLET | Refills: 2 | Status: SHIPPED | OUTPATIENT
Start: 2024-09-23

## 2024-11-08 DIAGNOSIS — R11.0 NAUSEA: ICD-10-CM

## 2024-11-11 RX ORDER — HYDROXYZINE PAMOATE 25 MG/1
25 CAPSULE ORAL 4 TIMES DAILY PRN
Qty: 90 CAPSULE | Refills: 1 | Status: SHIPPED | OUTPATIENT
Start: 2024-11-11

## 2024-11-20 DIAGNOSIS — K21.9 GASTROESOPHAGEAL REFLUX DISEASE, UNSPECIFIED WHETHER ESOPHAGITIS PRESENT: ICD-10-CM

## 2024-11-20 RX ORDER — ESOMEPRAZOLE MAGNESIUM 40 MG/1
40 CAPSULE, DELAYED RELEASE ORAL
Qty: 90 CAPSULE | Refills: 1 | Status: SHIPPED | OUTPATIENT
Start: 2024-11-20

## 2024-11-20 NOTE — TELEPHONE ENCOUNTER
No care due was identified.  [facility] Embedded Care Due Messages. Reference number: [id number].   11/20/2024 1:13:19 PM CST

## 2024-11-20 NOTE — TELEPHONE ENCOUNTER
Refill Decision Note   Stephania Salmeron  is requesting a refill authorization.  Brief Assessment and Rationale for Refill:  Approve     Medication Therapy Plan:         Comments:     Note composed:5:11 PM 11/20/2024             Appointments     Last Visit   4/24/2024 Sinan Saeed MD   Next Visit   12/9/2024 Sinan Saeed MD

## 2024-12-09 ENCOUNTER — OFFICE VISIT (OUTPATIENT)
Dept: PRIMARY CARE CLINIC | Facility: CLINIC | Age: 69
End: 2024-12-09
Payer: MEDICARE

## 2024-12-09 VITALS
WEIGHT: 189.69 LBS | SYSTOLIC BLOOD PRESSURE: 126 MMHG | DIASTOLIC BLOOD PRESSURE: 64 MMHG | RESPIRATION RATE: 18 BRPM | OXYGEN SATURATION: 96 % | BODY MASS INDEX: 28.75 KG/M2 | HEIGHT: 68 IN | HEART RATE: 83 BPM

## 2024-12-09 DIAGNOSIS — Z79.899 OTHER LONG TERM (CURRENT) DRUG THERAPY: ICD-10-CM

## 2024-12-09 DIAGNOSIS — F41.9 ANXIETY AND DEPRESSION: ICD-10-CM

## 2024-12-09 DIAGNOSIS — R29.898 WEAKNESS OF BOTH LOWER EXTREMITIES: Primary | ICD-10-CM

## 2024-12-09 DIAGNOSIS — H81.4 VERTIGO OF CENTRAL ORIGIN: ICD-10-CM

## 2024-12-09 DIAGNOSIS — F32.A ANXIETY AND DEPRESSION: ICD-10-CM

## 2024-12-09 DIAGNOSIS — R29.6 RECURRENT FALLS: ICD-10-CM

## 2024-12-09 DIAGNOSIS — R41.3 MEMORY LOSS: ICD-10-CM

## 2024-12-09 DIAGNOSIS — R25.1 TREMOR: ICD-10-CM

## 2024-12-09 DIAGNOSIS — R11.0 NAUSEA: ICD-10-CM

## 2024-12-09 DIAGNOSIS — Z95.1 S/P CABG (CORONARY ARTERY BYPASS GRAFT): ICD-10-CM

## 2024-12-09 DIAGNOSIS — K21.9 GASTROESOPHAGEAL REFLUX DISEASE, UNSPECIFIED WHETHER ESOPHAGITIS PRESENT: ICD-10-CM

## 2024-12-09 PROCEDURE — 3078F DIAST BP <80 MM HG: CPT | Mod: HCNC,CPTII,S$GLB, | Performed by: STUDENT IN AN ORGANIZED HEALTH CARE EDUCATION/TRAINING PROGRAM

## 2024-12-09 PROCEDURE — 3044F HG A1C LEVEL LT 7.0%: CPT | Mod: HCNC,CPTII,S$GLB, | Performed by: STUDENT IN AN ORGANIZED HEALTH CARE EDUCATION/TRAINING PROGRAM

## 2024-12-09 PROCEDURE — 3074F SYST BP LT 130 MM HG: CPT | Mod: HCNC,CPTII,S$GLB, | Performed by: STUDENT IN AN ORGANIZED HEALTH CARE EDUCATION/TRAINING PROGRAM

## 2024-12-09 PROCEDURE — 3008F BODY MASS INDEX DOCD: CPT | Mod: HCNC,CPTII,S$GLB, | Performed by: STUDENT IN AN ORGANIZED HEALTH CARE EDUCATION/TRAINING PROGRAM

## 2024-12-09 PROCEDURE — 1101F PT FALLS ASSESS-DOCD LE1/YR: CPT | Mod: HCNC,CPTII,S$GLB, | Performed by: STUDENT IN AN ORGANIZED HEALTH CARE EDUCATION/TRAINING PROGRAM

## 2024-12-09 PROCEDURE — 1159F MED LIST DOCD IN RCRD: CPT | Mod: HCNC,CPTII,S$GLB, | Performed by: STUDENT IN AN ORGANIZED HEALTH CARE EDUCATION/TRAINING PROGRAM

## 2024-12-09 PROCEDURE — 99214 OFFICE O/P EST MOD 30 MIN: CPT | Mod: HCNC,S$GLB,, | Performed by: STUDENT IN AN ORGANIZED HEALTH CARE EDUCATION/TRAINING PROGRAM

## 2024-12-09 PROCEDURE — 99999 PR PBB SHADOW E&M-EST. PATIENT-LVL V: CPT | Mod: PBBFAC,HCNC,, | Performed by: STUDENT IN AN ORGANIZED HEALTH CARE EDUCATION/TRAINING PROGRAM

## 2024-12-09 PROCEDURE — 3288F FALL RISK ASSESSMENT DOCD: CPT | Mod: HCNC,CPTII,S$GLB, | Performed by: STUDENT IN AN ORGANIZED HEALTH CARE EDUCATION/TRAINING PROGRAM

## 2024-12-09 PROCEDURE — 1126F AMNT PAIN NOTED NONE PRSNT: CPT | Mod: HCNC,CPTII,S$GLB, | Performed by: STUDENT IN AN ORGANIZED HEALTH CARE EDUCATION/TRAINING PROGRAM

## 2024-12-09 PROCEDURE — 1160F RVW MEDS BY RX/DR IN RCRD: CPT | Mod: HCNC,CPTII,S$GLB, | Performed by: STUDENT IN AN ORGANIZED HEALTH CARE EDUCATION/TRAINING PROGRAM

## 2024-12-09 NOTE — PROGRESS NOTES
Assessment:       1. Weakness of both lower extremities    2. Vertigo of central origin    3. Tremor    4. Memory loss    5. Recurrent falls    6. Gastroesophageal reflux disease, unspecified whether esophagitis present    7. Nausea    8. S/P CABG (coronary artery bypass graft)    9. Anxiety and depression    10. Other long term (current) drug therapy           Plan:     Assessment & Plan    IMPRESSION:  - Assessed patient's reported symptoms of dizziness, gait issues, tremor, and memory concerns  - Evaluated for Parkinson's-like features, noting no significant cog-wheeling in joints  - Considered impact of current medications (Seroquel, Ativan) on fall risk and confusion  - Assessed strength, noting weakness in hamstrings (3.5/5) compared to other muscle groups (4-4.5/5)  - Reviewed recent weight loss progress (28 lbs in 10 months)  - Determined neurologist consultation appropriate for further evaluation of symptoms    DIZZINESS, GAIT ABNORMALITIES, AND TREMOR:  - Discussed importance of maintaining strength, especially in legs, for overall stability and function.  - Educated on the spectrum of Parkinsonian features and potential benefits of early intervention.  - Recommend engaging in regular lower extremity stretches and strengthening exercises.  - Stephania can consider implementing chair yoga for gentle exercise.  - Referred to neurologist for evaluation of dizziness, gait issues, tremor, and memory concerns.  - Consider referral to Dr. Howard or Dr. Hernández for neurological assessment.    MILD COGNITIVE IMPAIRMENT:  - Educated on the spectrum of Parkinsonian features and potential benefits of early intervention.  - Referred to neurologist for evaluation of dizziness, gait issues, tremor, and memory concerns.    GENERALIZED ANXIETY DISORDER AND MAJOR DEPRESSIVE DISORDER:  - Explained pharmacokinetics of Seroquel, including peak action time and duration of effect.  - Recommend adjusting sleep routine: take  Seroquel earlier (around 9-10 PM) to align with its pharmacokinetics.  - Stephania to limit TV use before bedtime to improve sleep hygiene.    WEIGHT MANAGEMENT AND HYPERLIPIDEMIA:  - Stephania to continue current weight loss efforts through dietary modifications.    BREAST CANCER SCREENING:  - Mammogram ordered (pending confirmation regarding compatibility with defibrillator).    PRESENCE OF CARDIAC DEFIBRILLATOR:  - Mammogram ordered (pending confirmation regarding compatibility with defibrillator).    FOLLOW-UP:  - Follow up after neurologist consultation to discuss findings and treatment plan.             Weakness of both lower extremities  -     CBC Auto Differential; Future; Expected date: 12/09/2024    Vertigo of central origin  -     Ambulatory referral/consult to Neurology; Future; Expected date: 12/16/2024  -     CBC Auto Differential; Future; Expected date: 12/09/2024  -     Comprehensive Metabolic Panel; Future; Expected date: 12/09/2024  -     Lipid Panel; Future; Expected date: 12/09/2024  -     Hemoglobin A1C; Future; Expected date: 12/09/2024  -     TSH; Future; Expected date: 12/09/2024    Tremor  -     Ambulatory referral/consult to Neurology; Future; Expected date: 12/16/2024    Memory loss  -     Ambulatory referral/consult to Neurology; Future; Expected date: 12/16/2024  -     CBC Auto Differential; Future; Expected date: 12/09/2024  -     Comprehensive Metabolic Panel; Future; Expected date: 12/09/2024  -     Lipid Panel; Future; Expected date: 12/09/2024  -     Hemoglobin A1C; Future; Expected date: 12/09/2024  -     TSH; Future; Expected date: 12/09/2024    Recurrent falls    Gastroesophageal reflux disease, unspecified whether esophagitis present    Nausea    S/P CABG (coronary artery bypass graft)    Anxiety and depression    Other long term (current) drug therapy  -     CBC Auto Differential; Future; Expected date: 12/09/2024  -     Comprehensive Metabolic Panel; Future; Expected date: 12/09/2024  -  "    Lipid Panel; Future; Expected date: 12/09/2024  -     Hemoglobin A1C; Future; Expected date: 12/09/2024  -     TSH; Future; Expected date: 12/09/2024                This note was generated with the assistance of ambient listening technology. Verbal consent was obtained by the patient and accompanying visitor(s) for the recording of patient appointment to facilitate this note. I attest to having reviewed and edited the generated note for accuracy, though some syntax or spelling errors may persist. Please contact the author of this note for any clarification.      Subjective:           Patient ID: Stephania Salmeron   Age:  69 y.o.  Sex: female     Chief Complaint:   Dizziness and Nausea      History of Present Illness:    Stephania Salmeron is a 69 y.o. female who presents today with a chief complaint of Dizziness and Nausea  .        History of Present Illness    CHIEF COMPLAINT:  Stephania presents today for dizziness and gait issues.    DIZZINESS AND GAIT ISSUES:  She experiences dizziness in various situations, occurring consistently (80% of the time) when bending over, and intermittently when standing up or looking to the side. Associated symptoms include nausea, difficulty speaking (described as sounding "drunk"), and unsteady gait (appearing intoxicated). Her walking pattern is characterized by small steps and frequent downward glances. She reports difficulty with stairs, particularly the four steps at her home, using a handrail for support. Her last physical therapy session for gait issues was approximately a year ago.    TREMORS:  She experiences mild but noticeable tremors, characterized by shaking. She reports difficulty holding objects, particularly when interacting with others who also have tremors, as it tends to accentuate the shaking. She expresses difficulty understanding her own handwriting due to the tremors.    COGNITIVE CONCERNS:  She reports experiencing forgetfulness, often forgetting things immediately " after turning around. She expresses concern about potentially needing to be evaluated for Parkinson's due to symptoms of tremors, illegible handwriting, and forgetfulness.    FAMILY HISTORY:  She reports a family history of Parkinson's disease, with a cousin recently diagnosed. She also mentions a family history of dementia.    MEDICAL HISTORY:  She has a history of lung cancer and reports experiencing shortness of breath.    MEDICATIONS:  Current medications include metoprolol for hypertension, Wellbutrin 153 mg daily, Seroquel 300 mg at night for sleep, Ativan 1 mg 3 times daily for anxiety (sometimes forgets doses), Xarelto (rivaroxaban) for anticoagulation, and pregabalin (Lyrica) as needed for headaches, knee pain, and shoulder pain.    SLEEP ISSUES:  She reports irregular sleep patterns, often going to sleep at 4 AM and sleeping throughout the day. She experiences difficulty falling asleep despite taking prescribed medication, which she typically takes after 1 AM. This sleep pattern has persisted for years. She denies that the medication helps her fall asleep, despite others observing that she falls asleep quickly after taking it.    WEIGHT AND DIET:  She reports losing 28 lbs over the course of 10 months. She acknowledges having irregular eating habits, not consistently consuming three meals a day. She admits to not eating healthy foods and avoiding vegetables, with the exception of lettuce.      ROS:  General: +weight loss  Respiratory: +shortness of breath  Gastrointestinal: +nausea  Neurological: +dizziness, +tremors  Psychiatric: +sleep difficulty           Review of Systems   Constitutional:  Positive for unexpected weight change. Negative for fever.   HENT:  Negative for congestion, nosebleeds, postnasal drip and sore throat.    Eyes:  Positive for visual disturbance.   Respiratory:  Positive for shortness of breath. Negative for cough, chest tightness and wheezing.    Cardiovascular:  Negative for  "chest pain, palpitations and leg swelling.   Gastrointestinal:  Positive for nausea. Negative for abdominal distention, constipation and diarrhea.   Genitourinary:  Negative for difficulty urinating and dysuria.   Musculoskeletal:  Negative for back pain and gait problem.   Skin:  Negative for pallor and rash.   Neurological:  Positive for tremors, weakness and headaches. Negative for numbness.   Psychiatric/Behavioral:  Positive for sleep disturbance. Negative for agitation. The patient is not nervous/anxious.            Objective:        Vitals:    12/09/24 1411   BP: 126/64   BP Location: Right arm   Patient Position: Sitting   Pulse: 83   Resp: 18   SpO2: 96%   Weight: 86 kg (189 lb 11.3 oz)   Height: 5' 8" (1.727 m)       Body mass index is 28.84 kg/m².      Physical Exam  Vitals reviewed.   Constitutional:       General: She is not in acute distress.     Appearance: She is obese.   HENT:      Head: Normocephalic and atraumatic.      Right Ear: Tympanic membrane and external ear normal.      Left Ear: Tympanic membrane and external ear normal.      Nose: No rhinorrhea.      Mouth/Throat:      Mouth: Mucous membranes are moist.      Pharynx: No posterior oropharyngeal erythema.   Eyes:      Extraocular Movements: Extraocular movements intact.      Conjunctiva/sclera: Conjunctivae normal.   Cardiovascular:      Rate and Rhythm: Normal rate and regular rhythm.      Pulses: Normal pulses.      Heart sounds: No murmur heard.  Pulmonary:      Effort: No respiratory distress.      Breath sounds: No wheezing.      Comments: coughing  Abdominal:      Tenderness: There is no right CVA tenderness or left CVA tenderness.   Musculoskeletal:      Right lower leg: No edema.      Left lower leg: No edema.   Lymphadenopathy:      Cervical: No cervical adenopathy.   Skin:     Capillary Refill: Capillary refill takes less than 2 seconds.      Coloration: Skin is not jaundiced.      Findings: No bruising.   Neurological:      " General: No focal deficit present.      Mental Status: She is alert and oriented to person, place, and time.      Motor: Weakness present.      Gait: Gait abnormal.   Psychiatric:         Mood and Affect: Mood normal.         Physical Exam    Neurological: Slight tremor observed.  MSK: Spine - Hip: Hip flexion strength: 4/5.  MSK: Knee - Left: Knee extension strength: 4.5/5. Knee flexion strength: 3.5/5. Grinding sensation in knee joint.  MSK: Knee - Right: Knee extension strength: 4.5/5. Knee flexion strength: 3.5/5.               Past Medical History:   Diagnosis Date    Acute coronary syndrome     Atrial fibrillation     Clotting disorder     COPD (chronic obstructive pulmonary disease)     Coronary artery disease     COVID-19 01/18/2021    Required hospitalization d/t pneumonia and hypoxia    GERD (gastroesophageal reflux disease)     Hyperlipidemia     Hypertension     ICD (implantable cardioverter-defibrillator) in place     [employer] Evera XT ICD - Model JGBM8A5 (SN: [id number]), 5568-45 (SN: [id number]), 6932-65 (SN: [id number])    Occipital stroke     Left occipital lobe    Quadrantanopia, right     Right lower quadrant       Lab Results   Component Value Date     04/28/2024    K 4.3 04/28/2024     04/28/2024    CO2 20 (L) 04/28/2024    BUN 19 04/28/2024    CREATININE 0.9 04/28/2024    ANIONGAP 12 04/28/2024     Lab Results   Component Value Date    HGBA1C 5.3 04/28/2024     Lab Results   Component Value Date     (H) 10/03/2023     (H) 04/04/2023     (H) 12/15/2022       Lab Results   Component Value Date    WBC 5.29 04/28/2024    HGB 11.6 (L) 04/28/2024    HCT 39.5 04/28/2024    HCT 37 06/02/2022     04/28/2024    GRAN 2.7 04/28/2024    GRAN 50.5 04/28/2024     Lab Results   Component Value Date    CHOL 272 (H) 04/28/2024    HDL 42 04/28/2024    LDLCALC 195.2 (H) 04/28/2024    TRIG 174 (H) 04/28/2024        Outpatient Encounter Medications as of 12/9/2024    Medication Sig Dispense Refill    albuterol (PROVENTIL/VENTOLIN HFA) 90 mcg/actuation inhaler INHALE 2 PUFFS INTO THE LUNGS EVERY 6 (SIX) HOURS AS NEEDED FOR WHEEZING. RESCUE 54 g 1    aspirin (ECOTRIN) 81 MG EC tablet Take 81 mg by mouth once daily.      atorvastatin (LIPITOR) 40 MG tablet Take 1 tablet (40 mg total) by mouth once daily. 90 tablet 3    buPROPion (WELLBUTRIN XL) 150 MG TB24 tablet Take 450 mg by mouth once daily.      docusate sodium (COLACE) 100 MG capsule Take 1 capsule (100 mg total) by mouth 2 (two) times daily as needed for Constipation. 60 capsule 5    esomeprazole (NEXIUM) 40 MG capsule TAKE 1 CAPSULE BY MOUTH BEFORE BREAKFAST 90 capsule 1    hydrOXYzine pamoate (VISTARIL) 25 MG Cap TAKE 1 CAPSULE (25 MG TOTAL) BY MOUTH 4 (FOUR) TIMES DAILY AS NEEDED (NAUSEA). 90 capsule 1    LORazepam (ATIVAN) 1 MG tablet Take 1 mg by mouth 3 (three) times daily.      meclizine (ANTIVERT) 25 mg tablet TAKE AS DIRECTED UP TO 4 TABLETS PER WEEK OR 16 TABLETS PER MONTH 16 tablet 2    metoprolol succinate (TOPROL-XL) 25 MG 24 hr tablet TAKE 1 TABLET BY MOUTH TWICE A  tablet 3    pregabalin (LYRICA) 100 MG capsule Take 100 mg by mouth 3 (three) times daily.      QUEtiapine (SEROQUEL) 300 MG Tab Take 300 mg by mouth every evening.      rivaroxaban (XARELTO) 20 mg Tab Take 1 tablet (20 mg total) by mouth once daily. 90 tablet 3    SYMBICORT 160-4.5 mcg/actuation HFAA INHALE 2 PUFFS INTO THE LUNGS EVERY 12 (TWELVE) HOURS. CONTROLLER 30.6 g 2    tiotropium bromide (SPIRIVA RESPIMAT) 1.25 mcg/actuation inhaler Inhale 2 puffs into the lungs once daily. Controller 12 g 5    [DISCONTINUED] esomeprazole (NEXIUM) 40 MG capsule TAKE 1 CAPSULE BY MOUTH BEFORE BREAKFAST. 90 capsule 3    [DISCONTINUED] hydrOXYzine pamoate (VISTARIL) 25 MG Cap TAKE 1 CAPSULE (25 MG TOTAL) BY MOUTH 4 (FOUR) TIMES DAILY AS NEEDED (NAUSEA). 90 capsule 1     No facility-administered encounter medications on file as of 12/9/2024.

## 2024-12-21 DIAGNOSIS — H81.4 VERTIGO OF CENTRAL ORIGIN: ICD-10-CM

## 2024-12-21 NOTE — TELEPHONE ENCOUNTER
No care due was identified.  [facility] Embedded Care Due Messages. Reference number: [id number].   12/21/2024 2:00:18 PM CST

## 2024-12-23 RX ORDER — MECLIZINE HYDROCHLORIDE 25 MG/1
TABLET ORAL
Qty: 16 TABLET | Refills: 2 | Status: SHIPPED | OUTPATIENT
Start: 2024-12-23

## 2025-04-08 ENCOUNTER — PATIENT OUTREACH (OUTPATIENT)
Dept: ADMINISTRATIVE | Facility: HOSPITAL | Age: 70
End: 2025-04-08
Payer: MEDICARE

## 2025-04-08 DIAGNOSIS — Z12.31 BREAST CANCER SCREENING BY MAMMOGRAM: Primary | ICD-10-CM

## 2025-04-08 NOTE — PROGRESS NOTES
Health Maintenance Due   Topic Date Due    Mammogram  Never done    Shingles Vaccine (1 of 2) Never done    RSV Vaccine (Age 60+ and Pregnant patients) (1 - Risk 60-74 years 1-dose series) Never done    DEXA Scan  05/24/2024    Lipid Panel  04/28/2025     Immunizations - reviewed and updated   Care Everywhere - triggered   Care Teams - updated   Outreach - Mammogram gap report reviewed, order placed

## 2025-04-21 DIAGNOSIS — H81.4 VERTIGO OF CENTRAL ORIGIN: ICD-10-CM

## 2025-04-21 DIAGNOSIS — J44.9 CHRONIC OBSTRUCTIVE PULMONARY DISEASE, UNSPECIFIED COPD TYPE: ICD-10-CM

## 2025-04-21 RX ORDER — BUDESONIDE AND FORMOTEROL FUMARATE 160; 4.5 UG/1; UG/1
2 AEROSOL, METERED RESPIRATORY (INHALATION) EVERY 12 HOURS
Qty: 30.6 G | Refills: 2 | Status: SHIPPED | OUTPATIENT
Start: 2025-04-21

## 2025-04-21 RX ORDER — MECLIZINE HYDROCHLORIDE 25 MG/1
TABLET ORAL
Qty: 16 TABLET | Refills: 11 | Status: SHIPPED | OUTPATIENT
Start: 2025-04-21

## 2025-04-21 NOTE — TELEPHONE ENCOUNTER
Care Due:                  Date            Visit Type   Department     Provider  --------------------------------------------------------------------------------                                EP -                              PRIMARY      [facility]  Last Visit: 12-      CARE ([facility])   PRIMARY CARE   Sinan Saeed  Next Visit: None Scheduled  None         None Found                                                            Last  Test          Frequency    Reason                     Performed    Due Date  --------------------------------------------------------------------------------    CBC.........  12 months..  rivaroxaban..............  04- 04-    CMP.........  12 months..  atorvastatin, rivaroxaban  04- 04-    Lipid Panel.  12 months..  atorvastatin.............  04- 04-    [facility] Embedded Care Due Messages. Reference number: [id number].   4/21/2025 2:54:14 PM CDT

## 2025-04-21 NOTE — TELEPHONE ENCOUNTER
Refill Routing Note   Medication(s) are not appropriate for processing by [facility] for the following reason(s):        Outside of protocol    [facility] action(s):  Defer  Approve             Appointments  past 12m or future 3m with PCP    Date Provider   Last Visit   12/9/2024 Sinan Saeed MD   Next Visit   Visit date not found Sinan Saeed MD   ED visits in past 90 days: 0        Note composed:5:13 PM 04/21/2025

## 2025-04-22 ENCOUNTER — TELEPHONE (OUTPATIENT)
Dept: PRIMARY CARE CLINIC | Facility: CLINIC | Age: 70
End: 2025-04-22
Payer: MEDICARE

## 2025-04-22 NOTE — TELEPHONE ENCOUNTER
----- Message from Janay sent at 4/22/2025 10:44 AM CDT -----  Contact: Dhiraj /occupational therapy  Dhiraj with occupational Patient report that she slip from her bed on Saturday, her family assisted when she was on the floor, report 4/10  right rib pain, and decline to seek medical attention.  # . Thank you.

## 2025-04-29 DIAGNOSIS — Z95.1 S/P CABG (CORONARY ARTERY BYPASS GRAFT): ICD-10-CM

## 2025-04-29 RX ORDER — ATORVASTATIN CALCIUM 40 MG/1
40 TABLET, FILM COATED ORAL
Qty: 90 TABLET | Refills: 3 | Status: SHIPPED | OUTPATIENT
Start: 2025-04-29

## 2025-04-29 NOTE — TELEPHONE ENCOUNTER
No care due was identified.  [facility] Embedded Care Due Messages. Reference number: [id number].   4/29/2025 4:56:47 AM CDT

## 2025-04-29 NOTE — TELEPHONE ENCOUNTER
Refill Routing Note   Medication(s) are not appropriate for processing by [facility] for the following reason(s):        Required labs outdated    [facility] action(s):  Defer               Appointments  past 12m or future 3m with PCP    Date Provider   Last Visit   12/9/2024 Sinan Saeed MD   Next Visit   Visit date not found Sinan Saeed MD   ED visits in past 90 days: 0        Note composed:4:57 AM 04/29/2025

## 2025-05-28 ENCOUNTER — OFFICE VISIT (OUTPATIENT)
Dept: PRIMARY CARE CLINIC | Facility: CLINIC | Age: 70
End: 2025-05-28
Payer: MEDICARE

## 2025-05-28 VITALS
BODY MASS INDEX: 27.39 KG/M2 | WEIGHT: 180.75 LBS | HEIGHT: 68 IN | OXYGEN SATURATION: 96 % | SYSTOLIC BLOOD PRESSURE: 98 MMHG | DIASTOLIC BLOOD PRESSURE: 60 MMHG | HEART RATE: 67 BPM | TEMPERATURE: 99 F | RESPIRATION RATE: 16 BRPM

## 2025-05-28 DIAGNOSIS — N30.00 ACUTE CYSTITIS WITHOUT HEMATURIA: Primary | ICD-10-CM

## 2025-05-28 DIAGNOSIS — R41.3 MEMORY LOSS: ICD-10-CM

## 2025-05-28 DIAGNOSIS — R39.9 UTI SYMPTOMS: ICD-10-CM

## 2025-05-28 PROCEDURE — 99999 PR PBB SHADOW E&M-EST. PATIENT-LVL V: CPT | Mod: PBBFAC,,,

## 2025-05-28 RX ORDER — NITROFURANTOIN 25; 75 MG/1; MG/1
100 CAPSULE ORAL 2 TIMES DAILY
Qty: 10 CAPSULE | Refills: 0 | Status: SHIPPED | OUTPATIENT
Start: 2025-05-28 | End: 2025-06-02

## 2025-05-28 RX ORDER — NITROFURANTOIN 25; 75 MG/1; MG/1
100 CAPSULE ORAL 2 TIMES DAILY
Qty: 10 CAPSULE | Refills: 0 | Status: CANCELLED | OUTPATIENT
Start: 2025-05-28 | End: 2025-06-02

## 2025-05-28 NOTE — PATIENT INSTRUCTIONS
PLEASE READ YOUR DISCHARGE INSTRUCTIONS ENTIRELY AS IT CONTAINS IMPORTANT INFORMATION.  URINARY TRACT INFECTION PREVENTION:    - increase fluid intake. Women with recurrent urinary tract infections should drink 2-3 liters of fluid per day.    - Avoid bath salts, bath bombs, bubble baths in prevention of recurrence.     - avoid spermicide use in intercourse. Use of a spermicide with intercourse has been shown to increase the risk of UTIs.     - avoid sex. The frequency of sexual intercourse is a strong factor for recurrent infections.     - urinate after sex routinely    - initiate regular use of probiotics such as culturelle or lactobacillus acidophilus which can protect the vagina from bad bacterial colonization.     - avoid use of nylon undergarments. Use of cotton undergarments can assist in prevention of further infections.     -Take the antibiotics to completion.     -Drink plenty of fluids, wipe front to back, take showers not baths, no scented soaps, wear breathable cotton underwear, urinate after sexual intercourse.     -Please go to Urgent Care or the ER for worsening symptoms including fever, worsening flank pain, vomiting, etc.       -Please return or see your primary care doctor if you develop new or worsening symptoms.       -If you continue to have urinary tract infections despite practicing good prevention, please talk to your doctor about other options such as antibiotic prophylaxis or seeing a urologist.

## 2025-05-28 NOTE — PROGRESS NOTES
Clinic Note  5/28/2025      Subjective:       Patient ID:  Stephania is a 70 y.o. female being seen for an established visit.    Chief Complaint: Urinary Tract Infection    Patient presents with her daughter today     Patient has been having concern for memory loss and changes in behavior since last visit with PCP on 12/9/2024. Neurology consult was placed however patient missed appointment possibly due to hospital admission. She is also due for HM items .     Patient unable to urinate in clinic. Plans to return to lab for urine     Discussed with patient and daughter will treat empirically due to UTI in the past as well as change in behavior.     Patient denies any other concerns today     Urinary Tract Infection   This is a new problem. The current episode started 1 to 4 weeks ago (about 10 days, forgetful and not herself per daughter). The pain is at a severity of 0/10. There has been no fever. Associated symptoms include behavior changes and flank pain (not painful but tender). Pertinent negatives include no frequency, hematuria, hesitancy, nausea or urgency. She has tried nothing for the symptoms. The treatment provided no relief. Her past medical history is significant for kidney stones and recurrent UTIs.       Review of Systems   Constitutional:  Negative for fever.   Respiratory:  Negative for shortness of breath.    Cardiovascular:  Negative for chest pain.   Gastrointestinal:  Negative for nausea.   Genitourinary:  Positive for flank pain (not painful but tender). Negative for frequency, hematuria, hesitancy and urgency.   Neurological:  Negative for dizziness and headaches.        Medication List with Changes/Refills   New Medications    NITROFURANTOIN, MACROCRYSTAL-MONOHYDRATE, (MACROBID) 100 MG CAPSULE    Take 1 capsule (100 mg total) by mouth 2 (two) times daily. for 5 days   Current Medications    ALBUTEROL (PROVENTIL/VENTOLIN HFA) 90 MCG/ACTUATION INHALER    INHALE 2 PUFFS INTO THE LUNGS EVERY 6 (SIX)  "HOURS AS NEEDED FOR WHEEZING. RESCUE    ASPIRIN (ECOTRIN) 81 MG EC TABLET    Take 81 mg by mouth once daily.    ATORVASTATIN (LIPITOR) 40 MG TABLET    TAKE 1 TABLET BY MOUTH EVERY DAY    BREYNA 160-4.5 MCG/ACTUATION HFAA    INHALE 2 PUFFS INTO THE LUNGS EVERY 12 (TWELVE) HOURS. CONTROLLER    BUPROPION (WELLBUTRIN XL) 150 MG TB24 TABLET    Take 450 mg by mouth once daily.    DOCUSATE SODIUM (COLACE) 100 MG CAPSULE    Take 1 capsule (100 mg total) by mouth 2 (two) times daily as needed for Constipation.    ESOMEPRAZOLE (NEXIUM) 40 MG CAPSULE    TAKE 1 CAPSULE BY MOUTH BEFORE BREAKFAST    HYDROXYZINE PAMOATE (VISTARIL) 25 MG CAP    TAKE 1 CAPSULE (25 MG TOTAL) BY MOUTH 4 (FOUR) TIMES DAILY AS NEEDED (NAUSEA).    LORAZEPAM (ATIVAN) 1 MG TABLET    Take 1 mg by mouth once daily.    MECLIZINE (ANTIVERT) 25 MG TABLET    TAKE AS DIRECTED UP TO 4 TABLETS PER WEEK OR 16 TABLETS PER MONTH    METOPROLOL SUCCINATE (TOPROL-XL) 25 MG 24 HR TABLET    TAKE 1 TABLET BY MOUTH TWICE A DAY    PREGABALIN (LYRICA) 100 MG CAPSULE    Take 100 mg by mouth 3 (three) times daily.    QUETIAPINE (SEROQUEL) 300 MG TAB    Take 300 mg by mouth every evening.    RIVAROXABAN (XARELTO) 20 MG TAB    Take 1 tablet (20 mg total) by mouth once daily.    TIOTROPIUM BROMIDE (SPIRIVA RESPIMAT) 1.25 MCG/ACTUATION INHALER    Inhale 2 puffs into the lungs once daily. Controller       Problem List[1]        Objective:      BP 98/60 (BP Location: Right arm, Patient Position: Sitting)   Pulse 67   Temp 98.6 °F (37 °C) (Oral)   Resp 16   Ht 5' 8" (1.727 m)   Wt 82 kg (180 lb 12.4 oz)   SpO2 96%   BMI 27.49 kg/m²   Estimated body mass index is 27.49 kg/m² as calculated from the following:    Height as of this encounter: 5' 8" (1.727 m).    Weight as of this encounter: 82 kg (180 lb 12.4 oz).  Physical Exam  Constitutional:       General: She is not in acute distress.     Appearance: Normal appearance. She is not ill-appearing.   HENT:      Head: Normocephalic " and atraumatic.   Eyes:      General:         Right eye: No discharge.         Left eye: No discharge.      Conjunctiva/sclera: Conjunctivae normal.   Pulmonary:      Effort: Pulmonary effort is normal.   Abdominal:      Tenderness: There is right CVA tenderness. There is no left CVA tenderness.   Musculoskeletal:         General: No deformity.   Skin:     Coloration: Skin is not jaundiced or pale.   Neurological:      General: No focal deficit present.      Mental Status: She is alert and oriented to person, place, and time.      Motor: Weakness present.      Gait: Gait abnormal (rolling walker).   Psychiatric:         Mood and Affect: Mood normal.         Behavior: Behavior normal.             Assessment and Plan:   -UTI   Tx empirically with Macrobid   Urine to be completed in lab when patient returns with specimen     -Memory loss  Schedule neurology appt     -HM:   Daughter plans to schedule mammogram and dexa scan       Problem List Items Addressed This Visit       Memory loss     Other Visit Diagnoses         Acute cystitis without hematuria    -  Primary    Relevant Medications    nitrofurantoin, macrocrystal-monohydrate, (MACROBID) 100 MG capsule      UTI symptoms        Relevant Orders    Urinalysis, Reflex to Urine Culture Urine, Clean Catch          Reviewed risks, benefits, and appropriate medication use prescribed  Discussed LS changes as appropriate   Reviewed cancer screening guidelines   Advised to keep speciality and follow up appointments as scheduled   Reviewed ER precautions   Verbalized understanding and is agreeable to plan      Follow Up:   Follow up if symptoms worsen or fail to improve, for already scheduled with PCP .    Other Orders Placed This Visit:  Orders Placed This Encounter   Procedures    Urinalysis, Reflex to Urine Culture Urine, Clean Catch           FAYE Mccollum              [1]   Patient Active Problem List  Diagnosis    Diarrhea    Anxiety and depression    Chronic  intestinal ischemia    Chronic bronchitis    S/P PTCA (percutaneous transluminal coronary angioplasty)    Old MI (myocardial infarction)    S/P CABG (coronary artery bypass graft)    AICD (automatic cardioverter/defibrillator) present    Paroxysmal atrial fibrillation    H/O: GI bleed    Coronary artery disease of native artery of native heart with stable angina pectoris    Hypoalbuminemia    PAD (peripheral artery disease)    Mesenteric ischemia due to arterial insufficiency    Bright red blood per rectum    Nausea vomiting and diarrhea    Generalized abdominal pain    Osteopenia of both hips    Chest pain concerning for angina    Gastroesophageal reflux disease    Tremor    Vertigo    Frequent falls    Balance disorder    Abnormal auditory perception    Tinnitus    Dysphagia    Choking in adult    Sensorineural hearing loss (SNHL) of both ears    Vertigo of central origin    Peripheral vertigo involving right ear    Memory loss    Esophageal web    Nonsustained ventricular tachycardia    Numbness and tingling of left arm and leg    Headache    Severe obesity (BMI 35.0-39.9) with comorbidity    Recurrent major depressive disorder, remission status unspecified

## 2025-06-04 DIAGNOSIS — R11.0 NAUSEA: ICD-10-CM

## 2025-06-04 RX ORDER — HYDROXYZINE PAMOATE 25 MG/1
25 CAPSULE ORAL 4 TIMES DAILY PRN
Qty: 90 CAPSULE | Refills: 1 | Status: SHIPPED | OUTPATIENT
Start: 2025-06-04

## 2025-06-17 ENCOUNTER — DOCUMENT SCAN (OUTPATIENT)
Dept: HOME HEALTH SERVICES | Facility: HOSPITAL | Age: 70
End: 2025-06-17
Payer: MEDICARE

## 2025-06-30 ENCOUNTER — PATIENT OUTREACH (OUTPATIENT)
Dept: ADMINISTRATIVE | Facility: HOSPITAL | Age: 70
End: 2025-06-30
Payer: MEDICARE

## 2025-06-30 NOTE — PROGRESS NOTES
Health Maintenance Due   Topic Date Due    Shingles Vaccine (1 of 2) Never done    RSV Vaccine (Age 60+ and Pregnant patients) (1 - Risk 60-74 years 1-dose series) Never done    DEXA Scan  05/24/2023    Lipid Panel  04/28/2025     Immunizations - reviewed and updated   Care Everywhere - triggered   Care Teams -   Outreach - Breast cancer gap report reviewed. Per chart, patient refused to schedule

## 2025-07-02 DIAGNOSIS — Z78.0 MENOPAUSE: ICD-10-CM

## 2025-07-15 ENCOUNTER — EXTERNAL HOME HEALTH (OUTPATIENT)
Dept: HOME HEALTH SERVICES | Facility: HOSPITAL | Age: 70
End: 2025-07-15
Payer: MEDICARE

## 2025-07-31 DIAGNOSIS — R11.0 NAUSEA: ICD-10-CM

## 2025-07-31 NOTE — TELEPHONE ENCOUNTER
Refill Routing Note   Medication(s) are not appropriate for processing by [facility] for the following reason(s):        Outside of protocol    [facility] action(s):  Route               Appointments  past 12m or future 3m with PCP    Date Provider   Last Visit   12/9/2024 Sinan Saeed MD   Next Visit   Visit date not found Sinan Saeed MD   ED visits in past 90 days: 0        Note composed:6:38 PM 07/31/2025

## 2025-08-01 RX ORDER — HYDROXYZINE PAMOATE 25 MG/1
25 CAPSULE ORAL 4 TIMES DAILY PRN
Qty: 90 CAPSULE | Refills: 1 | Status: SHIPPED | OUTPATIENT
Start: 2025-08-01

## 2025-08-06 DIAGNOSIS — I25.10 CORONARY ARTERY DISEASE: ICD-10-CM

## 2025-08-06 DIAGNOSIS — K21.9 GASTROESOPHAGEAL REFLUX DISEASE, UNSPECIFIED WHETHER ESOPHAGITIS PRESENT: ICD-10-CM

## 2025-08-06 RX ORDER — ESOMEPRAZOLE MAGNESIUM 40 MG/1
40 CAPSULE, DELAYED RELEASE ORAL
Qty: 90 CAPSULE | Refills: 2 | Status: SHIPPED | OUTPATIENT
Start: 2025-08-06

## 2025-08-06 RX ORDER — METOPROLOL SUCCINATE 25 MG/1
25 TABLET, EXTENDED RELEASE ORAL 2 TIMES DAILY
Qty: 180 TABLET | Refills: 1 | Status: SHIPPED | OUTPATIENT
Start: 2025-08-06

## 2025-08-06 NOTE — TELEPHONE ENCOUNTER
Care Due:                  Date            Visit Type   Department     Provider  --------------------------------------------------------------------------------                                EP -                              PRIMARY      [facility]  Last Visit: 12-      CARE ([facility])   PRIMARY CARE   Sinan Saeed  Next Visit: None Scheduled  None         None Found                                                            Last  Test          Frequency    Reason                     Performed    Due Date  --------------------------------------------------------------------------------    CBC.........  12 months..  rivaroxaban..............  04- 04-    CMP.........  12 months..  atorvastatin, rivaroxaban  04- 04-    Lipid Panel.  12 months..  atorvastatin.............  04- 04-    Health [facility] Embedded Care Due Messages. Reference number: [id number].   8/06/2025 12:16:41 AM CDT

## 2025-08-06 NOTE — TELEPHONE ENCOUNTER
Refill Routing Note   Medication(s) are not appropriate for processing by [facility] for the following reason(s):        No active prescription written by provider    [facility] action(s):  Defer             Appointments  past 12m or future 3m with PCP    Date Provider   Last Visit   12/9/2024 Sinan Saeed MD   Next Visit   8/6/2025 Sinan Saeed MD   ED visits in past 90 days: 0        Note composed:5:28 PM 08/06/2025

## 2025-08-06 NOTE — TELEPHONE ENCOUNTER
Provider Staff:  Action required for this patient    Requires labs      Please see care gap opportunities below in Care Due Message.    Thanks!  [facility]     Appointments      Date Provider   Last Visit   12/9/2024 Sinan Saeed MD   Next Visit   Visit date not found Sinan Saeed MD     Refill Decision Note   Stephania Salmeron  is requesting a refill authorization.  Brief Assessment and Rationale for Refill:  Approve     Medication Therapy Plan:        Comments:     Note composed:7:55 AM 08/06/2025

## 2025-08-06 NOTE — TELEPHONE ENCOUNTER
No care due was identified.  Health [facility] Embedded Care Due Messages. Reference number: [id number].   8/06/2025 5:26:10 AM CDT

## 2025-08-12 ENCOUNTER — PATIENT MESSAGE (OUTPATIENT)
Dept: INTERNAL MEDICINE | Facility: CLINIC | Age: 70
End: 2025-08-12
Payer: MEDICARE

## (undated) DEVICE — CATHETER DIAGNOSTIC DXTERITY 5FR JR4.0

## (undated) DEVICE — SHEATH INTRODUCER SLENDER 6FX10CM

## (undated) DEVICE — GUIDEWIRE J TIP EXCHANGE FIXED CORE 260

## (undated) DEVICE — CATHETER DIAGNOSTIC DXTERITY 5FR IMA

## (undated) DEVICE — CATHETER DIAGNOSTIC DXTERITY 5FR JL3.5

## (undated) DEVICE — SHEATH INTRODUCER PRECISION ACCESS 6FX10